# Patient Record
Sex: FEMALE
[De-identification: names, ages, dates, MRNs, and addresses within clinical notes are randomized per-mention and may not be internally consistent; named-entity substitution may affect disease eponyms.]

---

## 2017-12-09 NOTE — ED PDOC
Arrival/HPI





- General


Time Seen by Provider: 12/09/17 09:41


Historian: Patient





- History of Present Illness


Narrative History of Present Illness (Text): 





12/09/17 09:56


57 year old female, whose past medical history includes angina, multiple 

sclerosis, fibromyalgia, diabetes, hyperlipidemia, who presents complaining of 

intermittent midsternal chest pain associated with nausea, vomiting that began 

a week ago. She reports the pain radiates to her back and is similar to her 

past angina symptoms. Patient states she is visiting from [place] and traveled 

on 11/18/2017. She reports she has relief when taking her nitroglycerin, but 

did not have any with her at the time. She also states she her Hgb was 7 and 

missed an Iron treatment. Intermittent cough. Patient reports a fever at 100, 

but denies any chills, shortness of breath, diarrhea, urinary symptoms, neck 

pain, headache, dizziness, or any other complaints.  





PMD: Dr. Edwards in [place]





Time/Duration: 1 week


Symptom Onset: Gradual


Symptom Course: Intermittent


Activities at Onset: Light


Context: Home





Past Medical History





- Provider Review


Nursing Documentation Reviewed: Yes





- Travel History


Have you recently traveled outside [place] w/in the past 3 mons?: Yes


If Yes, travel location?: From [facility] to [place] (11/18/2017)





Family/Social History





- Physician Review


Nursing Documentation Reviewed: Yes


Family/Social History: Other (Cardiac history: Brother recent open heart 

surgery )





Allergies/Home Meds


Allergies/Adverse Reactions: 


Allergies





levofloxacin [From Levaquin] Allergy (Verified 12/09/17 09:59)


 RASH


iv dye Allergy (Uncoded 12/09/17 10:00)


 RASH








Home Medications: 


 Home Meds











 Medication  Instructions  Recorded  Confirmed


 


Aspirin [Ecotrin] 81 mg PO DAILY 12/09/17 12/09/17


 


Atropine/Diphenoxylate [Lonox 1 tab PO DAILY PRN 12/09/17 12/09/17





0.025 MG-2.5 MG]   


 


Clonazepam [Klonopin] 1 mg PO Q8 PRN 12/09/17 12/09/17


 


Insulin Human (NPH)/Regular 0 units SC AC PRN 12/09/17 12/09/17





[Novolin 70/30 (70/30 units/ml) 10   





ml]   


 


Losartan/Hydrochlorothiazide 1 tab PO BID 12/09/17 12/09/17





[Losartan-Hctz 100-12.5 mg Tab]   


 


Meloxicam [Mobic] 15 mg PO DAILY 12/09/17 12/09/17


 


Oxycodone HCl/Acetaminophen 1 tab PO Q6 PRN 12/09/17 12/09/17





[Percocet  mg Tablet]   


 


Pantoprazole [Protonix] 40 mg PO DAILY 12/09/17 12/09/17


 


amLODIPine [Norvasc] 10 mg PO DAILY 12/09/17 12/09/17














Review of Systems





- Physician Review


All systems were reviewed & negative as marked: Yes





- Review of Systems


Constitutional: Fevers.  absent: Other (Chills)


Respiratory: Cough.  absent: SOB


Cardiovascular: Chest Pain


Gastrointestinal: Nausea, Vomiting.  absent: Diarrhea


Genitourinary Female: absent: Dysuria, Frequency, Hematuria


Musculoskeletal: Back Pain.  absent: Neck Pain, Other (lower extremities edema)





Physical Exam


Vital Signs Reviewed: Yes


Vital Signs











  Temp Pulse Resp BP Pulse Ox


 


 12/09/17 11:36   88  18  141/98 H  98


 


 12/09/17 09:36  98.2 F  63  18  146/76  98











Temperature: Afebrile


Blood Pressure: Normal


Pulse: Regular


Respiratory Rate: Normal


Appearance: Positive for: Well-Appearing, Non-Toxic, Comfortable


Pain Distress: None


Mental Status: Positive for: Alert and Oriented X 3





- Systems Exam


Head: Present: Atraumatic, Normocephalic


Pupils: Present: PERRL


Extroacular Muscles: Present: EOMI


Conjunctiva: Present: Normal


Mouth: Present: Moist Mucous Membranes


Neck: Present: Normal Range of Motion


Respiratory/Chest: Present: Clear to Auscultation, Good Air Exchange, Other (

Right Chest wall port).  No: Respiratory Distress, Accessory Muscle Use


Cardiovascular: Present: Regular Rate and Rhythm, Normal S1, S2.  No: Murmurs


Abdomen: Present: Normal Bowel Sounds.  No: Tenderness, Distention, Peritoneal 

Signs


Back: Present: Normal Inspection


Upper Extremity: Present: Normal Inspection.  No: Cyanosis, Edema


Lower Extremity: Present: Normal Inspection.  No: Edema


Neurological: Present: GCS=15, CN II-XII Intact, Speech Normal


Skin: Present: Warm, Dry, Normal Color.  No: Rashes


Psychiatric: Present: Alert, Oriented x 3, Normal Insight, Normal Concentration





Medical Decision Making


ED Course and Treatment: 





12/09/17 09:56


Impression:


57 year old female presents complaining of midsternal chest pain that radiates 

to the back associated with nausea, vomiting, and cough for the past week. 





Differential Diagnosis included but are not limited to:  


R/O ACS. Lower probability for PE





Plan:


-- EKG 


-- Labs


-- Chest X-ray 


-- Urinalysis 


-- Reassess and disposition





Progress Notes:


EKG shows NSR at 61 BPM with low voltage. Otherwise with normal. Interpreted by 

me. 





12/09/17 10:21


CXR Impression: As read by me, NAD. 





Chest pain has improved. Nitrostat and Aspirin was given on the field. 


 


PROCEDURE: Chest X-ray 


Dictator : Gilles Douglas MD


Report Date : 12/09/2017 10:55:46


IMPRESSION:


No acute consolidation. There may be some minimal linear scarring left lateral 

lower lung field.








12/09/17 14:16


CXR nl. EKG NSR. Chest pain resolved. Case discussed with Dr. Foster who 

will place the patient under her service. 





- Lab Interpretations


Lab Results: 








 12/09/17 10:10 





 12/09/17 10:10 





 Lab Results





12/09/17 11:25: Urine Color Yellow, Urine Appearance Slight-cloudy, Urine pH 6.0

, Ur Specific Gravity 1.020, Urine Protein Trace H, Urine Glucose (UA) Negative

, Urine Ketones Negative, Urine Blood Negative, Urine Nitrate Positive H, Urine 

Bilirubin Negative, Urine Urobilinogen 1.0 H, Ur Leukocyte Esterase Small H, 

Urine RBC 0 - 2, Urine WBC 5 - 10, Ur Epithelial Cells 3 - 4, Urine Bacteria 

Many


12/09/17 10:10: Sodium 141, Potassium 3.5 L, Chloride 107, Carbon Dioxide 26, 

Anion Gap 12, BUN 14, Creatinine 0.8, Est GFR (African Amer) > 60, Est GFR (Non-

Af Amer) > 60, Random Glucose 81, Calcium 8.8, Magnesium 1.5 L, Total Bilirubin 

0.3, AST 24, ALT 30, Alkaline Phosphatase 84, Lactate Dehydrogenase 381, Total 

Creatine Kinase 34 L, Troponin I < 0.01, NT-Pro-B Natriuret Pep 526 H, Total 

Protein 6.5, Albumin 3.3, Globulin 3.3, Albumin/Globulin Ratio 1.0 L


12/09/17 10:10: PT 12.6 H, INR 1.14 H, APTT 33.7, D-Dimer, Quantitative 856 H


12/09/17 10:10: WBC 3.3 L, RBC 3.04 L, Hgb 9.5 L, Hct 30.4 L, .0, MCH 

31.3, MCHC 31.3, RDW 14.1, Plt Count 185, MPV 11.0, Gran % 67.0, Lymph % (Auto) 

24.0, Mono % (Auto) 7.8 H, Eos % (Auto) 0.9 L, Baso % (Auto) 0.3, Gran # 2.24, 

Lymph # 0.8 L, Mono # 0.3, Eos # 0.0, Baso # 0.01








I have reviewed the lab results: Yes





- RAD Interpretation


Radiology Orders: 








12/09/17 10:01


CHEST PORTABLE [RAD] Stat 





12/09/17 11:09


ANGIO CHEST PE PROTOCOL [CT] Stat 














- EKG Interpretation


Interpreted by ED Physician: Yes


Type: 12 lead EKG





- Medication Orders


Current Medication Orders: 











Discontinued Medications





Acetaminophen (Tylenol 325mg Tab)  975 mg PO STAT STA


   Stop: 12/09/17 10:20


   Last Admin: 12/09/17 10:36  Dose: 975 mg





MAR Pain/Vitals


 Document     12/09/17 10:36  EWO  (Rec: 12/09/17 10:36  [facility]  [id number])


     Pain Reassessment


      Is This A Pain ReAssessment?               No


     Sleep


      Is patient sleeping during reassessment?   No


     Presence of Pain


      Presence of Pain                           Yes


     Pain Scale Used


      Pain Scale Used                            Numeric


     Location


      Pain Location Body Site                    Head


      Description                                Constant


                                                 Pressure


      Intensity                                  4


      Scale Used                                 Numeric





Aspirin (Aspirin)  325 mg PO STAT STA


   Stop: 12/09/17 10:01


   Last Admin: 12/09/17 10:33  Dose:  





Diphenhydramine HCl (Benadryl)  50 mg IVP STAT STA


   Stop: 12/09/17 11:17


   Last Admin: 12/09/17 12:10  Dose: 50 mg





IVP Administration


 Document     12/09/17 12:10  EWO  (Rec: 12/09/17 12:11  [facility]  [id number])


     Charges for Administration


      # of IVP Administrations                   1





Ketorolac Tromethamine (Toradol)  30 mg IVP STAT STA


   Stop: 12/09/17 11:10


   Last Admin: 12/09/17 12:10  Dose: 30 mg





MAR Pain Assessment


 Document     12/09/17 12:10  EWO  (Rec: 12/09/17 12:10  [facility]  [id number])


     Pain Reassessment


      Is this a pain reassessment?               Yes


     Sleep


      Is patient sleeping during reassessment?   No


     Presence of Pain


      Presence of Pain                           Yes


     Pain Scale Used


      Pain Scale Used                            Numeric


     Location


      Pain Location Body Site                    Head


IVP Administration


 Document     12/09/17 12:10  EWO  (Rec: 12/09/17 12:10  [facility]  [id number])


     Charges for Administration


      # of IVP Administrations                   1





Magnesium Oxide (Mag-Ox)  400 mg PO STAT STA


   Stop: 12/09/17 10:56


   Last Admin: 12/09/17 12:09  Dose: 400 mg





Nitroglycerin (Nitro-Bid 2% Oint)  1 ea TOP STAT STA


   Stop: 12/09/17 10:20


   Last Admin: 12/09/17 10:36  Dose: 1 ea





Ondansetron HCl (Zofran Inj)  4 mg IVP STAT STA


   Stop: 12/09/17 10:49


   Last Admin: 12/09/17 12:10  Dose: 4 mg





IVP Administration


 Document     12/09/17 12:10  EWO  (Rec: 12/09/17 12:10  [facility]  [id number])


     Charges for Administration


      # of IVP Administrations                   1





Potassium Chloride (K-Dur 20 Meq Er Tab)  40 meq PO STAT STA


   Stop: 12/09/17 10:56


   Last Admin: 12/09/17 12:09  Dose: 40 meq











- Scribe Statement


The provider has reviewed the documentation as recorded by the Ivelisse Levy





Provider Scribe Attestation:


All medical record entries made by the Ivelisse were at my direction and 

personally dictated by me. I have reviewed the chart and agree that the record 

accurately reflects my personal performance of the history, physical exam, 

medical decision making, and the department course for this patient. I have 

also personally directed, reviewed, and agree with the discharge instructions 

and disposition.








Disposition/Present on Arrival





- Present on Arrival


Any Indicators Present on Arrival: No





- Disposition


Have Diagnosis and Disposition been Completed?: Yes


Diagnosis: 


 Chest pain





Disposition Time: 14:17


Patient Plan: Observation


Condition: FAIR


Discharge Instructions (ExitCare):  Chest Pain (ED)


Referrals: 


Micaela Lema, [Primary Care Provider] - Follow up with primary

## 2017-12-09 NOTE — CT
PROCEDURE:  CT Chest with contrast (Pulmonary Angiogram)



HISTORY:

The chest pain. .  Elevated D dimer ; r/o PE



COMPARISON:

None available. 



TECHNIQUE:

Axial computed tomography images were obtained of the chest in the 

pulmonary arterial phase of enhancement. Coronal and sagittal 

reformatted images were created and reviewed.



Intravenous contrast dose: 



Radiation dose:



Total exam DLP = 667.43 mGy-cm.



This CT exam was performed using one or more of the following dose 

reduction techniques: Automated exposure control, adjustment of the 

mA and/or kV according to patient size, and/or use of iterative 

reconstruction technique.



FINDINGS:



PULMONARY ARTERIES:

The visualized pulmonary trunk, right and left main, lobar, segmental 

and proximal subsegmental branches of the pulmonary arteries are well 

opacified with no definitive filling defects seen to suggest acute 

central pulmonary embolus. Pulmonary trunk measures approximately 3.1 

cm. 



In situ right IJ MediPort with tip in the SVC. 



AORTA:

No acute findings. No thoracic aortic aneurysm. Ascending thoracic 

aorta slightly dilated measuring approximately 3.73 cm. Descending 

thoracic aorta measures approximately 2.53 cm. 



LUNGS:

Minor atelectasis/scarring changes seen in both lung bases.  No focal 

consolidation. No parenchymal masses or obvious nodules 



PLEURAL SPACES:

Unremarkable. No effusion or pneumothorax. 



HEART:

Heart is borderline/mildly enlarged. No significant pericardial 

effusion. . In situ bipolar pacemaker 



LYMPH NODES:

No significant mediastinal or hilar adenopathy.  Note is made of 

fluid throughout the entire esophagus.  Clinical correlation 

recommended to exclude dysmotility or reflux. .  There is a small 

hiatal hernia.



BONES, CHEST WALL:

Mild multilevel degenerative spondylosis of the thoracic spine.  

There are no acute compression fractures nor retropulsed fragments.



OTHER FINDINGS:

The spleen appears enlarged measuring approximately 15.6 cm in AP 

dimension. 



Postoperative changes of the stomach.  Clinical correlation with 

surgical history recommended 



IMPRESSION:

No evidence of central pulmonary embolus.



Mild splenomegaly.



Postoperative changes of the stomach.  Clinical correlation with 

surgical history.

## 2017-12-09 NOTE — CP.PCM.HP
<Naga Ralph - Last Filed: 12/09/17 19:05>





History of Present Illness





- History of Present Illness


History of Present Illness: 





57 year old female with a past medical history of multiple sclerosis, angina, 

Fibromyalgia, bradycardia (s/p Pacemaker), Diabetes mellitus, hyperlipidemia, 

colitis, obesity(s/p gastric bypass) who comes in complaining of mid-sternal 

chest pain associated with nausea and radiates to the back.  The patient rates 

the pain a 7/10 in severity.  The patient who is visiting from [place] also 

reports a headache in conjunction with the pain.  The patient was recently 

hospitalized in [place] for the same complaint about one month ago.  The 

patient denies any fevers, chills, vomiting, changes in vision, lightheadedness

, dizziness, syncopal episodes, or any other complaints.





Past medical history: See HPI


Medications: Reviewed, see MAR


Past surgical history: Gastric bypass, Unspecified small intestine surgery


Past hospitalization: One month ago for chest pain in [place]


Social history: Denies smoking or alcohol history. Denies illicit drug use. 

Lives with daughter. . Performs ADL's and IDL's with help of home 

services and walker. Expected to return to [place] January 1st.





PMD: Dr. Edwards


Hematologist: Dr. Love





Present on Admission





- Present on Admission


Any Indicators Present on Admission: No





Review of Systems





- Constitutional


Constitutional: As Per HPI





- EENT


Eyes: As Per HPI


Ears: As Per HPI


Nose/Mouth/Throat: As Per HPI





- Cardiovascular


Cardiovascular: As Per HPI





- Respiratory


Respiratory: As Per HPI





- Gastrointestinal


Gastrointestinal: As Per HPI





- Genitourinary


Genitourinary: As Per HPI





- Musculoskeletal


Musculoskeletal: As Per HPI





- Integumentary


Integumentary: As Per HPI





- Neurological


Neurological: As Per HPI





- Psychiatric


Psychiatric: As Per HPI





- Endocrine


Endocrine: As Per HPI





- Hematologic/Lymphatic


Hematologic: As Per HPI





Past Patient History





- Infectious Disease


Hx of Infectious Diseases: None





- Past Social History


Smoking Status: Unknown If Ever Smoked





- CARDIAC


Hx Hypertension: Yes


Hx Pacemaker: Yes





- ENDOCRINE/METABOLIC


Hx Diabetes Mellitus Type 2: Yes





- PSYCHIATRIC


Hx Substance Use: No





- ANESTHESIA


Hx Anesthesia: No





Meds


Allergies/Adverse Reactions: 


 Allergies











Allergy/AdvReac Type Severity Reaction Status Date / Time


 


levofloxacin [From Levaquin] Allergy  RASH Verified 12/09/17 14:45


 


iv dye Allergy  RASH Uncoded 12/09/17 14:45














Physical Exam





- Head Exam


Head Exam: ATRAUMATIC, NORMAL INSPECTION, NORMOCEPHALIC





- Eye Exam


Eye Exam: EOMI, Normal appearance, PERRL


Pupil Exam: NORMAL ACCOMODATION, PERRL.  absent: Irregular, Unequal





- ENT Exam


ENT Exam: Mucous Membranes Moist, Normal Exam, Normal Oropharynx





- Neck Exam


Neck exam: Positive for: Normal Inspection.  Negative for: Lymphadenopathy, 

Thyromegaly





- Respiratory Exam


Respiratory Exam: Clear to Auscultation Bilateral, NORMAL BREATHING PATTERN.  

absent: Chest Wall Tenderness, Prolonged Expiratory Phase, Respiratory Distress





- Cardiovascular Exam


Cardiovascular Exam: REGULAR RHYTHM, RRR, +S1, +S2.  absent: Gallop, Rubs





- GI/Abdominal Exam


GI & Abdominal Exam: Normal Bowel Sounds, Soft.  absent: Organomegaly, 

Tenderness





- Extremities Exam


Extremities exam: Positive for: full ROM, normal inspection.  Negative for: 

joint swelling, pedal edema, tenderness





- Back Exam


Back exam: NORMAL INSPECTION.  absent: CVA tenderness (L), CVA tenderness (R), 

paraspinal tenderness





- Neurological Exam


Neurological exam: Alert, CN II-XII Intact, Oriented x3





- Psychiatric Exam


Psychiatric exam: Normal Affect, Normal Mood





- Skin


Skin Exam: Dry, Intact, Normal Color





Results





- Vital Signs


Recent Vital Signs: 





 Last Vital Signs











Temp  98.9 F   12/09/17 13:00


 


Pulse  78   12/09/17 15:00


 


Resp  18   12/09/17 15:00


 


BP  118/80   12/09/17 15:00


 


Pulse Ox  98   12/09/17 15:00














- Labs


Result Diagrams: 


 12/09/17 10:10





 12/09/17 10:10


Labs: 





 Laboratory Results - last 24 hr











  12/09/17 12/09/17 12/09/17





  10:10 10:10 10:10


 


WBC  3.3 L  


 


RBC  3.04 L  


 


Hgb  9.5 L  


 


Hct  30.4 L  


 


MCV  100.0  


 


MCH  31.3  


 


MCHC  31.3  


 


RDW  14.1  


 


Plt Count  185  


 


MPV  11.0  


 


Gran %  67.0  


 


Lymph % (Auto)  24.0  


 


Mono % (Auto)  7.8 H  


 


Eos % (Auto)  0.9 L  


 


Baso % (Auto)  0.3  


 


Gran #  2.24  


 


Lymph #  0.8 L  


 


Mono #  0.3  


 


Eos #  0.0  


 


Baso #  0.01  


 


PT   12.6 H 


 


INR   1.14 H 


 


APTT   33.7 


 


D-Dimer, Quantitative   856 H 


 


Sodium    141


 


Potassium    3.5 L


 


Chloride    107


 


Carbon Dioxide    26


 


Anion Gap    12


 


BUN    14


 


Creatinine    0.8


 


Est GFR ( Amer)    > 60


 


Est GFR (Non-Af Amer)    > 60


 


Random Glucose    81


 


Calcium    8.8


 


Magnesium    1.5 L


 


Total Bilirubin    0.3


 


AST    24


 


ALT    30


 


Alkaline Phosphatase    84


 


Lactate Dehydrogenase    381


 


Total Creatine Kinase    34 L


 


Troponin I    < 0.01


 


NT-Pro-B Natriuret Pep    526 H


 


Total Protein    6.5


 


Albumin    3.3


 


Globulin    3.3


 


Albumin/Globulin Ratio    1.0 L


 


Urine Color   


 


Urine Appearance   


 


Urine pH   


 


Ur Specific Gravity   


 


Urine Protein   


 


Urine Glucose (UA)   


 


Urine Ketones   


 


Urine Blood   


 


Urine Nitrate   


 


Urine Bilirubin   


 


Urine Urobilinogen   


 


Ur Leukocyte Esterase   


 


Urine RBC   


 


Urine WBC   


 


Ur Epithelial Cells   


 


Urine Bacteria   














  12/09/17





  11:25


 


WBC 


 


RBC 


 


Hgb 


 


Hct 


 


MCV 


 


MCH 


 


MCHC 


 


RDW 


 


Plt Count 


 


MPV 


 


Gran % 


 


Lymph % (Auto) 


 


Mono % (Auto) 


 


Eos % (Auto) 


 


Baso % (Auto) 


 


Gran # 


 


Lymph # 


 


Mono # 


 


Eos # 


 


Baso # 


 


PT 


 


INR 


 


APTT 


 


D-Dimer, Quantitative 


 


Sodium 


 


Potassium 


 


Chloride 


 


Carbon Dioxide 


 


Anion Gap 


 


BUN 


 


Creatinine 


 


Est GFR ( Amer) 


 


Est GFR (Non-Af Amer) 


 


Random Glucose 


 


Calcium 


 


Magnesium 


 


Total Bilirubin 


 


AST 


 


ALT 


 


Alkaline Phosphatase 


 


Lactate Dehydrogenase 


 


Total Creatine Kinase 


 


Troponin I 


 


NT-Pro-B Natriuret Pep 


 


Total Protein 


 


Albumin 


 


Globulin 


 


Albumin/Globulin Ratio 


 


Urine Color  Yellow


 


Urine Appearance  Slight-cloudy


 


Urine pH  6.0


 


Ur Specific Gravity  1.020


 


Urine Protein  Trace H


 


Urine Glucose (UA)  Negative


 


Urine Ketones  Negative


 


Urine Blood  Negative


 


Urine Nitrate  Positive H


 


Urine Bilirubin  Negative


 


Urine Urobilinogen  1.0 H


 


Ur Leukocyte Esterase  Small H


 


Urine RBC  0 - 2


 


Urine WBC  5 - 10


 


Ur Epithelial Cells  3 - 4


 


Urine Bacteria  Many














Assessment & Plan





- Assessment and Plan (Free Text)


Assessment: 





57 year old female with a past medical history of dm, hld, colitis, m.s., angina

, and fibromyalgia who is being admitted for chest pain r/o acs.





Plan: 





1. Chest pain r/o ACS.


-1 Week of Chest pain reported by patient.


-Trending troponins x3. Troponin (-)x1.


-Cardiology consulted. Will f/u with rec's.


-Lipid panel ordered. Will f/u with results.


-Aspirin 325 give in the E.D. Start 81 mg Daily.





2.history of Anemia


-Hemoglobin 9.5 upon admission. Unable to trend since patient is from [place].


-Patient has Port-o-cath for IV iron treatments.


-One 200 Venofer dose to be given today.


-Will continue to follow up serial CBC's.





3. UTI


-u/A had positive Nitrates, and many urine bacteria.


-Urine cx ordered .Will f/u with results.


-Rocephin 1 gm q24.





4.D.M.


-hold home medications.


-ISS. Accuchecks.


-Diabetic diet.





5. Hyperlipidemia


-lipid panel ordered. Will f/u with results.


-continue home medications.





6.Fibromyalgia


-continue home meds.





7. Multiple sclerosis


- continue home meds.





GI ppx


-Protonix


DVT ppx


-Heparin

















<Ariana Foster - Last Filed: 12/10/17 12:20>





Results





- Vital Signs


Recent Vital Signs: 





 Last Vital Signs











Temp  98.0 F   12/10/17 07:30


 


Pulse  60   12/10/17 07:30


 


Resp  18   12/10/17 07:30


 


BP  125/82   12/10/17 07:30


 


Pulse Ox  98   12/10/17 07:30














- Labs


Result Diagrams: 


 12/10/17 07:30





 12/10/17 07:30


Labs: 





 Laboratory Results - last 24 hr











  12/09/17 12/09/17 12/09/17





  16:07 17:00 21:47


 


WBC   


 


RBC   


 


Hgb   


 


Hct   


 


MCV   


 


MCH   


 


MCHC   


 


RDW   


 


Plt Count   


 


MPV   


 


Gran %   


 


Lymph % (Auto)   


 


Mono % (Auto)   


 


Eos % (Auto)   


 


Baso % (Auto)   


 


Gran #   


 


Lymph #   


 


Mono #   


 


Eos #   


 


Baso #   


 


Sodium   


 


Potassium   


 


Chloride   


 


Carbon Dioxide   


 


Anion Gap   


 


BUN   


 


Creatinine   


 


Est GFR ( Amer)   


 


Est GFR (Non-Af Amer)   


 


POC Glucose (mg/dL)  95   116 H


 


Random Glucose   


 


Calcium   


 


Total Bilirubin   


 


AST   


 


ALT   


 


Alkaline Phosphatase   


 


Lactate Dehydrogenase   


 


Total Creatine Kinase   


 


Troponin I   < 0.01 


 


Total Protein   


 


Albumin   


 


Globulin   


 


Albumin/Globulin Ratio   














  12/09/17 12/10/17 12/10/17





  23:00 07:30 07:30


 


WBC   2.6 L* D 


 


RBC   3.12 L 


 


Hgb   9.6 L 


 


Hct   30.9 L 


 


MCV   99.0 


 


MCH   30.8 


 


MCHC   31.1 


 


RDW   14.2 


 


Plt Count   163 


 


MPV   11.6 H 


 


Gran %   69.2 H 


 


Lymph % (Auto)   21.4 L 


 


Mono % (Auto)   7.8 H 


 


Eos % (Auto)   1.2 L 


 


Baso % (Auto)   0.4 


 


Gran #   1.78 


 


Lymph #   0.6 L 


 


Mono #   0.2 


 


Eos #   0.0 


 


Baso #   0.01 


 


Sodium    142


 


Potassium    4.6


 


Chloride    105


 


Carbon Dioxide    28


 


Anion Gap    13


 


BUN    10


 


Creatinine    0.8


 


Est GFR ( Amer)    > 60


 


Est GFR (Non-Af Amer)    > 60


 


POC Glucose (mg/dL)   


 


Random Glucose    85


 


Calcium    9.2


 


Total Bilirubin    0.3


 


AST    26


 


ALT    27


 


Alkaline Phosphatase    87


 


Lactate Dehydrogenase   


 


Total Creatine Kinase   


 


Troponin I  < 0.01  


 


Total Protein    6.5


 


Albumin    3.4


 


Globulin    3.1


 


Albumin/Globulin Ratio    1.1














  12/10/17





  08:30


 


WBC 


 


RBC 


 


Hgb 


 


Hct 


 


MCV 


 


MCH 


 


MCHC 


 


RDW 


 


Plt Count 


 


MPV 


 


Gran % 


 


Lymph % (Auto) 


 


Mono % (Auto) 


 


Eos % (Auto) 


 


Baso % (Auto) 


 


Gran # 


 


Lymph # 


 


Mono # 


 


Eos # 


 


Baso # 


 


Sodium 


 


Potassium 


 


Chloride 


 


Carbon Dioxide 


 


Anion Gap 


 


BUN 


 


Creatinine 


 


Est GFR ( Amer) 


 


Est GFR (Non-Af Amer) 


 


POC Glucose (mg/dL) 


 


Random Glucose 


 


Calcium 


 


Total Bilirubin 


 


AST 


 


ALT 


 


Alkaline Phosphatase 


 


Lactate Dehydrogenase  429


 


Total Creatine Kinase  31 L


 


Troponin I  < 0.01


 


Total Protein 


 


Albumin 


 


Globulin 


 


Albumin/Globulin Ratio 














Attending/Attestation





- Attestation


I have personally seen and examined this patient.: Yes


I have fully participated in the care of the patient.: Yes


I have reviewed all pertinent clinical information: Yes


Notes (Text): 





12/10/17 12:17





Attending note;





Patient seen and examined with resident in ER.





Patient is a 57 year old female with a past medical history of multiple 

sclerosis, Fibromyalgia, bradycardia (s/p Pacemaker), Diabetes mellitus, 

hyperlipidemia, colitis, obesity(s/p gastric bypass), opiate dependency is 

admitted for chest pain.


EKG showed no significant changes/pacer present. Cardiac enzymes 1 negative.


CT angios negative for pulmonary embolus.





Patient was  admitted in [facility] for chest pain in June. Workup 

negative as per patient.


Does not remember stress test.


Patient had remote history of cardiac cath about 10 years ago which was 

negative.


Cardiology evaluation requested.





Obesity/status post gastric bypass surgery; anemia. Started on IV iron. 

Hemoglobin is stable.





Questionable history of MS;walks with a walker at home. also getting physical 

therapy at home.





Chronic opiate dependency; Percocet when necessary. Patient was given Toradol.





Currently denies any headache, abdominal pain.





Admit to telemetry. Monitor closely.





Possible discharge home tomorrow.

## 2017-12-09 NOTE — RAD
HISTORY:

chest pain  



COMPARISON:

No prior study available for comparison 



FINDINGS:

Right IJ MediPort tip in the SVC. 



LUNGS:

Lung fields are free of focal consolidation though there may be some 

minimal linear scarring in the left lateral lower lung field.



PLEURA:

No significant pleural effusion identified, no pneumothorax apparent.



CARDIOVASCULAR:

Heart is enlarged. In situ bipolar pacemaker



OSSEOUS STRUCTURES:

No significant abnormalities.



VISUALIZED UPPER ABDOMEN:

Normal.



OTHER FINDINGS:

None.



IMPRESSION:

No acute consolidation. There may be some minimal linear scarring 

left lateral lower lung field.

## 2017-12-10 NOTE — CP.PCM.PN
<Daniel Blount - Last Filed: 12/10/17 12:30>





Subjective





- Date & Time of Evaluation


Date of Evaluation: 12/10/17


Time of Evaluation: 09:10





- Subjective


Subjective: 


Daniel Blount DO, PGY-1: Hospitalist Service


Patient seen and examined at bedside. Patient reports chest pain with exertion 

and a headache for 1 week that has gradually worsening. Patient also reports 

one week of malaise and generalized weakness and has not taken her Copaxone (MS 

medication) for over 2.5 weeks given that she left it at home in [place]. 








Objective





- Vital Signs/Intake and Output


Vital Signs (last 24 hours): 


 











Temp Pulse Resp BP Pulse Ox


 


 98.0 F   60   18   125/82   98 


 


 12/10/17 07:30  12/10/17 07:30  12/10/17 07:30  12/10/17 07:30  12/10/17 07:30








Intake and Output: 


 











 12/10/17 12/10/17





 06:59 18:59


 


Intake Total 300 


 


Output Total 0 


 


Balance 300 














- Medications


Medications: 


 Current Medications





Acetaminophen (Tylenol 325mg Tab)  650 mg PO Q6H PRN


   PRN Reason: Pain, moderate (4-7)


   Last Admin: 12/10/17 05:48 Dose:  650 mg


Aspirin (Ecotrin)  81 mg PO DAILY JEFF


   Last Admin: 12/10/17 10:00 Dose:  81 mg


Clonazepam (Klonopin)  1 mg PO Q8 PRN; Protocol


   PRN Reason: Anxiety


   Last Admin: 12/10/17 05:44 Dose:  1 mg


Diphenhydramine HCl (Benadryl)  25 mg PO HS PRN


   PRN Reason: Insomnia


   Last Admin: 12/10/17 10:01 Dose:  25 mg


Diphenoxylate HCl/Atropine (Lomotil 0.025-2.5 Mg Tablet)  1 tab PO DAILY PRN


   PRN Reason: Diarrhea


Hydrochlorothiazide (Microzide)  12.5 mg PO DAILY JEFF


   Last Admin: 12/10/17 10:00 Dose:  12.5 mg


Ceftriaxone Sodium (Rocephin 1 Gram Ivpb (D5w))  1 gm in 100 mls @ 100 mls/hr 

IVPB DAILY JEFF


   PRN Reason: Protocol


   Last Admin: 12/10/17 10:00 Dose:  100 mls/hr


Insulin Human Regular (Humulin R Low)  0 units SC ACHS [facility]


   PRN Reason: Protocol


   Last Admin: 12/10/17 07:34 Dose:  Not Given


Losartan Potassium (Cozaar)  100 mg PO DAILY [facility]


   Last Admin: 12/10/17 10:00 Dose:  100 mg


Home Med ( Venlafaxine [Effexor 50 Mg Tab] 100 Mg)  100 mg PO BID [facility]


Ondansetron HCl (Zofran Inj)  4 mg IVP Q8H PRN


   PRN Reason: Nausea/Vomiting


   Last Admin: 12/10/17 11:07 Dose:  4 mg


Oxycodone/Acetaminophen (Percocet 5/325 Mg Tab)  1 tab PO Q6H PRN


   PRN Reason: Pain, moderate (4-7)


   Stop: 12/13/17 08:18


   Last Admin: 12/10/17 08:24 Dose:  1 tab


Pantoprazole Sodium (Protonix Ec Tab)  40 mg PO 0600 [facility]


   Last Admin: 12/10/17 05:44 Dose:  40 mg











- Labs


Labs: 


 





 12/10/17 07:30 





 12/10/17 07:30 





 











PT  12.6 SECONDS (9.4-12.5)  H  12/09/17  10:10    


 


INR  1.14  (0.93-1.08)  H  12/09/17  10:10    


 


APTT  33.7 Seconds (25.1-36.5)   12/09/17  10:10    














- Constitutional


Appears: Non-toxic, No Acute Distress, Older Than Stated Age





- Head Exam


Head Exam: ATRAUMATIC





- Eye Exam


Additional comments: 


left eye ptosis








- ENT Exam


ENT Exam: Mucous Membranes Moist, Normal Oropharynx





- Neck Exam


Neck Exam: Normal Inspection





- Respiratory Exam


Respiratory Exam: Clear to Ausculation Bilateral, NORMAL BREATHING PATTERN





- Cardiovascular Exam


Cardiovascular Exam: RRR, +S1, +S2





- GI/Abdominal Exam


GI & Abdominal Exam: Soft, Normal Bowel Sounds





- Extremities Exam


Extremities Exam: Normal Inspection.  absent: Pedal Edema





- Back Exam


Back Exam: NORMAL INSPECTION.  absent: CVA tenderness (L), CVA tenderness (R)





- Neurological Exam


Neurological Exam: Alert, CN II-XII Intact, Oriented x3


Neuro motor strength exam: Left Upper Extremity: 4, Right Upper Extremity: 5, 

Left Lower Extremity: 4, Right Lower Extremity: 5





- Psychiatric Exam


Psychiatric exam: Normal Affect, Normal Mood





- Skin


Skin Exam: Dry, Intact, Normal Color, Warm





Assessment and Plan





- Assessment and Plan (Free Text)


Assessment: 


57 year old female with a past medical history of Diabetes Mellitus, 

Hyperlipidemia, Multiple Sclerosis, Angina, and Fibromyalgia who is being 

admitted for chest pain. Prior to discharge, patient began to complain of 

gradually worsening headache and bilateral lower extremity pain.   


Plan: 


1) Chest pain


- ACS ruled out; troponin x3 negative, EKG showed paced rhythm with no acute ST-

T wave changes.


- CT angiogram of the chest was negative for pulmonary embolism.


- Cardiology consulted, appreciate recommendations.


-Lipid panel ordered, TG 91, cholesterol 116, LDL 51, HDL of 45 


-Aspirin 325 give in the E.D. Start 81 mg Daily.





2) Subacute headache with concomitant lower extremity pain: This could 

represent a MS flair as patient has been 


    without MS medication for 2.5 weeks.


- CT head without contrast to rule out acute stroke or hemmorhage.


- Neurology consulted





3) History of Anemia


-Hemoglobin 9.5 upon admission. Unable to trend since patient is from [place].


-Patient has Port-o-cath for IV iron treatments.


-One 200 Venofer dose to be given today.


-Will continue to follow up serial CBC's.





4) UTI


-u/A had positive Nitrates, and many urine bacteria.


-Urine cx ordered .Will f/u with results.


-Rocephin 1 gm q24.





4.D.M.


-hold home medications.


-ISS. Accuchecks.


-Diabetic diet.





5. Hyperlipidemia


-lipid panel ordered. Will f/u with results.


-continue home medications.





6.Fibromyalgia


- Effexor 75 mg PO, as 50 mg is not carried in pharmacy 





7) Multiple sclerosis


- Neurology consulted, for possible MS flair





GI ppx


-Protonix


DVT ppx


-Heparin








<Rangasamy,Ajantha - Last Filed: 12/10/17 15:35>





Objective





- Vital Signs/Intake and Output


Vital Signs (last 24 hours): 


 











Temp Pulse Resp BP Pulse Ox


 


 98.4 F   65   20   145/82   98 


 


 12/10/17 12:00  12/10/17 12:00  12/10/17 12:00  12/10/17 12:00  12/10/17 07:30








Intake and Output: 


 











 12/10/17 12/10/17





 06:59 18:59


 


Intake Total 300 


 


Output Total 0 


 


Balance 300 














- Medications


Medications: 


 Current Medications





Acetaminophen (Tylenol 325mg Tab)  650 mg PO Q6H PRN


   PRN Reason: Pain, moderate (4-7)


   Last Admin: 12/10/17 05:48 Dose:  650 mg


Aspirin (Ecotrin)  81 mg PO DAILY [facility]


   Last Admin: 12/10/17 10:00 Dose:  81 mg


Clonazepam (Klonopin)  1 mg PO Q8 PRN; Protocol


   PRN Reason: Anxiety


   Last Admin: 12/10/17 05:44 Dose:  1 mg


Diphenhydramine HCl (Benadryl)  25 mg PO HS PRN


   PRN Reason: Insomnia


   Last Admin: 12/10/17 10:01 Dose:  25 mg


Diphenoxylate HCl/Atropine (Lomotil 0.025-2.5 Mg Tablet)  1 tab PO DAILY PRN


   PRN Reason: Diarrhea


Hydrochlorothiazide (Microzide)  12.5 mg PO DAILY [facility]


   Last Admin: 12/10/17 10:00 Dose:  12.5 mg


Ceftriaxone Sodium (Rocephin 1 Gram Ivpb (D5w))  1 gm in 100 mls @ 100 mls/hr 

IVPB DAILY [facility]


   PRN Reason: Protocol


   Last Admin: 12/10/17 10:00 Dose:  100 mls/hr


Insulin Human Regular (Humulin R Low)  0 units SC ACHS [facility]


   PRN Reason: Protocol


   Last Admin: 12/10/17 11:55 Dose:  Not Given


Losartan Potassium (Cozaar)  100 mg PO DAILY [facility]


   Last Admin: 12/10/17 10:00 Dose:  100 mg


Multivitamins/Minerals (Therapeutic-M Tab)  1 tab PO 0800 [facility]


Ondansetron HCl (Zofran Inj)  4 mg IVP Q8H PRN


   PRN Reason: Nausea/Vomiting


   Last Admin: 12/10/17 11:07 Dose:  4 mg


Oxycodone/Acetaminophen (Percocet 5/325 Mg Tab)  1 tab PO Q6H PRN


   PRN Reason: Pain, moderate (4-7)


   Stop: 12/13/17 08:18


   Last Admin: 12/10/17 08:24 Dose:  1 tab


Pantoprazole Sodium (Protonix Ec Tab)  40 mg PO 0600 [facility]


   Last Admin: 12/10/17 05:44 Dose:  40 mg


Venlafaxine HCl (Effexor Xr)  75 mg PO DAILY [facility]


   Last Admin: 12/10/17 13:27 Dose:  75 mg











- Labs


Labs: 


 





 12/10/17 07:30 





 12/10/17 07:30 





 











PT  12.6 SECONDS (9.4-12.5)  H  12/09/17  10:10    


 


INR  1.14  (0.93-1.08)  H  12/09/17  10:10    


 


APTT  33.7 Seconds (25.1-36.5)   12/09/17  10:10    














Attending/Attestation





- Attestation


I have personally seen and examined this patient.: Yes


I have fully participated in the care of the patient.: Yes


I have reviewed all pertinent clinical information, including history, physical 

exam and plan: Yes


Notes (Text): 





12/10/17 15:27





Attending note;





Patient seen and examined with resident.





Patient is a 57 year old female with a past medical history of multiple 

sclerosis, Fibromyalgia, bradycardia (s/p Pacemaker), Diabetes mellitus, 

hyperlipidemia, colitis, obesity(s/p gastric bypass), opiate dependency is 

admitted for chest pain.


EKG showed no significant changes/pacer present. Cardiac enzymes 3 negative.


CT angios negative for pulmonary embolus.


cardiology evaluation with  appreciated.cleared from cardiology point of 

view.





Questionable history of MS;walks with a walker at home. also getting physical 

therapy at home.


currently comaining of head ache; requesting Percocet and Toradol. Patient did 

not bring any pain medications from [place].


Opiate seeking behavior suspected.


CT head ordered.





multiple symptoms; rule out somatization disorder. we will Follow-up patient 

closely.





Chronic opiate dependency; Percocet and toradol prn ordered.





upon discharge the patient will follow-up with PMD in [place].

## 2017-12-10 NOTE — CARD
--------------- APPROVED REPORT --------------





EKG Measurement

Heart [id number]

[id number]

[id number]

[id number]

[id number]



<Conclusion>

Normal sinus rhythm

Low voltage QRS

Nonspecific ST and T wave abnormality

Base line artefact please repeat

Abnormal ECG

## 2017-12-10 NOTE — CP.PCM.DIS
Provider





- Provider


Date of Admission: 


12/09/17 14:11





Attending physician: 


Natanael Saha MD





Primary care physician: 


Micaela Profile Required





Consults: 


Dr. Anderson Cardiology








Time Spent in preparation of Discharge (in minutes): 35





Hospital Course





- Lab Results


Lab Results: 


 Most Recent Lab Values











WBC  2.6 10^3/ul (4.5-11.0)  L* D 12/10/17  07:30    


 


RBC  3.12 10^6/uL (3.5-6.1)  L  12/10/17  07:30    


 


Hgb  9.6 g/dL (12.0-16.0)  L  12/10/17  07:30    


 


Hct  30.9 % (36.0-48.0)  L  12/10/17  07:30    


 


MCV  99.0 fl (80.0-105.0)   12/10/17  07:30    


 


MCH  30.8 pg (25.0-35.0)   12/10/17  07:30    


 


MCHC  31.1 g/dl (31.0-37.0)   12/10/17  07:30    


 


RDW  14.2 % (11.5-14.5)   12/10/17  07:30    


 


Plt Count  163 10^3/uL (120.0-450.0)   12/10/17  07:30    


 


MPV  11.6 fl (7.0-11.0)  H  12/10/17  07:30    


 


Gran %  69.2 % (50.0-68.0)  H  12/10/17  07:30    


 


Lymph % (Auto)  21.4 % (22.0-35.0)  L  12/10/17  07:30    


 


Mono % (Auto)  7.8 % (1.0-6.0)  H  12/10/17  07:30    


 


Eos % (Auto)  1.2 % (1.5-5.0)  L  12/10/17  07:30    


 


Baso % (Auto)  0.4 % (0.0-3.0)   12/10/17  07:30    


 


Gran #  1.78  (1.4-6.5)   12/10/17  07:30    


 


Lymph #  0.6  (1.2-3.4)  L  12/10/17  07:30    


 


Mono #  0.2  (0.1-0.6)   12/10/17  07:30    


 


Eos #  0.0  (0.0-0.7)   12/10/17  07:30    


 


Baso #  0.01 K/mm3 (0.0-2.0)   12/10/17  07:30    


 


PT  12.6 SECONDS (9.4-12.5)  H  12/09/17  10:10    


 


INR  1.14  (0.93-1.08)  H  12/09/17  10:10    


 


APTT  33.7 Seconds (25.1-36.5)   12/09/17  10:10    


 


D-Dimer, Quantitative  856 ng/mL (0-243)  H  12/09/17  10:10    


 


Sodium  142 mmol/L (132-148)   12/10/17  07:30    


 


Potassium  4.6 mmol/L (3.6-5.0)   12/10/17  07:30    


 


Chloride  105 mmol/L ()   12/10/17  07:30    


 


Carbon Dioxide  28 mmol/L (21-33)   12/10/17  07:30    


 


Anion Gap  13  (10-20)   12/10/17  07:30    


 


BUN  10 mg/dL (7-21)   12/10/17  07:30    


 


Creatinine  0.8 mg/dl (0.7-1.2)   12/10/17  07:30    


 


Est GFR ( Amer)  > 60   12/10/17  07:30    


 


Est GFR (Non-Af Amer)  > 60   12/10/17  07:30    


 


POC Glucose (mg/dL)  116 mg/dL ()  H  12/09/17  21:47    


 


Random Glucose  85 mg/dL ()   12/10/17  07:30    


 


Calcium  9.2 mg/dL (8.4-10.5)   12/10/17  07:30    


 


Magnesium  1.5 mg/dL (1.7-2.2)  L  12/09/17  10:10    


 


Total Bilirubin  0.3 mg/dL (0.2-1.3)   12/10/17  07:30    


 


AST  26 U/L (14-36)   12/10/17  07:30    


 


ALT  27 U/L (7-56)   12/10/17  07:30    


 


Alkaline Phosphatase  87 U/L ()   12/10/17  07:30    


 


Lactate Dehydrogenase  429 U/L (333-699)   12/10/17  08:30    


 


Total Creatine Kinase  31 U/L ()  L  12/10/17  08:30    


 


Troponin I  < 0.01 ng/mL  12/10/17  08:30    


 


NT-Pro-B Natriuret Pep  526 pg/mL (0-450)  H  12/09/17  10:10    


 


Total Protein  6.5 g/dL (5.8-8.3)   12/10/17  07:30    


 


Albumin  3.4 g/dL (3.0-4.8)   12/10/17  07:30    


 


Globulin  3.1 gm/dL  12/10/17  07:30    


 


Albumin/Globulin Ratio  1.1  (1.1-1.8)   12/10/17  07:30    


 


Triglycerides  91 mg/dL ()   12/09/17  10:10    


 


Cholesterol  116 mg/dL (130-200)  L  12/09/17  10:10    


 


LDL Cholesterol Direct  51 mg/dL (0-129)   12/09/17  10:10    


 


HDL Cholesterol  45 mg/dL (29-60)   12/09/17  10:10    


 


TSH 3rd Generation  0.90 mIU/mL (0.46-4.68)   12/09/17  10:10    


 


Urine Color  Yellow  (YELLOW)   12/09/17  11:25    


 


Urine Appearance  Slight-cloudy  (CLEAR)   12/09/17  11:25    


 


Urine pH  6.0  (4.7-8.0)   12/09/17  11:25    


 


Ur Specific Gravity  1.020  (1.005-1.035)   12/09/17  11:25    


 


Urine Protein  Trace mg/dL (<30 mg/dL)  H  12/09/17  11:25    


 


Urine Glucose (UA)  Negative mg/dL (NEGATIVE)   12/09/17  11:25    


 


Urine Ketones  Negative mg/dL (NEGATIVE)   12/09/17  11:25    


 


Urine Blood  Negative  (NEGATIVE)   12/09/17  11:25    


 


Urine Nitrate  Positive  (NEGATIVE)  H  12/09/17  11:25    


 


Urine Bilirubin  Negative  (NEGATIVE)   12/09/17  11:25    


 


Urine Urobilinogen  1.0 E.U./dL (<1 E.U./dL)  H  12/09/17  11:25    


 


Ur Leukocyte Esterase  Small Susan/uL (NEGATIVE)  H  12/09/17  11:25    


 


Urine RBC  0 - 2 /hpf (0-2)   12/09/17  11:25    


 


Urine WBC  5 - 10 /hpf (0-6)   12/09/17  11:25    


 


Ur Epithelial Cells  3 - 4 /hpf (0-5)   12/09/17  11:25    


 


Urine Bacteria  Many  (NEG)   12/09/17  11:25    














- Hospital Course


Hospital Course: 


57 year female with a past medical history of MS, fibrolyalgia, bradycardia s/p 

pacemaker, angina, hyperlipidemia, DM II, history of gastric bypass who 

presents to [facility] [place] with midsternal chest pain with radiation to the back with 

associated nausea. EKG, troponin x3, and chest x-ray were negative for an acute 

MI or acute chest syndrome. The patient was monitored on telemetry and 

cardiology evaluated the patient. The patient's chest pain resolved and was 

dsicharged with the below written instructions and prescriptions.








Discharge Exam





- Head Exam


Head Exam: ATRAUMATIC, NORMAL INSPECTION, NORMOCEPHALIC





- Eye Exam


Eye Exam: EOMI, Normal appearance





- ENT Exam


ENT Exam: Mucous Membranes Moist, Normal Oropharynx





- Neck Exam


Neck exam: Normal Inspection





- Respiratory Exam


Respiratory Exam: Clear to PA & Lateral, NORMAL BREATHING PATTERN





- Cardiovascular Exam


Cardiovascular Exam: RRR, +S1, +S2





- GI/Abdominal Exam


GI & Abdominal Exam: Normal Bowel Sounds.  absent: Guarding, Rebound





- Extremities Exam


Extremities exam: normal capillary refill, normal inspection





- Back Exam


Back exam: NORMAL INSPECTION.  absent: CVA tenderness (L), CVA tenderness (R)





- Neurological Exam


Neurological exam: Alert, CN II-XII Intact, Oriented x3





- Psychiatric Exam


Psychiatric exam: Normal Affect, Normal Mood





- Skin


Skin Exam: Dry, Intact, Normal Color, Warm





Discharge Plan





- Discharge Medications


Prescriptions: 


Ibuprofen [Motrin] 600 mg PO BID #12 tab





- Follow Up Plan


Condition: FAIR


Disposition: HOME/ ROUTINE


Instructions:  Chest Pain (GEN), Chest Pain (DC)


Additional Instructions: 


1) Patient to take any medications as prescribed.


2) Patient to return to closest ED for any worsening of symptoms.


3) Patient to follow up with Primary Medical Doctor within the next 7 days.


Referrals: 


Micaela Lema, [Primary Care Provider] -

## 2017-12-10 NOTE — CON
DATE:  12/10/2017



REASON FOR CONSULTATION AND FOLLOWUP:  Cardiac evaluation and chest pain.

 

HISTORY OF PRESENT ILLNESS:  A 57-year-old female with a past medical

history significant for multiple sclerosis, angina, fibromyalgia,

bradycardia, syncope, a year and half ago; heart rate at upper 30s, status

post permanent pacemaker; resident of [place], all workup was done in

[place]; history of obesity, history of status post gastric bypass,

hypertension, hyperlipidemia, colitis, diabetes, came in with complaint of

sharp mediastinal pain that radiate into the back.  Denies any chest pain,

nausea or vomiting.  Denies any dyspnea on exertion.  The patient walks

with a cane, but no chest pain on walking.  The patient has a history of

multiple sclerosis, weakness of the left lower extremity, so walks with a

cane.  The patient had cardiac catheterization 10 years ago.  Currently,

the patient denies any chest pain.  Denies any shortness of breath.  Denies

any palpitation, lying flat in the bed.



PAST MEDICAL HISTORY:  Significant for multiple sclerosis leading to

weakness of the left side and walks with a cane, history of fibromyalgia,

history of bradycardia, history of syncope, status post permanent pacemaker

a year and half in [place], history of diabetes, hypertension,

hyperlipidemia, history of obesity, history of gastric bypass.



PAST SURGICAL HISTORY:  Gastric bypass, a couple of years ago; history of

pacemaker, a year and half ago in [place], recently admitted in [place]

with a cardiac noninvasive workup was done, which was negative according to

the patient.



REVIEW OF SYSTEMS:  As per HPI.



CURRENT MEDICATIONS:  The patient is at home taking Effexor 50 mg daily,

oxycodone, Percocet 10/325 every now and then, Protonix, Klonopin, insulin,

aspirin, amlodipine, Losartan.



ALLERGIES:  ALLERGIC TO LEVAQUIN AND IV DYE.



PHYSICAL EXAMINATION:

VITAL SIGNS:  As follows, temperature afebrile, heart rate 60, and blood

pressure 125/82.

HEENT:  PERRLA.  Extraocular muscles intact.

NECK:  Supple.  No carotid bruits or thyromegaly.

CHEST:  Clear to auscultation.

HEART:  S1 and S2, regular.

ABDOMEN:  Soft.

EXTREMITIES:  Clubbing and cyanosis negative.



LABORATORY DATA:  EKG shows normal sinus rhythm at a rate of 61, low

voltage nonspecific ST-T changes noted.  Blood workup as follows:  WBC 2.6,

hemoglobin 9.6, hematocrit 30.9, platelet count 163.  Chemistry shows

sodium 140, potassium 4.6, chloride 105, carbon dioxide 23, anion gap of

13, BUN 10, and creatinine 0.1.  Two troponin, negative.



IMPRESSION:  Atypical chest pain, mild tenderness on deep palpation,

history of coronary artery disease _____ to the angina; history of cardiac

catheterization 10 years ago at [place], which was negative; history of

recent admission to [place] and according to the patient, noninvasive

workup was negative; history of permanent pacemaker, a year and half ago in

[place]; a resident of [place], diabetes, hypertension, obesity, history of

Port-A-Cath on right side of the chest because of the poor venous access,

history of multiple sclerosis, weakness of the left lower extremity, walks

with a cane, history of fibromyalgia.



RECOMMENDATION:  Chest pain, atypical.  No evidence of acute MI.  No

evidence of angina or abnormal EKG.  I suggest 600 mg of ibuprofen now

stat, and if the patient to be in stable, okay to be discharged and follow

up in [place].  Discussed with the patient in length.



Thank you  _____ for providing us the opportunity in taking care of

GruberKassandraa.  The patient will go back on 01/01/2018 to [place].  So

far no evidence of acute MI.





__________________________________________

Marcus Anderson MD





DD:  12/10/2017 10:29:58

DT:  12/10/2017 18:31:56

Job # [id number]

## 2017-12-11 NOTE — CON
NEUROLOGY CONSULTATION



DATE:



REASON FOR CONSULTATION:  Headaches.



HISTORY OF PRESENT ILLNESS:  The patient is a 57-year-old female who has

been asked for evaluation of headaches.  The patient was admitted with

chest pain; however, started experiencing headaches since she is in the

hospital.  Headaches are described as pressure like.  It is not associated

with any photophobia or phonophobia.  She does have some nausea.  The

patient usually does not get headaches.  She was given Toradol with minimal

relief in her headache symptoms.  She has history of multiple sclerosis

with weakness in the left side.  She never lost vision in the eyes.  She

said she has had multiple sclerosis for 10 years and is being followed up

with physician in [place].



PAST MEDICAL HISTORY:  Include multiple sclerosis, hypertension, and

diabetes mellitus.



PAST SURGICAL HISTORY:  Includes permanent pacemaker.



ALLERGIES:  LEVOFLOXACIN AND IV DYE.



FAMILY HISTORY:  Reviewed and noncontributory to the case.



REVIEW OF SYSTEMS:  Positive for headache, positive for chest pain.  Denies

any shortness of breath, abdominal pain, constipation, diarrhea, dysuria,

pyuria.  Positive for nausea.  Denies any cough, sputum production,

hallucinations, skin rash.



MEDICATIONS:  At home included Effexor, Percocet p.r.n., pantoprazole,

Lomotil, Klonopin, insulin, amlodipine, losartan, aspirin, and

acetaminophen.



PHYSICAL EXAMINATION:

GENERAL:  The patient is a middle age female, lying on the bed, in no acute

distress.

VITAL SIGNS:  Blood pressure is 145/82, heart rate is 61 per minute,

breathing at the rate of 16 per minute, temperature is 98.8 degrees

Fahrenheit.

HEENT:  Normocephalic and atraumatic.

NECK:  Supple.  There are no carotid bruits.

LUNGS:  Clear.

CVS:  S1 and S2 audible.  No murmurs.

ABDOMEN:  Soft and nontender.  Bowel sounds are present.

NEUROLOGY:  Mental status:  The patient is awake and alert.  Oriented to

time, place, and person.  Speech is fluent.  Naming and repetition normal. 

Memory and cognition are intact.  Cranial nerve examination; pupils are 3

mm bilaterally reactive to light.  Visual fields are full.  Extraocular

movements are intact.  There is no facial asymmetry.  Palate is upgoing

bilaterally and tongue is midline.  Motor examination; tone is normal. 

Power is 5/5 bilaterally in all extremities.  Reflexes are +2 and

symmetrical.  Plantars are downgoing bilaterally.  Cerebellar examination;

finger to nose shows no dysmetria.  Gait is deferred.



LABORATORY DATA:  Labs reviewed.  CT scan of the head, significant diffuse

_____ chronic white matter ischemic changes extending to the white matter

tracts of both basal nuclei.  WBC is 3.3, hemoglobin 9.8, hematocrit 31.2

and platelets of 191.  Sodium is 135, potassium 4.2, chloride 101, carbon

dioxide 28, BUN of 11, creatinine 0.9 and glucose of 86.



IMPRESSION:  Headaches.  Possible stress related.  I doubt it secondary to

multiple sclerosis exacerbation.



RECOMMENDATIONS:

1.  The patient was started on Fioricet, which is to be stopped.

2.  The patient was on Percocet at home in the past.  If Tylenol does not

help her pain symptoms switch to Percocet.

3.  The patient also to be continued on morphine p.r.n.

4.  Consider obtaining MRI of the brain with and without contrast if her

pacemaker is MRI compatible.

5.  Continue supportive care and treatment.



Thank you for the opportunity to participate in the care of this patient.





__________________________________________

Steffany Baugh MD



DD:  12/11/2017 9:47:23

DT:  12/11/2017 10:58:13

Job # [id number]

## 2017-12-11 NOTE — CP.PCM.DIS
<Deandre Linn - Last Filed: 12/13/17 17:31>





Provider





- Provider


Date of Admission: 


12/09/17 14:11





Attending physician: 


Ariana Foster MD





Consults: 


Neurology: Dr. Baugh


Cardiology: Dr. Anderson and Dr. Lawton


Time Spent in preparation of Discharge (in minutes): 30





Hospital Course





- Lab Results


Lab Results: 


 Most Recent Lab Values











WBC  3.3 10^3/ul (4.5-11.0)  L D 12/11/17  08:00    


 


RBC  3.15 10^6/uL (3.5-6.1)  L  12/11/17  08:00    


 


Hgb  9.8 g/dL (12.0-16.0)  L  12/11/17  08:00    


 


Hct  31.2 % (36.0-48.0)  L  12/11/17  08:00    


 


MCV  99.0 fl (80.0-105.0)   12/11/17  08:00    


 


MCH  31.1 pg (25.0-35.0)   12/11/17  08:00    


 


MCHC  31.4 g/dl (31.0-37.0)   12/11/17  08:00    


 


RDW  14.0 % (11.5-14.5)   12/11/17  08:00    


 


Plt Count  191 10^3/uL (120.0-450.0)   12/11/17  08:00    


 


MPV  10.8 fl (7.0-11.0)   12/11/17  08:00    


 


Gran %  68.5 % (50.0-68.0)  H  12/11/17  08:00    


 


Lymph % (Auto)  24.0 % (22.0-35.0)   12/11/17  08:00    


 


Mono % (Auto)  6.0 % (1.0-6.0)   12/11/17  08:00    


 


Eos % (Auto)  1.2 % (1.5-5.0)  L  12/11/17  08:00    


 


Baso % (Auto)  0.3 % (0.0-3.0)   12/11/17  08:00    


 


Gran #  2.29  (1.4-6.5)   12/11/17  08:00    


 


Lymph #  0.8  (1.2-3.4)  L  12/11/17  08:00    


 


Mono #  0.2  (0.1-0.6)   12/11/17  08:00    


 


Eos #  0.0  (0.0-0.7)   12/11/17  08:00    


 


Baso #  0.01 K/mm3 (0.0-2.0)   12/11/17  08:00    


 


PT  12.6 SECONDS (9.4-12.5)  H  12/09/17  10:10    


 


INR  1.14  (0.93-1.08)  H  12/09/17  10:10    


 


APTT  33.7 Seconds (25.1-36.5)   12/09/17  10:10    


 


D-Dimer, Quantitative  856 ng/mL (0-243)  H  12/09/17  10:10    


 


Sodium  135 mmol/L (132-148)   12/11/17  08:00    


 


Potassium  4.2 mmol/L (3.6-5.0)   12/11/17  08:00    


 


Chloride  101 mmol/L ()   12/11/17  08:00    


 


Carbon Dioxide  28 mmol/L (21-33)   12/11/17  08:00    


 


Anion Gap  11  (10-20)   12/11/17  08:00    


 


BUN  11 mg/dL (7-21)   12/11/17  08:00    


 


Creatinine  0.9 mg/dl (0.7-1.2)   12/11/17  08:00    


 


Est GFR ( Amer)  > 60   12/11/17  08:00    


 


Est GFR (Non-Af Amer)  > 60   12/11/17  08:00    


 


POC Glucose (mg/dL)  90 mg/dL ()   12/11/17  07:17    


 


Random Glucose  86 mg/dL ()   12/11/17  08:00    


 


Hemoglobin A1c  4.8 % (4.2-6.5)   12/10/17  08:30    


 


Calcium  8.7 mg/dL (8.4-10.5)   12/11/17  08:00    


 


Magnesium  1.7 mg/dL (1.7-2.2)   12/11/17  08:00    


 


Total Bilirubin  0.3 mg/dL (0.2-1.3)   12/11/17  08:00    


 


AST  26 U/L (14-36)   12/11/17  08:00    


 


ALT  27 U/L (7-56)   12/11/17  08:00    


 


Alkaline Phosphatase  88 U/L ()   12/11/17  08:00    


 


Lactate Dehydrogenase  429 U/L (333-699)   12/10/17  08:30    


 


Total Creatine Kinase  31 U/L ()  L  12/10/17  08:30    


 


Troponin I  < 0.01 ng/mL  12/10/17  08:30    


 


NT-Pro-B Natriuret Pep  526 pg/mL (0-450)  H  12/09/17  10:10    


 


Total Protein  6.4 g/dL (5.8-8.3)   12/11/17  08:00    


 


Albumin  3.3 g/dL (3.0-4.8)   12/11/17  08:00    


 


Globulin  3.1 gm/dL  12/11/17  08:00    


 


Albumin/Globulin Ratio  1.1  (1.1-1.8)   12/11/17  08:00    


 


Triglycerides  91 mg/dL ()   12/09/17  10:10    


 


Cholesterol  116 mg/dL (130-200)  L  12/09/17  10:10    


 


LDL Cholesterol Direct  51 mg/dL (0-129)   12/09/17  10:10    


 


HDL Cholesterol  45 mg/dL (29-60)   12/09/17  10:10    


 


TSH 3rd Generation  0.90 mIU/mL (0.46-4.68)   12/09/17  10:10    


 


Urine Color  Yellow  (YELLOW)   12/09/17  11:25    


 


Urine Appearance  Slight-cloudy  (CLEAR)   12/09/17  11:25    


 


Urine pH  6.0  (4.7-8.0)   12/09/17  11:25    


 


Ur Specific Gravity  1.020  (1.005-1.035)   12/09/17  11:25    


 


Urine Protein  Trace mg/dL (<30 mg/dL)  H  12/09/17  11:25    


 


Urine Glucose (UA)  Negative mg/dL (NEGATIVE)   12/09/17  11:25    


 


Urine Ketones  Negative mg/dL (NEGATIVE)   12/09/17  11:25    


 


Urine Blood  Negative  (NEGATIVE)   12/09/17  11:25    


 


Urine Nitrate  Positive  (NEGATIVE)  H  12/09/17  11:25    


 


Urine Bilirubin  Negative  (NEGATIVE)   12/09/17  11:25    


 


Urine Urobilinogen  1.0 E.U./dL (<1 E.U./dL)  H  12/09/17  11:25    


 


Ur Leukocyte Esterase  Small Susan/uL (NEGATIVE)  H  12/09/17  11:25    


 


Urine RBC  0 - 2 /hpf (0-2)   12/09/17  11:25    


 


Urine WBC  5 - 10 /hpf (0-6)   12/09/17  11:25    


 


Ur Epithelial Cells  3 - 4 /hpf (0-5)   12/09/17  11:25    


 


Urine Bacteria  Many  (NEG)   12/09/17  11:25    














- Hospital Course


Hospital Course: 


Patient was admitted on 12/09/17 for mid-sternal chest pain associated with 

nausea that radiated to the back.  Patient stated that she was visiting from 

[place], where she had been hospitalized a month previous for the same complaint

, and that she left her Copaxone pain medications there.  She also complained 

of a headache.  The following imaging was performed while she was in the 

hospital:





Imaging:


- XR Chest 12/09: No acute consolidation; may have linear scarring in left lung 

field


- CT Chest 12/09: No evidence of central pulmonary embolus, mild splenomegaly, 

postoperative changes of stomach


- CT Head 12/10: Diffuse/confluent chronic white matter changes extending into 

white matter tracts of both basal nuclei and scattered bilateral basal nuclei 

lacunar infarcts





Throughout the course of her stay, patient's chest pain was ruled out for acute 

coronary syndrome.  She then started complaining of worsening headache and 

asked for more pain medication.  Neurology was contacted at this time, given 

the patient's past medical history of multiple sclerosis.  Dr. Baugh from 

neurology made the recommendations to start the patient on fioricet, and give 

her percocet for her pain as well as morphine.  He also suggested getting an 

MRI of the brain, but patient has a pacemaker.  





Cardiology cleared her chest pain and ruled out acute coronary syndrome.  The 

following lab values: Lipid panel ordered, TG 91, cholesterol 116, LDL 51, HDL 

of 45, warranted starting the patient on Aspirin 81 mg daily





Patient's pain improved throughout her stay.  She was deemed stable for 

discharge.  Patient was given 15 tablets of fioricet and was advised to follow 

up with her primary care doctor in [place].





Discharge Exam





- Head Exam


Head Exam: ATRAUMATIC, NORMAL INSPECTION, NORMOCEPHALIC





- Eye Exam


Eye Exam: EOMI, Normal appearance, PERRL


Pupil Exam: NORMAL ACCOMODATION, PERRL


Additional comments: 


Mild Left Eye ptosis





- Respiratory Exam


Respiratory Exam: Clear to PA & Lateral, NORMAL BREATHING PATTERN, 

UNREMARKABLE.  absent: Decreased Breath Sounds





- Cardiovascular Exam


Cardiovascular Exam: REGULAR RHYTHM, +S1, +S2.  absent: Tachycardia





- GI/Abdominal Exam


GI & Abdominal Exam: Normal Bowel Sounds, Unremarkable





- Extremities Exam


Extremities exam: full ROM, normal capillary refill, normal inspection





- Back Exam


Back exam: FULL ROM, NORMAL INSPECTION.  absent: CVA tenderness (L), CVA 

tenderness (R)





- Neurological Exam


Neurological exam: Alert, CN II-XII Intact, Normal Gait, Oriented x3, Reflexes 

Normal





- Psychiatric Exam


Psychiatric exam: Normal Affect, Normal Mood





- Skin


Skin Exam: Dry, Intact, Normal Color, Warm





Discharge Plan





- Discharge Medications


Prescriptions: 


Acetaminophen/Butalbital/Caf [Fioricet] 1 tab PO Q8 PRN #15 tab


 PRN Reason: Headache


Ibuprofen [Motrin] 600 mg PO BID #12 tab





- Follow Up Plan


Condition: FAIR


Disposition: HOME/ ROUTINE


Patient education suggested?: Yes


Instructions:  Chest Pain (GEN), Multiple Sclerosis (DC), Fibromyalgia (DC), 

Diabetes Mellitus Type 1 in Adults (GEN), Cholesterol and Your Health (GEN), 

Hypertension (GEN)


Additional Instructions: 


1) Patient to take any medications as prescribed.


2) Patient to return to closest ED for any worsening of symptoms.


3) Patient to follow up with Primary Medical Doctor within the next 7 days.


Referrals: 


[facility], [Non-Staff] - 





<Ariana Foster - Last Filed: 12/13/17 18:00>





Provider





- Provider


Date of Admission: 


12/09/17 14:11





Attending physician: 


Ariana Foster MD








Hospital Course





- Lab Results


Lab Results: 


 Most Recent Lab Values











WBC  3.3 10^3/ul (4.5-11.0)  L D 12/11/17  08:00    


 


RBC  3.15 10^6/uL (3.5-6.1)  L  12/11/17  08:00    


 


Hgb  9.8 g/dL (12.0-16.0)  L  12/11/17  08:00    


 


Hct  31.2 % (36.0-48.0)  L  12/11/17  08:00    


 


MCV  99.0 fl (80.0-105.0)   12/11/17  08:00    


 


MCH  31.1 pg (25.0-35.0)   12/11/17  08:00    


 


MCHC  31.4 g/dl (31.0-37.0)   12/11/17  08:00    


 


RDW  14.0 % (11.5-14.5)   12/11/17  08:00    


 


Plt Count  191 10^3/uL (120.0-450.0)   12/11/17  08:00    


 


MPV  10.8 fl (7.0-11.0)   12/11/17  08:00    


 


Gran %  68.5 % (50.0-68.0)  H  12/11/17  08:00    


 


Lymph % (Auto)  24.0 % (22.0-35.0)   12/11/17  08:00    


 


Mono % (Auto)  6.0 % (1.0-6.0)   12/11/17  08:00    


 


Eos % (Auto)  1.2 % (1.5-5.0)  L  12/11/17  08:00    


 


Baso % (Auto)  0.3 % (0.0-3.0)   12/11/17  08:00    


 


Gran #  2.29  (1.4-6.5)   12/11/17  08:00    


 


Lymph #  0.8  (1.2-3.4)  L  12/11/17  08:00    


 


Mono #  0.2  (0.1-0.6)   12/11/17  08:00    


 


Eos #  0.0  (0.0-0.7)   12/11/17  08:00    


 


Baso #  0.01 K/mm3 (0.0-2.0)   12/11/17  08:00    


 


PT  12.6 SECONDS (9.4-12.5)  H  12/09/17  10:10    


 


INR  1.14  (0.93-1.08)  H  12/09/17  10:10    


 


APTT  33.7 Seconds (25.1-36.5)   12/09/17  10:10    


 


D-Dimer, Quantitative  856 ng/mL (0-243)  H  12/09/17  10:10    


 


Sodium  135 mmol/L (132-148)   12/11/17  08:00    


 


Potassium  4.2 mmol/L (3.6-5.0)   12/11/17  08:00    


 


Chloride  101 mmol/L ()   12/11/17  08:00    


 


Carbon Dioxide  28 mmol/L (21-33)   12/11/17  08:00    


 


Anion Gap  11  (10-20)   12/11/17  08:00    


 


BUN  11 mg/dL (7-21)   12/11/17  08:00    


 


Creatinine  0.9 mg/dl (0.7-1.2)   12/11/17  08:00    


 


Est GFR ( Amer)  > 60   12/11/17  08:00    


 


Est GFR (Non-Af Amer)  > 60   12/11/17  08:00    


 


POC Glucose (mg/dL)  120 mg/dL ()  H  12/11/17  11:22    


 


Random Glucose  86 mg/dL ()   12/11/17  08:00    


 


Hemoglobin A1c  4.8 % (4.2-6.5)   12/10/17  08:30    


 


Calcium  8.7 mg/dL (8.4-10.5)   12/11/17  08:00    


 


Magnesium  1.7 mg/dL (1.7-2.2)   12/11/17  08:00    


 


Total Bilirubin  0.3 mg/dL (0.2-1.3)   12/11/17  08:00    


 


AST  26 U/L (14-36)   12/11/17  08:00    


 


ALT  27 U/L (7-56)   12/11/17  08:00    


 


Alkaline Phosphatase  88 U/L ()   12/11/17  08:00    


 


Lactate Dehydrogenase  429 U/L (333-699)   12/10/17  08:30    


 


Total Creatine Kinase  31 U/L ()  L  12/10/17  08:30    


 


Troponin I  < 0.01 ng/mL  12/10/17  08:30    


 


NT-Pro-B Natriuret Pep  526 pg/mL (0-450)  H  12/09/17  10:10    


 


Total Protein  6.4 g/dL (5.8-8.3)   12/11/17  08:00    


 


Albumin  3.3 g/dL (3.0-4.8)   12/11/17  08:00    


 


Globulin  3.1 gm/dL  12/11/17  08:00    


 


Albumin/Globulin Ratio  1.1  (1.1-1.8)   12/11/17  08:00    


 


Triglycerides  91 mg/dL ()   12/09/17  10:10    


 


Cholesterol  116 mg/dL (130-200)  L  12/09/17  10:10    


 


LDL Cholesterol Direct  51 mg/dL (0-129)   12/09/17  10:10    


 


HDL Cholesterol  45 mg/dL (29-60)   12/09/17  10:10    


 


TSH 3rd Generation  0.90 mIU/mL (0.46-4.68)   12/09/17  10:10    


 


Urine Color  Yellow  (YELLOW)   12/09/17  11:25    


 


Urine Appearance  Slight-cloudy  (CLEAR)   12/09/17  11:25    


 


Urine pH  6.0  (4.7-8.0)   12/09/17  11:25    


 


Ur Specific Gravity  1.020  (1.005-1.035)   12/09/17  11:25    


 


Urine Protein  Trace mg/dL (<30 mg/dL)  H  12/09/17  11:25    


 


Urine Glucose (UA)  Negative mg/dL (NEGATIVE)   12/09/17  11:25    


 


Urine Ketones  Negative mg/dL (NEGATIVE)   12/09/17  11:25    


 


Urine Blood  Negative  (NEGATIVE)   12/09/17  11:25    


 


Urine Nitrate  Positive  (NEGATIVE)  H  12/09/17  11:25    


 


Urine Bilirubin  Negative  (NEGATIVE)   12/09/17  11:25    


 


Urine Urobilinogen  1.0 E.U./dL (<1 E.U./dL)  H  12/09/17  11:25    


 


Ur Leukocyte Esterase  Small Susan/uL (NEGATIVE)  H  12/09/17  11:25    


 


Urine RBC  0 - 2 /hpf (0-2)   12/09/17  11:25    


 


Urine WBC  5 - 10 /hpf (0-6)   12/09/17  11:25    


 


Ur Epithelial Cells  3 - 4 /hpf (0-5)   12/09/17  11:25    


 


Urine Bacteria  Many  (NEG)   12/09/17  11:25    














Attending/Attestation





- Attestation


I have personally seen and examined this patient.: Yes


I have fully participated in the care of the patient.: Yes


I have reviewed all pertinent clinical information, including history, physical 

exam and plan: Yes


Notes (Text): 





12/13/17 17:59








Attending note;





Patient seen and examined with resident.





Patient is a 57 year old female with a past medical history of multiple 

sclerosis, Fibromyalgia, bradycardia (s/p Pacemaker), Diabetes mellitus, 

hyperlipidemia, colitis, obesity(s/p gastric bypass), opiate dependency is 

admitted for chest pain.


EKG showed no significant changes/pacer present. Cardiac enzymes 3 negative.


CT angios negative for pulmonary embolus.


cardiology evaluation with  appreciated.cleared from cardiology point of 

view.





Questionable history of MS;walks with a walker at home. also getting physical 

therapy at home.


currently comaining of head ache; requesting Percocet and Toradol. Patient did 

not bring any pain medications from [place].


Opiate seeking behavior suspected.


CT head is negative for acute infarcts. Shows chronic microvascular changes. 

Neurology evaluation appreciated.





multiple symptoms; rule out somatization disorder.





Chronic opiate dependency; Percocet and toradol prn ordered.  patient stated 

that she will get her prescription for Percocet faxed from [place]/from her PMD.





upon discharge the patient will follow-up with PMD in [place].





diagnosis;


Fibromyalgia


Chronic opiate dependency


Pacemaker


Diabetes


Obesity


History of gastric bypass surgery.

## 2017-12-11 NOTE — PN
DATE:  12/11/2017



LOCATION:  The patient is in room 569, bed 2.



REASON FOR CONSULTATION AND FOLLOWUP:  Chest pain.



SUBJECTIVE:  The patient states that her chest pain is relieved and she had

no shortness of breath or palpitation.  The patient is status post

permanent pacemaker insertion.



PHYSICAL EXAMINATION:

VITAL SIGNS:  Blood pressure is 145/82, yesterday blood pressure was

125/82, respirations 20, pulse 61, and temperature 98.8.

HEENT:  Head is normocephalic.  Eyes; pupils are normal.  Conjunctivae

slightly pale.

NECK:  JVP is low.  Carotids are equal.

THORAX:  AP diameter normal.

LUNGS:  Clear.

CARDIOVASCULAR:  S1 and S2.

ABDOMEN:  Soft.  No tenderness.  No organomegaly.  Bowel sounds normal.

EXTREMITIES:  No clubbing.  No cyanosis.



LABORATORY DATA:  WBC 3.3, hemoglobin 9.8, hematocrit 31.2, and platelet

count 191.  Sodium 135, potassium 4.2, BUN 11, and creatinine 0.9. 

Calcium, magnesium, AST, ALT normal.  Total protein and albumin normal. 

Troponin negative.



MEDICATIONS:  The patient is on aspirin 81 mg daily, losartan 100 mg p.o.

daily, Microzide 12.5 mg p.o. daily, Protonix 40 daily, and cefpodoxime 200

mg p.o. q.12 hours.



DIAGNOSES:  The patient's chest pain is atypically, it is musculoskeletal

with local tenderness, which has resolved now, status post permanent

pacemaker insertion, status post gastric bypass surgery, hypertension,

hyperlipidemia, colitis, diabetes mellitus, and anemia.



PLAN:  We will continue present therapy.  The patient also has multiple

sclerosis and she came to visit from [place] and she will follow with her

physician in [place].  We will continue present therapy.





__________________________________________

Marcus Lawton MD





DD:  12/11/2017 12:24:58

DT:  12/11/2017 12:48:51

Job # [id number]

## 2017-12-26 NOTE — RAD
HISTORY:

12/09/2017



COMPARISON:

No prior. 



FINDINGS:



LUNGS:

No active pulmonary disease.



PLEURA:

No significant pleural effusion identified, no pneumothorax apparent.



CARDIOVASCULAR:

 No radiographic findings to suggest acute or significant 

cardiovascular disease. Position/ configuration of pacemaker

device: Satisfactory. Venous access catheter in stable, satisfactory 

position.



OSSEOUS STRUCTURES:

No significant abnormalities.



VISUALIZED UPPER ABDOMEN:

Normal.



OTHER FINDINGS:

None.



IMPRESSION:

No active disease. No significant interval change compared to the 

prior examination(s).

## 2017-12-27 NOTE — CARD
--------------- APPROVED REPORT --------------





EKG Measurement

Heart [id number]

MO [id number]

[id number]

[id number]

[id number]



<Conclusion>

Electronic atrial pacemaker

Nonspecific ST and T wave abnormality

Abnormal ECG

## 2017-12-27 NOTE — RAD
PROCEDURE:  Right Knee Radiographs.



HISTORY:

knee pain



COMPARISON:

None.



FINDINGS:



BONES:

Normal. No fracture. 



JOINTS:

Normal. No osteoarthritis. 



JOINT EFFUSION:

None. 



OTHER FINDINGS:

None.



IMPRESSION:

Normal radiographs of the right knee.

## 2018-05-10 ENCOUNTER — HOSPITAL ENCOUNTER (OUTPATIENT)
Dept: HOSPITAL 42 - ED | Age: 58
Setting detail: OBSERVATION
LOS: 2 days | Discharge: HOME | End: 2018-05-12
Attending: INTERNAL MEDICINE | Admitting: INTERNAL MEDICINE
Payer: COMMERCIAL

## 2018-05-10 VITALS — BODY MASS INDEX: 16 KG/M2

## 2018-05-10 DIAGNOSIS — M79.7: ICD-10-CM

## 2018-05-10 DIAGNOSIS — R07.9: Primary | ICD-10-CM

## 2018-05-10 DIAGNOSIS — I10: ICD-10-CM

## 2018-05-10 DIAGNOSIS — G35: ICD-10-CM

## 2018-05-10 DIAGNOSIS — E78.5: ICD-10-CM

## 2018-05-10 DIAGNOSIS — D64.9: ICD-10-CM

## 2018-05-10 DIAGNOSIS — E66.9: ICD-10-CM

## 2018-05-10 DIAGNOSIS — J44.9: ICD-10-CM

## 2018-05-10 DIAGNOSIS — Z95.0: ICD-10-CM

## 2018-05-10 DIAGNOSIS — E11.9: ICD-10-CM

## 2018-05-10 DIAGNOSIS — Z98.84: ICD-10-CM

## 2018-05-10 LAB
ALBUMIN SERPL-MCNC: 2.5 G/DL (ref 3–4.8)
ALBUMIN/GLOB SERPL: 0.8 {RATIO} (ref 1.1–1.8)
ALT SERPL-CCNC: 38 U/L (ref 7–56)
APTT BLD: 27.1 SECONDS (ref 25.1–36.5)
AST SERPL-CCNC: 43 U/L (ref 14–36)
BUN SERPL-MCNC: 17 MG/DL (ref 7–21)
CALCIUM SERPL-MCNC: 8 MG/DL (ref 8.4–10.5)
ERYTHROCYTE [DISTWIDTH] IN BLOOD BY AUTOMATED COUNT: 14 % (ref 11.5–14.5)
GFR NON-AFRICAN AMERICAN: > 60
HGB BLD-MCNC: 10.1 G/DL (ref 12–16)
INR PPP: 1.12 (ref 0.93–1.08)
MCH RBC QN AUTO: 30.5 PG (ref 25–35)
MCHC RBC AUTO-ENTMCNC: 31.8 G/DL (ref 31–37)
MCV RBC AUTO: 96.1 FL (ref 80–105)
PLATELET # BLD: 160 10^3/UL (ref 120–450)
PMV BLD AUTO: 10.6 FL (ref 7–11)
PROTHROMBIN TIME: 12.8 SECONDS (ref 9.4–12.5)
RBC # BLD AUTO: 3.31 10^6/UL (ref 3.5–6.1)
TROPONIN I SERPL-MCNC: < 0.01 NG/ML
WBC # BLD AUTO: 7.4 10^3/UL (ref 4.5–11)

## 2018-05-10 PROCEDURE — 82948 REAGENT STRIP/BLOOD GLUCOSE: CPT

## 2018-05-10 PROCEDURE — 93005 ELECTROCARDIOGRAM TRACING: CPT

## 2018-05-10 PROCEDURE — 85610 PROTHROMBIN TIME: CPT

## 2018-05-10 PROCEDURE — 84484 ASSAY OF TROPONIN QUANT: CPT

## 2018-05-10 PROCEDURE — 82550 ASSAY OF CK (CPK): CPT

## 2018-05-10 PROCEDURE — 84439 ASSAY OF FREE THYROXINE: CPT

## 2018-05-10 PROCEDURE — 71045 X-RAY EXAM CHEST 1 VIEW: CPT

## 2018-05-10 PROCEDURE — 96372 THER/PROPH/DIAG INJ SC/IM: CPT

## 2018-05-10 PROCEDURE — 85027 COMPLETE CBC AUTOMATED: CPT

## 2018-05-10 PROCEDURE — 93306 TTE W/DOPPLER COMPLETE: CPT

## 2018-05-10 PROCEDURE — 85730 THROMBOPLASTIN TIME PARTIAL: CPT

## 2018-05-10 PROCEDURE — 83735 ASSAY OF MAGNESIUM: CPT

## 2018-05-10 PROCEDURE — 99285 EMERGENCY DEPT VISIT HI MDM: CPT

## 2018-05-10 PROCEDURE — 85025 COMPLETE CBC W/AUTO DIFF WBC: CPT

## 2018-05-10 PROCEDURE — 96376 TX/PRO/DX INJ SAME DRUG ADON: CPT

## 2018-05-10 PROCEDURE — 83615 LACTATE (LD) (LDH) ENZYME: CPT

## 2018-05-10 PROCEDURE — 84443 ASSAY THYROID STIM HORMONE: CPT

## 2018-05-10 PROCEDURE — 76700 US EXAM ABDOM COMPLETE: CPT

## 2018-05-10 PROCEDURE — 96365 THER/PROPH/DIAG IV INF INIT: CPT

## 2018-05-10 PROCEDURE — 97161 PT EVAL LOW COMPLEX 20 MIN: CPT

## 2018-05-10 PROCEDURE — 96375 TX/PRO/DX INJ NEW DRUG ADDON: CPT

## 2018-05-10 PROCEDURE — 36415 COLL VENOUS BLD VENIPUNCTURE: CPT

## 2018-05-10 PROCEDURE — 97116 GAIT TRAINING THERAPY: CPT

## 2018-05-10 PROCEDURE — 80053 COMPREHEN METABOLIC PANEL: CPT

## 2018-05-11 VITALS — OXYGEN SATURATION: 97 %

## 2018-05-11 LAB
APTT BLD: 27 SECONDS (ref 25.1–36.5)
BASOPHILS # BLD AUTO: 0.01 K/MM3 (ref 0–2)
BASOPHILS NFR BLD: 0.1 % (ref 0–3)
EOSINOPHIL # BLD: 0 10*3/UL (ref 0–0.7)
EOSINOPHIL NFR BLD: 0.1 % (ref 1.5–5)
ERYTHROCYTE [DISTWIDTH] IN BLOOD BY AUTOMATED COUNT: 14.2 % (ref 11.5–14.5)
GRANULOCYTES # BLD: 6.6 10*3/UL (ref 1.4–6.5)
GRANULOCYTES NFR BLD: 73.2 % (ref 50–68)
HGB BLD-MCNC: 9 G/DL (ref 12–16)
INR PPP: 1.24 (ref 0.93–1.08)
LYMPHOCYTES # BLD: 1.9 10*3/UL (ref 1.2–3.4)
LYMPHOCYTES NFR BLD AUTO: 20.9 % (ref 22–35)
MCH RBC QN AUTO: 30.4 PG (ref 25–35)
MCHC RBC AUTO-ENTMCNC: 31.5 G/DL (ref 31–37)
MCV RBC AUTO: 96.6 FL (ref 80–105)
MONOCYTES # BLD AUTO: 0.5 10*3/UL (ref 0.1–0.6)
MONOCYTES NFR BLD: 5.7 % (ref 1–6)
PLATELET # BLD: 139 10^3/UL (ref 120–450)
PMV BLD AUTO: 11 FL (ref 7–11)
PROTHROMBIN TIME: 14.2 SECONDS (ref 9.4–12.5)
RBC # BLD AUTO: 2.96 10^6/UL (ref 3.5–6.1)
T4 FREE SERPL-MCNC: 0.83 NG/DL (ref 0.78–2.19)
TROPONIN I SERPL-MCNC: < 0.01 NG/ML
WBC # BLD AUTO: 9 10^3/UL (ref 4.5–11)

## 2018-05-11 RX ADMIN — OXYCODONE AND ACETAMINOPHEN PRN TAB: 10; 325 TABLET ORAL at 14:09

## 2018-05-11 RX ADMIN — INSULIN LISPRO SCH: 100 INJECTION, SOLUTION INTRAVENOUS; SUBCUTANEOUS at 12:21

## 2018-05-11 RX ADMIN — INSULIN LISPRO SCH: 100 INJECTION, SOLUTION INTRAVENOUS; SUBCUTANEOUS at 16:30

## 2018-05-11 RX ADMIN — INSULIN LISPRO SCH: 100 INJECTION, SOLUTION INTRAVENOUS; SUBCUTANEOUS at 22:27

## 2018-05-11 RX ADMIN — INSULIN LISPRO SCH: 100 INJECTION, SOLUTION INTRAVENOUS; SUBCUTANEOUS at 08:14

## 2018-05-11 RX ADMIN — OXYCODONE AND ACETAMINOPHEN PRN TAB: 10; 325 TABLET ORAL at 08:14

## 2018-05-11 RX ADMIN — OXYCODONE AND ACETAMINOPHEN PRN TAB: 10; 325 TABLET ORAL at 01:14

## 2018-05-11 RX ADMIN — OXYCODONE AND ACETAMINOPHEN PRN TAB: 10; 325 TABLET ORAL at 20:17

## 2018-05-11 NOTE — RAD
HISTORY:

chest pain  



COMPARISON:

12/26/2017 



FINDINGS:



LUNGS:

No active pulmonary disease.



PLEURA:

No significant pleural effusion identified, no pneumothorax apparent.



CARDIOVASCULAR:

Normal.



OSSEOUS STRUCTURES:

No significant abnormalities.



VISUALIZED UPPER ABDOMEN:

Normal.



OTHER FINDINGS:

Port-A-Cath.  Pacemaker



IMPRESSION:

No active disease.

## 2018-05-11 NOTE — CARD
--------------- APPROVED REPORT --------------





EKG Measurement

Heart [id number]

MO [id number]

[id number]

[id number]

[id number]



<Conclusion>

Sinus rhythm with APCs

Low voltage QRS

Nonspecific T wave abnormality

C/W ECG 12/26/17: A. Pacing not seen on this ECG

## 2018-05-11 NOTE — CP.PCM.HP
<Nain Candelaria - Last Filed: 05/11/18 03:39>





History of Present Illness





- History of Present Illness


History of Present Illness: 





CC: Chest pain





HPI:


58 year old female with past medical history MS, Angina, fibromyalgia, 

bradycardia s/p pacemaker, DM2, HLD, colitis, obestity s/p gastric bypass who 

presents with 24 hour history of chest pain. Patient is noted to be poor 

historian in terms of presenting symptoms. Patient when asked to describe pain 

states the pain is middle of her chest. Patient reports yes to questions when 

prompted symptoms such as nausea, vomiting, diaphoresis, radiation of pain to 

neck and arm. Patient has recently traveled from [place] where she spends some 

of her time. She was admitted to prior to coming to [place] for acute exacerbation 

of CHF. Patient states she has not been able to take any of her prescribed 

medication due to inability to fill scripts from pharmacy because she has had 

no time to fill them. Patient indicates headache, lower extremity edema that 

has since improved from admission to hospital in [place]. Patient reports 

chronic back pain. She denies shortness of breath, abdominal pain, diarrhea, 

fever, chills. Patient indicates left sided weakness due to her chronic 

Multiple sclerosis. She denies numbness or focal deficits. 








PMH: MS, Angina, fibromyalgia, bradycardia s/p pacemaker, DM2, HLD, colitis, 

obestity s/p gastric bypass


PSH: Gastric bypass, pacemaker in 2015


SOCHx: Denies tobacco, ETOH, ID


- Lives with daughter, performs ADL and IDL with help of home services, 

ambulates with walker


Hosp: [facility] 12/2017


ALL: Levofloxacin and IV dye


MEDS: Mar reviewed





PMD: Dr. Edwards


Heme: Dr. Love 





Present on Admission





- Present on Admission


Any Indicators Present on Admission: No





Review of Systems





- Review of Systems


All systems: reviewed and no additional remarkable complaints except (as 

mentioned in HPI)





Past Patient History





- Infectious Disease


Hx of Infectious Diseases: None





- Past Social History


Smoking Status: Never Smoked





- CARDIAC


Hx Hypercholesterolemia: Yes


Hx Pacemaker: Yes





- PULMONARY


Hx Respiratory Disorders: No





- NEUROLOGICAL


Hx Neurological Disorder: Yes (MULTIPLE SCLEROSIS,FIBROMYALGIA)





- HEENT


Hx HEENT Problems: No





- RENAL


Hx Chronic Kidney Disease: No





- ENDOCRINE/METABOLIC


Hx Diabetes Mellitus Type 2: Yes





- HEMATOLOGICAL/ONCOLOGICAL


Hx Blood Disorders: No





- INTEGUMENTARY


Hx Dermatological Problems: No





- MUSCULOSKELETAL/RHEUMATOLOGICAL


Hx Falls: Yes





- GASTROINTESTINAL


Other/Comment: colitis





- GENITOURINARY/GYNECOLOGICAL


Hx Genitourinary Disorders: No





- PSYCHIATRIC


Hx Substance Use: No





- SURGICAL HISTORY


Hx Surgeries: Yes


Hx Gastric Bypass Surgery: Yes





- ANESTHESIA


Hx Anesthesia: No





Meds


Allergies/Adverse Reactions: 


 Allergies











Allergy/AdvReac Type Severity Reaction Status Date / Time


 


levofloxacin [From Levaquin] Allergy  RASH Verified 12/09/17 14:45


 


iv dye Allergy  RASH Uncoded 12/09/17 14:45














Physical Exam





- Constitutional


Appears: Non-toxic





- Head Exam


Head Exam: ATRAUMATIC, NORMAL INSPECTION, NORMOCEPHALIC





- Eye Exam


Eye Exam: EOMI, PERRL





- ENT Exam


ENT Exam: Mucous Membranes Moist





- Respiratory Exam


Respiratory Exam: Clear to Auscultation Bilateral, NORMAL BREATHING PATTERN.  

absent: Rhonchi, Wheezes





- Cardiovascular Exam


Cardiovascular Exam: REGULAR RHYTHM, +S1, +S2


Additional comments: 





midsternal pain





- GI/Abdominal Exam


GI & Abdominal Exam: Normal Bowel Sounds, Soft





- Neurological Exam


Neurological exam: Alert, Oriented x3


Additional comments: 





motor and sensory grossly intact, left sided lower extremity and upper 

extremity exhibit slight weakness with limited ability for movement against 

resistance, patient able to move all four extremities past midline





- Psychiatric Exam


Psychiatric exam: Anxious





- Skin


Skin Exam: Dry, Warm





Results





- Vital Signs


Recent Vital Signs: 





 Last Vital Signs











Temp  99.2 F   05/11/18 02:16


 


Pulse  81   05/11/18 02:16


 


Resp  19   05/11/18 02:16


 


BP  97/55 L  05/11/18 02:16


 


Pulse Ox  99   05/10/18 21:14














- Labs


Result Diagrams: 


 05/10/18 21:51





 05/10/18 21:51





Assessment & Plan





- Assessment and Plan (Free Text)


Assessment: 





58 year old female with past medical history of MS, Angina, fibromyalgia, 

bradycardia s/p pacemaker, DM2, HLD, colitis, obestity s/p gastric bypass who 

presents with chest pain. EKG showing NSR with sinus arrhythmia, non specific T 

wave changes , initial troponin negative. Patient to be admitted for chest pain 

r/o ACS


Plan: 





Chest Pain r/o ACS


- Initial troponin negative


- Trend trop and serial ekg


- Cardiology consult


- previous lipid panel in chart


- ASA, CBB, statin, nitro prn 





Anemia


- H/H on admission 10/31.8 


- Patient has Port for IV iron treatments 


- Venofer dose to be confirmed by day team


- Monitor





DM2


- ISS low


- ACHS


- CCD





HLD


- Lipitor 40mg





Hx of Fibromyalgia


- Continue home medications





Multiple Sclerosis


- Continue home medications





GI PPX: Protonix


DVT ppx: Heparin





Case and plan discussed with attending 








- Date & Time


Date: 05/11/18


Time: 03:48





<Mynor Wilks - Last Filed: 05/15/18 03:43>





Results





- Vital Signs


Recent Vital Signs: 





 Last Vital Signs











Temp  98.6 F   05/12/18 12:00


 


Pulse  61   05/12/18 12:00


 


Resp  18   05/12/18 12:00


 


BP  94/56 L  05/12/18 12:00


 


Pulse Ox  97   05/12/18 00:01














- Labs


Result Diagrams: 


 05/11/18 04:00





 05/10/18 21:51





Attending/Attestation





- Attestation


I have personally seen and examined this patient.: Yes


I have fully participated in the care of the patient.: Yes


I have reviewed all pertinent clinical information: Yes


Notes (Text): 





05/15/18 03:42


Agree with physical examination, assessment and plan.


Patient was seen when she was in the ER.

## 2018-05-11 NOTE — US
HISTORY:

abd pain



COMPARISON:

None.



TECHNIQUE:

Sonographic evaluation of the abdomen.



FINDINGS:



LIVER:

Measures 10.7 cm.  Increased echogenicity of the liver parenchyma. No 

mass. No intrahepatic bile duct dilatation.



GALLBLADDER:

Not seen



COMMON BILE DUCT:

Measures 12 mm. No stones. No dilatation.



PANCREAS:

Unremarkable as visualized. No mass. No ductal dilatation.



RIGHT KIDNEY:

Measures 11.1 x 3.5 x 5.2cm. Normal echogenicity. No calculus, mass, 

or hydronephrosis.



LEFT KIDNEY:

Measures 12.3 x 6.1 x 5.3cm. Normal echogenicity. No calculus, mass, 

or hydronephrosis.



SPLEEN:

Normal in size and contour. No mass. 12.7 x 5.0 x 5.7 cm 



AORTA:

No aneurysmal dilatation. 2.2 cm 



IVC:

Unremarkable. 



OTHER FINDINGS:

None. 



IMPRESSION:

Fatty infiltration of the liver.  Gallbladder removed with dilatation 

of the common duct

## 2018-05-12 VITALS
DIASTOLIC BLOOD PRESSURE: 56 MMHG | HEART RATE: 61 BPM | SYSTOLIC BLOOD PRESSURE: 94 MMHG | TEMPERATURE: 98.6 F | RESPIRATION RATE: 18 BRPM

## 2018-05-12 RX ADMIN — OXYCODONE AND ACETAMINOPHEN PRN TAB: 10; 325 TABLET ORAL at 14:12

## 2018-05-12 RX ADMIN — INSULIN LISPRO SCH: 100 INJECTION, SOLUTION INTRAVENOUS; SUBCUTANEOUS at 11:51

## 2018-05-12 RX ADMIN — INSULIN LISPRO SCH: 100 INJECTION, SOLUTION INTRAVENOUS; SUBCUTANEOUS at 07:58

## 2018-05-12 RX ADMIN — OXYCODONE AND ACETAMINOPHEN PRN TAB: 10; 325 TABLET ORAL at 07:57

## 2018-05-12 RX ADMIN — OXYCODONE AND ACETAMINOPHEN PRN TAB: 10; 325 TABLET ORAL at 02:10

## 2018-05-12 NOTE — CP.PCM.DIS
<Deysi Fisher - Last Filed: 05/12/18 19:34>





Provider





- Provider


Date of Admission: 


05/10/18 23:18





Attending physician: 


Natanael Saha MD





Primary care physician: 


NO PRIMARY CARE PROVIDER





Consults: 





Jamie Lawton


Time Spent in preparation of Discharge (in minutes): 60





Diagnosis





- Discharge Diagnosis


(1) Chest pain


Status: Acute   





Hospital Course





- Lab Results


Lab Results: 


 Most Recent Lab Values











WBC  9.0 10^3/ul (4.5-11.0)  D 05/11/18  04:00    


 


RBC  2.96 10^6/uL (3.5-6.1)  L  05/11/18  04:00    


 


Hgb  9.0 g/dL (12.0-16.0)  L  05/11/18  04:00    


 


Hct  28.6 % (36.0-48.0)  L  05/11/18  04:00    


 


MCV  96.6 fl (80.0-105.0)   05/11/18  04:00    


 


MCH  30.4 pg (25.0-35.0)   05/11/18  04:00    


 


MCHC  31.5 g/dl (31.0-37.0)   05/11/18  04:00    


 


RDW  14.2 % (11.5-14.5)   05/11/18  04:00    


 


Plt Count  139 10^3/uL (120.0-450.0)   05/11/18  04:00    


 


MPV  11.0 fl (7.0-11.0)   05/11/18  04:00    


 


Gran %  73.2 % (50.0-68.0)  H  05/11/18  04:00    


 


Lymph % (Auto)  20.9 % (22.0-35.0)  L  05/11/18  04:00    


 


Mono % (Auto)  5.7 % (1.0-6.0)   05/11/18  04:00    


 


Eos % (Auto)  0.1 % (1.5-5.0)  L  05/11/18  04:00    


 


Baso % (Auto)  0.1 % (0.0-3.0)   05/11/18  04:00    


 


Gran #  6.60  (1.4-6.5)  H  05/11/18  04:00    


 


Lymph # (Auto)  1.9  (1.2-3.4)   05/11/18  04:00    


 


Mono # (Auto)  0.5  (0.1-0.6)   05/11/18  04:00    


 


Eos # (Auto)  0.0  (0.0-0.7)   05/11/18  04:00    


 


Baso # (Auto)  0.01 K/mm3 (0.0-2.0)   05/11/18  04:00    


 


PT  14.2 SECONDS (9.4-12.5)  H  05/11/18  04:00    


 


INR  1.24  (0.93-1.08)  H  05/11/18  04:00    


 


APTT  27.0 Seconds (25.1-36.5)   05/11/18  04:00    


 


Sodium  146 mmol/L (132-148)   05/10/18  21:51    


 


Potassium  3.9 mmol/L (3.6-5.0)   05/10/18  21:51    


 


Chloride  110 mmol/L ()  H  05/10/18  21:51    


 


Carbon Dioxide  27 mmol/L (21-33)   05/10/18  21:51    


 


Anion Gap  13  (10-20)   05/10/18  21:51    


 


BUN  17 mg/dL (7-21)   05/10/18  21:51    


 


Creatinine  0.6 mg/dl (0.7-1.2)  L  05/10/18  21:51    


 


Est GFR ( Amer)  > 60   05/10/18  21:51    


 


Est GFR (Non-Af Amer)  > 60   05/10/18  21:51    


 


POC Glucose (mg/dL)  93 mg/dL ()   05/12/18  11:29    


 


Random Glucose  97 mg/dL ()   05/10/18  21:51    


 


Calcium  8.0 mg/dL (8.4-10.5)  L  05/10/18  21:51    


 


Magnesium  1.5 mg/dL (1.7-2.2)  L  05/11/18  04:00    


 


Total Bilirubin  0.2 mg/dL (0.2-1.3)   05/10/18  21:51    


 


AST  43 U/L (14-36)  H D 05/10/18  21:51    


 


ALT  38 U/L (7-56)   05/10/18  21:51    


 


Alkaline Phosphatase  120 U/L ()   05/10/18  21:51    


 


Lactate Dehydrogenase  524 U/L (333-699)   05/10/18  21:51    


 


Total Creatine Kinase  34 U/L ()  L  05/10/18  21:51    


 


Troponin I  0.03 ng/mL D 05/11/18  10:06    


 


Total Protein  5.6 g/dL (5.8-8.3)  L  05/10/18  21:51    


 


Albumin  2.5 g/dL (3.0-4.8)  L  05/10/18  21:51    


 


Globulin  3.1 gm/dL  05/10/18  21:51    


 


Albumin/Globulin Ratio  0.8  (1.1-1.8)  L  05/10/18  21:51    


 


Free T4  0.83 ng/dL (0.78-2.19)   05/11/18  04:00    


 


TSH 3rd Generation  1.26 mIU/mL (0.46-4.68)   05/11/18  04:00    














- Hospital Course


Hospital Course: 





58 year old female with past medical history MS, Angina, fibromyalgia, 

bradycardia s/p pacemaker, DM2, HLD, colitis, obestity s/p gastric bypass who 

presents with 24 hour history of chest pain. Pt's EKG neg for ST/T wave 

abnormalities, trops negative x3. Echocardiogram showed normal EF, no wall 

motion abnormalities. Pt also found to be anemic, given a dose of IV iron. Pt 

to follow up with Dr Lawton for an outpatient stress test. Pt to follow up with 

PMD, pt understands and states to be adherent to the plan.





Case seen and discussed with Dr Saha.


Deysi Fisher, PGY1





Discharge Exam





- Head Exam


Head Exam: ATRAUMATIC, NORMAL INSPECTION, NORMOCEPHALIC





- Eye Exam


Eye Exam: EOMI, PERRL.  absent: Conjunctival injection, Nystagmus, Scleral 

icterus


Pupil Exam: NORMAL ACCOMODATION, PERRL.  absent: Irregular, Miosis, Unequal





- ENT Exam


ENT Exam: Mucous Membranes Moist





- Neck Exam


Neck exam: Full Rom





- Respiratory Exam


Respiratory Exam: Clear to PA & Lateral, NORMAL BREATHING PATTERN.  absent: 

Accessory Muscle Use, Chest Wall Tenderness, Prolonged Expiratory Phase, 

Respiratory Distress, Stridor





- Cardiovascular Exam


Cardiovascular Exam: RRR, +S1, +S2.  absent: Systolic Murmur





- GI/Abdominal Exam


GI & Abdominal Exam: Normal Bowel Sounds, Soft.  absent: Distended, Firm, 

Guarding, Mass, Rebound, Rigid, Tenderness





- Extremities Exam


Extremities exam: normal inspection





- Back Exam


Back exam: NORMAL INSPECTION





- Neurological Exam


Neurological exam: Alert, Oriented x3





- Psychiatric Exam


Psychiatric exam: Normal Affect, Normal Mood





- Skin


Skin Exam: Dry, Normal Color, Warm





Discharge Plan





- Discharge Medications


Prescriptions: 


Acetaminophen/Oxycodone Hydr [Percocet 10/325 mg Tab] 1 tab PO Q6H #12 tab





- Follow Up Plan


Condition: STABLE


Disposition: HOME/ ROUTINE


Instructions:  Postgastric Bypass Diet, Chronic Pain (DC), Rheumatoid Arthritis 

(DC), Diet to Prevent Dumping Syndrome, Chest Pain (DC), Chest Pain (GEN)


Additional Instructions: 


1. Patient is to follow up with her primary care physician within one week of 

being discharge.


2. Patient is to follow up with Cardiology, Dr. Lawton, upon discharge.


3. Patient is to have out patient stress test before following up with Dr. Lawton.


4. Patient is to follow up with GI (stomach doctor), Dr. Pierce, for GI 

evaluation and possible EGD.


4. Patient has not been given any new prescriptions, please take your 

medications as previously prescribed. Pt given 1 script for percocet, 12 

tablets.


5. If you have any new or worsening symptoms, please go to the nearest 

emergency room. 


6. Please take care of yourself and be well. 


Referrals: 


PCP,NO [Primary Care Provider] - 


Bruce Pierce MD [Staff Provider] - 


Marcus Lawton MD [Staff Provider] - 





<Natanael Saha - Last Filed: 05/13/18 07:39>





Provider





- Provider


Date of Admission: 


05/10/18 23:18





Attending physician: 


Natanael Saha MD





Primary care physician: 


MALINA PRIMARY CARE PROVIDER








Hospital Course





- Lab Results


Lab Results: 


 Most Recent Lab Values











WBC  9.0 10^3/ul (4.5-11.0)  D 05/11/18  04:00    


 


RBC  2.96 10^6/uL (3.5-6.1)  L  05/11/18  04:00    


 


Hgb  9.0 g/dL (12.0-16.0)  L  05/11/18  04:00    


 


Hct  28.6 % (36.0-48.0)  L  05/11/18  04:00    


 


MCV  96.6 fl (80.0-105.0)   05/11/18  04:00    


 


MCH  30.4 pg (25.0-35.0)   05/11/18  04:00    


 


MCHC  31.5 g/dl (31.0-37.0)   05/11/18  04:00    


 


RDW  14.2 % (11.5-14.5)   05/11/18  04:00    


 


Plt Count  139 10^3/uL (120.0-450.0)   05/11/18  04:00    


 


MPV  11.0 fl (7.0-11.0)   05/11/18  04:00    


 


Gran %  73.2 % (50.0-68.0)  H  05/11/18  04:00    


 


Lymph % (Auto)  20.9 % (22.0-35.0)  L  05/11/18  04:00    


 


Mono % (Auto)  5.7 % (1.0-6.0)   05/11/18  04:00    


 


Eos % (Auto)  0.1 % (1.5-5.0)  L  05/11/18  04:00    


 


Baso % (Auto)  0.1 % (0.0-3.0)   05/11/18  04:00    


 


Gran #  6.60  (1.4-6.5)  H  05/11/18  04:00    


 


Lymph # (Auto)  1.9  (1.2-3.4)   05/11/18  04:00    


 


Mono # (Auto)  0.5  (0.1-0.6)   05/11/18  04:00    


 


Eos # (Auto)  0.0  (0.0-0.7)   05/11/18  04:00    


 


Baso # (Auto)  0.01 K/mm3 (0.0-2.0)   05/11/18  04:00    


 


PT  14.2 SECONDS (9.4-12.5)  H  05/11/18  04:00    


 


INR  1.24  (0.93-1.08)  H  05/11/18  04:00    


 


APTT  27.0 Seconds (25.1-36.5)   05/11/18  04:00    


 


Sodium  146 mmol/L (132-148)   05/10/18  21:51    


 


Potassium  3.9 mmol/L (3.6-5.0)   05/10/18  21:51    


 


Chloride  110 mmol/L ()  H  05/10/18  21:51    


 


Carbon Dioxide  27 mmol/L (21-33)   05/10/18  21:51    


 


Anion Gap  13  (10-20)   05/10/18  21:51    


 


BUN  17 mg/dL (7-21)   05/10/18  21:51    


 


Creatinine  0.6 mg/dl (0.7-1.2)  L  05/10/18  21:51    


 


Est GFR ( Amer)  > 60   05/10/18  21:51    


 


Est GFR (Non-Af Amer)  > 60   05/10/18  21:51    


 


POC Glucose (mg/dL)  93 mg/dL ()   05/12/18  11:29    


 


Random Glucose  97 mg/dL ()   05/10/18  21:51    


 


Calcium  8.0 mg/dL (8.4-10.5)  L  05/10/18  21:51    


 


Magnesium  1.5 mg/dL (1.7-2.2)  L  05/11/18  04:00    


 


Total Bilirubin  0.2 mg/dL (0.2-1.3)   05/10/18  21:51    


 


AST  43 U/L (14-36)  H D 05/10/18  21:51    


 


ALT  38 U/L (7-56)   05/10/18  21:51    


 


Alkaline Phosphatase  120 U/L ()   05/10/18  21:51    


 


Lactate Dehydrogenase  524 U/L (333-699)   05/10/18  21:51    


 


Total Creatine Kinase  34 U/L ()  L  05/10/18  21:51    


 


Troponin I  0.03 ng/mL D 05/11/18  10:06    


 


Total Protein  5.6 g/dL (5.8-8.3)  L  05/10/18  21:51    


 


Albumin  2.5 g/dL (3.0-4.8)  L  05/10/18  21:51    


 


Globulin  3.1 gm/dL  05/10/18  21:51    


 


Albumin/Globulin Ratio  0.8  (1.1-1.8)  L  05/10/18  21:51    


 


Free T4  0.83 ng/dL (0.78-2.19)   05/11/18  04:00    


 


TSH 3rd Generation  1.26 mIU/mL (0.46-4.68)   05/11/18  04:00    














Attending/Attestation





- Attestation


I have personally seen and examined this patient.: Yes


I have fully participated in the care of the patient.: Yes


I have reviewed all pertinent clinical information, including history, physical 

exam and plan: Yes


Notes (Text): 





05/12/18


58 year old female with past medical history of MS, fibromyalgia, bradycardia s/

p pacemaker, diabetes, dyslipidemia, and obesity s/p gastric bypass who 

presented with complaint of chest pain and abdominal pain.  Serial cardiac 

enzymes were negative and ACS was ruled out.  Echocardiogram was reviewed as 

above.  Abdominal US was also obtained which was negative for acute findings.  

She was seen by cardiology who recommended outpatient stress test.  She is on 

iron for chronic anemia.





Overall her symptoms have improved.


Patient is discharged home to follow up with her pmd.


Follow up with cardiology for outpatient stress test.


Recommend to follow up with GI as outpatient.


Counselled on limiting NSAID use.





Natanael Saha MD


Hospitalist.

## 2018-05-12 NOTE — CON
DATE:  05/11/2018



LOCATION:  Patient in room 274, bed #2.





REASON FOR CONSULTATION:  Chest pain.



HISTORY OF PRESENT ILLNESS:  This is a 58-year-old female who is known to

have multiple sclerosis, fibromyalgia, hyperlipidemia, colitis, obesity. 

She is status post bariatric surgery for obesity, diabetes mellitus since

last 2 years. high

blood pressure since last 5 years, history of asthma and COPD, admitted

with history that is in 2 days she is having chest pain with local

tenderness on the chest area.  The patient also states that yesterday she

started having cough and she had cold-like feeling and chills and now she

was bring up yellowish expectoration.  The patient states that she was in

the bus and she fell and hit the chest and since then she broke or black

and blue marks on the chest and also has local tenderness.  She is also

complaining since then abdominal pain.  The patient prior to this denies

any exertional chest pain.  She walks with a walker.  She also has a

pacemaker insertion 2 years ago.  She was following with a cardiologist in

[place].



PAST MEDICAL HISTORY:  Positive for multiple sclerosis, fibromyalgia,

hyperlipidemia, colitis, obesity, status post bariatric surgery for

obesity, status post pacemaker insertion, diabetes mellitus, hypertension,

asthma, COPD.  She also has a history of fall, so that is why she is

walking with a walker.



PERSONAL HISTORY:  Denies smoking, denies drinking.



ALLERGIES:  DENIES ALLERGIES.



MEDICATIONS:  The patient's home medication included Ecotrin 81 mg daily,

amlodipine 10 mg daily, Effexor 50 mg tablets 100 mg b.i.d., Protonix 40

daily, losartan/hydrochlorothiazide 100/12.5 one tablet p.o. b.i.d.,

insulin 70/30, Motrin 600 mg b.i.d., Klonopin 1 mg p.o. every 8 hours

p.r.n., Lomotil 1 tablet p.o. daily p.r.n.



REVIEW OF SYSTEMS:  All the systems reviewed, positive mentioned history,

otherwise negative.



PHYSICAL EXAMINATION:

VITAL SIGNS:  Blood pressure 107/65, respirations 20, pulse 72, temperature

98.5.

HEENT:  Head is normocephalic.  Eyes, pupils normal.  Conjunctivae slightly

pale.

NECK:  JVP low.  Carotid equal.

THORAX:  AP diameter normal.

LUNGS:  Clear.

CARDIOVASCULAR:  S1 and S2.  The patient's chest wall shows ecchymosis due

to fall in [place] and she has local tenderness on the whole chest.

ABDOMEN:  Soft, nontender.  No organomegaly.  Bowel sound normal.

EXTREMITIES:  No clubbing, no cyanosis.



LABORATORY DATA:   Shows WBC 9, hemoglobin 9, hematocrit 28.6, platelet

139.  Glucose 70.  Troponin x2 negative.  Sodium 146, potassium 3.9, BUN

17, creatinine 0.6, glucose 97, also glucose 131.  Troponin x3 negative. 

Total protein 5.6, albumin 2.5.  TSH 1.26.  Chest x-ray, no active disease,

pacemaker in position.  EKG shows sinus rhythm with APCs, low voltage,

nonspecific ST-T changes.



DIAGNOSES:  Chest pain, musculoskeletal, status post pacemaker insertion,

history of multiple sclerosis, fibromyalgia, hypertension, diabetes

mellitus, hyperlipidemia, colitis, obesity, status post bariatric surgery

for obesity, history of asthma, COPD, respiratory tract infection.



PLAN:  The patient getting heparin 5000 units subcu every 8 hours, insulin

as ordered, Lipitor 40 daily, Lyrica 75 b.i.d., amlodipine 10 daily,

Protonix 40 daily, clonidine 1 mg p.o. every 8 hours p.r.n., vitamin D

p.r.n.  The patient also has been ordered Effexor 100 mg p.o. b.i.d.  Echo

has been ordered and we will continue symptomatic treatment of chest pain,

chest pain is musculoskeletal and the patient can have IV Lexiscan stress

test as outpatient.  We will follow with you.





__________________________________________

Marcus Lawton MD





DD:  05/11/2018 16:49:20

DT:  05/11/2018 16:56:17

Job # [id number]

## 2018-05-12 NOTE — CARD
--------------- APPROVED REPORT --------------





EXAM: Two-dimensional and M-mode echocardiogram with Doppler and 

color Doppler.



Other Information 

Quality : AverageRhythm : 



INDICATION

Chest Pain 



2D DIMENSIONS 

Left Atrium (2D)4.2   (1.6-4.0cm)IVSd1.1   (0.7-1.1cm)

LVDd3.6   (3.9-5.9cm)PWd1.2   (0.7-1.1cm)

LVDs2.5   (2.5-4.0cm)FS (%) 29.2   %

LVEF (%)57.0   (>50%)



M-Mode DIMENSIONS 

Aortic Root3.00   (2.2-3.7cm)Aortic Cusp Exc.1.90   (1.5-2.0cm)



Aortic Valve

AoV Peak Dukgvivo474.0cm/s



Mitral Valve

MV E Gpnzgffb65.3cm/sMV A Fgfhflgu22.7cm/sE/A ratio0.9



TDI

E/Lateral E'0.0E/Medial E'0.0



Tricuspid Valve

TR Peak Grzgbtde277fw/sRAP [id number] Peak Gr.28mmHg

[id number]



 LEFT VENTRICLE 

The left ventricle is normal size. There is normal left ventricular 

wall thickness. The left ventricular function is normal. The left 

ventricular ejection fraction is within the normal range. There is 

normal LV segmental wall motion.



 RIGHT VENTRICLE 

The right ventricle is normal size.



 ATRIA 

The left atrium is mildly dilated. The right atrium size is normal. 

The interatrial septum is intact with no evidence for an atrial 

septal defect.



 AORTIC VALVE 

The aortic valve is mildly sclerotic.



 MITRAL VALVE 

The mitral valve is normal in structure.



 TRICUSPID VALVE 

The tricuspid valve is normal in structure.



 PULMONIC VALVE 

The pulmonic valve is not well visualized.



 GREAT VESSELS 

The aortic root is normal in size.



 PERICARDIAL EFFUSION 

There is no pericardial effusion.



<Conclusion>

The left ventricle is normal size.

There is normal left ventricular wall thickness.

The left ventricular function is normal.

## 2018-06-16 ENCOUNTER — HOSPITAL ENCOUNTER (INPATIENT)
Dept: HOSPITAL 42 - ED | Age: 58
LOS: 4 days | Discharge: HOME | DRG: 690 | End: 2018-06-20
Attending: INTERNAL MEDICINE | Admitting: INTERNAL MEDICINE
Payer: COMMERCIAL

## 2018-06-16 VITALS — BODY MASS INDEX: 36.5 KG/M2

## 2018-06-16 DIAGNOSIS — Z91.041: ICD-10-CM

## 2018-06-16 DIAGNOSIS — E11.9: ICD-10-CM

## 2018-06-16 DIAGNOSIS — F31.9: ICD-10-CM

## 2018-06-16 DIAGNOSIS — G35: ICD-10-CM

## 2018-06-16 DIAGNOSIS — Z91.14: ICD-10-CM

## 2018-06-16 DIAGNOSIS — Z87.81: ICD-10-CM

## 2018-06-16 DIAGNOSIS — K52.9: ICD-10-CM

## 2018-06-16 DIAGNOSIS — Z79.82: ICD-10-CM

## 2018-06-16 DIAGNOSIS — J44.9: ICD-10-CM

## 2018-06-16 DIAGNOSIS — F41.9: ICD-10-CM

## 2018-06-16 DIAGNOSIS — Z95.0: ICD-10-CM

## 2018-06-16 DIAGNOSIS — M79.7: ICD-10-CM

## 2018-06-16 DIAGNOSIS — N39.0: Primary | ICD-10-CM

## 2018-06-16 DIAGNOSIS — Z82.49: ICD-10-CM

## 2018-06-16 DIAGNOSIS — Z88.1: ICD-10-CM

## 2018-06-16 DIAGNOSIS — Z87.440: ICD-10-CM

## 2018-06-16 DIAGNOSIS — E78.00: ICD-10-CM

## 2018-06-16 DIAGNOSIS — Z79.4: ICD-10-CM

## 2018-06-16 DIAGNOSIS — E78.5: ICD-10-CM

## 2018-06-16 DIAGNOSIS — Z95.810: ICD-10-CM

## 2018-06-16 DIAGNOSIS — E66.9: ICD-10-CM

## 2018-06-16 DIAGNOSIS — I10: ICD-10-CM

## 2018-06-16 DIAGNOSIS — D61.818: ICD-10-CM

## 2018-06-16 DIAGNOSIS — K21.9: ICD-10-CM

## 2018-06-16 DIAGNOSIS — Z87.442: ICD-10-CM

## 2018-06-16 DIAGNOSIS — Z98.84: ICD-10-CM

## 2018-06-16 LAB
ALBUMIN SERPL-MCNC: 3.4 G/DL (ref 3–4.8)
ALBUMIN/GLOB SERPL: 1 {RATIO} (ref 1.1–1.8)
ALT SERPL-CCNC: 36 U/L (ref 7–56)
APAP SERPL-MCNC: < 10 UG/ML (ref 10–20)
APPEARANCE UR: (no result)
APTT BLD: 31.3 SECONDS (ref 25.1–36.5)
AST SERPL-CCNC: 55 U/L (ref 14–36)
BACTERIA #/AREA URNS HPF: (no result) /[HPF]
BASOPHILS # BLD AUTO: 0 K/MM3 (ref 0–2)
BASOPHILS NFR BLD: 0 % (ref 0–3)
BILIRUB UR-MCNC: NEGATIVE MG/DL
BNP SERPL-MCNC: 93.9 PG/ML (ref 0–450)
BUN SERPL-MCNC: 12 MG/DL (ref 7–21)
CALCIUM SERPL-MCNC: 8.3 MG/DL (ref 8.4–10.5)
COLOR UR: YELLOW
EOSINOPHIL # BLD: 0 10*3/UL (ref 0–0.7)
EOSINOPHIL NFR BLD: 0.5 % (ref 1.5–5)
ERYTHROCYTE [DISTWIDTH] IN BLOOD BY AUTOMATED COUNT: 13.8 % (ref 11.5–14.5)
GFR NON-AFRICAN AMERICAN: > 60
GLUCOSE UR STRIP-MCNC: NEGATIVE MG/DL
GRANULOCYTES # BLD: 0.46 10*3/UL (ref 1.4–6.5)
GRANULOCYTES NFR BLD: 24.7 % (ref 50–68)
HGB BLD-MCNC: 9.9 G/DL (ref 12–16)
INR PPP: 0.99 (ref 0.93–1.08)
LEUKOCYTE ESTERASE UR-ACNC: (no result) LEU/UL
LYMPHOCYTES # BLD: 1.3 10*3/UL (ref 1.2–3.4)
LYMPHOCYTES NFR BLD AUTO: 68.3 % (ref 22–35)
MCH RBC QN AUTO: 29.8 PG (ref 25–35)
MCHC RBC AUTO-ENTMCNC: 31.6 G/DL (ref 31–37)
MCV RBC AUTO: 94.3 FL (ref 80–105)
MONOCYTES # BLD AUTO: 0.1 10*3/UL (ref 0.1–0.6)
MONOCYTES NFR BLD: 6.5 % (ref 1–6)
PH UR STRIP: 6 [PH] (ref 4.7–8)
PLATELET # BLD: 159 10^3/UL (ref 120–450)
PMV BLD AUTO: 11.1 FL (ref 7–11)
PROT UR STRIP-MCNC: NEGATIVE MG/DL
PROTHROMBIN TIME: 11.3 SECONDS (ref 9.4–12.5)
RBC # BLD AUTO: 3.32 10^6/UL (ref 3.5–6.1)
RBC # UR STRIP: NEGATIVE /UL
SALICYLATES SERPL-MCNC: 2 MG/DL (ref 2–20)
SP GR UR STRIP: 1.02 (ref 1–1.03)
TROPONIN I SERPL-MCNC: < 0.01 NG/ML
UROBILINOGEN UR STRIP-ACNC: 0.2 E.U./DL
WBC # BLD AUTO: 1.9 10^3/UL (ref 4.5–11)
WBC #/AREA URNS HPF: (no result) /HPF (ref 0–6)

## 2018-06-16 RX ADMIN — OXYCODONE AND ACETAMINOPHEN PRN TAB: 10; 325 TABLET ORAL at 23:46

## 2018-06-16 RX ADMIN — INSULIN HUMAN SCH: 100 INJECTION, SOLUTION PARENTERAL at 23:05

## 2018-06-16 NOTE — CP.PCM.HP
<Adonay Perdomo - Last Filed: 06/16/18 22:12>





History of Present Illness





- History of Present Illness


History of Present Illness: 





Adonay Perdomo D.O. PGY 2, Internal Medicine Resident, History and Physical





58-year-old female with a past medical history multiple sclerosis, fibromyalgia

, bradycardia s/p AICD placement, hypertension, obesity, diabetes, chronic 

anemia who presented to the [facility] emergency room with complaints of 2 days worth 

of substernal chest pain.  Patient describes the pain as 10 out of 10, 

substernal, associated with left arm numbness, diaphoresis, shortness of breath

, weakness, does state that she has associated left hand paresthesias/numbness 

although she does have a hard time discerning the symptoms given the fact that 

she has multiple sclerosis.  Patient is originally from [place] and was 

considering moving appears of as how she has been here over the past month.  

However the patient has not been taking her medications for multiple sclerosis, 

Copaxone, for about a month.  Patient states that she comes to [place] every 

now and again for visiting family.  Otherwise no recent travel, sick contacts, 

or any other extraneous factors.  Of note patient states that she chronically 

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 

and a short gut.





Past medical history: As above


Past surgical history: AICD placement, right chest wall port placement, gastric 

bypass, small bowel resection


Social history: Denies smoking, denies alcohol, denies illicits


Family history: Noncontributory


Medications: Reviewed


Allergies: Levofloxacin





Present on Admission





- Present on Admission


Any Indicators Present on Admission: No





Review of Systems





- Review of Systems


All systems: reviewed and no additional remarkable complaints except





- Cardiovascular


Cardiovascular: Chest Pain





- Gastrointestinal


Gastrointestinal: Diarrhea





- Musculoskeletal


Musculoskeletal: Numbness





Past Patient History





- Infectious Disease


Hx of Infectious Diseases: None





- Past Social History


Smoking Status: Never Smoked





- CARDIAC


Hx Hypertension: Yes





- PULMONARY


Hx Respiratory Disorders: No





- NEUROLOGICAL


Hx Neurological Disorder: Yes (MULTIPLE SCLEROSIS,FIBROMYALGIA)





- HEENT


Hx HEENT Problems: No





- RENAL


Hx Chronic Kidney Disease: No





- ENDOCRINE/METABOLIC


Hx Diabetes Mellitus Type 2: Yes





- HEMATOLOGICAL/ONCOLOGICAL


Hx Blood Disorders: No





- INTEGUMENTARY


Hx Dermatological Problems: No





- MUSCULOSKELETAL/RHEUMATOLOGICAL


Hx Back Pain: Yes


Hx Falls: Yes





- GASTROINTESTINAL


Other/Comment: colitis





- GENITOURINARY/GYNECOLOGICAL


Hx Genitourinary Disorders: No





- PSYCHIATRIC


Hx Substance Use: No





- SURGICAL HISTORY


Hx Gastric Bypass Surgery: Yes


Other/Comment: pacemaker





- ANESTHESIA


Hx Anesthesia: No





Meds


Allergies/Adverse Reactions: 


 Allergies











Allergy/AdvReac Type Severity Reaction Status Date / Time


 


levofloxacin [From Levaquin] Allergy  RASH Verified 12/09/17 14:45


 


iv dye Allergy  RASH Uncoded 12/09/17 14:45














Physical Exam





- Constitutional


Appears: Non-toxic, No Acute Distress, Chronically Ill





- Head Exam


Head Exam: ATRAUMATIC, NORMOCEPHALIC





- Eye Exam


Eye Exam: EOMI, PERRL.  absent: Scleral icterus





- ENT Exam


ENT Exam: Mucous Membranes Moist





- Neck Exam


Neck exam: Positive for: Normal Inspection.  Negative for: Lymphadenopathy





- Respiratory Exam


Respiratory Exam: Clear to Auscultation Bilateral.  absent: Rhonchi, Wheezes





- Cardiovascular Exam


Cardiovascular Exam: RRR, +S1, +S2.  absent: Gallop, Rubs





- GI/Abdominal Exam


GI & Abdominal Exam: Normal Bowel Sounds, Soft.  absent: Distended, Firm





- Neurological Exam


Neurological exam: Alert, CN II-XII Intact, Oriented x3


Additional comments: 





4/5 L BI/TRI/DELT, otherwise all other areas 5/5, sensory deficit over right 

outer thigh and some numbness of left hand





- Psychiatric Exam


Psychiatric exam: Normal Affect, Normal Mood





- Skin


Skin Exam: Dry, Intact, Warm





Results





- Vital Signs


Recent Vital Signs: 





 Last Vital Signs











Temp  98.5 F   06/16/18 17:57


 


Pulse  68   06/16/18 21:17


 


Resp  18   06/16/18 21:17


 


BP  118/82   06/16/18 21:17


 


Pulse Ox  97   06/16/18 21:17














- Labs


Result Diagrams: 


 06/16/18 18:39





 06/16/18 18:39


Labs: 





 Laboratory Results - last 24 hr











  06/16/18





  21:55


 


POC Glucose (mg/dL)  77














Assessment & Plan





- Assessment and Plan (Free Text)


Assessment: 





58-year-old female with a past medical history multiple sclerosis, fibromyalgia

, bradycardia s/p AICD placement, hypertension, obesity, diabetes, chronic 

anemia who presented to the [facility] emergency room with complaints of 2 days worth 

of substernal chest pain.  


Plan: 





1.  Chest pain rule out ACS


Admit to telemetry


First troponin is negative, will trend another 2


Does have a history of bradycardia and has an AICD


Cardiology consulted


We will order a TSH


Vitals every 4


Had about 30


Repeat EKG in the a.m.





2.  Neutropenia


Etiology unclear


Could be due to the Copaxone that she was previously taking, patient has admits 

that she follows with a hematologist for anemia and may have had low white 

blood cells as well


Urine shows possible urinary tract infection, will repeat to get a clean-catch 

but at this time given her neutropenia we will start her on ceftriaxone 2 g 

daily


We will also test CRP, hepatitis panel, HIV, TSH, MARYANN screen with reflex and 

obtain a peripheral smear to workup possible causes


Neutropenic precautions


Low microbial food


Chest x-ray reviewed, possible infiltrate versus consolidation of the left 

lower lobe region, as mentioned will continue with ceftriaxone and follow-up 

radiologist official read





3.  Anemia, chronic


Given her symptomatology, we will order a B12 as well as a folate


Repeat CBC in the morning


No active bleeding noted


Hemodynamically stable


Hematology consulted





4.  History of multiple sclerosis and fibromyalgia


Noncompliant with medications


Patient previously on Copaxone


Difficult to ascertain whether some of her symptoms may be due to multiple 

sclerosis flare versus coronary disease


We will start on Solu-Medrol 60 every 12 and evaluate for symptom changes


Neurology consulted


Given that she has a history of depression as well, will consult psychiatry


Continue home medications Flexeril, Klonopin, venlafaxine, oxycodone


PT ordered





5.  Chronic diarrhea


Continue home Imodium





DVT/GI prophylaxis: SCDs, Protonix





Patient was seen and examined and case was discussed at length with attending 

physician.








- Date & Time


Date: 06/16/18


Time: 21:45





<Charo MENESES,Kd - Last Filed: 06/17/18 02:35>





Results





- Vital Signs


Recent Vital Signs: 





 Last Vital Signs











Temp  98.0 F   06/17/18 00:01


 


Pulse  67   06/17/18 02:00


 


Resp  19   06/17/18 00:01


 


BP  143/78   06/17/18 00:01


 


Pulse Ox  99   06/17/18 00:01














- Labs


Result Diagrams: 


 06/16/18 18:39





 06/16/18 18:39


Labs: 





 Laboratory Results - last 24 hr











  06/16/18 06/16/18 06/16/18





  21:55 23:02 23:50


 


POC Glucose (mg/dL)  77  103 


 


Troponin I    < 0.01














Attending/Attestation





- Attestation


I have personally seen and examined this patient.: Yes


I have fully participated in the care of the patient.: Yes


I have reviewed all pertinent clinical information: Yes


Notes (Text): 











-I agree with the above H&P completed by the resident physician with the 

following additions and/or changes:





-The patient is a 58 year old woman with a history of multiple sclerosis, 

fibromyalgia, depression, bradycardia (s/p AICD), HTN, obesity, IDDM and 

chronic anemia, who presents with chest pain (with some typical features) and 

neutropenia of unclear etiology. The differential diagnosis for her neutropenia 

is broad and includes: Infection vs Medications vs Nutritional Deficiencies (i.e

- B12 or folate) vs Primary Hematological Disease vs Malignancy. We will check 

serial trops and EKGs, HgA1c, lipids and a TSH.  Also, daily ASA and a 

cardiology consult ordered. In addition, for work-up of her neutropenia, the 

following labs have been ordered: peripheral blood smear, MARYANN, B12, folate, HIV

, Hepatitis panel, ESR and CRP. Empiric IV Ceftriaxone for treatment of her 

UTI. Neutropenic precautions and diet as well as a hematology consult have all 

been requested.

## 2018-06-16 NOTE — ED PDOC
Arrival/HPI





- General


Chief Complaint: Anxiety


Time Seen by Provider: 06/16/18 17:39


Historian: Patient





- History of Present Illness


Narrative History of Present Illness (Text): 





06/16/18 18:00


pt p/w + 2 days onset of substernal chest pain, non-radiating, + left arm 

numbness, + weakness, + diaphoretic, + sob; NON-pleuritic chest pain; at most 

pain is 10/10; pt states pain wax and wanes and felt that the pain is similar 

to her prior hx of chest pain; pt also has chest pain when she has MS flare, 

including gait imbalance; pt states 1 week ago, she fell 3 times and was 

diagnosed with left rib fx but was subsequently discharged; pt states she fell 

again yesterday but did not strike anything; pt states no fever/chills, no 

palpitations, no abd pain, + nausea, no vomiting, no appetite; pt denied urinary

/bowel changes, no incontinence; pt states she also had an argument with her 

daughter today, just prior to Emergency department arrival and states her chest 

pain became severe and decided to come to Emergency department for further eval

; pt states she has been very depressed lately, no SI/HI, no hallucinations - 

visual/tactile/auditory; pt states she misses her monthly psych evaluation that 

she typically receives while down in [place]; pt splits her time between [place] and [place]; 

pt states she just came up to [place] ~ 1 month ago; pt has a neurologists in [place] but 

not in [place]; pt states she stopped taking her MS medication ~ 1 month ago as well 

(b/c insurance is not covering it). pt also did not obtain a stress test for 

herself over the last 2 months as recommended by Dr Lawton, when she was 

admitted few weeks ago, pt expressed fear of stress tests. Pt arrived to 

Emergency department for further eval, pt's without other complaints.





PCP: Dr GOSIA Han? (MEGHAN)


cards: Dr Lawton





Time/Duration: < week (2 days)


Symptom Onset: Sudden


Symptom Course: Worsening


Quality: Tightness, Cramping


Severity Level: 7, Severe


Activities at Onset: Rest


Context: Home





Past Medical History





- Provider Review


Nursing Documentation Reviewed: Yes





- Travel History


Have you recently traveled outside [place] w/in the past 3 mons?: No





- Past History


Past History: Non-Contributing





- Infectious Disease


Hx of Infectious Diseases: None





- Reproductive


Menopause: Yes


Currently Pregnant: No





- Cardiac


Hx Hypertension: Yes





- Pulmonary


Hx Respiratory Disorders: No





- Neurological


Hx Neurological Disorder: Yes (MULTIPLE SCLEROSIS,FIBROMYALGIA)





- HEENT


Hx HEENT Disorder: No





- Renal


Hx Renal Disorder: No





- Endocrine/Metabolic


Hx Diabetes Mellitus Type 2: Yes





- Hematological/Oncological


Hx Blood Disorders: No





- Integumentary


Hx Dermatological Disorder: No





- Musculoskeletal/Rheumatological


Hx Back Pain: Yes


Hx Falls: Yes





- Gastrointestinal


Other/Comment: colitis





- Genitourinary/Gynecological


Hx Genitourinary Disorders: No





- Psychiatric


Hx Substance Use: No





- Surgical History


Hx Gastric Bypass Surgery: Yes


Other/Comment: pacemaker





- Anesthesia


Hx Anesthesia: No





Family/Social History





- Physician Review


Nursing Documentation Reviewed: Yes


Family/Social History: No Known Family HX


Smoking Status: Never Smoked


Hx Alcohol Use: No


Hx Substance Use: No


Hx Substance Use Treatment: No





Allergies/Home Meds


Allergies/Adverse Reactions: 


Allergies





levofloxacin [From Levaquin] Allergy (Verified 12/09/17 14:45)


 RASH


iv dye Allergy (Uncoded 12/09/17 14:45)


 RASH








Home Medications: 


 Home Meds











 Medication  Instructions  Recorded  Confirmed


 


Aspirin [Ecotrin] 81 mg PO DAILY 12/09/17 06/16/18


 


Atropine/Diphenoxylate [Lomotil 1 tab PO DAILY PRN 12/09/17 06/16/18





0.025-2.5 mg tablet]   


 


Clonazepam [Klonopin] 1 mg PO Q8 PRN 12/09/17 06/16/18


 


Insulin Human (NPH)/Regular 0 units SC AC PRN 12/09/17 06/16/18





[Novolin 70/30 (70/30 units/ml) 10   





ml]   


 


Oxycodone HCl/Acetaminophen 1 tab PO Q6 PRN 12/09/17 06/16/18





[Percocet  mg Tablet]   


 


Pantoprazole [Protonix EC Tab] 40 mg PO DAILY 12/09/17 06/16/18


 


Venlafaxine [Effexor  50 MG TAB] 100 mg PO BID 12/09/17 06/16/18


 


amLODIPine [Norvasc] 10 mg PO DAILY 12/09/17 06/16/18


 


Acetaminophen/Butalbital/Caf 1 tab PO Q4H PRN 06/16/18 06/16/18





[Fioricet]   


 


Cyclobenzaprine [Flexeril] 1 tab PO TID PRN 06/16/18 06/16/18


 


Lidocaine 5% [Lidocaine 5%] 1 g TOP DAILY 06/16/18 06/16/18


 


Loperamide [Imodium] 1 tab PO PRN PRN 06/16/18 06/16/18


 


Ondansetron [Zofran Tab] 1 tab PO BID 06/16/18 06/16/18


 


hydroCHLOROthiazide [Hydrodiuril] 1 tab PO DAILY 06/16/18 06/16/18














Review of Systems





- Review of Systems


Constitutional: Fatigue


Eyes: Normal


ENT: Normal


Respiratory: SOB.  absent: Cough


Cardiovascular: Chest Pain.  absent: Palpitations


Gastrointestinal: Nausea.  absent: Abdominal Pain, Vomiting


Genitourinary Female: Normal


Musculoskeletal: Normal


Skin: Normal


Neurological: Headache, Dizziness.  absent: Focal Weakness


Endocrine: Diaphoresis


Hemo/Lymphatic: Normal


Psychiatric: Anxiety, Depression.  absent: Suicidal Ideation





Physical Exam





- Physical Exam


Narrative Physical Exam (Text): 





06/16/18 18:05


General: alert/awake, GCS = 15, oriented x 3, resting in bed, uncomfortable, 

cooperative, interactive; NAD; tearful at times


Head: NC/AT


EYE: PERRLA, EOMI, sclera anicteric, no nystagmus, no photophobia; visual field 

intact b/l


Facial: WNL


Oral: uvula/tongue are midline, no exudate/lesions, no drooling/stridor, no 

dysphonia; intact dentitions; moist oral mucosa


NECK: intact ROM, no midline tenderness, no nuchal rigidity, no meningeal signs

; no step off


Chest: CTA b/l, no w/r/r; no tachypenia, no accessory muscle use noted


CHEST wall: + right chest wall port-a-cath; no skin wounds noted


Cardiac: +S1, +S2, no m/r/r, no tachycardia


Abdominal: +BS, soft/nd/nt, well nourished patient; no masses/rebound/guarding/

rigidity; no gimenez's sign, no mcburney's point tenderness


Extremities: intact ROM, strength 5/5 grossly intact in all limbs, neurovasc 

intact b/l; + ambulatory; reflex +2/2


BACK: no step off, no midline tenderness, NO crepitus, no gross deformities 

noted; Intact ROM


SKIN: cap refill < 1 sec, no ulcerations, no petechiae, no rashes


NEURO: CNII-XII WNL, no facial asymmetries, no slurr speech, oriented x 3


NIH stroke scale ~ 0


Psych: normal insight, depressed/flat affect; + crying, follows command with 

ease





Vital Signs Reviewed: Yes


Vital Signs











  Temp Pulse Resp BP Pulse Ox


 


 06/16/18 21:17   68  18  118/82  97


 


 06/16/18 17:57  98.5 F  90  18  117/69  100











Temperature: Afebrile


Blood Pressure: Normal


Pulse: Regular


Respiratory Rate: Normal


Appearance: Positive for: Well-Appearing, Uncomfortable.  No: Non-Toxic, Ill-

Appearing


Pain Distress: Mild


Mental Status: Positive for: Alert and Oriented X 3





- Systems Exam


Head: Present: Atraumatic, Normocephalic





Medical Decision Making


ED Course and Treatment: 





06/16/18 18:10





Impression: 1) depression; 2) chest pain, r/o acs; 3) possible MS flare; 4) 

medication non-compliance


i have consider all the differential diagnosis regarding pt's chief medical 

complaints/clinical findings, including but are not limited to: 1) depression; 2

) chest pain, r/o acs; 3) possible MS flare; 4) medication non-compliance





A/P: 1) depression; 2) chest pain, r/o acs; 3) possible MS flare; 4) medication 

non-compliance


- labs


- iv


- acs eval


- xray


- supportive care


- observe/reevaluation





1900


given pt's medical complaints and complexities of her medical history, will 

recommend patient for admission


pt agrees





06/16/18 19:24


pt is doing well currently


pt is much more comfortable


pt is made aware of her medical results and agrees with admission





I spoke to medicine team on call, made aware, will see patient





paged on call hospitalists, Dr Wilks, made aware, agrees with admission





Re-evaluation Time: 19:19


Reassessment Condition: Improving,but remains with symptoms





- Lab Interpretations


Microbiology Results: 


Microbiology Results





06/16/18 19:00   Urine,Clean Catch   Urine Culture - Final


                            ,000 CFU/ML.


                            MULTIPLE SPECIES. SUGGEST REPEAT SPECIMEN.








Lab Results: 








 06/16/18 18:39 





 06/16/18 18:39 





 Lab Results





06/16/18 18:39: ESR 55 H


06/16/18 18:39: Alcohol, Quantitative < 10


06/16/18 18:39: Salicylates 2, Acetaminophen < 10.0 L


06/16/18 18:39: Sodium 143, Potassium 4.0, Chloride 108 H, Carbon Dioxide 26, 

Anion Gap 13, BUN 12, Creatinine 0.8, Est GFR (African Amer) > 60, Est GFR (Non-

Af Amer) > 60, Random Glucose 80, Calcium 8.3 L, Magnesium 2.1, Total Bilirubin 

0.2, AST 55 H D, ALT 36, Alkaline Phosphatase 111, Lactate Dehydrogenase 528, 

Total Creatine Kinase 45, Troponin I < 0.01  D, NT-Pro-B Natriuret Pep 93.9, 

Total Protein 6.9, Albumin 3.4, Globulin 3.5, Albumin/Globulin Ratio 1.0 L


06/16/18 18:39: PT 11.3, INR 0.99, APTT 31.3


06/16/18 18:39: WBC 1.9 L* D, RBC 3.32 L, Hgb 9.9 L, Hct 31.3 L, MCV 94.3, MCH 

29.8, MCHC 31.6, RDW 13.8, Plt Count 159, MPV 11.1 H, Gran % 24.7 L, Lymph % (

Auto) 68.3 H, Mono % (Auto) 6.5 H, Eos % (Auto) 0.5 L, Baso % (Auto) 0.0, Gran 

# 0.46 L, Lymph # (Auto) 1.3, Mono # (Auto) 0.1, Eos # (Auto) 0.0, Baso # (Auto

) 0.00


06/16/18 18:28: Urine Opiates Screen Negative, Urine Methadone Screen Negative, 

Ur Barbiturates Screen Positive H, Ur Phencyclidine Scrn Negative, Ur 

Amphetamines Screen Negative, U Benzodiazepines Scrn Negative, U Oth Cocaine 

Metabols Negative, U Cannabinoids Screen Negative


06/16/18 18:28: Urine Color Yellow, Urine Appearance Sl cloudy, Urine pH 6.0, 

Ur Specific Gravity 1.020, Urine Protein Negative, Urine Glucose (UA) Negative, 

Urine Ketones Negative, Urine Blood Negative, Urine Nitrate Positive H, Urine 

Bilirubin Negative, Urine Urobilinogen 0.2, Ur Leukocyte Esterase Moderate H, 

Urine RBC 2 - 5, Urine WBC 20 - 25, Ur Epithelial Cells 10 - 12, Urine Bacteria 

Large








I have reviewed the lab results: Yes


Interpretation: Abnormal lab values (+ UTI; low WBCs)





- RAD Interpretation


Narrative RAD Interpretations (Text): 





06/20/18 08:14


HISTORY:


Chest pain





COMPARISON:


05/10/2018





TECHNIQUE:


Chest PA and lateral





FINDINGS:





LUNGS:


No active pulmonary disease.





PLEURA:


No significant pleural effusion identified. No pneumothorax apparent.





CARDIOVASCULAR:


 No radiographic findings to suggest acute or significant cardiovascular 

disease. Position/ configuration of pacemaker\AICD device: Satisfactory. Venous 

access catheter in stable, satisfactory position.





OSSEOUS STRUCTURES:


No significant abnormalities.





VISUALIZED UPPER ABDOMEN:


Normal.





OTHER FINDINGS:


None.





IMPRESSION:


No active disease. No significant interval change compared to the prior 

examination(s).


Radiology Orders: 








06/16/18 18:04


CHEST TWO VIEWS (PA/LAT) [RAD] Stat 











: Radiologist





- EKG Interpretation


EKG Interpretation (Text): 





06/16/18 18:17


Sinus rhythm at 75 bpm, with ectopy, normal axis, diffuse low voltage, non-

specific T changes, ABNL EKG; unchanged compare with old ekg 5/2018





Interpreted by ED Physician: Yes


Type: 12 lead EKG


Comparison: Similar to previous EKG





- Medication Orders


Current Medication Orders: 








Acetaminophen (Tylenol 325mg Tab)  650 mg PO Q6H PRN


   PRN Reason: Pain, moderate (4-7)


   Last Admin: 06/18/18 13:12  Dose: 650 mg





MAR Pain/Vitals


 Document     06/18/18 13:12    (Rec: 06/18/18 13:13  [id number]


     Pain Reassessment


      Is This A Pain ReAssessment?               Yes


     Presence of Pain


      Presence of Pain                           Yes


     Location


      Pain Location Body Site                    Head


      Alleviating Factors                        Medication


Re-Assess: MAR Pain/Vitals


 Document     06/18/18 14:12    (Rec: 06/18/18 18:05  [id number]


     Pain Reassessment


      Is This A Pain ReAssessment?               Yes


     Sleep


      Is patient sleeping during reassessment?   Yes





Acetaminophen/Butalbital/Caffeine (Fioricet)  1 tab PO Q4H PRN


   PRN Reason: Pain, moderate (4-7)


   Last Admin: 06/18/18 20:44  Dose: 1 tab





MAR Pain Assessment


 Document     06/18/18 20:44  OLIVD  (Rec: 06/18/18 20:45  OLIVD  [id number])


     Pain Reassessment


      Is this a pain reassessment?               No


     Presence of Pain


      Presence of Pain                           Yes


     Pain Scale Used


      Pain Scale Used                            Numeric


     Location


      Pain Location Body Site                    Head


     Description


      Description                                Constant


      Intensity of Pain at present               9


      Pain Behavior                              Restlessness


Re-Assess: MAR Pain Assessment


 Document     06/18/18 21:44  OLIVD  (Rec: 06/18/18 22:17  OLIVD  [id number])


     Pain Reassessment


      Is this a pain reassessment?               Yes


     Presence of Pain


      Presence of Pain                           Yes


     Pain Scale Used


      Pain Scale Used                            Numeric


     Location


      Pain Location Body Site                    Head


     Description


      Description                                Constant


      Intensity of Pain at present               8


      Pain Behavior                              Moaning


                                                 Restlessness


      Pain not relieved and LIP/MD was           Yes


       notified                                  





Acetylcysteine (Acetylcysteine 20%)  4 ml IH [id number] [facility]


   Last Admin: 06/20/18 07:21  Dose: 4 ml





Albuterol/Ipratropium (Duoneb 3 Mg/0.5 Mg (3 Ml) Ud)  3 ml IH [id number] [facility]


   Last Admin: 06/20/18 07:21  Dose: 3 ml





Amlodipine Besylate (Norvasc)  10 mg PO DAILY [facility]


   Last Admin: 06/19/18 09:34  Dose: 10 mg





MAR Blood Pressure


 Document     06/19/18 09:34  ML  (Rec: 06/19/18 09:35  ML  [email or web address])


     Blood Pressure


      Blood Pressure (100//90)             156/78





Aspirin (Ecotrin)  81 mg PO DAILY [facility]


   Last Admin: 06/19/18 09:34  Dose: 81 mg





Clonazepam (Klonopin)  1 mg PO Q8 PRN; Protocol


   PRN Reason: Anxiety


   Last Admin: 06/19/18 09:43  Dose: 1 mg





Behavioural


 Document     06/19/18 09:43  ML  (Rec: 06/19/18 09:43  ML  [email or web address])


     Maintenance


      Maintenance Dose                           Yes


Re-Assess: Reassess Psych Meds


 Document     06/19/18 10:43  ML  (Rec: 06/19/18 10:44  ML  [email or web address])


      Reassess Psych Med                         Effective





Cyclobenzaprine HCl (Flexeril)  5 mg PO TID PRN


   PRN Reason: Pain, moderate (4-7)


   Last Admin: 06/19/18 06:07  Dose: 5 mg





Diphenoxylate HCl/Atropine (Lomotil 0.025-2.5 Mg Tablet)  1 tab PO DAILY PRN


   PRN Reason: Diarrhea


Folic Acid (Folic Acid)  1 mg PO DAILY [facility]


   Last Admin: 06/19/18 09:34  Dose: 1 mg





Guaifenesin/Dextromethorphan (Robitussin Dm)  10 ml PO Q4H PRN


   PRN Reason: Cough


Hydrochlorothiazide (Hydrodiuril)  25 mg PO DAILY [facility]


   Last Admin: 06/19/18 09:34  Dose: 25 mg





Ceftriaxone Sodium (Rocephin 2 Gm Ivpb)  2 gm in 100 mls @ 100 mls/hr IVPB 

DAILY [facility]


   PRN Reason: Protocol


   Last Admin: 06/19/18 09:35  Dose: 100 mls/hr





eMAR Start Stop


 Document     06/19/18 09:35  ML  (Rec: 06/19/18 09:35  ML  [email or web address])


     Intravenous Solution


      Start Date                                 06/19/18


      Start Time                                 09:35


      End Date                                   06/19/18


      End time                                   10:35


      Total Infusion Time                        60





Methylprednisolone 1 gm/ (Sodium Chloride)  250 mls @ 500 mls/hr IV DAILY [facility]


   Stop: 06/20/18 14:00


   Last Admin: 06/19/18 09:35  Dose: 500 mls/hr





eMAR Start Stop


 Document     06/19/18 09:35  ML  (Rec: 06/19/18 09:36  ML  [email or web address])


     Intravenous Solution


      Start Date                                 06/19/18


      Start Time                                 09:35


      End Date                                   06/19/18


      End time                                   10:45


      Total Infusion Time                        70





Ibuprofen (Motrin Tab)  600 mg PO BID [facility]


   Last Admin: 06/19/18 17:02  Dose: 600 mg





Re-Assess: MAR Pain/Vitals


 Document     06/19/18 18:02  ML  (Rec: 06/19/18 18:08  ML  [email or web address])


     Pain Reassessment


      Is This A Pain ReAssessment?               Yes


     Presence of Pain


      Presence of Pain                           No





Insulin Human Regular (Humulin R Med)  0 units SC ACHS [facility]


   PRN Reason: Protocol


   Last Admin: 06/19/18 23:12 Dose:  Not Given


   Non-Admin Reason: Blood Sugar Parameter





MAR Blood Glucose


 Document     06/19/18 23:12  OLIVD  (Rec: 06/19/18 23:13  OLIVD  [id number])


     Blood Glucose


      Finger Stick Blood Glucose ()        151





Lidocaine (Lidocaine 5%)  0 gm TOP DAILY [facility]


   Last Admin: 06/19/18 09:36 Dose:  Not Given


   Non-Admin Reason: Patient Refused





Loperamide HCl (Imodium)  2 mg PO DAILY [facility]


   Last Admin: 06/19/18 09:59  Dose:  





Non-Formulary Medication (Venlafaxine [Effexor  50 Mg Tab])  100 mg PO BID [facility]


   Last Admin: 06/19/18 17:53  Dose:  





Ondansetron HCl (Zofran Inj)  4 mg IVP Q6H PRN


   PRN Reason: Migraine headache


   Last Admin: 06/20/18 01:18  Dose: 4 mg





IVP Administration


 Document     06/20/18 01:18  MV  (Rec: 06/20/18 01:19  MV  [id number])


     Charges for Administration


      # of IVP Administrations                   1





Oxycodone/Acetaminophen (Percocet 10/325 Mg Tab)  1 tab PO Q6H PRN


   PRN Reason: Pain, moderate (4-7)


   Last Admin: 06/19/18 18:02  Dose: 1 tab





MAR Pain Assessment


 Document     06/19/18 18:02  ML  (Rec: 06/19/18 18:02  ML  [email or web address])


     Pain Reassessment


      Is this a pain reassessment?               No


     Presence of Pain


      Presence of Pain                           Yes





Pantoprazole Sodium (Protonix Ec Tab)  40 mg PO 0600 [facility]


   Last Admin: 06/20/18 05:48  Dose: 40 mg





Discontinued Medications





Albuterol/Ipratropium (Duoneb 3 Mg/0.5 Mg (3 Ml) Ud)  3 ml IH [id number] [facility]


   Last Admin: 06/19/18 01:22 Dose:  Not Given


   Non-Admin Reason: Patient Refused





Diphenhydramine HCl (Benadryl)  25 mg PO ONCE ONE


   Stop: 06/18/18 11:25


   Last Admin: 06/18/18 11:40  Dose: 25 mg





Famotidine (Pepcid)  20 mg PO STAT STA


   Stop: 06/18/18 11:27


   Last Admin: 06/18/18 11:40  Dose: 20 mg





Loperamide HCl (Imodium)  2 mg PO ONCE STA


   Stop: 06/18/18 11:28


   Last Admin: 06/18/18 11:40  Dose: 2 mg





Lorazepam (Ativan)  2 mg IVP ONCE ONE


   PRN Reason: Protocol


   Stop: 06/16/18 18:06


   Last Admin: 06/16/18 18:48  Dose: 2 mg





IVP Administration


 Document     06/16/18 18:48  GMD  (Rec: 06/16/18 18:48  GMD  [id number])


     Charges for Administration


      # of IVP Administrations                   1





Nitrofurantoin Macrocrystals (Macrobid)  100 mg PO ONCE ONE


   PRN Reason: Protocol


   Stop: 06/16/18 18:51


   Last Admin: 06/16/18 19:48  Dose: 100 mg





Nitroglycerin (Nitro-Bid 2% Oint)  1 ea TOP STAT STA


   Stop: 06/16/18 18:05


   Last Admin: 06/16/18 18:48  Dose: 1 ea





Ondansetron HCl (Zofran Inj)  4 mg IVP ONCE ONE


   Stop: 06/19/18 12:43


   Last Admin: 06/19/18 12:59  Dose: 4 mg





IVP Administration


 Document     06/19/18 12:59  ML  (Rec: 06/19/18 12:59  ML  [email or web address])


     Charges for Administration


      # of IVP Administrations                   1














Disposition/Present on Arrival





- Present on Arrival


Any Indicators Present on Arrival: No


History of DVT/PE: No


History of Uncontrolled Diabetes: Yes


Urinary Catheter: No


History of Decub. Ulcer: No


History Surgical Site Infection Following: None





- Disposition


Have Diagnosis and Disposition been Completed?: Yes


Diagnosis: 


 Chest pain with minimal risk for cardiac etiology, Multiple sclerosis 

exacerbation, Depression, UTI (urinary tract infection), Weakness, Leukopenia





Disposition: HOSPITALIZED


Disposition Time: 19:15


Patient Plan: Admission, Telemetry


Patient Problems: 


 Current Active Problems











Problem Status Onset


 


Chest pain with minimal risk for cardiac etiology Acute  


 


Multiple sclerosis exacerbation Acute  


 


Depression Acute  


 


UTI (urinary tract infection) Acute  


 


Weakness Acute  


 


Pancytopenia Acute  











Condition: STABLE

## 2018-06-17 LAB
% IRON SATURATION: 31 % (ref 20–55)
ALBUMIN SERPL-MCNC: 2.9 G/DL (ref 3–4.8)
ALBUMIN/GLOB SERPL: 0.9 {RATIO} (ref 1.1–1.8)
ALT SERPL-CCNC: 32 U/L (ref 7–56)
APPEARANCE UR: (no result)
AST SERPL-CCNC: 39 U/L (ref 14–36)
BACTERIA #/AREA URNS HPF: (no result) /[HPF]
BASOPHILS # BLD AUTO: 0 K/MM3 (ref 0–2)
BASOPHILS NFR BLD: 0 % (ref 0–3)
BILIRUB UR-MCNC: NEGATIVE MG/DL
BUN SERPL-MCNC: 11 MG/DL (ref 7–21)
CALCIUM SERPL-MCNC: 8.1 MG/DL (ref 8.4–10.5)
COLOR UR: (no result)
EOSINOPHIL # BLD: 0 10*3/UL (ref 0–0.7)
EOSINOPHIL NFR BLD: 1.2 % (ref 1.5–5)
EPI CELLS #/AREA URNS HPF: (no result) /HPF (ref 0–5)
ERYTHROCYTE [DISTWIDTH] IN BLOOD BY AUTOMATED COUNT: 13.8 % (ref 11.5–14.5)
FOLATE SERPL-MCNC: > 20 NG/ML
GFR NON-AFRICAN AMERICAN: > 60
GLUCOSE UR STRIP-MCNC: NEGATIVE MG/DL
GRANULOCYTES # BLD: 0.51 10*3/UL (ref 1.4–6.5)
GRANULOCYTES NFR BLD: 30.4 % (ref 50–68)
HEPATITIS A IGM: NEGATIVE
HEPATITIS B CORE AB: NEGATIVE
HEPATITIS C ANTIBODY: NEGATIVE
HGB BLD-MCNC: 9.2 G/DL (ref 12–16)
IRON SERPL-MCNC: 64 UG/DL (ref 45–180)
LEUKOCYTE ESTERASE UR-ACNC: (no result) LEU/UL
LYMPHOCYTES # BLD: 1 10*3/UL (ref 1.2–3.4)
LYMPHOCYTES NFR BLD AUTO: 61.9 % (ref 22–35)
MCH RBC QN AUTO: 29.6 PG (ref 25–35)
MCHC RBC AUTO-ENTMCNC: 31.4 G/DL (ref 31–37)
MCV RBC AUTO: 94.2 FL (ref 80–105)
MONOCYTES # BLD AUTO: 0.1 10*3/UL (ref 0.1–0.6)
MONOCYTES NFR BLD: 6.5 % (ref 1–6)
PH UR STRIP: 8 [PH] (ref 4.7–8)
PLATELET # BLD: 121 10^3/UL (ref 120–450)
PMV BLD AUTO: 11 FL (ref 7–11)
PROT UR STRIP-MCNC: NEGATIVE MG/DL
RBC # BLD AUTO: 3.11 10^6/UL (ref 3.5–6.1)
RBC # UR STRIP: NEGATIVE /UL
RBC #/AREA URNS HPF: NEGATIVE /HPF (ref 0–2)
SP GR UR STRIP: 1.01 (ref 1–1.03)
TIBC SERPL-MCNC: 203 UG/DL (ref 265–497)
TROPONIN I SERPL-MCNC: < 0.01 NG/ML
UROBILINOGEN UR STRIP-ACNC: 0.2 E.U./DL
VIT B12 SERPL-MCNC: 204 PG/ML (ref 239–931)
WBC # BLD AUTO: 1.7 10^3/UL (ref 4.5–11)

## 2018-06-17 RX ADMIN — IPRATROPIUM BROMIDE AND ALBUTEROL SULFATE SCH ML: .5; 3 SOLUTION RESPIRATORY (INHALATION) at 19:24

## 2018-06-17 RX ADMIN — INSULIN HUMAN SCH: 100 INJECTION, SOLUTION PARENTERAL at 12:20

## 2018-06-17 RX ADMIN — PANTOPRAZOLE SODIUM SCH MG: 40 TABLET, DELAYED RELEASE ORAL at 06:16

## 2018-06-17 RX ADMIN — IPRATROPIUM BROMIDE AND ALBUTEROL SULFATE SCH ML: .5; 3 SOLUTION RESPIRATORY (INHALATION) at 13:13

## 2018-06-17 RX ADMIN — OXYCODONE AND ACETAMINOPHEN PRN TAB: 10; 325 TABLET ORAL at 14:25

## 2018-06-17 RX ADMIN — LIDOCAINE SCH APPLIC: 50 OINTMENT TOPICAL at 10:19

## 2018-06-17 RX ADMIN — OXYCODONE AND ACETAMINOPHEN PRN TAB: 10; 325 TABLET ORAL at 08:02

## 2018-06-17 RX ADMIN — OXYCODONE AND ACETAMINOPHEN PRN TAB: 10; 325 TABLET ORAL at 21:28

## 2018-06-17 RX ADMIN — INSULIN HUMAN SCH: 100 INJECTION, SOLUTION PARENTERAL at 22:21

## 2018-06-17 RX ADMIN — INSULIN HUMAN SCH: 100 INJECTION, SOLUTION PARENTERAL at 16:50

## 2018-06-17 RX ADMIN — INSULIN HUMAN SCH: 100 INJECTION, SOLUTION PARENTERAL at 07:45

## 2018-06-17 RX ADMIN — CEFTRIAXONE SCH MLS/HR: 2 INJECTION, POWDER, FOR SOLUTION INTRAMUSCULAR; INTRAVENOUS at 10:16

## 2018-06-17 NOTE — CARD
--------------- APPROVED REPORT --------------





EKG Measurement

Heart [id number]

CT 132P41

[id number]

[id number]

[id number]



<Conclusion>

Sinus rhythm with premature atrial complexes

Low voltage QRS

Nonspecific T wave abnormality

Prolonged QT

Abnormal ECG

## 2018-06-17 NOTE — CP.PCM.PN
Subjective





- Date & Time of Evaluation


Date of Evaluation: 06/17/18


Time of Evaluation: 00:46





- Subjective


Subjective: 





Seen by medical resident and .





Objective





- Vital Signs/Intake and Output


Vital Signs (last 24 hours): 


 











Temp Pulse Resp BP Pulse Ox


 


 98.5 F   68   18   118/82   97 


 


 06/16/18 17:57  06/16/18 21:17  06/16/18 21:17  06/16/18 21:17  06/16/18 21:17











- Medications


Medications: 


 Current Medications





Acetaminophen (Tylenol 325mg Tab)  650 mg PO Q6H PRN


   PRN Reason: Pain, moderate (4-7)


Acetaminophen/Butalbital/Caffeine (Fioricet)  1 tab PO Q4H PRN


   PRN Reason: Pain, moderate (4-7)


Amlodipine Besylate (Norvasc)  10 mg PO DAILY [facility]


Aspirin (Ecotrin)  81 mg PO DAILY [facility]


Clonazepam (Klonopin)  1 mg PO Q8 PRN; Protocol


   PRN Reason: Anxiety


Cyclobenzaprine HCl (Flexeril)  5 mg PO TID PRN


   PRN Reason: Pain, moderate (4-7)


Diphenoxylate HCl/Atropine (Lomotil 0.025-2.5 Mg Tablet)  1 tab PO DAILY PRN


   PRN Reason: Diarrhea


Hydrochlorothiazide (Hydrodiuril)  25 mg PO DAILY [facility]


Ceftriaxone Sodium (Rocephin 2 Gm Ivpb)  2 gm in 100 mls @ 100 mls/hr IVPB 

DAILY [facility]


   PRN Reason: Protocol


Ibuprofen (Motrin Tab)  600 mg PO BID [facility]


Insulin Human Regular (Humulin R Med)  0 units SC ACHS [facility]


   PRN Reason: Protocol


   Last Admin: 06/16/18 23:05 Dose:  Not Given


Lidocaine (Lidocaine 5%)  0 gm TOP DAILY [facility]


Loperamide HCl (Imodium)  2 mg PO QID PRN


   PRN Reason: Diarrhea


Non-Formulary Medication (Venlafaxine [Effexor  50 Mg Tab])  100 mg PO BID [facility]


Oxycodone/Acetaminophen (Percocet 10/325 Mg Tab)  1 tab PO Q6H PRN


   PRN Reason: Pain, moderate (4-7)


   Last Admin: 06/16/18 23:46 Dose:  1 tab


Pantoprazole Sodium (Protonix Ec Tab)  40 mg PO 0600 [facility]











- Labs


Labs: 


 











PT  11.3 SECONDS (9.4-12.5)   06/16/18  18:39    


 


INR  0.99  (0.93-1.08)   06/16/18  18:39    


 


APTT  31.3 Seconds (25.1-36.5)   06/16/18  18:39

## 2018-06-17 NOTE — CP.PCM.CON
History of Present Illness





- History of Present Illness


History of Present Illness: 


Neurology Consultation Note:





Mrs. Gruber is a 58-year-old left-handed woman with a past medical history 

multiple sclerosis (stopped copaxone one month ago), fibromyalgia, bradycardia s

/p AICD placement, hypertension, obesity, diabetes, chronic anemia who 

presented to the [facility] complaining of severe chest pain and left arm numbness/

weakness and paresthesias.  MRI of the brain is difficult to obtain due to her 

AICD.  The patient has had several MS flares in the past and currently has the 

numbness/weakness of the left hand and right leg numbness. 





Review of Systems





- Review of Systems


All systems: reviewed and no additional remarkable complaints except





Past Patient History





- Infectious Disease


Hx of Infectious Diseases: None





- Past Social History


Smoking Status: Never Smoked





- CARDIAC


Hx Cardiac Disorders: Yes


Hx Cardia Arrhythmia: Yes (BRADYCARDIA)


Hx Hypercholesterolemia: Yes


Hx Hypertension: Yes


Hx Pacemaker: Yes





- PULMONARY


Hx Respiratory Disorders: No





- NEUROLOGICAL


Hx Neurological Disorder: Yes (MULTIPLE SCLEROSIS)


Hx Migraine: Yes





- HEENT


Hx HEENT Problems: No





- RENAL


Hx Chronic Kidney Disease: No


Hx Kidney Stones: Yes





- ENDOCRINE/METABOLIC


Hx Endocrine Disorders: Yes


Hx Diabetes Mellitus Type 2: Yes





- HEMATOLOGICAL/ONCOLOGICAL


Hx Blood Disorders: Yes


Hx Anemia: Yes





- INTEGUMENTARY


Hx Dermatological Problems: No





- MUSCULOSKELETAL/RHEUMATOLOGICAL


Hx Musculoskeletal Disorders: Yes


Hx Arthritis: Yes


Hx Back Pain: Yes


Hx Falls: Yes


Hx Fractures: Yes (L RIB)


Hx Unsteady Gait: Yes





- GASTROINTESTINAL


Hx Gastrointestinal Disorders: Yes (COLITIS)


Hx Gastroesophageal Reflux: Yes





- GENITOURINARY/GYNECOLOGICAL


Hx Genitourinary Disorders: No


Hx Urinary Tract Infection: Yes





- PSYCHIATRIC


Hx Psychophysiologic Disorder: Yes


Hx Anxiety: Yes


Hx Bipolar Disorder: Yes


Hx Depression: Yes


Hx Substance Use: No





- SURGICAL HISTORY


Hx Surgeries: Yes (TONSILLECTOMY)


Hx Gastric Bypass Surgery: Yes





- ANESTHESIA


Hx Anesthesia: No





Meds


Allergies/Adverse Reactions: 


 Allergies











Allergy/AdvReac Type Severity Reaction Status Date / Time


 


levofloxacin [From Levaquin] Allergy  RASH Verified 12/09/17 14:45


 


iv dye Allergy  RASH Uncoded 12/09/17 14:45














- Medications


Medications: 


 Current Medications





Acetaminophen (Tylenol 325mg Tab)  650 mg PO Q6H PRN


   PRN Reason: Pain, moderate (4-7)


Acetaminophen/Butalbital/Caffeine (Fioricet)  1 tab PO Q4H PRN


   PRN Reason: Pain, moderate (4-7)


Albuterol/Ipratropium (Duoneb 3 Mg/0.5 Mg (3 Ml) Ud)  3 ml IH [id number] On license of [facility]


Amlodipine Besylate (Norvasc)  10 mg PO DAILY On license of [facility]


Aspirin (Ecotrin)  81 mg PO DAILY On license of [facility]


Clonazepam (Klonopin)  1 mg PO Q8 PRN; Protocol


   PRN Reason: Anxiety


Cyclobenzaprine HCl (Flexeril)  5 mg PO TID PRN


   PRN Reason: Pain, moderate (4-7)


Diphenoxylate HCl/Atropine (Lomotil 0.025-2.5 Mg Tablet)  1 tab PO DAILY PRN


   PRN Reason: Diarrhea


Hydrochlorothiazide (Hydrodiuril)  25 mg PO DAILY On license of [facility]


Ceftriaxone Sodium (Rocephin 2 Gm Ivpb)  2 gm in 100 mls @ 100 mls/hr IVPB 

DAILY JEFF


   PRN Reason: Protocol


Ibuprofen (Motrin Tab)  600 mg PO BID On license of [facility]


Insulin Human Regular (Humulin R Med)  0 units SC ACHS JEFF


   PRN Reason: Protocol


   Last Admin: 06/17/18 07:45 Dose:  Not Given


Lidocaine (Lidocaine 5%)  0 gm TOP DAILY On license of [facility]


Loperamide HCl (Imodium)  2 mg PO QID PRN


   PRN Reason: Diarrhea


Non-Formulary Medication (Venlafaxine [Effexor  50 Mg Tab])  100 mg PO BID On license of [facility]


Oxycodone/Acetaminophen (Percocet 10/325 Mg Tab)  1 tab PO Q6H PRN


   PRN Reason: Pain, moderate (4-7)


   Last Admin: 06/17/18 08:02 Dose:  1 tab


Pantoprazole Sodium (Protonix Ec Tab)  40 mg PO 0600 On license of [facility]


   Last Admin: 06/17/18 06:16 Dose:  40 mg











Physical Exam





- Neurological Exam


Neurological exam: Abnormal Gait, Alert, CN II-XII Intact, Oriented x3, 

Reflexes Normal


Additional comments: 





Brisk reflexes, decreased fine motor movements on the left.  Decreased 

sensation of the left arm as compared with the right. 





Results





- Vital Signs


Recent Vital Signs: 


 Last Vital Signs











Temp  98.3 F   06/17/18 06:00


 


Pulse  66   06/17/18 06:00


 


Resp  20   06/17/18 06:00


 


BP  111/67   06/17/18 06:00


 


Pulse Ox  96   06/17/18 06:00














- Labs


Result Diagrams: 


 06/17/18 06:15





 06/17/18 06:15


Labs: 


 Laboratory Results - last 24 hr











  06/16/18 06/16/18 06/16/18





  21:55 23:02 23:50


 


WBC   


 


RBC   


 


Hgb   


 


Hct   


 


MCV   


 


MCH   


 


MCHC   


 


RDW   


 


Plt Count   


 


MPV   


 


Gran %   


 


Lymph % (Auto)   


 


Mono % (Auto)   


 


Eos % (Auto)   


 


Baso % (Auto)   


 


Gran #   


 


Lymph # (Auto)   


 


Mono # (Auto)   


 


Eos # (Auto)   


 


Baso # (Auto)   


 


Sodium   


 


Potassium   


 


Chloride   


 


Carbon Dioxide   


 


Anion Gap   


 


BUN   


 


Creatinine   


 


Est GFR ( Amer)   


 


Est GFR (Non-Af Amer)   


 


POC Glucose (mg/dL)  77  103 


 


Random Glucose   


 


Calcium   


 


Iron   


 


TIBC   


 


% Saturation   


 


Total Bilirubin   


 


AST   


 


ALT   


 


Alkaline Phosphatase   


 


Troponin I    < 0.01


 


Total Protein   


 


Albumin   


 


Globulin   


 


Albumin/Globulin Ratio   


 


TSH 3rd Generation   














  06/17/18 06/17/18 06/17/18





  06:15 06:15 06:15


 


WBC    1.7 L*


 


RBC    3.11 L


 


Hgb    9.2 L


 


Hct    29.3 L


 


MCV    94.2


 


MCH    29.6


 


MCHC    31.4


 


RDW    13.8


 


Plt Count    121


 


MPV    11.0


 


Gran %    30.4 L


 


Lymph % (Auto)    61.9 H


 


Mono % (Auto)    6.5 H


 


Eos % (Auto)    1.2 L


 


Baso % (Auto)    0.0


 


Gran #    0.51 L


 


Lymph # (Auto)    1.0 L


 


Mono # (Auto)    0.1


 


Eos # (Auto)    0.0


 


Baso # (Auto)    0.00


 


Sodium  140  


 


Potassium  4.2  


 


Chloride  105  


 


Carbon Dioxide  28  


 


Anion Gap  11  


 


BUN  11  


 


Creatinine  0.8  


 


Est GFR ( Amer)  > 60  


 


Est GFR (Non-Af Amer)  > 60  


 


POC Glucose (mg/dL)   


 


Random Glucose  86  


 


Calcium  8.1 L  


 


Iron   


 


TIBC   


 


% Saturation   


 


Total Bilirubin  0.1 L  


 


AST  39 H D  


 


ALT  32  


 


Alkaline Phosphatase  101  


 


Troponin I  < 0.01  


 


Total Protein  6.0  


 


Albumin  2.9 L  


 


Globulin  3.1  


 


Albumin/Globulin Ratio  0.9 L  


 


TSH 3rd Generation   2.58 














  06/17/18 06/17/18 06/17/18





  07:35 08:45 09:00


 


WBC   


 


RBC   


 


Hgb   


 


Hct   


 


MCV   


 


MCH   


 


MCHC   


 


RDW   


 


Plt Count   


 


MPV   


 


Gran %   


 


Lymph % (Auto)   


 


Mono % (Auto)   


 


Eos % (Auto)   


 


Baso % (Auto)   


 


Gran #   


 


Lymph # (Auto)   


 


Mono # (Auto)   


 


Eos # (Auto)   


 


Baso # (Auto)   


 


Sodium   


 


Potassium   


 


Chloride   


 


Carbon Dioxide   


 


Anion Gap   


 


BUN   


 


Creatinine   


 


Est GFR ( Amer)   


 


Est GFR (Non-Af Amer)   


 


POC Glucose (mg/dL)  70  76 


 


Random Glucose   


 


Calcium   


 


Iron    64


 


TIBC    203 L


 


% Saturation    31


 


Total Bilirubin   


 


AST   


 


ALT   


 


Alkaline Phosphatase   


 


Troponin I   


 


Total Protein   


 


Albumin   


 


Globulin   


 


Albumin/Globulin Ratio   


 


TSH 3rd Generation   














Assessment & Plan


(1) Multiple sclerosis exacerbation


Assessment and Plan: 


I recommend starting the patient on IV solumedrol 1000 mg once a day for 3 days 

(3 total treatments). This should be discussed with ID and hematology since the 

patient is high risk for infection and she is neutropenic.  She will need to be 

closely monitored.





Thank you. 


Status: Acute   Priority: High

## 2018-06-17 NOTE — CARD
--------------- APPROVED REPORT --------------





EKG Measurement

Heart [id number]

NE [id number]

[id number]

[id number]

[id number]



<Conclusion>

Sinus rhythm with marked sinus arrhythmia

Low voltage QRS

Nonspecific T wave abnormality

Prolonged QT

Abnormal ECG

## 2018-06-17 NOTE — RAD
HISTORY:

Chest pain



COMPARISON:

05/10/2018



TECHNIQUE:

Chest PA and lateral



FINDINGS:



LUNGS:

No active pulmonary disease.



PLEURA:

No significant pleural effusion identified. No pneumothorax apparent.



CARDIOVASCULAR:

 No radiographic findings to suggest acute or significant 

cardiovascular disease. Position/ configuration of pacemaker

device: Satisfactory. Venous access catheter in stable, satisfactory 

position.



OSSEOUS STRUCTURES:

No significant abnormalities.



VISUALIZED UPPER ABDOMEN:

Normal.



OTHER FINDINGS:

None.



IMPRESSION:

No active disease. No significant interval change compared to the 

prior examination(s).

## 2018-06-17 NOTE — CP.PCM.CON
History of Present Illness





- History of Present Illness


History of Present Illness: 





59 yo woman  who is currently visiting from [place], admitted with chest pain 

and numbness of arm, currently being worked up fro neuro and cardio standpoint.


She was found to have pancytopenia, asymptomatic without fever, SOb.


She has an extensive PMHx of multiple sclerosis, D.M, HTN, gastric bypass 

surgery, AICD placement.





Past Patient History





- Infectious Disease


Hx of Infectious Diseases: None





- Past Social History


Smoking Status: Never Smoked





- CARDIAC


Hx Cardiac Disorders: Yes


Hx Cardia Arrhythmia: Yes (BRADYCARDIA)


Hx Hypercholesterolemia: Yes


Hx Hypertension: Yes


Hx Pacemaker: Yes





- PULMONARY


Hx Respiratory Disorders: No





- NEUROLOGICAL


Hx Neurological Disorder: Yes (MULTIPLE SCLEROSIS)


Hx Migraine: Yes





- HEENT


Hx HEENT Problems: No





- RENAL


Hx Chronic Kidney Disease: No


Hx Kidney Stones: Yes





- ENDOCRINE/METABOLIC


Hx Endocrine Disorders: Yes


Hx Diabetes Mellitus Type 2: Yes





- HEMATOLOGICAL/ONCOLOGICAL


Hx Blood Disorders: Yes


Hx Anemia: Yes





- INTEGUMENTARY


Hx Dermatological Problems: No





- MUSCULOSKELETAL/RHEUMATOLOGICAL


Hx Musculoskeletal Disorders: Yes


Hx Arthritis: Yes


Hx Back Pain: Yes


Hx Falls: Yes


Hx Fractures: Yes (L RIB)


Hx Unsteady Gait: Yes





- GASTROINTESTINAL


Hx Gastrointestinal Disorders: Yes (COLITIS)


Hx Gastroesophageal Reflux: Yes





- GENITOURINARY/GYNECOLOGICAL


Hx Genitourinary Disorders: No


Hx Urinary Tract Infection: Yes





- PSYCHIATRIC


Hx Psychophysiologic Disorder: Yes


Hx Anxiety: Yes


Hx Bipolar Disorder: Yes


Hx Depression: Yes


Hx Substance Use: No





- SURGICAL HISTORY


Hx Surgeries: Yes (TONSILLECTOMY)


Hx Gastric Bypass Surgery: Yes





- ANESTHESIA


Hx Anesthesia: No





Meds


Allergies/Adverse Reactions: 


 Allergies











Allergy/AdvReac Type Severity Reaction Status Date / Time


 


levofloxacin [From Levaquin] Allergy  RASH Verified 12/09/17 14:45


 


iv dye Allergy  RASH Uncoded 12/09/17 14:45














- Medications


Medications: 


 Current Medications





Acetaminophen (Tylenol 325mg Tab)  650 mg PO Q6H PRN


   PRN Reason: Pain, moderate (4-7)


Acetaminophen/Butalbital/Caffeine (Fioricet)  1 tab PO Q4H PRN


   PRN Reason: Pain, moderate (4-7)


Albuterol/Ipratropium (Duoneb 3 Mg/0.5 Mg (3 Ml) Ud)  3 ml IH [id number] Pending sale to [facility]


Amlodipine Besylate (Norvasc)  10 mg PO DAILY Pending sale to [facility]


   Last Admin: 06/17/18 10:16 Dose:  10 mg


Aspirin (Ecotrin)  81 mg PO DAILY Pending sale to [facility]


   Last Admin: 06/17/18 10:16 Dose:  81 mg


Clonazepam (Klonopin)  1 mg PO Q8 PRN; Protocol


   PRN Reason: Anxiety


   Last Admin: 06/17/18 12:18 Dose:  1 mg


Cyclobenzaprine HCl (Flexeril)  5 mg PO TID PRN


   PRN Reason: Pain, moderate (4-7)


Diphenoxylate HCl/Atropine (Lomotil 0.025-2.5 Mg Tablet)  1 tab PO DAILY PRN


   PRN Reason: Diarrhea


Folic Acid (Folic Acid)  1 mg PO DAILY Pending sale to [facility]


Hydrochlorothiazide (Hydrodiuril)  25 mg PO DAILY Pending sale to [facility]


   Last Admin: 06/17/18 10:16 Dose:  25 mg


Ceftriaxone Sodium (Rocephin 2 Gm Ivpb)  2 gm in 100 mls @ 100 mls/hr IVPB 

DAILY Pending sale to [facility]


   PRN Reason: Protocol


   Last Admin: 06/17/18 10:16 Dose:  100 mls/hr


Ibuprofen (Motrin Tab)  600 mg PO BID Pending sale to [facility]


   Last Admin: 06/17/18 10:16 Dose:  600 mg


Insulin Human Regular (Humulin R Med)  0 units SC ACHS Pending sale to [facility]


   PRN Reason: Protocol


   Last Admin: 06/17/18 12:20 Dose:  Not Given


Lidocaine (Lidocaine 5%)  0 gm TOP DAILY Pending sale to [facility]


   Last Admin: 06/17/18 10:19 Dose:  1 applic


Loperamide HCl (Imodium)  2 mg PO QID PRN


   PRN Reason: Diarrhea


Non-Formulary Medication (Venlafaxine [Effexor  50 Mg Tab])  100 mg PO BID Pending sale to [facility]


Oxycodone/Acetaminophen (Percocet 10/325 Mg Tab)  1 tab PO Q6H PRN


   PRN Reason: Pain, moderate (4-7)


   Last Admin: 06/17/18 08:02 Dose:  1 tab


Pantoprazole Sodium (Protonix Ec Tab)  40 mg PO 0600 Pending sale to [facility]


   Last Admin: 06/17/18 06:16 Dose:  40 mg











Results





- Vital Signs


Recent Vital Signs: 


 Last Vital Signs











Temp  98.7 F   06/17/18 12:00


 


Pulse  80   06/17/18 12:00


 


Resp  19   06/17/18 12:00


 


BP  132/92 H  06/17/18 12:00


 


Pulse Ox  96   06/17/18 06:00














- Labs


Result Diagrams: 


 06/17/18 06:15





 06/17/18 06:15


Labs: 


 Laboratory Results - last 24 hr











  06/16/18 06/16/18 06/16/18





  21:55 23:02 23:50


 


WBC   


 


RBC   


 


Hgb   


 


Hct   


 


MCV   


 


MCH   


 


MCHC   


 


RDW   


 


Plt Count   


 


MPV   


 


Gran %   


 


Lymph % (Auto)   


 


Mono % (Auto)   


 


Eos % (Auto)   


 


Baso % (Auto)   


 


Gran #   


 


Lymph # (Auto)   


 


Mono # (Auto)   


 


Eos # (Auto)   


 


Baso # (Auto)   


 


Sodium   


 


Potassium   


 


Chloride   


 


Carbon Dioxide   


 


Anion Gap   


 


BUN   


 


Creatinine   


 


Est GFR ( Amer)   


 


Est GFR (Non-Af Amer)   


 


POC Glucose (mg/dL)  77  103 


 


Random Glucose   


 


Calcium   


 


Iron   


 


TIBC   


 


% Saturation   


 


Total Bilirubin   


 


AST   


 


ALT   


 


Alkaline Phosphatase   


 


Troponin I    < 0.01


 


C-React Prot High Sens   


 


Total Protein   


 


Albumin   


 


Globulin   


 


Albumin/Globulin Ratio   


 


TSH 3rd Generation   


 


Urine Color   


 


Urine Appearance   


 


Urine pH   


 


Ur Specific Gravity   


 


Urine Protein   


 


Urine Glucose (UA)   


 


Urine Ketones   


 


Urine Blood   


 


Urine Nitrate   


 


Urine Bilirubin   


 


Urine Urobilinogen   


 


Ur Leukocyte Esterase   














  06/17/18 06/17/18 06/17/18





  06:15 06:15 06:15


 


WBC    1.7 L*


 


RBC    3.11 L


 


Hgb    9.2 L


 


Hct    29.3 L


 


MCV    94.2


 


MCH    29.6


 


MCHC    31.4


 


RDW    13.8


 


Plt Count    121


 


MPV    11.0


 


Gran %    30.4 L


 


Lymph % (Auto)    61.9 H


 


Mono % (Auto)    6.5 H


 


Eos % (Auto)    1.2 L


 


Baso % (Auto)    0.0


 


Gran #    0.51 L


 


Lymph # (Auto)    1.0 L


 


Mono # (Auto)    0.1


 


Eos # (Auto)    0.0


 


Baso # (Auto)    0.00


 


Sodium  140  


 


Potassium  4.2  


 


Chloride  105  


 


Carbon Dioxide  28  


 


Anion Gap  11  


 


BUN  11  


 


Creatinine  0.8  


 


Est GFR ( Amer)  > 60  


 


Est GFR (Non-Af Amer)  > 60  


 


POC Glucose (mg/dL)   


 


Random Glucose  86  


 


Calcium  8.1 L  


 


Iron   


 


TIBC   


 


% Saturation   


 


Total Bilirubin  0.1 L  


 


AST  39 H D  


 


ALT  32  


 


Alkaline Phosphatase  101  


 


Troponin I  < 0.01  


 


C-React Prot High Sens   5.10 H 


 


Total Protein  6.0  


 


Albumin  2.9 L  


 


Globulin  3.1  


 


Albumin/Globulin Ratio  0.9 L  


 


TSH 3rd Generation   2.58 


 


Urine Color   


 


Urine Appearance   


 


Urine pH   


 


Ur Specific Gravity   


 


Urine Protein   


 


Urine Glucose (UA)   


 


Urine Ketones   


 


Urine Blood   


 


Urine Nitrate   


 


Urine Bilirubin   


 


Urine Urobilinogen   


 


Ur Leukocyte Esterase   














  06/17/18 06/17/18 06/17/18





  07:35 08:45 09:00


 


WBC   


 


RBC   


 


Hgb   


 


Hct   


 


MCV   


 


MCH   


 


MCHC   


 


RDW   


 


Plt Count   


 


MPV   


 


Gran %   


 


Lymph % (Auto)   


 


Mono % (Auto)   


 


Eos % (Auto)   


 


Baso % (Auto)   


 


Gran #   


 


Lymph # (Auto)   


 


Mono # (Auto)   


 


Eos # (Auto)   


 


Baso # (Auto)   


 


Sodium   


 


Potassium   


 


Chloride   


 


Carbon Dioxide   


 


Anion Gap   


 


BUN   


 


Creatinine   


 


Est GFR ( Amer)   


 


Est GFR (Non-Af Amer)   


 


POC Glucose (mg/dL)  70  76 


 


Random Glucose   


 


Calcium   


 


Iron    64


 


TIBC    203 L


 


% Saturation    31


 


Total Bilirubin   


 


AST   


 


ALT   


 


Alkaline Phosphatase   


 


Troponin I   


 


C-React Prot High Sens   


 


Total Protein   


 


Albumin   


 


Globulin   


 


Albumin/Globulin Ratio   


 


TSH 3rd Generation   


 


Urine Color   


 


Urine Appearance   


 


Urine pH   


 


Ur Specific Gravity   


 


Urine Protein   


 


Urine Glucose (UA)   


 


Urine Ketones   


 


Urine Blood   


 


Urine Nitrate   


 


Urine Bilirubin   


 


Urine Urobilinogen   


 


Ur Leukocyte Esterase   














  06/17/18 06/17/18





  11:36 11:45


 


WBC  


 


RBC  


 


Hgb  


 


Hct  


 


MCV  


 


MCH  


 


MCHC  


 


RDW  


 


Plt Count  


 


MPV  


 


Gran %  


 


Lymph % (Auto)  


 


Mono % (Auto)  


 


Eos % (Auto)  


 


Baso % (Auto)  


 


Gran #  


 


Lymph # (Auto)  


 


Mono # (Auto)  


 


Eos # (Auto)  


 


Baso # (Auto)  


 


Sodium  


 


Potassium  


 


Chloride  


 


Carbon Dioxide  


 


Anion Gap  


 


BUN  


 


Creatinine  


 


Est GFR ( Amer)  


 


Est GFR (Non-Af Amer)  


 


POC Glucose (mg/dL)  69 


 


Random Glucose  


 


Calcium  


 


Iron  


 


TIBC  


 


% Saturation  


 


Total Bilirubin  


 


AST  


 


ALT  


 


Alkaline Phosphatase  


 


Troponin I  


 


C-React Prot High Sens  


 


Total Protein  


 


Albumin  


 


Globulin  


 


Albumin/Globulin Ratio  


 


TSH 3rd Generation  


 


Urine Color   Light yellow


 


Urine Appearance   Sl cloudy


 


Urine pH   8.0


 


Ur Specific Gravity   1.015


 


Urine Protein   Negative


 


Urine Glucose (UA)   Negative


 


Urine Ketones   Negative


 


Urine Blood   Negative


 


Urine Nitrate   Positive H


 


Urine Bilirubin   Negative


 


Urine Urobilinogen   0.2


 


Ur Leukocyte Esterase   Trace H














Assessment & Plan


(1) Pancytopenia


Assessment and Plan: 


59 yo woman with multiple sclerosis, neutropenia and anemia, relatively 

asymptomatic, without any fever, labs not consistent with iron deficiency, 

noirmal LDH, no evidence of any abnormal WBCs in periphery, as per patient she 

has had low WBC counts before and was seen by an oncologist in [place], did not 

have a bone marrow biopsy and did not require any treatment.


Will give one dose of Granix, just in case of infectious diarrhea, add po folic 

acid.


get ultrasound of the abdomen to evaluate spleen size and check estelita and 

hepatitis profiles


Status: Acute

## 2018-06-17 NOTE — CT
PROCEDURE:  CT HEAD WITHOUT CONTRAST.



HISTORY:

left arm numbness/weakness



COMPARISON:

12/10/2017 



TECHNIQUE:

Axial computed tomography images were obtained through the head/brain 

without intravenous contrast.  



Coronal and sagittal reconstructed images.



Radiation dose:



Total exam DLP = 768.95 mGy-cm.



This CT exam was performed using one or more of the following dose 

reduction techniques: Automated exposure control, adjustment of the 

mA and/or kV according to patient size, and/or use of iterative 

reconstruction technique.



FINDINGS:



HEMORRHAGE:

No intracranial hemorrhage. 



BRAIN:

No mass effect or edema.  Cortical  atrophy, periventricular small 

vessel disease. Multiple lacune or infarcts again identified 

bilaterally.



VENTRICLES:

Unremarkable. No hydrocephalus. 



CALVARIUM:

Unremarkable.



PARANASAL SINUSES:

Unremarkable as visualized. No significant inflammatory changes.



MASTOID AIR CELLS:

Unremarkable as visualized. No inflammatory changes.



OTHER FINDINGS:

None.



IMPRESSION:

No acute intracranial abnormalities. No significant findings to 

account for the clinical presentation. No significant interval change 

compared to the prior examination(s).

## 2018-06-17 NOTE — CON
HISTORY OF PRESENT ILLNESS:  The patient is a  58-year-old female

with a psychiatric history of depression and anxiety as well as extensive

medical history including multiple sclerosis (please refer to medical note

for further details and history and _____), who is being worked up on the

medical floor after presenting with chest pain, upper extremity numbness

and weakness.  Psychiatrist consulted this patient _____ during her

presentation in the ER.  I reviewed recent notes and met with patient at

bedside.  She is cooperative, alert, and well oriented to month, year,

location, and circumstances.  Patient indicates that she has been depressed

and she has a lifelong history of depression with numerous medication

trials; however, has felt recently improved in the last couple of years

after she started Effexor.  Recently, she has been stressed and overwhelmed

because of her medical issues and recent move from [place] to [place] a

month ago to be with her 39-year-old daughter, Arabella.  Patient indicates

that she is not happy with the move and then she felt that it would be a

little bit more different, a little bit more harmonious; however, it has

been stressful because of her daughter is overwhelmed with her 5 children

and her daughter _____.  Patient also indicates feeling overwhelmed with

her medical issues as well.  However, she is hopeful, she has never been

hopeless.  She is not suicidal and she indicated that she is not feeling

worse.  She usually is as this is the stress that she can deal with.  She

reported that she is taking Effexor twice daily and feels that this

medication has been very beneficial for her depression after trying many

other medications that failed.  Indicated that her cousin is the one that

has prescribed to her.  The patient's anxiety is up and down, but generally

under control right now and I discussed different treatment options and

patient defers on any changes to her doses of Effexor, which she indicated

as 100 mg twice daily.  She is coherent, consistent with her responses, and

denies any perceptual disturbance or history of perceptual disturbance. 

Generally has been in control on the unit and is compliant with staff

request.  Her affect is congruent with her reported mood and shows some

reactivity and range during my questioning.



PSYCHIATRIC HISTORY:  Patient indicates two prior psychiatric

hospitalizations only in [place].  Last time was a year ago, in which she

was very depressed.  Patient reports that she has been prescribed Effexor

100 mg twice a day by her cousin who also benefits from this medication

apparently.  Patient denies any suicide attempts as noted.  She is not on

any current _____ psychiatric outpatient treatment.  Patient also reports

long history of _____ medication trial and would like to continue with

Effexor.



SOCIAL HISTORY:  Patient is born and raised in the [place].  She has been

 for the last 3 years after a 35-year marriage.  Her   of

complications from diabetes.  She has 3 adult daughters and she is residing

with her 39-year-old daughter, Arabella and her 5 kids.  Patient had been

living in [place] up until a month ago and decided to move to [place]

with Arabella.  Patient at this time wants to return back to [place] where

she feels much more comfortable.  She denies any drug or alcohol issues in

the past.  She graduated high school.  She is not employed and she on

disability.



Vital signs and labs were reviewed by this provider.



RELEVANT PSYCHIATRIC MEDICATIONS:  Klonopin 1 mg p.o. every 8 hours p.r.n.,

in which the patient received 1 dose today at noon; however, it does not

appear that Effexor has been restarted per patient.



IMPRESSION:  Major depressive disorder, mild to moderate at this time,

without any psychiatric symptoms; constipation of adjustment disorder with

anxiety and depression as patient has recently moved to [place] from

[place] about a month ago, contributing to her mood symptoms.  Patient also

has an anxiety disorder.  She is not psychotic.



RECOMMENDATIONS:  I discussed the options about increasing her Effexor dose

right now; however, patient defers, feels that this current dose is

effective and does not believe that she needs any alterations to her

regimen at this time.  Patient is agreeable to follow up with Psychiatry to

ensure that she remains stable on the medical floor; however, she adamantly

denies any need for psychiatric admission and again, she defers on any

medications at this time.  Psychiatry will follow up with patient tomorrow,

Monday, 2018, to check on patient's status.  Patient is being

prescribed Effexor as non-formulary 100 mg p.o. b.i.d. while being treated

on the medical floor.





__________________________________________

Grace Chapman MD



DD:  2018 12:26:15

DT:  2018 13:49:36

[facility] # [id number]

## 2018-06-17 NOTE — CP.PCM.PN
<Ashu Quiroz - Last Filed: 06/17/18 17:46>





Subjective





- Date & Time of Evaluation


Date of Evaluation: 06/17/18


Time of Evaluation: 08:15





- Subjective


Subjective: 





Patient seen and examined at bedside stating that she had chest pain that 

lasted for two days. Patient admits to chest pain, episodes of diarrhea, and 

cough. Patient denies shortness of breath, nausea, vomiting, headache.  





Objective





- Vital Signs/Intake and Output


Vital Signs (last 24 hours): 


 











Temp Pulse Resp BP Pulse Ox


 


 98.5 F   84   18   100/55 L  98 


 


 06/17/18 17:20  06/17/18 17:20  06/17/18 17:20  06/17/18 17:20  06/17/18 17:20








Intake and Output: 


 











 06/17/18 06/17/18





 06:59 18:59


 


Intake Total 240 900


 


Output Total  1650


 


Balance 240 -750














- Medications


Medications: 


 Current Medications





Acetaminophen (Tylenol 325mg Tab)  650 mg PO Q6H PRN


   PRN Reason: Pain, moderate (4-7)


Acetaminophen/Butalbital/Caffeine (Fioricet)  1 tab PO Q4H PRN


   PRN Reason: Pain, moderate (4-7)


Albuterol/Ipratropium (Duoneb 3 Mg/0.5 Mg (3 Ml) Ud)  3 ml [id number]


   Last Admin: 06/17/18 13:13 Dose:  3 ml


Amlodipine Besylate (Norvasc)  10 mg PO DAILY [facility]


   Last Admin: 06/17/18 10:16 Dose:  10 mg


Aspirin (Ecotrin)  81 mg PO DAILY [facility]


   Last Admin: 06/17/18 10:16 Dose:  81 mg


Clonazepam (Klonopin)  1 mg PO Q8 PRN; Protocol


   PRN Reason: Anxiety


   Last Admin: 06/17/18 12:18 Dose:  1 mg


Cyclobenzaprine HCl (Flexeril)  5 mg PO TID PRN


   PRN Reason: Pain, moderate (4-7)


Diphenoxylate HCl/Atropine (Lomotil 0.025-2.5 Mg Tablet)  1 tab PO DAILY PRN


   PRN Reason: Diarrhea


Folic Acid (Folic Acid)  1 mg PO DAILY [facility]


   Last Admin: 06/17/18 13:01 Dose:  1 mg


Hydrochlorothiazide (Hydrodiuril)  25 mg PO DAILY [facility]


   Last Admin: 06/17/18 10:16 Dose:  25 mg


Ceftriaxone Sodium (Rocephin 2 Gm Ivpb)  2 gm in 100 mls @ 100 mls/hr IVPB 

DAILY [facility]


   PRN Reason: Protocol


   Last Admin: 06/17/18 10:16 Dose:  100 mls/hr


Ibuprofen (Motrin Tab)  600 mg PO BID [facility]


   Last Admin: 06/17/18 10:16 Dose:  600 mg


Insulin Human Regular (Humulin R Med)  0 units SC ACHS [facility]


   PRN Reason: Protocol


   Last Admin: 06/17/18 16:50 Dose:  Not Given


Lidocaine (Lidocaine 5%)  0 gm TOP DAILY [facility]


   Last Admin: 06/17/18 10:19 Dose:  1 applic


Loperamide HCl (Imodium)  2 mg PO QID PRN


   PRN Reason: Diarrhea


Non-Formulary Medication (Venlafaxine [Effexor  50 Mg Tab])  100 mg PO BID [facility]


Oxycodone/Acetaminophen (Percocet 10/325 Mg Tab)  1 tab PO Q6H PRN


   PRN Reason: Pain, moderate (4-7)


   Last Admin: 06/17/18 14:25 Dose:  1 tab


Pantoprazole Sodium (Protonix Ec Tab)  40 mg PO 0600 [facility]


   Last Admin: 06/17/18 06:16 Dose:  40 mg











- Labs


Labs: 


 





 06/17/18 06:15 





 06/17/18 06:15 





 











PT  11.3 SECONDS (9.4-12.5)   06/16/18  18:39    


 


INR  0.99  (0.93-1.08)   06/16/18  18:39    


 


APTT  31.3 Seconds (25.1-36.5)   06/16/18  18:39    














- Head Exam


Head Exam: ATRAUMATIC, NORMAL INSPECTION, NORMOCEPHALIC





- Eye Exam


Eye Exam: EOMI, Normal appearance





- ENT Exam


ENT Exam: Mucous Membranes Moist





- Neck Exam


Neck Exam: Full ROM





- Respiratory Exam


Respiratory Exam: Clear to Ausculation Bilateral, NORMAL BREATHING PATTERN.  

absent: Rhonchi, Wheezes





- Cardiovascular Exam


Cardiovascular Exam: REGULAR RHYTHM, +S1, +S2


Additional comments: 





chest wall tenderness





- GI/Abdominal Exam


GI & Abdominal Exam: Soft, Normal Bowel Sounds





- Extremities Exam


Extremities Exam: Full ROM





- Back Exam


Back Exam: NORMAL INSPECTION





- Neurological Exam


Neurological Exam: Alert, Awake, Oriented x3





- Psychiatric Exam


Psychiatric exam: Normal Affect, Normal Mood





- Skin


Skin Exam: Intact, Normal Color, Warm





Assessment and Plan





- Assessment and Plan (Free Text)


Assessment: 








58-year-old female with a past medical history multiple sclerosis, fibromyalgia

, bradycardia s/p AICD placement, hypertension, obesity, diabetes, chronic 

anemia who presented to the [facility] emergency room with complaints of 2 days worth 

of substernal chest pain.  


Plan: 





1.  Chest pain rule out ACS


Admit to telemetry


Troponin is negative x 3


Does have a history of bradycardia and has an AICD


Cardiology consulted


TSH within normal limits


Vitals every 4


Head above bed 30





2.  Neutropenia


Hematology on consult


Etiology unclear


Could be due to the Copaxone that she was previously taking, patient has admits 

that she follows with a hematologist for anemia and may have had low white 

blood cells as well


Urine shows possible urinary tract infection, will continue with Rocephin


CRP >5, hepatitis panel negative, HIV and MARYANN screen with reflex still pending


Neutropenic precautions


Low microbial food


Chest x-ray official read states no active disease





3.  Anemia, chronic


Due to her symptoms, B12 was ordered which was low, Folate was normal


No active bleeding noted


Hemodynamically stable


Hematology consulted





4.  History of multiple sclerosis and fibromyalgia


Noncompliant with medications


Patient previously on Copaxone


Difficult to ascertain whether some of her symptoms may be due to multiple 

sclerosis flare versus coronary disease


Neurology consulted


Given that she has a history of depression as well, will consult psychiatry; 

recs appreciated


Continue home medications Flexeril, Klonopin, venlafaxine, oxycodone


PT ordered





5.  Chronic diarrhea


Continue home Imodium if stool cultures return negative





DVT/GI prophylaxis: SCDs, Protonix





Patient was seen and examined and case was discussed at length with attending 

physician.





<Marcus Pro - Last Filed: 06/17/18 18:38>





Objective





- Vital Signs/Intake and Output


Vital Signs (last 24 hours): 


 











Temp Pulse Resp BP Pulse Ox


 


 98.5 F   84   18   100/55 L  98 


 


 06/17/18 17:20  06/17/18 17:20  06/17/18 17:20  06/17/18 17:20  06/17/18 17:20








Intake and Output: 


 











 06/17/18 06/17/18





 06:59 18:59


 


Intake Total 240 900


 


Output Total  1650


 


Balance 240 -750














- Medications


Medications: 


 Current Medications





Acetaminophen (Tylenol 325mg Tab)  650 mg PO Q6H PRN


   PRN Reason: Pain, moderate (4-7)


Acetaminophen/Butalbital/Caffeine (Fioricet)  1 tab PO Q4H PRN


   PRN Reason: Pain, moderate (4-7)


Albuterol/Ipratropium (Duoneb 3 Mg/0.5 Mg (3 Ml) Ud)  3 ml IH [id number] [facility]


   Last Admin: 06/17/18 13:13 Dose:  3 ml


Amlodipine Besylate (Norvasc)  10 mg PO DAILY [facility]


   Last Admin: 06/17/18 10:16 Dose:  10 mg


Aspirin (Ecotrin)  81 mg PO DAILY [facility]


   Last Admin: 06/17/18 10:16 Dose:  81 mg


Clonazepam (Klonopin)  1 mg PO Q8 PRN; Protocol


   PRN Reason: Anxiety


   Last Admin: 06/17/18 12:18 Dose:  1 mg


Cyclobenzaprine HCl (Flexeril)  5 mg PO TID PRN


   PRN Reason: Pain, moderate (4-7)


Diphenoxylate HCl/Atropine (Lomotil 0.025-2.5 Mg Tablet)  1 tab PO DAILY PRN


   PRN Reason: Diarrhea


Folic Acid (Folic Acid)  1 mg PO DAILY [facility]


   Last Admin: 06/17/18 13:01 Dose:  1 mg


Hydrochlorothiazide (Hydrodiuril)  25 mg PO DAILY [facility]


   Last Admin: 06/17/18 10:16 Dose:  25 mg


Ceftriaxone Sodium (Rocephin 2 Gm Ivpb)  2 gm in 100 mls @ 100 mls/hr IVPB 

DAILY [facility]


   PRN Reason: Protocol


   Last Admin: 06/17/18 10:16 Dose:  100 mls/hr


Ibuprofen (Motrin Tab)  600 mg PO BID [facility]


   Last Admin: 06/17/18 17:56 Dose:  600 mg


Insulin Human Regular (Humulin R Med)  0 units SC ACHS [facility]


   PRN Reason: Protocol


   Last Admin: 06/17/18 16:50 Dose:  Not Given


Lidocaine (Lidocaine 5%)  0 gm TOP DAILY [facility]


   Last Admin: 06/17/18 10:19 Dose:  1 applic


Non-Formulary Medication (Venlafaxine [Effexor  50 Mg Tab])  100 mg PO BID [facility]


Oxycodone/Acetaminophen (Percocet 10/325 Mg Tab)  1 tab PO Q6H PRN


   PRN Reason: Pain, moderate (4-7)


   Last Admin: 06/17/18 14:25 Dose:  1 tab


Pantoprazole Sodium (Protonix Ec Tab)  40 mg PO 0600 [facility]


   Last Admin: 06/17/18 06:16 Dose:  40 mg











- Labs


Labs: 


 





 06/17/18 06:15 





 06/17/18 06:15 





 











PT  11.3 SECONDS (9.4-12.5)   06/16/18  18:39    


 


INR  0.99  (0.93-1.08)   06/16/18  18:39    


 


APTT  31.3 Seconds (25.1-36.5)   06/16/18  18:39    














Attending/Attestation





- Attestation


I have personally seen and examined this patient.: Yes


I have fully participated in the care of the patient.: Yes


I have reviewed all pertinent clinical information, including history, physical 

exam and plan: Yes


Notes (Text): 





06/17/18 18:34


Medical record note made by the resident after discussion with my direction and 

input after the patient was personally seen and examined by me. I have reviewed 

the chart and agree that the record accurately reflects by personal performance 

of the history, physical exam, data review, and medical decision-making, in the 

course for the patient. I have also personally directed the plan of care.





58-year-old female with PMH of  multiple sclerosis, fibromyalgia,Gastric Bypass 

surgery,  bradycardia s/p AICD placement, hypertension, obesity, diabetes, 

chronic anemia was admitted with atypical chest pain and H/O numbness in upper 

limb.





Chest pain is atypical, patient has chest wall tenderness, serial troponins are 

normal.





Possible Multiple Sclerosis exacerbation, started on steroid by Neurology.





Neutropenia is chronic, Patient is afebrile, we will monitor








Management plan was discussed in detail with patient and family.


Education was provided.


06/17/18 18:35

## 2018-06-18 LAB
ALBUMIN SERPL-MCNC: 3 G/DL (ref 3–4.8)
ALBUMIN/GLOB SERPL: 0.9 {RATIO} (ref 1.1–1.8)
ALT SERPL-CCNC: 35 U/L (ref 7–56)
AST SERPL-CCNC: 40 U/L (ref 14–36)
BASOPHILS # BLD AUTO: 0.01 K/MM3 (ref 0–2)
BASOPHILS NFR BLD: 0.1 % (ref 0–3)
BUN SERPL-MCNC: 15 MG/DL (ref 7–21)
CALCIUM SERPL-MCNC: 8.4 MG/DL (ref 8.4–10.5)
EOSINOPHIL # BLD: 0 10*3/UL (ref 0–0.7)
EOSINOPHIL NFR BLD: 0.4 % (ref 1.5–5)
ERYTHROCYTE [DISTWIDTH] IN BLOOD BY AUTOMATED COUNT: 13.7 % (ref 11.5–14.5)
GFR NON-AFRICAN AMERICAN: > 60
GRANULOCYTES # BLD: 9.53 10*3/UL (ref 1.4–6.5)
GRANULOCYTES NFR BLD: 84.7 % (ref 50–68)
HGB BLD-MCNC: 9.9 G/DL (ref 12–16)
LYMPHOCYTES # BLD: 1.3 10*3/UL (ref 1.2–3.4)
LYMPHOCYTES NFR BLD AUTO: 11.9 % (ref 22–35)
MCH RBC QN AUTO: 29.7 PG (ref 25–35)
MCHC RBC AUTO-ENTMCNC: 31.6 G/DL (ref 31–37)
MCV RBC AUTO: 94 FL (ref 80–105)
MONOCYTES # BLD AUTO: 0.3 10*3/UL (ref 0.1–0.6)
MONOCYTES NFR BLD: 2.9 % (ref 1–6)
PLATELET # BLD: 142 10^3/UL (ref 120–450)
PMV BLD AUTO: 11.1 FL (ref 7–11)
RBC # BLD AUTO: 3.33 10^6/UL (ref 3.5–6.1)
WBC # BLD AUTO: 11.3 10^3/UL (ref 4.5–11)

## 2018-06-18 RX ADMIN — CEFTRIAXONE SCH MLS/HR: 2 INJECTION, POWDER, FOR SOLUTION INTRAMUSCULAR; INTRAVENOUS at 09:43

## 2018-06-18 RX ADMIN — IPRATROPIUM BROMIDE AND ALBUTEROL SULFATE SCH ML: .5; 3 SOLUTION RESPIRATORY (INHALATION) at 13:38

## 2018-06-18 RX ADMIN — INSULIN HUMAN SCH: 100 INJECTION, SOLUTION PARENTERAL at 08:34

## 2018-06-18 RX ADMIN — IPRATROPIUM BROMIDE AND ALBUTEROL SULFATE SCH ML: .5; 3 SOLUTION RESPIRATORY (INHALATION) at 20:24

## 2018-06-18 RX ADMIN — INSULIN HUMAN SCH: 100 INJECTION, SOLUTION PARENTERAL at 11:33

## 2018-06-18 RX ADMIN — LIDOCAINE SCH: 50 OINTMENT TOPICAL at 09:53

## 2018-06-18 RX ADMIN — OXYCODONE AND ACETAMINOPHEN PRN TAB: 10; 325 TABLET ORAL at 09:55

## 2018-06-18 RX ADMIN — IPRATROPIUM BROMIDE AND ALBUTEROL SULFATE SCH ML: .5; 3 SOLUTION RESPIRATORY (INHALATION) at 09:09

## 2018-06-18 RX ADMIN — INSULIN HUMAN SCH: 100 INJECTION, SOLUTION PARENTERAL at 22:16

## 2018-06-18 RX ADMIN — IPRATROPIUM BROMIDE AND ALBUTEROL SULFATE SCH ML: .5; 3 SOLUTION RESPIRATORY (INHALATION) at 01:20

## 2018-06-18 RX ADMIN — PANTOPRAZOLE SODIUM SCH MG: 40 TABLET, DELAYED RELEASE ORAL at 06:23

## 2018-06-18 RX ADMIN — INSULIN HUMAN SCH UNIT: 100 INJECTION, SOLUTION PARENTERAL at 18:04

## 2018-06-18 RX ADMIN — OXYCODONE AND ACETAMINOPHEN PRN TAB: 10; 325 TABLET ORAL at 15:41

## 2018-06-18 RX ADMIN — METHYLPREDNISOLONE SODIUM SUCCINATE SCH MLS/HR: 1 INJECTION, POWDER, FOR SOLUTION INTRAMUSCULAR; INTRAVENOUS at 13:13

## 2018-06-18 NOTE — CP.PCM.PN
Subjective





- Date & Time of Evaluation


Date of Evaluation: 06/18/18


Time of Evaluation: 06:45





- Subjective


Subjective: 





Patient seen and examined at bedside stating overnight she used a facial cream 

which causes her to have itchiness in her face. Patient also states she 

continues to have a non productive cough. Numbness in her extremities is still 

presents on a waxing and waning basis. Also endorses diarrhea. Denies nausea, 

vomiting, abdominal pain. 





Objective





- Vital Signs/Intake and Output


Vital Signs (last 24 hours): 


 











Temp Pulse Resp BP Pulse Ox


 


 98.3 F   69   20   126/64   98 


 


 06/18/18 06:00  06/18/18 06:00  06/18/18 06:00  06/18/18 09:53  06/18/18 06:00








Intake and Output: 


 











 06/18/18 06/18/18





 06:59 18:59


 


Intake Total 300 


 


Balance 300 














- Medications


Medications: 


 Current Medications





Acetaminophen (Tylenol 325mg Tab)  650 mg PO Q6H PRN


   PRN Reason: Pain, moderate (4-7)


   Last Admin: 06/18/18 01:49 Dose:  650 mg


Acetaminophen/Butalbital/Caffeine (Fioricet)  1 tab PO Q4H PRN


   PRN Reason: Pain, moderate (4-7)


Albuterol/Ipratropium (Duoneb 3 Mg/0.5 Mg (3 Ml) Ud)  3 ml IH [id number] [facility]


   Last Admin: 06/18/18 09:09 Dose:  3 ml


Amlodipine Besylate (Norvasc)  10 mg PO DAILY [facility]


   Last Admin: 06/18/18 09:53 Dose:  10 mg


Aspirin (Ecotrin)  81 mg PO DAILY [facility]


   Last Admin: 06/18/18 09:34 Dose:  81 mg


Clonazepam (Klonopin)  1 mg PO Q8 PRN; Protocol


   PRN Reason: Anxiety


   Last Admin: 06/17/18 12:18 Dose:  1 mg


Cyclobenzaprine HCl (Flexeril)  5 mg PO TID PRN


   PRN Reason: Pain, moderate (4-7)


Diphenhydramine HCl (Benadryl)  25 mg PO ONCE ONE


   Stop: 06/18/18 11:25


Diphenoxylate HCl/Atropine (Lomotil 0.025-2.5 Mg Tablet)  1 tab PO DAILY PRN


   PRN Reason: Diarrhea


Famotidine (Pepcid)  20 mg PO STAT STA


   Stop: 06/18/18 11:27


Folic Acid (Folic Acid)  1 mg PO DAILY [facility]


   Last Admin: 06/18/18 09:34 Dose:  1 mg


Hydrochlorothiazide (Hydrodiuril)  25 mg PO DAILY [facility]


   Last Admin: 06/18/18 09:35 Dose:  25 mg


Ceftriaxone Sodium (Rocephin 2 Gm Ivpb)  2 gm in 100 mls @ 100 mls/hr IVPB 

DAILY [facility]


   PRN Reason: Protocol


   Last Admin: 06/18/18 09:43 Dose:  100 mls/hr


Ibuprofen (Motrin Tab)  600 mg PO BID [facility]


   Last Admin: 06/18/18 09:35 Dose:  Not Given


Insulin Human Regular (Humulin R Med)  0 units SC ACHS [facility]


   PRN Reason: Protocol


   Last Admin: 06/18/18 08:34 Dose:  Not Given


Lidocaine (Lidocaine 5%)  0 gm TOP DAILY [facility]


   Last Admin: 06/18/18 09:53 Dose:  Not Given


Non-Formulary Medication (Venlafaxine [Effexor  50 Mg Tab])  100 mg PO BID [facility]


Oxycodone/Acetaminophen (Percocet 10/325 Mg Tab)  1 tab PO Q6H PRN


   PRN Reason: Pain, moderate (4-7)


   Last Admin: 06/18/18 09:55 Dose:  1 tab


Pantoprazole Sodium (Protonix Ec Tab)  40 mg PO 0600 [facility]


   Last Admin: 06/18/18 06:23 Dose:  40 mg











- Labs


Labs: 


 





 06/18/18 06:30 





 06/18/18 06:30 





 











PT  11.3 SECONDS (9.4-12.5)   06/16/18  18:39    


 


INR  0.99  (0.93-1.08)   06/16/18  18:39    


 


APTT  31.3 Seconds (25.1-36.5)   06/16/18  18:39    














- Head Exam


Head Exam: ATRAUMATIC, NORMAL INSPECTION, NORMOCEPHALIC





- Eye Exam


Eye Exam: EOMI





- ENT Exam


ENT Exam: Mucous Membranes Moist, Normal Exam





- Respiratory Exam


Respiratory Exam: Clear to Ausculation Bilateral, NORMAL BREATHING PATTERN.  

absent: Rhonchi, Wheezes





- Cardiovascular Exam


Cardiovascular Exam: REGULAR RHYTHM, +S1, +S2





- GI/Abdominal Exam


GI & Abdominal Exam: Soft, Normal Bowel Sounds





- Extremities Exam


Extremities Exam: Normal Inspection





- Neurological Exam


Neurological Exam: Alert, Awake, Oriented x3





- Psychiatric Exam


Psychiatric exam: Normal Affect, Normal Mood





- Skin


Skin Exam: Erythema (facial), Warm





Assessment and Plan





- Assessment and Plan (Free Text)


Assessment: 


58-year-old female with a past medical history multiple sclerosis, fibromyalgia

, bradycardia s/p AICD placement, hypertension, obesity, diabetes, chronic 

anemia who [place] [facility] [place] with complaints of 2 days worth 

of substernal chest pain.  


Plan: 





1.  Chest pain rule out ACS


D/C telemetry


Troponin is negative x 3


Does have a history of bradycardia and has an AICD


Cardiology consulted


TSH within normal limits


Head above bed 30





2.  Neutropenia


Resolved


Hematology on consult


Etiology unclear; granix started on patient by hematologist. WBC 11.3 today


Could be due to the Copaxone that she was previously taking, patient has admits 

that she follows with a hematologist for anemia and may have had low white 

blood cells as well


Urine shows possible urinary tract infection, will continue with Rocephin


CRP >5, hepatitis panel negative, HIV and MARYANN screen with reflex still pending


Neutropenic precautions discontinued


Low microbial food


Chest x-ray official read states no active disease





3.  Anemia, chronic


Due to her symptoms, B12 was ordered which was low, Folate was normal


No active bleeding noted


Hemodynamically stable


Hematology consulted





4.  History of multiple sclerosis and fibromyalgia


Noncompliant with medications


Patient previously on Copaxone


Difficult to ascertain whether some of her symptoms may be due to multiple 

sclerosis flare versus coronary disease


Neurology consulted; will begin steroid therapy 


Given that she has a history of depression as well, psychiatry was consulted: 

patient stable from a psych standpoint and will sign off. 


Continue home medications Flexeril, Klonopin, venlafaxine, oxycodone


PT ordered





5.  Chronic diarrhea


Continue home Imodium since C. diff negative





DVT/GI prophylaxis: SCDs, Protonix





Patient was seen and examined and case was discussed at length with attending 

physician.

## 2018-06-18 NOTE — CP.PCM.PN
Subjective





- Date & Time of Evaluation


Date of Evaluation: 06/18/18


Time of Evaluation: 12:07





- Subjective


Subjective: 





Ms. Gruber was seen and examined at the bedside. She is alert, oriented x3. She 

complains of moderate to severe itchiness in her bilateral cheeks, warm to 

touch. She further claims of her cheeks are swollen. She also mention that it 

started after applying face cream. She is able to follow simple commands. She 

was on neutopenic precautions yesterday, today neutropenic precautions is 

discontinued. Latest WBC is 11.7. There was no untoward events overnight.





Objective





- Vital Signs/Intake and Output


Vital Signs (last 24 hours): 


 











Temp Pulse Resp BP Pulse Ox


 


 98.1 F   79   18   100/57 L  98 


 


 06/18/18 12:00  06/18/18 12:00  06/18/18 12:00  06/18/18 12:00  06/18/18 06:00








Intake and Output: 


 











 06/18/18 06/18/18





 06:59 18:59


 


Intake Total 300 


 


Balance 300 














- Medications


Medications: 


 Current Medications





Acetaminophen (Tylenol 325mg Tab)  650 mg PO Q6H PRN


   PRN Reason: Pain, moderate (4-7)


   Last Admin: 06/18/18 01:49 Dose:  650 mg


Acetaminophen/Butalbital/Caffeine (Fioricet)  1 tab PO Q4H PRN


   PRN Reason: Pain, moderate (4-7)


Albuterol/Ipratropium (Duoneb 3 Mg/0.5 Mg (3 Ml) Ud)  3 ml IH [id number] [facility]


   Last Admin: 06/18/18 09:09 Dose:  3 ml


Amlodipine Besylate (Norvasc)  10 mg PO DAILY [facility]


   Last Admin: 06/18/18 09:53 Dose:  10 mg


Aspirin (Ecotrin)  81 mg PO DAILY [facility]


   Last Admin: 06/18/18 09:34 Dose:  81 mg


Clonazepam (Klonopin)  1 mg PO Q8 PRN; Protocol


   PRN Reason: Anxiety


   Last Admin: 06/17/18 12:18 Dose:  1 mg


Cyclobenzaprine HCl (Flexeril)  5 mg PO TID PRN


   PRN Reason: Pain, moderate (4-7)


Diphenoxylate HCl/Atropine (Lomotil 0.025-2.5 Mg Tablet)  1 tab PO DAILY PRN


   PRN Reason: Diarrhea


Folic Acid (Folic Acid)  1 mg PO DAILY [facility]


   Last Admin: 06/18/18 09:34 Dose:  1 mg


Hydrochlorothiazide (Hydrodiuril)  25 mg PO DAILY [facility]


   Last Admin: 06/18/18 09:35 Dose:  25 mg


Ceftriaxone Sodium (Rocephin 2 Gm Ivpb)  2 gm in 100 mls @ 100 mls/hr IVPB 

DAILY [facility]


   PRN Reason: Protocol


   Last Admin: 06/18/18 09:43 Dose:  100 mls/hr


Methylprednisolone 1 gm/ (Sodium Chloride)  250 mls @ 500 mls/hr IV DAILY [facility]


   Stop: 06/20/18 14:00


Ibuprofen (Motrin Tab)  600 mg PO BID [facility]


   Last Admin: 06/18/18 09:35 Dose:  Not Given


Insulin Human Regular (Humulin R Med)  0 units SC ACHS [facility]


   PRN Reason: Protocol


   Last Admin: 06/18/18 11:33 Dose:  Not Given


Lidocaine (Lidocaine 5%)  0 gm TOP DAILY [facility]


   Last Admin: 06/18/18 09:53 Dose:  Not Given


Non-Formulary Medication (Venlafaxine [Effexor  50 Mg Tab])  100 mg PO BID [facility]


Oxycodone/Acetaminophen (Percocet 10/325 Mg Tab)  1 tab PO Q6H PRN


   PRN Reason: Pain, moderate (4-7)


   Last Admin: 06/18/18 09:55 Dose:  1 tab


Pantoprazole Sodium (Protonix Ec Tab)  40 mg PO 0600 [facility]


   Last Admin: 06/18/18 06:23 Dose:  40 mg











- Labs


Labs: 


 





 06/18/18 06:30 





 06/18/18 06:30 





 











PT  11.3 SECONDS (9.4-12.5)   06/16/18  18:39    


 


INR  0.99  (0.93-1.08)   06/16/18  18:39    


 


APTT  31.3 Seconds (25.1-36.5)   06/16/18  18:39    














- Constitutional


Appears: No Acute Distress





- Head Exam


Head Exam: NORMAL INSPECTION





- Eye Exam


Pupil Exam: PERRL





- Neurological Exam


Neurological Exam: Alert, Awake, Oriented x3


Neuro motor strength exam: Left Upper Extremity: 5, Right Upper Extremity: 5, 

Left Lower Extremity: 5, Right Lower Extremity: 5


Additional comments: 





alert, oriented follows commands, sensation is intact.





Assessment and Plan


(1) Multiple sclerosis exacerbation


Assessment & Plan: 


Case discussed with Dr. Lynne, continue all current medical regimen. Recommend 

Solumendrol 1000 mg IVPB daily for 3 days only. Recommend to follow up with her 

own neurologist upon discharge.


Status: Acute

## 2018-06-18 NOTE — PN
DATE:  06/18/2018



FOLLOWUP NOTE



SUBJECTIVE:  The patient was followed up.  The patient was admitted on the

medical site for chest pain and numbness of her upper extremities. 

Psychiatry consult was called for evaluation of mood symptoms as well as

anxiety and the patient is on psychotropic medication.  The patient

initially was seen by Dr. Chapman over the weekend.  This writer is taking

over.  The patient was followed up.  As per nursing report, the patient is

doing relatively well and compliant with the medications.  At the same

time, the patient complained of the back pain and constantly asking for

pain medication.  Besides that, there is no acute agitation, no aggression.

The patient is compliant with her treatment and medications.  The patient

was followed up today.  The patient presented to be alert.  The patient is

on breathing treatment.  The patient said that she feels fine, but

complained of the pain in her back.  The patient reported that she moved

from the [place] and she is happy with her move.  The patient is staying

with her daughter.  The patient described her relationship with her

daughter as very good.  The patient denied feeling of hopelessness or

helplessness.  The patient reported that she tolerates medications well,

denied any side effects.  The patient wants to continue the same regimen. 

The patient denied suicidal or homicidal ideation.  The patient does not

present to be psychotic.



PHYSICAL EXAMINATION

VITAL SIGNS:  This writer reviewed vital signs, it seems to be stable. 

Temperature 98.3, pulse of 69, blood pressure 101/49, respiration 20 and

oxygen saturation is 98%.



MEDICATIONS:  Medications reviewed.  The patient is on Tylenol, Fioricet,

DuoNeb, Norvasc, aspirin, Rocephin, Klonopin 1 mg p.o. every 8 hours

p.r.n., most recently was given yesterday at 12:18 p.m.  The patient is on

Flexeril, Lomotil, folic acid, hydrochlorothiazide, Motrin, Humulin,

lidocaine.  The patient is on Effexor 100 mg twice a day, Percocet,

Protonix.



LABORATORY DATA:  Labs reviewed.  WBC 11.3, hemoglobin and hematocrit 9.9

and 31.3 respectively.  Chemistry reviewed.  AST is 40.  Urinalysis showed

leukocyte esterase moderate.  Toxicology was positive for barbiturate, but

the patient is on Fioricet.  Serology negative for any hepatitis.  Reports

reviewed.  The patient had CT scan of the head, which showed no acute

intracranial abnormalities.  No significant findings to account of the

clinical presentation, no significant interval changes compared to the

prior examination.



MENTAL STATUS EXAMINATION:  The patient appears to be alert, mildly

anxious, having breathing treatment during the interview.  Good eye

contact.  Speech was underproductive, but normal rate, tone, quality and

quantity.  Mood described as "I do not feel good from my medical issues." 

Affect was constricted.  Thought process coherent and goal directed. 

Thought content:  The patient denied visual, auditory, tactile

hallucinations.  Denied paranoid ideation.  The patient does not present to

be psychotic.  Insight and judgment seems to be fair.  Impulses are well

controlled.



IMPRESSION:  Anxiety and depression.  As per history, rule out anxiety and

depression due to general medical condition.



PLAN:  Continue current management.  Continue current medication.  The

patient was advised to call back if she has any worsening of anxiety or

depression.  The patient contracted for safety.  The patient denied any

suicidal or homicidal ideation.  The patient is not psychotic.  At the

present moment, the patient is no imminent danger to self or others.  This

writer will sign off.  Should you have any questions, give me a call.







__________________________________________

Tamia Guerra MD



DD:  06/18/2018 9:47:33

DT:  06/18/2018 9:48:53

Job # [id number]

## 2018-06-19 VITALS — RESPIRATION RATE: 19 BRPM

## 2018-06-19 LAB
ALBUMIN SERPL-MCNC: 3.3 G/DL (ref 3–4.8)
ALBUMIN/GLOB SERPL: 1 {RATIO} (ref 1.1–1.8)
ALT SERPL-CCNC: 32 U/L (ref 7–56)
AST SERPL-CCNC: 28 U/L (ref 14–36)
BASOPHILS # BLD AUTO: 0 K/MM3 (ref 0–2)
BASOPHILS NFR BLD: 0 % (ref 0–3)
BUN SERPL-MCNC: 16 MG/DL (ref 7–21)
CALCIUM SERPL-MCNC: 8.9 MG/DL (ref 8.4–10.5)
EOSINOPHIL # BLD: 0 10*3/UL (ref 0–0.7)
EOSINOPHIL NFR BLD: 0 % (ref 1.5–5)
ERYTHROCYTE [DISTWIDTH] IN BLOOD BY AUTOMATED COUNT: 13.5 % (ref 11.5–14.5)
GFR NON-AFRICAN AMERICAN: > 60
GRANULOCYTES # BLD: 13.13 10*3/UL (ref 1.4–6.5)
GRANULOCYTES NFR BLD: 88.8 % (ref 50–68)
HGB BLD-MCNC: 10.2 G/DL (ref 12–16)
LYMPHOCYTES # BLD: 1.3 10*3/UL (ref 1.2–3.4)
LYMPHOCYTES NFR BLD AUTO: 8.9 % (ref 22–35)
MCH RBC QN AUTO: 29.8 PG (ref 25–35)
MCHC RBC AUTO-ENTMCNC: 32.3 G/DL (ref 31–37)
MCV RBC AUTO: 92.4 FL (ref 80–105)
MONOCYTES # BLD AUTO: 0.3 10*3/UL (ref 0.1–0.6)
MONOCYTES NFR BLD: 2.3 % (ref 1–6)
PLATELET # BLD: 162 10^3/UL (ref 120–450)
PMV BLD AUTO: 11.5 FL (ref 7–11)
RBC # BLD AUTO: 3.42 10^6/UL (ref 3.5–6.1)
WBC # BLD AUTO: 14.8 10^3/UL (ref 4.5–11)

## 2018-06-19 RX ADMIN — INSULIN HUMAN SCH: 100 INJECTION, SOLUTION PARENTERAL at 08:26

## 2018-06-19 RX ADMIN — ACETYLCYSTEINE SCH ML: 200 INHALANT RESPIRATORY (INHALATION) at 19:42

## 2018-06-19 RX ADMIN — IPRATROPIUM BROMIDE AND ALBUTEROL SULFATE SCH: .5; 3 SOLUTION RESPIRATORY (INHALATION) at 13:26

## 2018-06-19 RX ADMIN — ACETYLCYSTEINE SCH: 200 INHALANT RESPIRATORY (INHALATION) at 13:26

## 2018-06-19 RX ADMIN — OXYCODONE AND ACETAMINOPHEN PRN TAB: 10; 325 TABLET ORAL at 18:02

## 2018-06-19 RX ADMIN — INSULIN HUMAN SCH: 100 INJECTION, SOLUTION PARENTERAL at 16:52

## 2018-06-19 RX ADMIN — INSULIN HUMAN SCH: 100 INJECTION, SOLUTION PARENTERAL at 23:12

## 2018-06-19 RX ADMIN — INSULIN HUMAN SCH: 100 INJECTION, SOLUTION PARENTERAL at 11:39

## 2018-06-19 RX ADMIN — METHYLPREDNISOLONE SODIUM SUCCINATE SCH MLS/HR: 1 INJECTION, POWDER, FOR SOLUTION INTRAMUSCULAR; INTRAVENOUS at 09:35

## 2018-06-19 RX ADMIN — CEFTRIAXONE SCH MLS/HR: 2 INJECTION, POWDER, FOR SOLUTION INTRAMUSCULAR; INTRAVENOUS at 09:35

## 2018-06-19 RX ADMIN — OXYCODONE AND ACETAMINOPHEN PRN TAB: 10; 325 TABLET ORAL at 12:06

## 2018-06-19 RX ADMIN — IPRATROPIUM BROMIDE AND ALBUTEROL SULFATE SCH: .5; 3 SOLUTION RESPIRATORY (INHALATION) at 01:22

## 2018-06-19 RX ADMIN — OXYCODONE AND ACETAMINOPHEN PRN TAB: 10; 325 TABLET ORAL at 01:41

## 2018-06-19 RX ADMIN — PANTOPRAZOLE SODIUM SCH MG: 40 TABLET, DELAYED RELEASE ORAL at 06:07

## 2018-06-19 RX ADMIN — IPRATROPIUM BROMIDE AND ALBUTEROL SULFATE SCH ML: .5; 3 SOLUTION RESPIRATORY (INHALATION) at 19:42

## 2018-06-19 RX ADMIN — LIDOCAINE SCH: 50 OINTMENT TOPICAL at 09:36

## 2018-06-19 RX ADMIN — ACETYLCYSTEINE SCH ML: 200 INHALANT RESPIRATORY (INHALATION) at 07:38

## 2018-06-19 RX ADMIN — IPRATROPIUM BROMIDE AND ALBUTEROL SULFATE SCH ML: .5; 3 SOLUTION RESPIRATORY (INHALATION) at 07:38

## 2018-06-19 NOTE — CP.PCM.PN
Subjective





- Date & Time of Evaluation


Date of Evaluation: 06/19/18


Time of Evaluation: 06:15





- Subjective


Subjective: 





Lying in bed, complaining of headache, no distress,denies chest pain





Reason for consultation and follow up: Cardiac evaluation for chest pain more 

muskuloskeletal than cardiac, history of multiple sclerosis, fibromyalgia, 

bradycardia s/p AICD placement, hypertension, obesity, diabetes, chronic anemia 





Seen and examined by me and Dr. Anderson











Objective





- Vital Signs/Intake and Output


Vital Signs (last 24 hours): 


 











Temp Pulse Resp BP Pulse Ox


 


 98.9 F   105 H  20   143/98 H  98 


 


 06/18/18 18:25  06/18/18 18:25  06/18/18 18:25  06/18/18 18:25  06/18/18 18:25








Intake and Output: 


 











 06/19/18 06/19/18





 06:59 18:59


 


Intake Total 840 


 


Balance 840 














- Medications


Medications: 


 Current Medications





Acetaminophen (Tylenol 325mg Tab)  650 mg PO Q6H PRN


   PRN Reason: Pain, moderate (4-7)


   Last Admin: 06/18/18 13:12 Dose:  650 mg


Acetaminophen/Butalbital/Caffeine (Fioricet)  1 tab PO Q4H PRN


   PRN Reason: Pain, moderate (4-7)


   Last Admin: 06/18/18 20:44 Dose:  1 tab


Albuterol/Ipratropium (Duoneb 3 Mg/0.5 Mg (3 Ml) Ud)  3 ml IH [id number] [facility]


   Last Admin: 06/19/18 01:22 Dose:  Not Given


Amlodipine Besylate (Norvasc)  10 mg PO DAILY [facility]


   Last Admin: 06/18/18 09:53 Dose:  10 mg


Aspirin (Ecotrin)  81 mg PO DAILY [facility]


   Last Admin: 06/18/18 09:34 Dose:  81 mg


Clonazepam (Klonopin)  1 mg PO Q8 PRN; Protocol


   PRN Reason: Anxiety


   Last Admin: 06/17/18 12:18 Dose:  1 mg


Cyclobenzaprine HCl (Flexeril)  5 mg PO TID PRN


   PRN Reason: Pain, moderate (4-7)


   Last Admin: 06/19/18 06:07 Dose:  5 mg


Diphenoxylate HCl/Atropine (Lomotil 0.025-2.5 Mg Tablet)  1 tab PO DAILY [facility]


   PRN Reason: Diarrhea


Folic Acid (Folic Acid)  1 mg PO DAILY [facility]


   Last Admin: 06/18/18 09:34 Dose:  1 mg


Guaifenesin/Dextromethorphan (Robitussin Dm)  10 ml PO Q4H PRN


   PRN Reason: Cough


Hydrochlorothiazide (Hydrodiuril)  25 mg PO DAILY [facility]


   Last Admin: 06/18/18 09:35 Dose:  25 mg


Ceftriaxone Sodium (Rocephin 2 Gm Ivpb)  2 gm in 100 mls @ 100 mls/hr IVPB 

DAILY [facility]


   PRN Reason: Protocol


   Last Admin: 06/18/18 09:43 Dose:  100 mls/hr


Methylprednisolone 1 gm/ (Sodium Chloride)  250 mls @ 500 mls/hr IV DAILY [facility]


   Stop: 06/20/18 14:00


   Last Admin: 06/18/18 13:13 Dose:  500 mls/hr


Ibuprofen (Motrin Tab)  600 mg PO BID [facility]


   Last Admin: 06/18/18 18:04 Dose:  600 mg


Insulin Human Regular (Humulin R Med)  0 units SC ACHS [facility]


   PRN Reason: Protocol


   Last Admin: 06/18/18 22:16 Dose:  Not Given


Lidocaine (Lidocaine 5%)  0 gm TOP DAILY [facility]


   Last Admin: 06/18/18 09:53 Dose:  Not Given


Non-Formulary Medication (Venlafaxine [Effexor  50 Mg Tab])  100 mg PO BID [facility]


   Last Admin: 06/18/18 18:05 Dose:  Not Given


Oxycodone/Acetaminophen (Percocet 10/325 Mg Tab)  1 tab PO Q6H PRN


   PRN Reason: Pain, moderate (4-7)


   Last Admin: 06/19/18 01:41 Dose:  1 tab


Pantoprazole Sodium (Protonix Ec Tab)  40 mg PO 0600 [facility]


   Last Admin: 06/19/18 06:07 Dose:  40 mg











- Labs


Labs: 


 





 06/18/18 06:30 





 06/18/18 06:30 





 











PT  11.3 SECONDS (9.4-12.5)   06/16/18  18:39    


 


INR  0.99  (0.93-1.08)   06/16/18  18:39    


 


APTT  31.3 Seconds (25.1-36.5)   06/16/18  18:39    














- Constitutional


Appears: No Acute Distress





- Head Exam


Head Exam: NORMOCEPHALIC


Additional comments: 





headache





- Eye Exam


Eye Exam: Normal appearance





- ENT Exam


ENT Exam: Mucous Membranes Moist





- Respiratory Exam


Respiratory Exam: Clear to Ausculation Bilateral, NORMAL BREATHING PATTERN





- Cardiovascular Exam


Cardiovascular Exam: +S1, +S2


Additional comments: 





AICD/PPM





- GI/Abdominal Exam


GI & Abdominal Exam: Soft, Normal Bowel Sounds





- Extremities Exam


Extremities Exam: Normal Capillary Refill





- Neurological Exam


Neurological Exam: Alert, Awake, Oriented x3





- Psychiatric Exam


Psychiatric exam: Anxious





- Skin


Skin Exam: Intact, Normal Color, Warm





Assessment and Plan





- Assessment and Plan (Free Text)


Assessment: 





A 58-year-old female who came in to the ER due to left chest pain associated 

with left arm numbness for 2 days. upon evaluation by Jered Miles, chest pain 

more musculoskeletal than cardiac. positive tenderness as site. History of 

multiple sclerosis, fibromyalgia, bradycardia s/p AICD/PPM placement, 

hypertension, obesity, diabetes, chronic anemia, gastric bypass,small bowel 

resection, depression,anxiety.











Plan: 





Complaining of severe headache, RN gave Flexeril


Some tenderness on left side of chest otherwise denies chest pain


Continue Motrin 600 mg BID


Cardiac status stable


Heart rate and blood pressure controlled


On Norvasc 10 mg daily,Hydrodiuril 25 mg daily


Continue other medications


Continue current treatment


Work up in progress for low WBC.





Will follow up





Plan and treatment discussed with Dr. Anderson

## 2018-06-19 NOTE — CP.PCM.PN
Subjective





- Date & Time of Evaluation


Date of Evaluation: 06/19/18


Time of Evaluation: 05:30





- Subjective


Subjective: 





Patient seen and examined at bedside, stating she couldn't sleep all night due 

to her headache which started yesterday. Rates the pain 8/10 describing it as a 

tight band wrapping around her forehead radiating down to her neck. Patient 

also complains of leg pain which is chronic. States she has associated nausea, 

vision changes, dizziness and requests dilaudid. Also admits to coughing and 

not being able to expectorate. 





Objective





- Vital Signs/Intake and Output


Vital Signs (last 24 hours): 


 











Temp Pulse Resp BP Pulse Ox


 


 98.9 F   105 H  20   143/98 H  98 


 


 06/18/18 18:25  06/18/18 18:25  06/18/18 18:25  06/18/18 18:25  06/18/18 18:25








Intake and Output: 


 











 06/19/18 06/19/18





 06:59 18:59


 


Intake Total 840 


 


Balance 840 














- Medications


Medications: 


 Current Medications





Acetaminophen (Tylenol 325mg Tab)  650 mg PO Q6H PRN


   PRN Reason: Pain, moderate (4-7)


   Last Admin: 06/18/18 13:12 Dose:  650 mg


Acetaminophen/Butalbital/Caffeine (Fioricet)  1 tab PO Q4H PRN


   PRN Reason: Pain, moderate (4-7)


   Last Admin: 06/18/18 20:44 Dose:  1 tab


Acetylcysteine (Acetylcysteine 20%)  4 ml IH [id number] [facility]


   Last Admin: 06/19/18 07:38 Dose:  4 ml


Albuterol/Ipratropium (Duoneb 3 Mg/0.5 Mg (3 Ml) Ud)  3 ml IH [id number] [facility]


   Last Admin: 06/19/18 07:38 Dose:  3 ml


Amlodipine Besylate (Norvasc)  10 mg PO DAILY [facility]


   Last Admin: 06/18/18 09:53 Dose:  10 mg


Aspirin (Ecotrin)  81 mg PO DAILY [facility]


   Last Admin: 06/18/18 09:34 Dose:  81 mg


Clonazepam (Klonopin)  1 mg PO Q8 PRN; Protocol


   PRN Reason: Anxiety


   Last Admin: 06/17/18 12:18 Dose:  1 mg


Cyclobenzaprine HCl (Flexeril)  5 mg PO TID PRN


   PRN Reason: Pain, moderate (4-7)


   Last Admin: 06/19/18 06:07 Dose:  5 mg


Diphenoxylate HCl/Atropine (Lomotil 0.025-2.5 Mg Tablet)  1 tab PO DAILY PRN


   PRN Reason: Diarrhea


Folic Acid (Folic Acid)  1 mg PO DAILY [facility]


   Last Admin: 06/18/18 09:34 Dose:  1 mg


Guaifenesin/Dextromethorphan (Robitussin Dm)  10 ml PO Q4H PRN


   PRN Reason: Cough


Hydrochlorothiazide (Hydrodiuril)  25 mg PO DAILY [facility]


   Last Admin: 06/18/18 09:35 Dose:  25 mg


Ceftriaxone Sodium (Rocephin 2 Gm Ivpb)  2 gm in 100 mls @ 100 mls/hr IVPB 

DAILY [facility]


   PRN Reason: Protocol


   Last Admin: 06/18/18 09:43 Dose:  100 mls/hr


Methylprednisolone 1 gm/ (Sodium Chloride)  250 mls @ 500 mls/hr IV DAILY [facility]


   Stop: 06/20/18 14:00


   Last Admin: 06/18/18 13:13 Dose:  500 mls/hr


Ibuprofen (Motrin Tab)  600 mg PO BID [facility]


   Last Admin: 06/18/18 18:04 Dose:  600 mg


Insulin Human Regular (Humulin R Med)  0 units SC ACHS [facility]


   PRN Reason: Protocol


   Last Admin: 06/18/18 22:16 Dose:  Not Given


Lidocaine (Lidocaine 5%)  0 gm TOP DAILY [facility]


   Last Admin: 06/18/18 09:53 Dose:  Not Given


Non-Formulary Medication (Venlafaxine [Effexor  50 Mg Tab])  100 mg PO BID [facility]


   Last Admin: 06/18/18 18:05 Dose:  Not Given


Oxycodone/Acetaminophen (Percocet 10/325 Mg Tab)  1 tab PO Q6H PRN


   PRN Reason: Pain, moderate (4-7)


   Last Admin: 06/19/18 01:41 Dose:  1 tab


Pantoprazole Sodium (Protonix Ec Tab)  40 mg PO 0600 [facility]


   Last Admin: 06/19/18 06:07 Dose:  40 mg











- Labs


Labs: 


 





 06/18/18 06:30 





 06/18/18 06:30 





 











PT  11.3 SECONDS (9.4-12.5)   06/16/18  18:39    


 


INR  0.99  (0.93-1.08)   06/16/18  18:39    


 


APTT  31.3 Seconds (25.1-36.5)   06/16/18  18:39    














- Head Exam


Head Exam: ATRAUMATIC, NORMAL INSPECTION, NORMOCEPHALIC





- Eye Exam


Eye Exam: EOMI, Normal appearance





- ENT Exam


ENT Exam: Mucous Membranes Moist, Normal Exam





- Respiratory Exam


Respiratory Exam: Rhonchi (lower lobes), NORMAL BREATHING PATTERN





- Cardiovascular Exam


Cardiovascular Exam: REGULAR RHYTHM, +S1, +S2





- GI/Abdominal Exam


GI & Abdominal Exam: Soft, Normal Bowel Sounds





- Extremities Exam


Extremities Exam: Normal Inspection, Tenderness





- Back Exam


Back Exam: NORMAL INSPECTION





- Neurological Exam


Neurological Exam: Alert, Awake, Oriented x3





- Psychiatric Exam


Psychiatric exam: Normal Affect, Normal Mood





- Skin


Skin Exam: Normal Color, Warm





Assessment and Plan





- Assessment and Plan (Free Text)


Assessment: 





58-year-old female with a past medical history multiple sclerosis, fibromyalgia

, bradycardia s/p AICD placement, hypertension, obesity, diabetes, chronic 

anemia who presented to the [facility] emergency room with complaints of 2 days worth 

of substernal chest pain.  


Plan: 





1.  Chest pain rule out ACS


D/C telemetry


Troponin was negative x 3


Does have a history of bradycardia and has an AICD


Cardiology consulted; patient's chest pain is musculoskeletal in nature


TSH within normal limits


Head above bed 30





2.  Neutropenia


Resolved


Hematology on consult


Etiology was unclear; granix given by hematologist and count corrected. WBC 

14.8 today


Could be due to the Copaxone that she was previously taking, patient has admits 

that she follows with a hematologist for anemia and may have had low white 

blood cells as well


Urine shows possible urinary tract infection, will continue with Rocephin


CRP >5, hepatitis panel negative, HIV and MARYANN screen with reflex still pending


Neutropenic precautions discontinued


Low microbial food


Chest x-ray official read states no active disease





3.  Anemia, chronic


Due to her symptoms, B12 was ordered which was low, Folate was normal


No active bleeding noted


Hemodynamically stable


Hematology consulted





4. Multiple Sclerosis Flare


Noncompliant with medications


Patient previously on Copaxone


Difficult to ascertain whether some of her symptoms may be due to multiple 

sclerosis flare versus coronary disease


Neurology consulted; steroid therapy day #2, final day of steroids tomorrow for 

MS flare


Given that she has a history of depression as well, psychiatry was consulted: 

patient stable from a psych standpoint and will sign off. 


Continue home medications Flexeril, Klonopin, venlafaxine, oxycodone


PT evaluated and recommends home with services 





5.  Chronic diarrhea


C. diff negative


Continue home Imodium only if patient is having 3-4 loose BMs





6. Headache


-Patient states not relieved with percocet, flexeril, or fioricet. Requested 

dilaudid initially then morphine. Patient was noted to be up and talking on 

phone and eating not showing any signs of pain when assessed. 


-Will give zofran and re-assess





DVT/GI prophylaxis: SCDs, Protonix





Patient was seen and examined and case was discussed at length with attending 

physician.

## 2018-06-19 NOTE — CON
DATE:  06/18/2018



LOCATION:  Patient in room 275, bed 1.



REASON FOR CONSULTATION:  Chest pain.



HISTORY OF PRESENT ILLNESS:  The patient is a 58-years-old female who was

admitted to [facility] on 05/10/2018 also for chest pain, now is

again admitted with chest pain, when she said that she is getting episodes

of sharp chest pain at one localized point in the chest.  Mostly it comes

lying down, and when she sits up, it goes away.  Also sometimes in change

of positions, she feels pain.  Other times, she does not feel pain.  Pain

is sharp in nature, lasts 10 minutes to an hour.  Also she had some pain in

the left upper arm, both places she had tenderness, the arm and in the

chest, and the same point where the pain is, and this pain has no relation

with exertion.  The patient is known to have multiple sclerosis,

fibromyalgia, hyperlipidemia, colitis, obesity, diabetes, hypertension. 

She states that she lives in [place] and she comes here for visits, and the

same thing was when she was in 05/2018 when she was admitted, at that time

also she was visiting here, and she was told to have stress test as an

outpatient with her cardiologist at [place], that is what she wanted to do,

but apparently she did not do that yet.



PAST MEDICAL HISTORY:  Positive for multiple sclerosis, fibromyalgia,

hyperlipidemia, colitis, obesity, status post bariatric surgery for

obesity, status post pacemaker insertion, diabetes mellitus, hypertension,

asthma, COPD.  She also has history of a fall and she uses walker.



SOCIAL HISTORY:  She denies smoking, denies drinking.



ALLERGIES:  THE PATIENT STATES THAT SHE IS ALLERGIC TO LEVAQUIN.



FAMILY HISTORY:  Mother has coronary artery disease, one brother also had

coronary artery disease and he had bypass surgery.



PAST SURGICAL HISTORY:  The patient's past surgical history is positive for

gastric bypass surgery 10 years ago and also she had a small intestinal

perforation for which she had surgery.



REVIEW OF SYSTEMS:  All the systems reviewed and positive mentioned in the

history, others were negative.



HOME MEDICATIONS:  The patient's home medications included  Klonopin,

Imodium, Zofran, Flexeril, HydroDIURIL, Effexor, amlodipine, aspirin,

insulin, oxycodone, Protonix, Tylenol.



PHYSICAL EXAMINATION:

VITAL SIGNS:  Blood pressure 100/57, respirations 18, pulse 79, temperature

98.1.

HEENT:  Head:  Normocephalic.  Eyes:  Pupil normal.  Conjunctivae slightly

pale.

NECK:  JVP low.  Carotids equal.

THORAX:  AP diameter normal.

LUNGS:  Clear.

CARDIOVASCULAR:  S1 and S2.  The patient has marked tenderness at the area

where she complains of chest pain and she has also some tenderness on the

left upper arm that is where she says the pain was, the both places where

she has had pain, both places has tenderness, it is musculoskeletal type of

pain.

ABDOMEN:  Soft.  No tenderness.  No organomegaly.  Bowel sound normal.

EXTREMITIES:  No clubbing.  No cyanosis.



LABORATORY DATA:  Initially on 06/16/2018, WBC was 1.9, and 06/17/2018, it

was 1.7, but today it is 11.3.  Hemoglobin 9.9, hematocrit 31.3, platelets

142.  Sodium 139, potassium 4.7, BUN 15, creatinine 0.7, sugar 73. 

Calcium, phosphorus, magnesium normal.  AST 40, ALT 35, total protein 6.3,

albumin 3, globulin 3.3.  Chest x-ray, no acute pulmonary disease.  EKG

shows sinus rhythm with premature atrial complexes, low voltage QRS,

nonspecific T-wave abnormality.  The patient had echo on 05/11/2018 which

showed left ventricle size is normal, normal left ventricle wall thickness.

Left ventricular systolic function is normal.  RVSP 38 mmHg.  LV ejection

fraction 57%.  No significant regurgitation noted.



DIAGNOSES:  Chest pain, musculoskeletal; status post pacemaker insertion,

history of multiple sclerosis, fibromyalgia, hypertension, diabetes

mellitus, hyperlipidemia, colitis, obesity, status post bariatric surgery

for loss of weight, history of asthma, chronic obstructive pulmonary

disease, respiratory tract infection.



PLAN:  To continue DuoNeb hand nebulizer therapy, folic acid 1 mg daily,

insulin as ordered, hydrochlorothiazide 25 mg daily, Klonopin 1 mg p.o.

every 8 hours p.r.n., Motrin 600 mg b.i.d., amlodipine 10 mg daily,

Protonix 40 daily, ceftriaxone 2 g IV daily, methylprednisolone 1 g IV

daily.  The patient states that she will do stress test as outpatient when

she gets better.  Also right now we will treat chest pain symptomatically,

patient's chest pain is musculoskeletal and we will follow with you.





__________________________________________

Marcus Lawton MD



DD:  06/18/2018 17:00:35

DT:  06/18/2018 19:21:03

Job # [id number]

## 2018-06-20 VITALS
HEART RATE: 61 BPM | TEMPERATURE: 97.1 F | SYSTOLIC BLOOD PRESSURE: 110 MMHG | DIASTOLIC BLOOD PRESSURE: 67 MMHG | OXYGEN SATURATION: 96 %

## 2018-06-20 LAB
ALBUMIN SERPL-MCNC: 3.4 G/DL (ref 3–4.8)
ALBUMIN/GLOB SERPL: 1 {RATIO} (ref 1.1–1.8)
ALT SERPL-CCNC: 28 U/L (ref 7–56)
AST SERPL-CCNC: 28 U/L (ref 14–36)
BASOPHILS # BLD AUTO: 0 K/MM3 (ref 0–2)
BASOPHILS NFR BLD: 0 % (ref 0–3)
BUN SERPL-MCNC: 18 MG/DL (ref 7–21)
CALCIUM SERPL-MCNC: 9 MG/DL (ref 8.4–10.5)
EOSINOPHIL # BLD: 0 10*3/UL (ref 0–0.7)
EOSINOPHIL NFR BLD: 0 % (ref 1.5–5)
ERYTHROCYTE [DISTWIDTH] IN BLOOD BY AUTOMATED COUNT: 13.6 % (ref 11.5–14.5)
GFR NON-AFRICAN AMERICAN: > 60
GRANULOCYTES # BLD: 7.67 10*3/UL (ref 1.4–6.5)
GRANULOCYTES NFR BLD: 85.3 % (ref 50–68)
HGB BLD-MCNC: 10.2 G/DL (ref 12–16)
LYMPHOCYTES # BLD: 1 10*3/UL (ref 1.2–3.4)
LYMPHOCYTES NFR BLD AUTO: 11.3 % (ref 22–35)
MCH RBC QN AUTO: 30.3 PG (ref 25–35)
MCHC RBC AUTO-ENTMCNC: 32.7 G/DL (ref 31–37)
MCV RBC AUTO: 92.6 FL (ref 80–105)
MONOCYTES # BLD AUTO: 0.3 10*3/UL (ref 0.1–0.6)
MONOCYTES NFR BLD: 3.4 % (ref 1–6)
PLATELET # BLD: 159 10^3/UL (ref 120–450)
PMV BLD AUTO: 11.8 FL (ref 7–11)
RBC # BLD AUTO: 3.37 10^6/UL (ref 3.5–6.1)
WBC # BLD AUTO: 9 10^3/UL (ref 4.5–11)

## 2018-06-20 RX ADMIN — IPRATROPIUM BROMIDE AND ALBUTEROL SULFATE SCH ML: .5; 3 SOLUTION RESPIRATORY (INHALATION) at 13:41

## 2018-06-20 RX ADMIN — PANTOPRAZOLE SODIUM SCH MG: 40 TABLET, DELAYED RELEASE ORAL at 05:48

## 2018-06-20 RX ADMIN — METHYLPREDNISOLONE SODIUM SUCCINATE SCH MLS/HR: 1 INJECTION, POWDER, FOR SOLUTION INTRAMUSCULAR; INTRAVENOUS at 10:16

## 2018-06-20 RX ADMIN — ACETYLCYSTEINE SCH: 200 INHALANT RESPIRATORY (INHALATION) at 01:35

## 2018-06-20 RX ADMIN — CEFTRIAXONE SCH MLS/HR: 2 INJECTION, POWDER, FOR SOLUTION INTRAMUSCULAR; INTRAVENOUS at 09:13

## 2018-06-20 RX ADMIN — IPRATROPIUM BROMIDE AND ALBUTEROL SULFATE SCH: .5; 3 SOLUTION RESPIRATORY (INHALATION) at 01:35

## 2018-06-20 RX ADMIN — IPRATROPIUM BROMIDE AND ALBUTEROL SULFATE SCH ML: .5; 3 SOLUTION RESPIRATORY (INHALATION) at 07:21

## 2018-06-20 RX ADMIN — INSULIN HUMAN SCH: 100 INJECTION, SOLUTION PARENTERAL at 08:10

## 2018-06-20 RX ADMIN — ACETYLCYSTEINE SCH ML: 200 INHALANT RESPIRATORY (INHALATION) at 07:21

## 2018-06-20 RX ADMIN — LIDOCAINE SCH: 50 OINTMENT TOPICAL at 09:16

## 2018-06-20 RX ADMIN — ACETYLCYSTEINE SCH ML: 200 INHALANT RESPIRATORY (INHALATION) at 13:40

## 2018-06-20 RX ADMIN — INSULIN HUMAN SCH: 100 INJECTION, SOLUTION PARENTERAL at 11:36

## 2018-06-20 NOTE — PN
DATE:  06/19/2018



REASON FOR DICTATION:  Addendum to the initial progress note dictated by

our nurse practitioner, Aurora Ellis.



REASON FOR ADDENDUM:  The patient's chest pain was atypical.  No evidence

of acute MI.  Significant improvement with Motrin and nonsteroidal

antiinflammatory.  The patient had pacemaker AICD 2 years, being followed

by  _____ in [place].  The patient saw before she coming in and advised

to go back and follow up upon reaching [place].  The patient is going back

to [place] on 07/03/2018, and suggest for the risk stratification with a

stress test there.  The patient's stress test 2 years ago was normal.  So

for now, the chest pain looks very atypical and musculoskeletal but because

of risk stratification at least the patient to have a followup with her

cardiologist and a stress test in [place].





__________________________________________

Marcus Anderson MD



DD:  06/19/2018 11:58:01

DT:  06/19/2018 12:07:08

Job # [id number]

## 2018-06-20 NOTE — CP.PCM.PN
Subjective





- Date & Time of Evaluation


Date of Evaluation: 06/20/18


Time of Evaluation: 11:49





- Subjective


Subjective: 





Ms. Gruber was seen and examined at the bedside. She remains alert, oriented in 

all spheres. She denies any headache, lightheadedness,nausea, or vomiting. She 

is able to follow simple commands. She is able to tolerate IV solumedrol and 

last dose today.There was no untoward events overnight.





Objective





- Vital Signs/Intake and Output


Vital Signs (last 24 hours): 


 











Temp Pulse Resp BP Pulse Ox


 


 97.1 F L  61   19   110/67   96 


 


 06/20/18 08:20  06/20/18 08:20  06/20/18 08:20  06/20/18 09:11  06/20/18 08:20








Intake and Output: 


 











 06/20/18 06/20/18





 06:59 18:59


 


Intake Total 480 240


 


Balance 480 240














- Medications


Medications: 


 Current Medications





Acetaminophen (Tylenol 325mg Tab)  650 mg PO Q6H PRN


   PRN Reason: Pain, moderate (4-7)


   Last Admin: 06/18/18 13:12 Dose:  650 mg


Acetaminophen/Butalbital/Caffeine (Fioricet)  1 tab PO Q4H PRN


   PRN Reason: Pain, moderate (4-7)


   Last Admin: 06/18/18 20:44 Dose:  1 tab


Acetylcysteine (Acetylcysteine 20%)  4 ml IH [id number] [facility]


   Last Admin: 06/20/18 07:21 Dose:  4 ml


Albuterol/Ipratropium (Duoneb 3 Mg/0.5 Mg (3 Ml) Ud)  3 ml IH [id number] [facility]


   Last Admin: 06/20/18 07:21 Dose:  3 ml


Amlodipine Besylate (Norvasc)  10 mg PO DAILY [facility]


   Last Admin: 06/20/18 09:11 Dose:  10 mg


Aspirin (Ecotrin)  81 mg PO DAILY [facility]


   Last Admin: 06/20/18 09:12 Dose:  81 mg


Cefpodoxime Proxetil (Vantin)  200 mg PO Q12 [facility]


   Stop: 06/22/18 22:01


Clonazepam (Klonopin)  1 mg PO Q8 PRN; Protocol


   PRN Reason: Anxiety


   Last Admin: 06/20/18 09:11 Dose:  1 mg


Cyclobenzaprine HCl (Flexeril)  5 mg PO TID PRN


   PRN Reason: Pain, moderate (4-7)


   Last Admin: 06/19/18 06:07 Dose:  5 mg


Diphenoxylate HCl/Atropine (Lomotil 0.025-2.5 Mg Tablet)  1 tab PO DAILY PRN


   PRN Reason: Diarrhea


Folic Acid (Folic Acid)  1 mg PO DAILY [facility]


   Last Admin: 06/20/18 09:12 Dose:  1 mg


Guaifenesin/Dextromethorphan (Robitussin Dm)  10 ml PO Q4H PRN


   PRN Reason: Cough


Hydrochlorothiazide (Hydrodiuril)  25 mg PO DAILY [facility]


   Last Admin: 06/20/18 09:12 Dose:  25 mg


Methylprednisolone 1 gm/ (Sodium Chloride)  250 mls @ 500 mls/hr IV DAILY [facility]


   Stop: 06/20/18 14:00


   Last Admin: 06/20/18 10:16 Dose:  500 mls/hr


Ibuprofen (Motrin Tab)  600 mg PO BID [facility]


   Last Admin: 06/20/18 09:12 Dose:  600 mg


Insulin Human Regular (Humulin R Med)  0 units SC ACHS [facility]


   PRN Reason: Protocol


   Last Admin: 06/20/18 11:36 Dose:  Not Given


Lidocaine (Lidocaine 5%)  0 gm TOP DAILY [facility]


   Last Admin: 06/20/18 09:16 Dose:  Not Given


Loperamide HCl (Imodium)  2 mg PO DAILY [facility]


   Last Admin: 06/20/18 09:12 Dose:  2 mg


Non-Formulary Medication (Venlafaxine [Effexor  50 Mg Tab])  100 mg PO BID [facility]


   Last Admin: 06/20/18 09:16 Dose:  Not Given


Ondansetron HCl (Zofran Inj)  4 mg IVP Q6H PRN


   PRN Reason: Migraine headache


   Last Admin: 06/20/18 09:13 Dose:  4 mg


Oxycodone/Acetaminophen (Percocet 10/325 Mg Tab)  1 tab PO Q6H PRN


   PRN Reason: Pain, moderate (4-7)


   Last Admin: 06/19/18 18:02 Dose:  1 tab


Pantoprazole Sodium (Protonix Ec Tab)  40 mg PO 0600 [facility]


   Last Admin: 06/20/18 05:48 Dose:  40 mg











- Labs


Labs: 


 





 06/20/18 05:30 





 06/20/18 05:30 





 











PT  11.3 SECONDS (9.4-12.5)   06/16/18  18:39    


 


INR  0.99  (0.93-1.08)   06/16/18  18:39    


 


APTT  31.3 Seconds (25.1-36.5)   06/16/18  18:39    














- Constitutional


Appears: No Acute Distress





- Head Exam


Head Exam: NORMAL INSPECTION





- Eye Exam


Eye Exam: EOMI


Pupil Exam: PERRL





- Neck Exam


Neck Exam: Full ROM





- Neurological Exam


Neurological Exam: Alert, Awake, Oriented x3


Neuro motor strength exam: Left Upper Extremity: 5, Right Upper Extremity: 5, 

Left Lower Extremity: 4, Right Lower Extremity: 4





Assessment and Plan


(1) Multiple sclerosis exacerbation


Assessment & Plan: 


Case discussed with Dr. Lynne, continue all current medical regimen. May 

discharge patient pending clearance from primary team.Recommend to follow up 

with her own private neurologist in [place] and if the patient will stay in [place] 

[place] to follow up with Dr. THA Daniel, encourage hydration.


Status: Acute

## 2018-06-20 NOTE — CP.PCM.PN
Subjective





- Date & Time of Evaluation


Date of Evaluation: 06/20/18


Time of Evaluation: 06:20





- Subjective


Subjective: 





Sleeping but easily awaken,Lying in bed, no distress,denies chest pain





Reason for consultation and follow up: Cardiac evaluation for chest pain more 

muskuloskeletal than cardiac, history of multiple sclerosis, fibromyalgia, 

bradycardia s/p AICD placement, hypertension, obesity, diabetes, chronic anemia 





Seen and examined by me and Dr. Anderson











Objective





- Vital Signs/Intake and Output


Vital Signs (last 24 hours): 


 











Temp Pulse Resp BP Pulse Ox


 


 98.2 F   81   19   135/88   99 


 


 06/19/18 18:00  06/19/18 18:00  06/19/18 18:00  06/19/18 18:00  06/19/18 18:00








Intake and Output: 


 











 06/19/18 06/20/18





 18:59 06:59


 


Intake Total 725 480


 


Balance 725 480














- Medications


Medications: 


 Current Medications





Acetaminophen (Tylenol 325mg Tab)  650 mg PO Q6H PRN


   PRN Reason: Pain, moderate (4-7)


   Last Admin: 06/18/18 13:12 Dose:  650 mg


Acetaminophen/Butalbital/Caffeine (Fioricet)  1 tab PO Q4H PRN


   PRN Reason: Pain, moderate (4-7)


   Last Admin: 06/18/18 20:44 Dose:  1 tab


Acetylcysteine (Acetylcysteine 20%)  4 ml IH [id number] [facility]


   Last Admin: 06/20/18 01:35 Dose:  Not Given


Albuterol/Ipratropium (Duoneb 3 Mg/0.5 Mg (3 Ml) Ud)  3 ml IH [id number] [facility]


   Last Admin: 06/20/18 01:35 Dose:  Not Given


Amlodipine Besylate (Norvasc)  10 mg PO DAILY [facility]


   Last Admin: 06/19/18 09:34 Dose:  10 mg


Aspirin (Ecotrin)  81 mg PO DAILY [facility]


   Last Admin: 06/19/18 09:34 Dose:  81 mg


Clonazepam (Klonopin)  1 mg PO Q8 PRN; Protocol


   PRN Reason: Anxiety


   Last Admin: 06/19/18 09:43 Dose:  1 mg


Cyclobenzaprine HCl (Flexeril)  5 mg PO TID PRN


   PRN Reason: Pain, moderate (4-7)


   Last Admin: 06/19/18 06:07 Dose:  5 mg


Diphenoxylate HCl/Atropine (Lomotil 0.025-2.5 Mg Tablet)  1 tab PO DAILY PRN


   PRN Reason: Diarrhea


Folic Acid (Folic Acid)  1 mg PO DAILY [facility]


   Last Admin: 06/19/18 09:34 Dose:  1 mg


Guaifenesin/Dextromethorphan (Robitussin Dm)  10 ml PO Q4H PRN


   PRN Reason: Cough


Hydrochlorothiazide (Hydrodiuril)  25 mg PO DAILY [facility]


   Last Admin: 06/19/18 09:34 Dose:  25 mg


Ceftriaxone Sodium (Rocephin 2 Gm Ivpb)  2 gm in 100 mls @ 100 mls/hr IVPB 

DAILY [facility]


   PRN Reason: Protocol


   Last Admin: 06/19/18 09:35 Dose:  100 mls/hr


Methylprednisolone 1 gm/ (Sodium Chloride)  250 mls @ 500 mls/hr IV DAILY [facility]


   Stop: 06/20/18 14:00


   Last Admin: 06/19/18 09:35 Dose:  500 mls/hr


Ibuprofen (Motrin Tab)  600 mg PO BID [facility]


   Last Admin: 06/19/18 17:02 Dose:  600 mg


Insulin Human Regular (Humulin R Med)  0 units SC ACHS [facility]


   PRN Reason: Protocol


   Last Admin: 06/19/18 23:12 Dose:  Not Given


Lidocaine (Lidocaine 5%)  0 gm TOP DAILY [facility]


   Last Admin: 06/19/18 09:36 Dose:  Not Given


Loperamide HCl (Imodium)  2 mg PO DAILY [facility]


   Last Admin: 06/19/18 09:59 Dose:  Not Given


Non-Formulary Medication (Venlafaxine [Effexor  50 Mg Tab])  100 mg PO BID [facility]


   Last Admin: 06/19/18 17:53 Dose:  Not Given


Ondansetron HCl (Zofran Inj)  4 mg IVP Q6H PRN


   PRN Reason: Migraine headache


   Last Admin: 06/20/18 01:18 Dose:  4 mg


Oxycodone/Acetaminophen (Percocet 10/325 Mg Tab)  1 tab PO Q6H PRN


   PRN Reason: Pain, moderate (4-7)


   Last Admin: 06/19/18 18:02 Dose:  1 tab


Pantoprazole Sodium (Protonix Ec Tab)  40 mg PO 0600 [facility]


   Last Admin: 06/20/18 05:48 Dose:  40 mg











- Labs


Labs: 


 





 06/19/18 07:40 





 06/19/18 07:40 





 











PT  11.3 SECONDS (9.4-12.5)   06/16/18  18:39    


 


INR  0.99  (0.93-1.08)   06/16/18  18:39    


 


APTT  31.3 Seconds (25.1-36.5)   06/16/18  18:39    














- Constitutional


Appears: No Acute Distress





- Head Exam


Head Exam: NORMOCEPHALIC





- Eye Exam


Eye Exam: Normal appearance





- ENT Exam


ENT Exam: Mucous Membranes Moist





- Respiratory Exam


Respiratory Exam: Clear to Ausculation Bilateral, NORMAL BREATHING PATTERN





- Cardiovascular Exam


Cardiovascular Exam: +S1, +S2


Additional comments: 





AICD/PPM


right subclavian port.





- GI/Abdominal Exam


GI & Abdominal Exam: Soft, Normal Bowel Sounds





- Extremities Exam


Extremities Exam: Normal Capillary Refill





- Neurological Exam


Neurological Exam: Alert, Awake, Oriented x3





- Psychiatric Exam


Psychiatric exam: Normal Affect, Normal Mood





- Skin


Skin Exam: Intact, Normal Color, Warm





Assessment and Plan





- Assessment and Plan (Free Text)


Assessment: 





A 58-year-old female who came in to the ER due to left chest pain associated 

with left arm numbness for 2 days. upon evaluation by Jered Miles, chest pain 

more musculoskeletal than cardiac. positive tenderness as site. History of 

multiple sclerosis, fibromyalgia, bradycardia s/p AICD/PPM placement, 

hypertension, obesity, diabetes, chronic anemia, gastric bypass,small bowel 

resection, depression,anxiety.














Plan: 


Recommended stress test however refused 


     and she will have it done in [place] with her Cardiologist


Comfortable this morning


Denies chest pain,chest tenderness


Continue Motrin 600 mg BID


Cardiac status stable


Heart rate and blood pressure controlled


On Norvasc 10 mg daily,Hydrodiuril 25 mg daily


Continue other medications


Continue current treatment








Will follow up





Plan and treatment discussed with Dr. Anderson

## 2018-06-20 NOTE — CP.PCM.DIS
Provider





- Provider


Date of Admission: 


06/16/18 19:11





Attending physician: 


Marcus Pro MD





Primary care physician: 


[facility]





- Lab Results


Lab Results: 


 Micro Results





06/20/18 09:44   Stool   C. difficile Antigen & Toxin A,B (M - Final


06/18/18 22:13   Urine   Urine Culture - Final


                            10-50,000 CFU/ML.


                            MULTIPLE SPECIES. PROBABLE CONTAMINATION.


06/16/18 22:45   Blood-Venous   Blood Culture - Preliminary


                            NO GROWTH AFTER 3 DAYS


06/16/18 22:10   Blood-Venous   Blood Culture - Preliminary


                            NO GROWTH AFTER 3 DAYS


06/17/18 11:45   Stool   C. difficile Antigen & Toxin A,B (M - Final





 Most Recent Lab Values











WBC  9.0 10^3/ul (4.5-11.0)  D 06/20/18  05:30    


 


RBC  3.37 10^6/uL (3.5-6.1)  L  06/20/18  05:30    


 


Hgb  10.2 g/dL (12.0-16.0)  L  06/20/18  05:30    


 


Hct  31.2 % (36.0-48.0)  L  06/20/18  05:30    


 


MCV  92.6 fl (80.0-105.0)   06/20/18  05:30    


 


MCH  30.3 pg (25.0-35.0)   06/20/18  05:30    


 


MCHC  32.7 g/dl (31.0-37.0)   06/20/18  05:30    


 


RDW  13.6 % (11.5-14.5)   06/20/18  05:30    


 


Plt Count  159 10^3/uL (120.0-450.0)   06/20/18  05:30    


 


MPV  11.8 fl (7.0-11.0)  H  06/20/18  05:30    


 


Gran %  85.3 % (50.0-68.0)  H  06/20/18  05:30    


 


Lymph % (Auto)  11.3 % (22.0-35.0)  L  06/20/18  05:30    


 


Mono % (Auto)  3.4 % (1.0-6.0)   06/20/18  05:30    


 


Eos % (Auto)  0.0 % (1.5-5.0)  L  06/20/18  05:30    


 


Baso % (Auto)  0.0 % (0.0-3.0)   06/20/18  05:30    


 


Gran #  7.67  (1.4-6.5)  H  06/20/18  05:30    


 


Lymph # (Auto)  1.0  (1.2-3.4)  L  06/20/18  05:30    


 


Mono # (Auto)  0.3  (0.1-0.6)   06/20/18  05:30    


 


Eos # (Auto)  0.0  (0.0-0.7)   06/20/18  05:30    


 


Baso # (Auto)  0.00 K/mm3 (0.0-2.0)   06/20/18  05:30    


 


Differential Comment  See pathology report   06/16/18  22:00    


 


ESR  55 mm/hr (0.0-20.0)  H  06/16/18  18:39    


 


PT  11.3 SECONDS (9.4-12.5)   06/16/18  18:39    


 


INR  0.99  (0.93-1.08)   06/16/18  18:39    


 


APTT  31.3 Seconds (25.1-36.5)   06/16/18  18:39    


 


Sodium  141 mmol/L (132-148)   06/20/18  05:30    


 


Potassium  4.3 mmol/L (3.6-5.0)   06/20/18  05:30    


 


Chloride  105 mmol/L ()   06/20/18  05:30    


 


Carbon Dioxide  26 mmol/L (21-33)   06/20/18  05:30    


 


Anion Gap  14  (10-20)   06/20/18  05:30    


 


BUN  18 mg/dL (7-21)   06/20/18  05:30    


 


Creatinine  0.8 mg/dl (0.7-1.2)   06/20/18  05:30    


 


Est GFR ( Amer)  > 60   06/20/18  05:30    


 


Est GFR (Non-Af Amer)  > 60   06/20/18  05:30    


 


POC Glucose (mg/dL)  107 mg/dL ()   06/20/18  11:18    


 


Random Glucose  116 mg/dL ()  H  06/20/18  05:30    


 


Calcium  9.0 mg/dL (8.4-10.5)   06/20/18  05:30    


 


Phosphorus  4.5 mg/dL (2.5-4.5)   06/18/18  06:30    


 


Magnesium  1.8 mg/dL (1.7-2.2)   06/18/18  06:30    


 


Iron  64 ug/dL ()   06/17/18  09:00    


 


TIBC  203 ug/dL (265-497)  L  06/17/18  09:00    


 


% Saturation  31 % (20-55)   06/17/18  09:00    


 


Ferritin  289.0 ng/mL  06/17/18  09:00    


 


Total Bilirubin  < 0.1 mg/dL (0.2-1.3)  L  06/20/18  05:30    


 


AST  28 U/L (14-36)   06/20/18  05:30    


 


ALT  28 U/L (7-56)   06/20/18  05:30    


 


Alkaline Phosphatase  132 U/L ()  H  06/20/18  05:30    


 


Lactate Dehydrogenase  528 U/L (333-699)   06/16/18  18:39    


 


Total Creatine Kinase  45 U/L ()   06/16/18  18:39    


 


Troponin I  < 0.01 ng/mL  06/17/18  06:15    


 


C-React Prot High Sens  5.10 mg/L (1.00-3.00)  H  06/17/18  06:15    


 


NT-Pro-B Natriuret Pep  93.9 pg/mL (0-450)   06/16/18  18:39    


 


Total Protein  6.8 g/dL (5.8-8.3)   06/20/18  05:30    


 


Albumin  3.4 g/dL (3.0-4.8)   06/20/18  05:30    


 


Globulin  3.4 gm/dL  06/20/18  05:30    


 


Albumin/Globulin Ratio  1.0  (1.1-1.8)  L  06/20/18  05:30    


 


Vitamin B12  204 pg/mL (239-931)  L  06/17/18  06:15    


 


Folate  > 20.0 ng/mL  06/17/18  06:15    


 


Procalcitonin  < 0.05 NG/ML (0.19-0.49)  L  06/17/18  06:15    


 


TSH 3rd Generation  2.58 mIU/mL (0.46-4.68)   06/17/18  06:15    


 


Urine Color  Light yellow  (YELLOW)   06/17/18  11:45    


 


Urine Appearance  Sl cloudy  (CLEAR)   06/17/18  11:45    


 


Urine pH  8.0  (4.7-8.0)   06/17/18  11:45    


 


Ur Specific Gravity  1.015  (1.005-1.035)   06/17/18  11:45    


 


Urine Protein  Negative mg/dL (<30 mg/dL)   06/17/18  11:45    


 


Urine Glucose (UA)  Negative mg/dL (NEGATIVE)   06/17/18  11:45    


 


Urine Ketones  Negative mg/dL (NEGATIVE)   06/17/18  11:45    


 


Urine Blood  Negative  (NEGATIVE)   06/17/18  11:45    


 


Urine Nitrate  Positive  (NEGATIVE)  H  06/17/18  11:45    


 


Urine Bilirubin  Negative  (NEGATIVE)   06/17/18  11:45    


 


Urine Urobilinogen  0.2 E.U./dL (<1 E.U./dL)   06/17/18  11:45    


 


Ur Leukocyte Esterase  Trace Susan/uL (NEGATIVE)  H  06/17/18  11:45    


 


Urine RBC  Negative /hpf (0-2)   06/17/18  11:45    


 


Urine WBC  2 - 5 /hpf (0-6)   06/17/18  11:45    


 


Ur Epithelial Cells  0 - 2 /hpf (0-5)   06/17/18  11:45    


 


Urine Bacteria  Many  (NEG)   06/17/18  11:45    


 


Salicylates  2 mg/dL (2.0-20.0)   06/16/18  18:39    


 


Urine Opiates Screen  Negative  (NEGATIVE)   06/16/18  18:28    


 


Urine Methadone Screen  Negative  (NEGATIVE)   06/16/18  18:28    


 


Acetaminophen  < 10.0 ug/ml (10.0-20.0)  L  06/16/18  18:39    


 


Ur Barbiturates Screen  Positive  (NEGATIVE)  H  06/16/18  18:28    


 


Ur Phencyclidine Scrn  Negative  (NEGATIVE)   06/16/18  18:28    


 


Ur Amphetamines Screen  Negative  (NEGATIVE)   06/16/18  18:28    


 


U Benzodiazepines Scrn  Negative  (NEGATIVE)   06/16/18  18:28    


 


U Oth Cocaine Metabols  Negative  (NEGATIVE)   06/16/18  18:28    


 


U Cannabinoids Screen  Negative  (NEGATIVE)   06/16/18  18:28    


 


Alcohol, Quantitative  < 10 mg/dL (0-10)   06/16/18  18:39    


 


Hepatitis A IgM Ab  Negative  (NEGATIVE)   06/17/18  06:15    


 


Hep Bs Antigen  Negative  (NEGATIVE)   06/17/18  06:15    


 


Hep B Core IgM Ab  Negative  (NEGATIVE)   06/17/18  06:15    


 


Hepatitis C Antibody  Negative  (NEGATIVE)   06/17/18  06:15    


 


HIV 1&2 Ag/Ab, 4th Gen  Nonreactive  (Nonreactive)   06/17/18  06:00    














Discharge Exam





- Head Exam


Head Exam: NORMAL INSPECTION





Discharge Plan





- Discharge Medications


Prescriptions: 


Ibuprofen [Motrin Tab] 600 mg PO Q6 #20 tab


Ibuprofen [Motrin Tab] 600 mg PO BID #12 tab


Ondansetron [Zofran Tab] 1 tab PO BID #14 tab


Pantoprazole Sodium [Protonix] 40 mg PO DAILY #7 ect


Pregabalin [Lyrica] 100 mg PO TID #21 capsule


Venlafaxine [Effexor  50 MG TAB] 100 mg PO BID #14 tab





- Follow Up Plan


Condition: STABLE


Disposition: HOME/ ROUTINE


Instructions:  Chest Pain, Urinary Tract Infection, Adult (DC), Multiple 

Sclerosis, Adult (DC)


Additional Instructions: 


Follow up with Neurologist and Primary care physician.  Take mediations as 

prescribed. If you experience any worsening of symptoms contact your primary 

care Physician and go to the nearest Emergency Room     


Referrals: 


FAMILY PROVIDER,NO [Primary Care Provider] -

## 2018-07-18 ENCOUNTER — HOSPITAL ENCOUNTER (INPATIENT)
Dept: HOSPITAL 42 - ED | Age: 58
LOS: 2 days | Discharge: PSYCHIATRIC OR SUBSTANCE USE FACILITY | DRG: 690 | End: 2018-07-20
Attending: INTERNAL MEDICINE | Admitting: INTERNAL MEDICINE
Payer: MEDICARE

## 2018-07-18 VITALS — BODY MASS INDEX: 36.7 KG/M2

## 2018-07-18 DIAGNOSIS — F41.9: ICD-10-CM

## 2018-07-18 DIAGNOSIS — Z95.0: ICD-10-CM

## 2018-07-18 DIAGNOSIS — Z80.1: ICD-10-CM

## 2018-07-18 DIAGNOSIS — Z79.82: ICD-10-CM

## 2018-07-18 DIAGNOSIS — R44.1: ICD-10-CM

## 2018-07-18 DIAGNOSIS — Z86.73: ICD-10-CM

## 2018-07-18 DIAGNOSIS — G35: ICD-10-CM

## 2018-07-18 DIAGNOSIS — I10: ICD-10-CM

## 2018-07-18 DIAGNOSIS — N39.0: Primary | ICD-10-CM

## 2018-07-18 DIAGNOSIS — D72.819: ICD-10-CM

## 2018-07-18 DIAGNOSIS — F31.9: ICD-10-CM

## 2018-07-18 DIAGNOSIS — E11.9: ICD-10-CM

## 2018-07-18 DIAGNOSIS — D64.9: ICD-10-CM

## 2018-07-18 DIAGNOSIS — Z95.810: ICD-10-CM

## 2018-07-18 DIAGNOSIS — Z91.041: ICD-10-CM
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
## 2018-07-18 DIAGNOSIS — Z87.891: ICD-10-CM

## 2018-07-18 DIAGNOSIS — R41.0: ICD-10-CM

## 2018-07-18 DIAGNOSIS — Z91.14: ICD-10-CM

## 2018-07-18 DIAGNOSIS — Z98.84: ICD-10-CM

## 2018-07-18 DIAGNOSIS — Z88.1: ICD-10-CM

## 2018-07-18 DIAGNOSIS — M79.7: ICD-10-CM

## 2018-07-18 DIAGNOSIS — Z82.62: ICD-10-CM

## 2018-07-18 LAB
% IRON SATURATION: 16 % (ref 20–55)
ALBUMIN SERPL-MCNC: 3.7 G/DL (ref 3–4.8)
ALBUMIN/GLOB SERPL: 1.1 {RATIO} (ref 1.1–1.8)
ALT SERPL-CCNC: 24 U/L (ref 7–56)
APAP SERPL-MCNC: < 10 UG/ML (ref 10–20)
APPEARANCE UR: (no result)
APTT BLD: 29.5 SECONDS (ref 25.1–36.5)
AST SERPL-CCNC: 28 U/L (ref 14–36)
BACTERIA #/AREA URNS HPF: (no result) /[HPF]
BASE EXCESS BLDV CALC-SCNC: 0.5 MMOL/L (ref 0–2)
BASOPHILS # BLD AUTO: 0.01 K/MM3 (ref 0–2)
BASOPHILS NFR BLD: 0.3 % (ref 0–3)
BILIRUB UR-MCNC: NEGATIVE MG/DL
BUN SERPL-MCNC: 19 MG/DL (ref 7–21)
CALCIUM SERPL-MCNC: 8.9 MG/DL (ref 8.4–10.5)
COLOR UR: YELLOW
EOSINOPHIL # BLD: 0 10*3/UL (ref 0–0.7)
EOSINOPHIL NFR BLD: 0.6 % (ref 1.5–5)
ERYTHROCYTE [DISTWIDTH] IN BLOOD BY AUTOMATED COUNT: 13.2 % (ref 11.5–14.5)
GFR NON-AFRICAN AMERICAN: > 60
GLUCOSE UR STRIP-MCNC: NEGATIVE MG/DL
GRANULOCYTES # BLD: 1.43 10*3/UL (ref 1.4–6.5)
GRANULOCYTES NFR BLD: 44.9 % (ref 50–68)
HGB BLD-MCNC: 10.3 G/DL (ref 12–16)
INR PPP: 1.1 (ref 0.93–1.08)
IRON SERPL-MCNC: 35 UG/DL (ref 45–180)
LEUKOCYTE ESTERASE UR-ACNC: (no result) LEU/UL
LYMPHOCYTES # BLD: 1.4 10*3/UL (ref 1.2–3.4)
LYMPHOCYTES NFR BLD AUTO: 45.1 % (ref 22–35)
MCH RBC QN AUTO: 30.7 PG (ref 25–35)
MCHC RBC AUTO-ENTMCNC: 33.3 G/DL (ref 31–37)
MCV RBC AUTO: 92.2 FL (ref 80–105)
MONOCYTES # BLD AUTO: 0.3 10*3/UL (ref 0.1–0.6)
MONOCYTES NFR BLD: 9.1 % (ref 1–6)
PH BLDV: 7.38 [PH] (ref 7.32–7.43)
PH UR STRIP: 6 [PH] (ref 4.7–8)
PLATELET # BLD: 219 10^3/UL (ref 120–450)
PMV BLD AUTO: 10 FL (ref 7–11)
PROT UR STRIP-MCNC: NEGATIVE MG/DL
PROTHROMBIN TIME: 12.6 SECONDS (ref 9.4–12.5)
RBC # BLD AUTO: 3.35 10^6/UL (ref 3.5–6.1)
RBC # UR STRIP: NEGATIVE /UL
RBC #/AREA URNS HPF: NEGATIVE /HPF (ref 0–2)
SALICYLATES SERPL-MCNC: < 1 MG/DL (ref 2–20)
SP GR UR STRIP: >= 1.03 (ref 1–1.03)
TIBC SERPL-MCNC: 214 UG/DL (ref 265–497)
UROBILINOGEN UR STRIP-ACNC: 1 E.U./DL
VENOUS BLOOD FIO2: 21 %
VENOUS BLOOD GAS PCO2: 44 (ref 40–60)
VENOUS BLOOD GAS PO2: 71 MM/HG (ref 30–55)
WBC # BLD AUTO: 3.2 10^3/UL (ref 4.5–11)

## 2018-07-18 RX ADMIN — FOLIC ACID SCH MLS/HR: 5 INJECTION, SOLUTION INTRAMUSCULAR; INTRAVENOUS; SUBCUTANEOUS at 21:57

## 2018-07-18 NOTE — ED PDOC
Arrival/HPI





- General


Historian: Patient





- History of Present Illness


Time/Duration: Prior to Arrival


Symptom Onset: Sudden


Activities at Onset: Light


Context: Home





<Gustavo Ardon - Last Filed: 07/18/18 21:38>





<Hoang Marin DO - Last Filed: 07/18/18 22:17>





- General


Time Seen by Provider: 07/18/18 13:46





- History of Present Illness


Narrative History of Present Illness (Text): 





07/18/18 14:10


This is a 58 year old female with PMH of multiple sclerosis, fibromyalgia, HT, 

diabetes, chronic anemia and bradycardia s/p AICD presenting to the ER after 

verbal altercation with her daughter. History is difficult to obtain from 

patient at this time due to patient responses. She states that she was arguing 

with adult daughter and her daughter called the ambulance. Patient denies 

physical harm to herself or daughter. Patient denies desire for self harm or 

harm to others. She denies CP, SOB, abdominal pain, fevers, chills, urinary 

complaints and chills. 


 (Gustavo Ardon)





Past Medical History





- Provider Review


Nursing Documentation Reviewed: Yes





- Past History


Past History: Non-Contributing





- Infectious Disease


Hx of Infectious Diseases: None





- Cardiac


Hx Hypertension: Yes





- Pulmonary


Hx Respiratory Disorders: No





- Neurological


Hx Neurological Disorder: Yes (MULTIPLE SCLEROSIS,FIBROMYALGIA)





- HEENT


Hx HEENT Disorder: No





- Renal


Hx Renal Disorder: No





- Endocrine/Metabolic


Hx Diabetes Mellitus Type 2: Yes





- Hematological/Oncological


Hx Blood Disorders: No





- Integumentary


Hx Dermatological Disorder: No





- Musculoskeletal/Rheumatological


Hx Back Pain: Yes


Hx Falls: Yes





- Gastrointestinal


Other/Comment: colitis





- Genitourinary/Gynecological


Hx Genitourinary Disorders: No





- Psychiatric


Hx Substance Use: No





- Surgical History


Hx Gastric Bypass Surgery: Yes


Other/Comment: pacemaker





- Anesthesia


Hx Anesthesia: No





<Gustavo Ardon - Last Filed: 07/18/18 21:38>





Family/Social History





- Physician Review


Nursing Documentation Reviewed: Yes


Family/Social History: Unknown Family HX


Smoking Status: Never Smoked


Hx Alcohol Use: No


Hx Substance Use: No


Hx Substance Use Treatment: No





<Gustavo Ardon - Last Filed: 07/18/18 21:38>





Allergies/Home Meds





<Gustavo Arodn - Last Filed: 07/18/18 21:38>





<Hoang Marin DO - Last Filed: 07/18/18 22:17>


Allergies/Adverse Reactions: 


Allergies





levofloxacin [From Levaquin] Allergy (Verified 12/09/17 14:45)


 RASH


iv dye Allergy (Uncoded 12/09/17 14:45)


 RASH








Home Medications: 


 Home Meds











 Medication  Instructions  Recorded  Confirmed


 


Aspirin [Ecotrin] 81 mg PO DAILY 12/09/17 07/18/18


 


amLODIPine [Norvasc] 10 mg PO DAILY 12/09/17 07/18/18


 


Acetaminophen/Butalbital/Caf 1 tab PO Q4H PRN 06/16/18 07/18/18





[Fioricet]   


 


Lidocaine 5% 1 g TOP DAILY 06/16/18 07/18/18














Review of Systems





- Physician Review


All systems were reviewed & negative as marked: Yes





- Review of Systems


Constitutional: Normal.  absent: Fevers


Eyes: Normal


ENT: Normal


Respiratory: Normal.  absent: SOB


Cardiovascular: Normal.  absent: Chest Pain


Gastrointestinal: Normal.  absent: Abdominal Pain


Genitourinary Female: Normal.  absent: Dysuria


Musculoskeletal: Normal


Skin: Normal


Neurological: Normal


Psychiatric: Anxiety





<Gustavo Ardon - Last Filed: 07/18/18 21:38>





Physical Exam


Vital Signs Reviewed: Yes


Temperature: Afebrile


Blood Pressure: Normal


Pulse: Regular


Respiratory Rate: Normal


Appearance: Positive for: Well-Appearing, Non-Toxic, Comfortable


Pain Distress: None


Mental Status: Positive for: Alert and Oriented X 3





- Systems Exam


Head: Present: Atraumatic, Normocephalic


Pupils: Present: PERRL


Extroacular Muscles: Present: EOMI


Conjunctiva: Present: Normal


Mouth: Present: Moist Mucous Membranes


Neck: Present: Normal Range of Motion


Respiratory/Chest: Present: Clear to Auscultation, Good Air Exchange.  No: 

Respiratory Distress, Accessory Muscle Use


Cardiovascular: Present: Regular Rate and Rhythm, Normal S1, S2.  No: Murmurs


Abdomen: No: Tenderness, Distention, Peritoneal Signs


Back: Present: Normal Inspection


Upper Extremity: Present: Normal Inspection.  No: Cyanosis, Edema


Lower Extremity: Present: Normal Inspection.  No: Edema


Neurological: Present: GCS=15, CN II-XII Intact, Speech Normal


Skin: Present: Warm, Dry, Normal Color.  No: Rashes


Psychiatric: Present: Alert, Anxious, Agitated.  No: Normal Insight, Suicidal 

Ideation, Homicidal Ideation





<Gustavo Ardon - Last Filed: 07/18/18 21:38>


Vital Signs











  Temp Pulse Resp BP Pulse Ox


 


 07/18/18 18:44  98.7 F  89  18  126/76  99


 


 07/18/18 14:39  98.9 F  96 H  16  117/44 L  100














Medical Decision Making





<Gustavo Ardon - Last Filed: 07/18/18 21:38>





- Lab Interpretations


I have reviewed the lab results: Yes





- RAD Interpretation


: Radiologist





- EKG Interpretation


Interpreted by ED Physician: Yes


Type: 12 lead EKG





<Sarathheididavid DOHoang - Last Filed: 07/18/18 22:17>


ED Course and Treatment: 





07/18/18 14:17


Impression: This is a 58 year old female with PMH of multiple sclerosis, 

fibromyalgia, HT, diabetes, chronic anemia and bradycardia s/p AICD presenting 

to the ER after verbal altercation with her daughter.





Differential not limited to: Anxiety vs depression vs psychosis





Plan:


Blood work pending. Will check U/A and drug screen. 





Progress: 





07/18/18 14:20


Patient is agitated and confrontational, given 2mg ativan. 





07/18/18 16:37


Head CT: no acute intracranial findings. 





07/18/18 21:07


Spoke with daughter who states patient was recently told by physician to stop 

taking benzodiazepine. Daughter is unsure of stop date, physician name or name 

of benzodiazepine. 


 (Gustavo Ardon)


07/18/18 


Patient Seen With Resident:


In agreement with resident note which contains more details about the patient. 

Patient was seen and evaluated with resident. Came up with plan and treatment 

together. 





07/18/18 14:37


Chest X-ray:


Creator : Jennifer Lorenzo MD


IMPRESSION:


No active pulmonary disease.





07/18/18 15:43


Head CT without contrast:


Creator : Rome Le MD


IMPRESSION:


No acute intracranial findings





07/18/18 17:17


EKG shows NSR at 93 BPM with normal axis and intervals. Interpreted by me. 





07/18/18 17:18


Patient has a UTI and can be treated with PO antibiotics. Patient cleared for 

psychiatric evaluation





07/18/18 19:52


Discussed case with . (Hoang Marin DO)





- Lab Interpretations


Lab Results: 








 07/18/18 14:25 





 07/18/18 14:25 





 Lab Results





07/18/18 18:26: pO2 71 H, VBG pH 7.38, VBG pCO2 44.0, VBG HCO3 26.0, VBG Total 

CO2 27.4, VBG O2 Sat (Calc) 92.2 H, VBG Base Excess 0.5, VBG Potassium 3.8, 

Glucose 81, Lactate 0.6 L, FiO2 21.0, Sodium 136.0, Chloride 107.0, Venous 

Blood Potassium 3.8


07/18/18 16:13: Urine Opiates Screen Negative, Urine Methadone Screen Negative, 

Ur Barbiturates Screen Positive H, Ur Phencyclidine Scrn Negative, Ur 

Amphetamines Screen Negative, U Benzodiazepines Scrn Negative, U Oth Cocaine 

Metabols Negative, U Cannabinoids Screen Negative


07/18/18 16:13: Urine Color Yellow, Urine Appearance Sl cloudy, Urine pH 6.0, 

Ur Specific Gravity >= 1.030, Urine Protein Negative, Urine Glucose (UA) 

Negative, Urine Ketones Negative, Urine Blood Negative, Urine Nitrate Positive H

, Urine Bilirubin Negative, Urine Urobilinogen 1.0 H, Ur Leukocyte Esterase 

Small H, Urine RBC Negative, Urine WBC 10 - 15, Ur Epithelial Cells 6 - 8, 

Urine Bacteria Many


07/18/18 15:09: Blood Type A POSITIVE, Antibody Screen Negative, BBK History 

Checked No verified bt


07/18/18 14:30: Iron 35 L, TIBC Pending, % Saturation Pending


07/18/18 14:25: PT 12.6 H, INR 1.10 H, APTT 29.5


07/18/18 14:25: Alcohol, Quantitative < 10


07/18/18 14:25: Salicylates < 1 L, Acetaminophen < 10.0 L


07/18/18 14:25: Sodium 137, Potassium 3.7, Chloride 103, Carbon Dioxide 23, 

Anion Gap 15, BUN 19, Creatinine 0.8, Est GFR (African Amer) > 60, Est GFR (Non-

Af Amer) > 60, Random Glucose 105, Calcium 8.9, Total Bilirubin 0.6, AST 28, 

ALT 24, Alkaline Phosphatase 129 H, Total Protein 7.1, Albumin 3.7, Globulin 3.4

, Albumin/Globulin Ratio 1.1


07/18/18 14:25: WBC 3.2 L D, RBC 3.35 L, Hgb 10.3 L, Hct 30.9 L, MCV 92.2, MCH 

30.7, MCHC 33.3, RDW 13.2, Plt Count 219, MPV 10.0, Gran % 44.9 L, Lymph % (Auto

) 45.1 H, Mono % (Auto) 9.1 H, Eos % (Auto) 0.6 L, Baso % (Auto) 0.3, Gran # 

1.43, Lymph # (Auto) 1.4, Mono # (Auto) 0.3, Eos # (Auto) 0.0, Baso # (Auto) 

0.01











- RAD Interpretation


Radiology Orders: 








07/18/18 14:07


CHEST PORTABLE [RAD] Stat 





07/18/18 14:30


HEAD W/O CONTRAST [CT] Stat 














- Medication Orders


Current Medication Orders: 








Sodium Chloride (Sodium Chloride 0.9%)  1,000 mls @ 250 mls/hr IV .Q4H ONE


   Stop: 07/18/18 23:43


   Last Admin: 07/18/18 21:02  Dose: 250 mls/hr





eMAR Start Stop


 Document     07/18/18 21:02  JOL  (Rec: 07/18/18 21:03  JOL  [id number])


     Intravenous Solution


      Start Date                                 07/18/18


      Start Time                                 21:02





Folic Acid 1 mg/ Thiamine HCl 100 mg/ Multivitamins/Vitamin C 10 ml/ Dextrose  1

,011.2 mls @ 150 mls/hr IV .Q6H45M [facility]


   Last Admin: 07/18/18 21:57  Dose: 150 mls/hr





eMAR Start Stop


 Document     07/18/18 21:57  JOL  (Rec: 07/18/18 21:57  JOL  [id number])


     Intravenous Solution


      Start Date                                 07/18/18


      Start Time                                 21:57





Ceftriaxone Sodium (Rocephin 1 Gram Ivpb)  1 gm in 100 mls @ 100 mls/hr IVPB 

DAILY JEFF


   PRN Reason: Protocol


Lorazepam (Ativan)  2 mg IVP Q4H PRN; Protocol


   PRN Reason: Agitation





Discontinued Medications





Clonazepam (Klonopin)  1 mg PO STAT STA


   PRN Reason: Protocol


   Stop: 07/18/18 20:07


   Last Admin: 07/18/18 21:02  Dose: 1 mg





Ceftriaxone Sodium (Rocephin 1 Gram Ivpb)  1 gm in 100 mls @ 100 mls/hr IVPB 

STAT STA


   PRN Reason: Protocol


   Stop: 07/18/18 19:21


   Last Admin: 07/18/18 19:00  Dose: 100 mls/hr





eMAR Start Stop


 Document     07/18/18 19:00  MS  (Rec: 07/18/18 19:00  MS  [id number])


     Intravenous Solution


      Start Date                                 07/18/18


      Start Time                                 19:00


      End Date                                   07/18/18


      End time                                   20:00


      Total Infusion Time                        60





Lorazepam (Ativan)  2 mg IM STAT STA


   PRN Reason: Protocol


   Stop: 07/18/18 14:26


   Last Admin: 07/18/18 14:54  Dose: 2 mg





IM Administration Charges


 Document     07/18/18 14:54  MS  (Rec: 07/18/18 17:55  MS  [id number])


     Injection Site


      MAR Injection Site                         Left Deltoid


     Charges for Administration


      # of IM Administrations                    1





Lorazepam (Ativan)  2 mg IVP STAT STA


   PRN Reason: Protocol


   Stop: 07/18/18 14:52


   Last Admin: 07/18/18 15:30  Dose: 2 mg





IVP Administration


 Document     07/18/18 15:30  MS  (Rec: 07/18/18 18:51  MS  [id number])


     Charges for Administration


      # of IVP Administrations                   1





Thiamine HCl (Vitamin B1 Tab)  100 mg PO STAT STA


   Stop: 07/18/18 21:32











- PA / NP / Resident Statement


JEANNETTE has reviewed & agrees with the documentation as recorded.


JEANNETTE has examined the patient and agrees with the treatment plan.





<Gustavo Ardon - Last Filed: 07/18/18 21:38>





Disposition/Present on Arrival





- Present on Arrival


Any Indicators Present on Arrival: Yes


History of DVT/PE: No


History of Uncontrolled Diabetes: Yes


Urinary Catheter: No


History Surgical Site Infection Following: None





- Disposition


Have Diagnosis and Disposition been Completed?: Yes


Disposition Time: 22:00


Patient Plan: Discharge





<Gustavo Ardon - Last Filed: 07/18/18 21:38>





- Present on Arrival


Any Indicators Present on Arrival: No





- Disposition


Disposition Time: 18:30





<Hoang Marin DO - Last Filed: 07/18/18 22:17>





- Disposition


Diagnosis: 


 UTI (urinary tract infection), Leukopenia





Disposition: HOSPITALIZED


Patient Problems: 


 Current Active Problems











Problem Status Onset


 


UTI (urinary tract infection) Acute  


 


Leukopenia Chronic  











Condition: STABLE

## 2018-07-18 NOTE — RAD
Date of service: 



07/18/2018



HISTORY:

psych eval  



COMPARISON:

06/16/2018. 



FINDINGS:

The right MediPort terminates at the cavoatrial junction. 



LUNGS:

The lungs are well inflated and clear.



PLEURA:

No significant pleural effusion identified, no pneumothorax apparent.



CARDIOVASCULAR:

The heart is normal in size.  There is stable position of left-sided 

pacemaker.



OSSEOUS STRUCTURES:

No significant abnormalities.



VISUALIZED UPPER ABDOMEN:

Normal.



OTHER FINDINGS:

None.



IMPRESSION:

No active pulmonary disease.

## 2018-07-18 NOTE — CP.PCM.HP
<Mane Capellan - Last Filed: 07/18/18 23:36>





History of Present Illness





- History of Present Illness


History of Present Illness: 


58F w/ a PMH significant for fibromyalgia, MS, depression, HTN, diabetes, 

chronic anemia and bradycardia s/p AICD [place] [facility] [place] via EMS 

for CC of AMS.





Pt was seen in ED however is a poor historian and requires frequent 

reorientation. Remainder of HPI was supplemented by daughter via telephone. 





Patient's daughter reported her mother had been altered since saturday during 

which she had an unwitnessed fall. She reported that today the patient was 

having hallucinations of someone entering the house, and subsequently EMS was 

called. Daughter reports that patient lives at home with her, and has recently 

moved from [place] to [place] 2/2 increasing episodes of falls. The daughter reported her 

mother had a previous similar episode ~6 years ago in which that patient went 

into a coma after an uti. Subsequently, the patient was diagnosed w/ MS 6-7 

years ago and has been seen previously here for flare up. Her previous flare up 

involved R UE/LE numbness. It has been previously noted that the patient does 

show lesions on her brain. Patient has a port on the R side of her chest for a 

hematologic condition, however it is unknown to the daughter which condition 

she is receiving treatment for. Daughter has reported that patient's PMD may 

have DC'd some of the patient's benzo/ hazel recently; unable to verify at this 

time. 





Unable to obtain ROS at this time. 





In ED pt given Ativan 2mg x2; Clonazepam, Rocephin, and NS@250. 


CT head: w/o acute intracranial findings


CXR: WNl


CBC/CMP: Leukopenia; Normocytic Anemia


UA: Nitrite+, WBC: 10-15; Bacteria many 


UTox: Barbituate 








Present on Admission





- Present on Admission


Any Indicators Present on Admission: No





Review of Systems





- Review of Systems


Systems not reviewed;Unavailable: Altered Mental Status





Past Patient History





- Infectious Disease


Hx of Infectious Diseases: None





- Past Social History


Smoking Status: Never Smoked





- CARDIAC


Hx Hypertension: Yes





- PULMONARY


Hx Respiratory Disorders: No





- NEUROLOGICAL


Hx Neurological Disorder: Yes (MULTIPLE SCLEROSIS,FIBROMYALGIA)





- HEENT


Hx HEENT Problems: No





- RENAL


Hx Chronic Kidney Disease: No





- ENDOCRINE/METABOLIC


Hx Diabetes Mellitus Type 2: Yes





- HEMATOLOGICAL/ONCOLOGICAL


Hx Blood Disorders: No





- INTEGUMENTARY


Hx Dermatological Problems: No





- MUSCULOSKELETAL/RHEUMATOLOGICAL


Hx Back Pain: Yes


Hx Falls: Yes





- GASTROINTESTINAL


Other/Comment: colitis





- GENITOURINARY/GYNECOLOGICAL


Hx Genitourinary Disorders: No





- PSYCHIATRIC


Hx Substance Use: No





- SURGICAL HISTORY


Hx Gastric Bypass Surgery: Yes


Other/Comment: pacemaker





- ANESTHESIA


Hx Anesthesia: No





Meds


Allergies/Adverse Reactions: 


 Allergies











Allergy/AdvReac Type Severity Reaction Status Date / Time


 


levofloxacin [From Levaquin] Allergy  RASH Verified 12/09/17 14:45


 


iv dye Allergy  RASH Uncoded 12/09/17 14:45














Physical Exam





- Constitutional


Appears: No Acute Distress, Agitated





- Head Exam


Head Exam: ATRAUMATIC, NORMOCEPHALIC





- Eye Exam


Eye Exam: EOMI, PERRL.  absent: Scleral icterus





- ENT Exam


ENT Exam: Mucous Membranes Moist





- Respiratory Exam


Respiratory Exam: Clear to Auscultation Bilateral, NORMAL BREATHING PATTERN.  

absent: Rales, Rhonchi, Wheezes





- Cardiovascular Exam


Cardiovascular Exam: +S1, +S2.  absent: REGULAR RHYTHM, RRR





- GI/Abdominal Exam


GI & Abdominal Exam: Normal Bowel Sounds, Soft, Tenderness (RLQ/ LLQ ).  absent

: Guarding





- Extremities Exam


Extremities exam: Positive for: pedal pulses present.  Negative for: tenderness





- Neurological Exam


Neurological exam: Alert


Additional comments: 


Lethargic








- Psychiatric Exam


Additional comments: 





Confused, Agitated 





- Skin


Skin Exam: Dry, Intact, Warm





Results





- Vital Signs


Recent Vital Signs: 





 Last Vital Signs











Temp  98.7 F   07/18/18 18:44


 


Pulse  89   07/18/18 18:44


 


Resp  18   07/18/18 18:44


 


BP  126/76   07/18/18 18:44


 


Pulse Ox  99   07/18/18 18:44














- Labs


Result Diagrams: 


 07/18/18 14:25





 07/18/18 14:25





Assessment & Plan





- Assessment and Plan (Free Text)


Assessment: 


58F w/ a PMH significant for fibromyalgia, MS, depression, HTN, diabetes, 

chronic anemia and bradycardia s/p AICD [place] [facility] [place] via EMS 

for CC of AMS.





AMS


Barbituate OD vs Benzo Withdrawal vs MS Exacerbation vs UTI 


Seizure precaution


Aspiration precaution


Fall precaution


Neurocheck Q4


Thiamine 100 x1 given 


Banana Bag 150mls/hr


Aspiration precaution 


Blood Cx


Urine Cx


Cont Rocpehin 1gm Q24


Neuro consulted 





Hx MS


Continue Neurochecks; Neurologic reassessment once patient mentation returns to 

baseline





Leukopenia


Appears to be chronic give previous admt WBC


HIV pending 





Hx Chronic Anemia


Continue to monitor h/h; pt. currently hemodynamically stable





Hx Diabetes


Prev A1c 4.8 12/2017


New A1C pending





Hx Chronic Anemia:


Normocytic in nature; 


Iron studies pending





Hx HTN :


Appears to be normotensive at this time; 


Restart home Norvasc when necessary 





Hx Bradycardia s/p pacemaker 


EKG w/ nonspecific ST/T wave abnormalities; no ST Eelevations 





DVT/GI PPX: 





Dispo: Admit to med surg for monitoring of AMS and evaluation of underlying 

etiology 





Patient seen, examined, and discussed w/ attending physician Dr. Baugh in ED. 


Mane Capellan DO PGY1 IM Intern 











- Date & Time


Date: 07/18/18


Time: 23:33





<Tima Baugh - Last Filed: 07/19/18 00:05>





Results





- Vital Signs


Recent Vital Signs: 





 Last Vital Signs











Temp  98.4 F   07/18/18 22:30


 


Pulse  72   07/18/18 22:30


 


Resp  20   07/18/18 22:30


 


BP  108/66   07/18/18 22:30


 


Pulse Ox  94 L  07/18/18 22:30














- Labs


Result Diagrams: 


 07/18/18 14:25





 07/18/18 14:25

## 2018-07-18 NOTE — CT
Date of service: 



07/18/2018



PROCEDURE:  CT HEAD WITHOUT CONTRAST.



HISTORY:

? fall



COMPARISON:

06/17/2018 



TECHNIQUE:

Axial computed tomography images were obtained through the head/brain 

without intravenous contrast.  



Radiation dose:



Total exam DLP = 857 mGy-cm.



This CT exam was performed using one or more of the following dose 

reduction techniques: Automated exposure control, adjustment of the 

mA and/or kV according to patient size, and/or use of iterative 

reconstruction technique.



FINDINGS:



HEMORRHAGE:

No intracranial hemorrhage. 



BRAIN:

No mass effect or edema.  Chronic microvascular changes are seen in 

the periventricular white matter. There is an old lacunar infarct in 

the right thalamus.



VENTRICLES:

Unremarkable. No hydrocephalus. 



CALVARIUM:

Unremarkable.



PARANASAL SINUSES:

There is partial opacification of the right maxillary sinus



MASTOID AIR CELLS:

Unremarkable as visualized. No inflammatory changes.



OTHER FINDINGS:

None.



IMPRESSION:

No acute intracranial findings

## 2018-07-18 NOTE — CARD
--------------- APPROVED REPORT --------------





Date of service: 07/18/2018



EKG Measurement

Heart [id number]

TN [id number]

[id number]

[id number]

[id number]



<Conclusion>

Sinus rhythm with marked sinus arrhythmia

Nonspecific ST and T wave abnormality

Abnormal ECG

## 2018-07-19 LAB
HDLC SERPL-MCNC: 48 MG/DL (ref 29–60)
LDLC SERPL-MCNC: 48 MG/DL (ref 0–129)

## 2018-07-19 RX ADMIN — CEFTRIAXONE SCH MLS/HR: 1 INJECTION, SOLUTION INTRAVENOUS at 10:03

## 2018-07-19 RX ADMIN — THERA TABS SCH TAB: TAB at 10:03

## 2018-07-19 RX ADMIN — FOLIC ACID SCH MLS/HR: 5 INJECTION, SOLUTION INTRAMUSCULAR; INTRAVENOUS; SUBCUTANEOUS at 06:05

## 2018-07-19 RX ADMIN — INSULIN HUMAN SCH: 100 INJECTION, SOLUTION PARENTERAL at 08:41

## 2018-07-19 RX ADMIN — INSULIN HUMAN SCH: 100 INJECTION, SOLUTION PARENTERAL at 12:00

## 2018-07-19 RX ADMIN — INSULIN HUMAN SCH: 100 INJECTION, SOLUTION PARENTERAL at 22:29

## 2018-07-19 RX ADMIN — INSULIN HUMAN SCH: 100 INJECTION, SOLUTION PARENTERAL at 16:30

## 2018-07-19 NOTE — CP.PCM.PN
<Martin Vela - Last Filed: 07/19/18 17:48>





Subjective





- Date & Time of Evaluation


Date of Evaluation: 07/19/18


Time of Evaluation: 07:30





- Subjective


Subjective: 


Martin Vela, PGY-1 Progress Note for Hospitalist Service





Patient was seen and examined at bedside today and seemed agitated.Patient 

admitted to neck pain, headaches, and dizziness.Patient had difficulty staying 

focused and concentrate during interview. Patient admitted to have visual 

hallucinations while staying with her daughter. Patient complained of pain and 

numbness in her LE bilaterally but admitted this to be a chronic condition. 








Objective





- Vital Signs/Intake and Output


Vital Signs (last 24 hours): 


 











Temp Pulse Resp BP Pulse Ox


 


 99.1 F   75   16   133/85   96 


 


 07/19/18 14:00  07/19/18 14:00  07/19/18 14:00  07/19/18 14:00  07/19/18 14:00








Intake and Output: 


 











 07/19/18 07/19/18





 06:59 18:59


 


Intake Total 120 


 


Balance 120 














- Medications


Medications: 


 Current Medications





Aspirin (Ecotrin)  81 mg PO DAILY [facility]


Folic Acid (Folic Acid)  1 mg PO DAILY [facility]


   Last Admin: 07/19/18 10:03 Dose:  1 mg


Ceftriaxone Sodium (Rocephin 1 Gram Ivpb)  1 gm in 100 mls @ 100 mls/hr IVPB 

DAILY [facility]


   PRN Reason: Protocol


   Last Admin: 07/19/18 10:03 Dose:  100 mls/hr


Insulin Human Regular (Humulin R Low)  0 units SC ACHS [facility]


   PRN Reason: Protocol


   Last Admin: 07/19/18 12:00 Dose:  Not Given


Lorazepam (Ativan)  2 mg IVP Q4H PRN; Protocol


   PRN Reason: Agitation


   Last Admin: 07/19/18 12:30 Dose:  2 mg


Multivitamins (Thera Tab)  1 tab PO DAILY [facility]


   Last Admin: 07/19/18 10:03 Dose:  1 tab


Pregabalin (Lyrica)  100 mg PO TID [facility]


Thiamine HCl (Vitamin B1 Tab)  100 mg PO DAILY [facility]


   Last Admin: 07/19/18 10:03 Dose:  100 mg


Venlafaxine HCl (Effexor)  37.5 mg PO DAILY [facility]


   Last Admin: 07/19/18 11:49 Dose:  37.5 mg


Zaleplon (Sonata)  5 mg PO HS PRN


   PRN Reason: Insomnia











- Labs


Labs: 


 











PT  12.6 SECONDS (9.4-12.5)  H  07/18/18  14:25    


 


INR  1.10  (0.93-1.08)  H  07/18/18  14:25    


 


APTT  29.5 Seconds (25.1-36.5)   07/18/18  14:25    














- Constitutional


Appears: Well, No Acute Distress





- Head Exam


Head Exam: ATRAUMATIC, NORMAL INSPECTION, NORMOCEPHALIC





- Eye Exam


Eye Exam: EOMI, Normal appearance, PERRL


Pupil Exam: NORMAL ACCOMODATION, PERRL





- ENT Exam


ENT Exam: Mucous Membranes Moist, Normal Exam





- Neck Exam


Neck Exam: Normal Inspection





- Respiratory Exam


Respiratory Exam: Clear to Ausculation Bilateral, NORMAL BREATHING PATTERN





- Cardiovascular Exam


Cardiovascular Exam: RRR, +S1, +S2





- GI/Abdominal Exam


GI & Abdominal Exam: Soft, Normal Bowel Sounds.  absent: Tenderness





- Extremities Exam


Extremities Exam: Normal Inspection.  absent: Pedal Edema





- Neurological Exam


Neurological Exam: Alert, Awake





- Psychiatric Exam


Psychiatric exam: Anxious, Flat Affect





- Skin


Skin Exam: Dry, Intact, Normal Color, Warm





Assessment and Plan





- Assessment and Plan (Free Text)


Assessment: 


Ms. Gruber is a 58 year old female with past medical history of multiple 

sclerosis, fibromyalgia, anxiety, hypertension, type 2 diabetes, neutropenia, 

bradycardia s/p pacemaker placement presenting with chief complaint of 

increased frequency of falls and weakness.





AMS 2/2 seizure vs UTI vs MS


- CT head shows no intracranial hemorrhage, mass effect, or edema. Chronic 

microvascular changes in the periventricular white matter. Old lacunar infarct 

in right thalamus. Unable to obtain MRI as patient has pacemaker


- F/u EEG results


- Followup CTA head and neck for evaluation of posterior circulation 


- Fall precautions in place 


- PT consulted for further evaluation of gait- f/u recommendations


- dexamethasone 10 mg given in advance of CTA head and neck for evaluation of 

posterior circulation (patient is allergic to IV dye)


- f/u further Neuro recommendations


- f/u AM labs


- f/u blood cultures





UTI


- Urinalysis: Positive for nitrates and Leukocyte esterase. 


- Currently on rocephin 1gm for UTI


- f/u urine cx





Fibromyalgia/anxiety


- continued on home med of Venlafaxine and pregabalin


- began sonata 5 mg and clonazepam for help sleeping


- f/u Psych evaluation and recommendations regarding her orientation and 

hallucinations


- Neurochecks in place





Multiple Sclerosis


- f/u for continuous care as an outpatient





Disposition


Patient currently living with daughter locally, but patient plans on moving 

back to [place] and live with her other daughter there. F/u social work 

recommendations





Patient seen, case reviewed, and plan agreed upon with Dr. THA Saha.


Martin Vela, PGY-1











<Natanael Saha - Last Filed: 07/19/18 18:24>





Objective





- Vital Signs/Intake and Output


Vital Signs (last 24 hours): 


 











Temp Pulse Resp BP Pulse Ox


 


 99.1 F   75   16   133/85   96 


 


 07/19/18 14:00  07/19/18 14:00  07/19/18 14:00  07/19/18 14:00  07/19/18 14:00








Intake and Output: 


 











 07/19/18 07/19/18





 06:59 18:59


 


Intake Total 120 


 


Balance 120 














- Medications


Medications: 


 Current Medications





Aspirin (Ecotrin)  81 mg PO DAILY [facility]


Clonazepam (Klonopin)  1 mg PO BID PRN; Protocol


   PRN Reason: Anxiety


   Last Admin: 07/19/18 17:18 Dose:  1 mg


Folic Acid (Folic Acid)  1 mg PO DAILY [facility]


   Last Admin: 07/19/18 10:03 Dose:  1 mg


Ceftriaxone Sodium (Rocephin 1 Gram Ivpb)  1 gm in 100 mls @ 100 mls/hr IVPB 

DAILY [facility]


   PRN Reason: Protocol


   Last Admin: 07/19/18 10:03 Dose:  100 mls/hr


Insulin Human Regular (Humulin R Low)  0 units SC ACHS [facility]


   PRN Reason: Protocol


   Last Admin: 07/19/18 12:00 Dose:  Not Given


Lorazepam (Ativan)  2 mg IVP Q4H PRN; Protocol


   PRN Reason: Agitation


   Last Admin: 07/19/18 12:30 Dose:  2 mg


Multivitamins (Thera Tab)  1 tab PO DAILY [facility]


   Last Admin: 07/19/18 10:03 Dose:  1 tab


Pregabalin (Lyrica)  100 mg PO TID [facility]


   Last Admin: 07/19/18 17:18 Dose:  100 mg


Thiamine HCl (Vitamin B1 Tab)  100 mg PO DAILY [facility]


   Last Admin: 07/19/18 10:03 Dose:  100 mg


Venlafaxine HCl (Effexor)  37.5 mg PO DAILY [facility]


   Last Admin: 07/19/18 11:49 Dose:  37.5 mg


Zaleplon (Sonata)  5 mg PO HS PRN


   PRN Reason: Insomnia











- Labs


Labs: 


 











PT  12.6 SECONDS (9.4-12.5)  H  07/18/18  14:25    


 


INR  1.10  (0.93-1.08)  H  07/18/18  14:25    


 


APTT  29.5 Seconds (25.1-36.5)   07/18/18  14:25    














Attending/Attestation





- Attestation


I have personally seen and examined this patient.: Yes


I have fully participated in the care of the patient.: Yes


I have reviewed all pertinent clinical information, including history, physical 

exam and plan: Yes


Notes (Text): 





07/19/18 18:19


58 year old female with past medical history of anxiety, hypertension, 

fibromyalgia, multiple sclerosis, bradycardia s/p PPM and diabetes who 

presented with complaint of altered mental status, hallucinations and weakness/

falls.  CT head was negative for acute findings.  UA suggestive of possible 

UTI.  She is on antibiotics.  UCx is pending.  Neurology and psychiatry 

evaluations were requested.  PT evaluation was requested as well.





Natanael Saha MD


Hospitalist.

## 2018-07-19 NOTE — CP.PCM.CON
History of Present Illness





- History of Present Illness


History of Present Illness: 





Vivi Apodaca PGY-1, Family Medicine Resident, Neurology Consult Note





Patient is a 58 year old female with past medical history multiple sclerosis, 

fibromyalgia, anxiety, hypertension, type 2 diabetes, neutropenia, bradycardia s

/p pacemaker placement presenting with chief complaint of weakness and 

increased frequency of falls. She states that she has had more than 3 falls in 

the past 4 months. She admits to loss of consciousness in the most recent 

episode which prompted her arrival at the ED. After regaining consciousness, 

she felt tired and had trouble standing up. She also describes foaming at the 

mouth. Recently she has been experiencing increased weakness, particularly in 

her lower extremities bilaterally, as well as tremors. She is also having 

increased difficulty carrying out her baseline daily activities. Of note, [place] 

[place] [facility] [place] flare and was 

treaterd with solu-medrol. She denies any recent changes to her medications. 

Admits to headache, dizziness. Denies changes in vision or hearing, chest pain, 

palpitations, shortness of breath, abdominal pain, dysuria, bowel or bladder 

incontinence. 





Past medical history: multiple sclerosis, fibromyalgia, anxiety, hypertension, 

type 2 diabetes, neutropenia, bradycardia s/p pacemaker 


Past surgical: pacemaker (2015), gastric bypass


Social: Lives at home with daughter and grandchildren. Denies alcohol or 

recreational drug use. Former smoker 1 PPD for 20 years. Quit approximately 25 

years ago. 


Family history: Mother (, osteoporosis), Father (, lung cancer)


Home medications: Losartan, norvasc, HCTZ, clonazepam, percocet, gabapentin, 

copaxone 





12 point ROS was benign except as stated above. 














Past Patient History





- Infectious Disease


Hx of Infectious Diseases: None





- Past Social History


Smoking Status: Former Smoker


Alcohol: None


Drugs: Denies


Home Situation {Lives}: With Family





- CARDIAC


Hx Hypertension: Yes





- PULMONARY


Hx Respiratory Disorders: No





- NEUROLOGICAL


Hx Neurological Disorder: Yes (MULTIPLE SCLEROSIS,FIBROMYALGIA)





- HEENT


Hx HEENT Problems: No





- RENAL


Hx Chronic Kidney Disease: No





- ENDOCRINE/METABOLIC


Hx Diabetes Mellitus Type 2: Yes





- HEMATOLOGICAL/ONCOLOGICAL


Hx Blood Disorders: No





- INTEGUMENTARY


Hx Dermatological Problems: No





- MUSCULOSKELETAL/RHEUMATOLOGICAL


Hx Back Pain: Yes


Hx Falls: Yes





- GASTROINTESTINAL


Other/Comment: colitis





- GENITOURINARY/GYNECOLOGICAL


Hx Genitourinary Disorders: No





- PSYCHIATRIC


Hx Psychophysiologic Disorder: Yes


Hx Anxiety: Yes


Hx Bipolar Disorder: Yes


Hx Depression: Yes


Hx Substance Use: No





- SURGICAL HISTORY


Hx Gastric Bypass Surgery: Yes


Other/Comment: pacemaker





- ANESTHESIA


Hx Anesthesia: No





Meds


Allergies/Adverse Reactions: 


 Allergies











Allergy/AdvReac Type Severity Reaction Status Date / Time


 


levofloxacin [From Levaquin] Allergy  RASH Verified 17 14:45


 


iv dye Allergy  RASH Uncoded 17 14:45














- Medications


Medications: 


 Current Medications





Folic Acid 1 mg/ Thiamine HCl 100 mg/ Multivitamins/Vitamin C 10 ml/ Dextrose  1

,011.2 mls @ 150 mls/hr IV .Q6H45M [facility]


   Last Admin: 18 06:05 Dose:  150 mls/hr


Ceftriaxone Sodium (Rocephin 1 Gram Ivpb)  1 gm in 100 mls @ 100 mls/hr IVPB 

DAILY [facility]


   PRN Reason: Protocol


Insulin Human Regular (Humulin R Low)  0 units SC ACHS [facility]


   PRN Reason: Protocol


Lorazepam (Ativan)  2 mg IVP Q4H PRN; Protocol


   PRN Reason: Agitation











Physical Exam





- Constitutional


Appears: Non-toxic, No Acute Distress





- Head Exam


Head Exam: ATRAUMATIC, NORMOCEPHALIC





- Eye Exam


Eye Exam: EOMI, Normal appearance, PERRL





- ENT Exam


ENT Exam: Mucous Membranes Moist, Normal External Ear Exam





- Neck Exam


Neck exam: Positive for: Full Rom, Normal Inspection





- Respiratory Exam


Respiratory Exam: Clear to Auscultation Bilateral, NORMAL BREATHING PATTERN





- Cardiovascular Exam


Cardiovascular Exam: REGULAR RHYTHM, +S1, +S2





- GI/Abdominal Exam


GI & Abdominal Exam: Normal Bowel Sounds, Soft.  absent: Distended, Tenderness





- Extremities Exam


Extremities exam: Positive for: normal inspection





- Back Exam


Back exam: NORMAL INSPECTION





- Neurological Exam


Neurological exam: Alert, CN II-XII Intact, Motor Sensory Deficit, Oriented x3


Additional comments: 





Unsteady gait.





- Expanded Neurological Exam


  ** Expanded


Patient oriented to: person, place, time


Cranial nerves: EOM's Intact: Normal, Tongue Deviation: Normal


Ataxia: No


Cerebellar Function: Finger to Nose: Normal, Heel to Shin: Normal


Upper motor neuron: Pronator Drift: Normal


Sensory exam: Lower Extremity Light Touch: Abnormal Left, Abnormal Right (

Numbness in feet bilaterally)


Neuro motor strength exam: Left Upper Extremity: 5, Right Upper Extremity: 5, 

Left Lower Extremity: 5, Right Lower Extremity: 5


Coma Scale Eye Opening: SPONTANEOUS


Coma Scale Motor Response: OBEYS COMMANDS


Coma Scale Verbal: Oriented


Coma Scale Total: 15





- Psychiatric Exam


Psychiatric exam: Normal Affect, Normal Mood





- Skin


Skin Exam: Dry, Intact, Normal Color





Results





- Vital Signs


Recent Vital Signs: 


 Last Vital Signs











Temp  98.4 F   18 02:46


 


Pulse  72   18 02:46


 


Resp  20   18 02:46


 


BP  108/66   18 02:46


 


Pulse Ox  94 L  18 22:30














- Labs


Result Diagrams: 


 18 14:25





 18 14:25





Assessment & Plan





- Assessment and Plan (Free Text)


Assessment: 





Patient is a 58 year old female with past medical history of multiple sclerosis

, fibromyalgia, anxiety, hypertension, type 2 diabetes, neutropenia, 

bradycardia s/p pacemaker placement presenting with chief complaint of weakness 

and increased frequency of falls and was found to have no acute intracranial 

findings on head CT.


Plan: 





Syncope 


- Possibly secondary to seizure


- CT head shows no intracranial hemorrhage, mass effect, or edema. Chronic 

microvascular changes in the periventricular white matter. Old lacunar infarct 

in right thalamus. 


- Unable to obtain MRI as patient has pacemaker


- Currently on rocephin for UTI


- Neurochecks 


- Fall precaution


- PT/OT consulted


- Followup EEG to rule out seizures


- Followup CTA head and neck for evaluation of posterior circulation 


- Further recommendations per Dr. Walls





Case discussed and plan approved by attending physician Dr. Titi Apodaca PGY 1

## 2018-07-19 NOTE — CON
DATE:  07/19/2018



HISTORY OF PRESENT ILLNESS:  Shortly, the patient is a 58-year-old 

female, recently moved from [place], currently lives with her daughter and

5 of her grand kids in [place].  The patient was admitted on the medical

site status post argument with her daughter.  The patient was found to be

confused and possible urinary tract infection.  The patient was seen and

examined today.  The patient presented to be confused.  The patient had

difficulty to stay focused and concentrate during the interview, obviously

in delirium stage, but not psychotic.  The patient reported that she had

argument with her daughter and she fell, she hit her head.  She does not

remember how she ended up in the hospital.  The patient reported that she

ran off her psychotropic medications.  She was on Effexor and Klonopin in

the past.  The patient reported that she was feeling more depressed, but

adamantly denied thoughts of harming herself or others.  The patient

reported that she has future-oriented plans to move back to [place].  The

patient reported that she wants to get better.  She wants to be medically

well and leave [place].  The patient denied hearing voices, denied seeing

things.  Denied paranoid ideation.  The patient reported that she did not

follow up with outpatient provider after previous admission to the medical

site and this writer provided the patient with information about local

psychiatrists and outpatient clinics, but the patient said that her

daughter was not taking her for appointments and that is why she ran short

on the medication for the past month or so.  The patient reported also that

she is self medicating herself with the Benadryl, which could contribute to

the patient's delirium and change in mental status.  This writer reviewed

the vital signs.  Vital signs seems to be stable, temp 98.2, pulse of 71,

blood pressure 140/87, respirations 20, oxygen saturation is 100. 

Medications reviewed.  The patient is on Rocephin, folic acid, insulin,

Ativan 2 mg IV push every 4 hours p.r.n. for agitation, multivitamins,

Effexor and Sonata were started.  Labs showed no leukocytosis.  Hemoglobin

and hematocrit 10.3 and 30.9.  Coagulation reviewed.  Blood gases reviewed.

Chemistry reviewed.  AST and ALT within normal limits.  Iron is low.  Total

iron-binding capacity 214 and saturation is 16.  Urinalysis showed

leukocyte esterase and nitrite positive.  Toxicology positive for

barbiturates and alcohol was less than 10.  This writer is very familiar

with this patient from the previous consultation services here in [place]. 

The patient was not suicidal back then.  Psychiatric history significant

for history of depression, history of psychiatric admission, it was more

than 3 years back.



MENTAL STATUS EXAMINATION:  The patient appears to be alert, somewhat

confused.  The patient was off with the date.  Intermittent eye contact. 

Speech was overproductive, but slow.  Thought process, the patient had

difficulty to stay focused and concentrate and some circumstantiality, but

it is related to the fact that the patient has difficulty to stay focused

and concentrate.  The patient reported being depressed.  Thought content,

the patient denied thoughts of harming herself or others.  The patient

denied hearing voices, denied seeing things.  Insight and judgment seems to

be fair.  Impulses are well controlled.



IMPRESSION:  As per history, the patient has depression and anxiety, rule

out major depressive disorder, rule out adjustment disorder.  At present

moment, the patient seems to be in delirium stage.



PLAN:  Effexor will be resumed 37.5.  Sonata will be given for insomnia. 

The patient complained that she was not able to sleep for the past week or

so.  The patient is on antibiotics for urinary tract infection.  The

patient has future oriented plans.  This writer will follow up on this

patient just to make sure that the patient tolerates medications well as

well as delirium is improving.



Thank you very much for letting me participate in the care of your patient.

Should you have any questions, give me a call back.





__________________________________________

Tamia Guerra MD





DD:  07/19/2018 11:44:24

DT:  07/19/2018 11:49:43

Job # [id number]

## 2018-07-20 ENCOUNTER — HOSPITAL ENCOUNTER (INPATIENT)
Dept: HOSPITAL 42 - PSYC | Age: 58
LOS: 2 days | Discharge: ANOTHER HOSPITAL | DRG: 885 | End: 2018-07-22
Attending: PSYCHIATRY & NEUROLOGY | Admitting: PSYCHIATRY & NEUROLOGY
Payer: MEDICARE

## 2018-07-20 VITALS — RESPIRATION RATE: 20 BRPM

## 2018-07-20 VITALS
HEART RATE: 67 BPM | DIASTOLIC BLOOD PRESSURE: 68 MMHG | SYSTOLIC BLOOD PRESSURE: 114 MMHG | OXYGEN SATURATION: 100 % | TEMPERATURE: 98.6 F

## 2018-07-20 VITALS — BODY MASS INDEX: 36.3 KG/M2

## 2018-07-20 DIAGNOSIS — Z16.12: ICD-10-CM

## 2018-07-20 DIAGNOSIS — D64.9: ICD-10-CM

## 2018-07-20 DIAGNOSIS — E11.9: ICD-10-CM

## 2018-07-20 DIAGNOSIS — Z98.84: ICD-10-CM

## 2018-07-20 DIAGNOSIS — Z87.891: ICD-10-CM

## 2018-07-20 DIAGNOSIS — Z95.810: ICD-10-CM

## 2018-07-20 DIAGNOSIS — M79.7: ICD-10-CM

## 2018-07-20 DIAGNOSIS — E66.9: ICD-10-CM

## 2018-07-20 DIAGNOSIS — N30.90: ICD-10-CM

## 2018-07-20 DIAGNOSIS — Z79.4: ICD-10-CM

## 2018-07-20 DIAGNOSIS — F41.0: ICD-10-CM

## 2018-07-20 DIAGNOSIS — Z91.14: ICD-10-CM

## 2018-07-20 DIAGNOSIS — B96.20: ICD-10-CM

## 2018-07-20 DIAGNOSIS — Z88.3: ICD-10-CM

## 2018-07-20 DIAGNOSIS — I10: ICD-10-CM

## 2018-07-20 DIAGNOSIS — F32.2: Primary | ICD-10-CM

## 2018-07-20 DIAGNOSIS — G35: ICD-10-CM

## 2018-07-20 LAB
ALBUMIN SERPL-MCNC: 3.2 G/DL (ref 3–4.8)
ALBUMIN/GLOB SERPL: 1 {RATIO} (ref 1.1–1.8)
ALT SERPL-CCNC: 27 U/L (ref 7–56)
AST SERPL-CCNC: 24 U/L (ref 14–36)
BASOPHILS # BLD AUTO: 0 K/MM3 (ref 0–2)
BASOPHILS NFR BLD: 0 % (ref 0–3)
BUN SERPL-MCNC: 9 MG/DL (ref 7–21)
CALCIUM SERPL-MCNC: 8.8 MG/DL (ref 8.4–10.5)
EOSINOPHIL # BLD: 0 10*3/UL (ref 0–0.7)
EOSINOPHIL NFR BLD: 0 % (ref 1.5–5)
ERYTHROCYTE [DISTWIDTH] IN BLOOD BY AUTOMATED COUNT: 12.9 % (ref 11.5–14.5)
GFR NON-AFRICAN AMERICAN: > 60
GRANULOCYTES # BLD: 1.95 10*3/UL (ref 1.4–6.5)
GRANULOCYTES NFR BLD: 59.4 % (ref 50–68)
HGB BLD-MCNC: 9.7 G/DL (ref 12–16)
LYMPHOCYTES # BLD: 1.2 10*3/UL (ref 1.2–3.4)
LYMPHOCYTES NFR BLD AUTO: 35.1 % (ref 22–35)
MCH RBC QN AUTO: 30.2 PG (ref 25–35)
MCHC RBC AUTO-ENTMCNC: 32.8 G/DL (ref 31–37)
MCV RBC AUTO: 92.2 FL (ref 80–105)
MONOCYTES # BLD AUTO: 0.2 10*3/UL (ref 0.1–0.6)
MONOCYTES NFR BLD: 5.5 % (ref 1–6)
PLATELET # BLD: 223 10^3/UL (ref 120–450)
PMV BLD AUTO: 10.3 FL (ref 7–11)
RBC # BLD AUTO: 3.21 10^6/UL (ref 3.5–6.1)
WBC # BLD AUTO: 3.3 10^3/UL (ref 4.5–11)

## 2018-07-20 RX ADMIN — CEFTRIAXONE SCH MLS/HR: 1 INJECTION, SOLUTION INTRAVENOUS at 09:13

## 2018-07-20 RX ADMIN — INSULIN HUMAN SCH: 100 INJECTION, SOLUTION PARENTERAL at 08:27

## 2018-07-20 RX ADMIN — INSULIN HUMAN SCH: 100 INJECTION, SOLUTION PARENTERAL at 12:00

## 2018-07-20 RX ADMIN — THERA TABS SCH TAB: TAB at 09:14

## 2018-07-20 NOTE — CT
Date of service: 



07/20/2018



PROCEDURE:  CT Angiography of the neck with contrast



HISTORY:

r/o aneurysm, carotid stenosis



COMPARISON:

None available. 



TECHNIQUE:

Contiguous axial images of the neck were obtained from the level of 

the skull-base to the superior mediastinum in the arteriographic 

phase of enhancement. Coronal and sagittal reformats or also 

generated. 



IV contrast dose: 



Radiation Dose - DLP:  mGy-cm 



This CT exam was performed using one or more of the following dose 

reduction techniques: Automated exposure control, adjustment of the 

mA and/or kV according to patient size, and/or use of iterative 

reconstruction technique.



FINDINGS:



RIGHT CAROTID ARTERIES:

Calcified plaque in the internal carotid with no stenosis 



LEFT CAROTID ARTERIES:

Calcified plaque in the internal carotid with no stenosis 



VERTEBRAL ARTERIES:

Right Vertebral Artery: Normal.



Left Vertebral Artery: Normal.



OTHER FINDINGS:

None.



IMPRESSION:

Calcified plaque in the internal carotids with no stenosis 



PROCEDURE:  CT Angiography of the Brain.



HISTORY:

r/o aneurysm, carotid stenosis



COMPARISON:

None available. 



TECHNIQUE:

CT angiography of the intracranial arteries was performed. Coronal 

and sagittal maximum intensity projection reformated images were 

generated.



This CT exam was performed using one or more of the following dose 

reduction techniques: Automated exposure control, adjustment of the 

mA and/or kV according to patient size, and/or use of iterative 

reconstruction technique.



FINDINGS:



INTERNAL CEREBRAL ARTERIES:

Unremarkable. The skull base, petrous, cavernous and supraclinoid 

segments are bilaterally widely patent. 



ANTERIOR CEREBRAL ARTERIES:

Unremarkable. A1 and A2 segments are widely patent. Smaller distal 

branches unremarkable, as visualized.



MIDDLE CEREBRAL ARTERIES:

Unremarkable. M1 and M2 segments are widely patent. Perisylvian 

branches grossly symmetric.



POSTERIOR CIRCULATION:

Basilar Artery: Unremarkable.



Distal Vertebral Arteries: Unremarkable.



Posterior Cerebral Arteries: Unremarkable.



Posterior Inferior Cerebellar Arteries: Unremarkable.



ANEURYSM/ VASCULAR MALFORMATIONS:

None.



OTHER FINDINGS:

None. 



IMPRESSION:

Unremarkable CT Angiography of the Brain.

## 2018-07-20 NOTE — CP.PCM.CON
Past Patient History





- Infectious Disease


Hx of Infectious Diseases: None





- Past Social History


Smoking Status: Former Smoker


Alcohol: None


Drugs: Denies


Home Situation {Lives}: With Family





- CARDIAC


Hx Hypertension: Yes





- PULMONARY


Hx Respiratory Disorders: No





- NEUROLOGICAL


Hx Neurological Disorder: Yes (MULTIPLE SCLEROSIS,FIBROMYALGIA)





- HEENT


Hx HEENT Problems: No





- RENAL


Hx Chronic Kidney Disease: No





- ENDOCRINE/METABOLIC


Hx Diabetes Mellitus Type 2: Yes





- HEMATOLOGICAL/ONCOLOGICAL


Hx Blood Disorders: No





- INTEGUMENTARY


Hx Dermatological Problems: No





- MUSCULOSKELETAL/RHEUMATOLOGICAL


Hx Back Pain: Yes


Hx Falls: Yes





- GASTROINTESTINAL


Other/Comment: colitis





- GENITOURINARY/GYNECOLOGICAL


Hx Genitourinary Disorders: No





- PSYCHIATRIC


Hx Psychophysiologic Disorder: Yes


Hx Anxiety: Yes


Hx Bipolar Disorder: Yes


Hx Depression: Yes


Hx Substance Use: No





- SURGICAL HISTORY


Hx Gastric Bypass Surgery: Yes


Other/Comment: pacemaker





- ANESTHESIA


Hx Anesthesia: No





Meds


Allergies/Adverse Reactions: 


 Allergies











Allergy/AdvReac Type Severity Reaction Status Date / Time


 


levofloxacin [From Levaquin] Allergy  RASH Verified 12/09/17 14:45


 


iv dye Allergy  RASH Uncoded 12/09/17 14:45














- Medications


Medications: 


 Current Medications





Acetaminophen (Tylenol 325mg Tab)  650 mg PO Q6H PRN


   PRN Reason: Pain, moderate (4-7)


Aspirin (Ecotrin)  81 mg PO DAILY JEFF


Clonazepam (Klonopin)  1 mg PO BID PRN; Protocol


   PRN Reason: Anxiety


   Last Admin: 07/19/18 17:18 Dose:  1 mg


Folic Acid (Folic Acid)  1 mg PO DAILY [facility]


   Last Admin: 07/19/18 10:03 Dose:  1 mg


Ceftriaxone Sodium (Rocephin 1 Gram Ivpb)  1 gm in 100 mls @ 100 mls/hr IVPB 

DAILY JEFF


   PRN Reason: Protocol


   Last Admin: 07/19/18 10:03 Dose:  100 mls/hr


Insulin Human Regular (Humulin R Low)  0 units SC ACHS JEFF


   PRN Reason: Protocol


   Last Admin: 07/19/18 22:29 Dose:  Not Given


Lorazepam (Ativan)  2 mg IVP Q4H PRN; Protocol


   PRN Reason: Agitation


   Last Admin: 07/19/18 12:30 Dose:  2 mg


Multivitamins (Thera Tab)  1 tab PO DAILY JEFF


   Last Admin: 07/19/18 10:03 Dose:  1 tab


Pantoprazole Sodium (Protonix Ec Tab)  40 mg PO 0600 JEFF


Pregabalin (Lyrica)  100 mg PO TID JEFF


   Last Admin: 07/19/18 17:18 Dose:  100 mg


Thiamine HCl (Vitamin B1 Tab)  100 mg PO DAILY JEFF


   Last Admin: 07/19/18 10:03 Dose:  100 mg


Venlafaxine HCl (Effexor)  37.5 mg PO DAILY JEFF


   Last Admin: 07/19/18 11:49 Dose:  37.5 mg


Zaleplon (Sonata)  5 mg PO HS PRN


   PRN Reason: Insomnia


   Last Admin: 07/19/18 23:06 Dose:  5 mg











Results





- Vital Signs


Recent Vital Signs: 


 Last Vital Signs











Temp  99 F   07/19/18 21:59


 


Pulse  77   07/19/18 21:59


 


Resp  16   07/19/18 21:59


 


BP  138/86   07/19/18 21:59


 


Pulse Ox  94 L  07/19/18 21:59














- Labs


Result Diagrams: 


 07/20/18 06:00





 07/20/18 06:00


Labs: 


 Laboratory Results - last 24 hr











  07/19/18 07/19/18 07/20/18





  17:11 21:45 06:00


 


WBC    3.3 L


 


RBC    3.21 L


 


Hgb    9.7 L


 


Hct    29.6 L


 


MCV    92.2


 


MCH    30.2


 


MCHC    32.8


 


RDW    12.9


 


Plt Count    223


 


MPV    10.3


 


Gran %    59.4


 


Lymph % (Auto)    35.1 H


 


Mono % (Auto)    5.5


 


Eos % (Auto)    0.0 L


 


Baso % (Auto)    0.0


 


Gran #    1.95


 


Lymph # (Auto)    1.2


 


Mono # (Auto)    0.2


 


Eos # (Auto)    0.0


 


Baso # (Auto)    0.00


 


Sodium   


 


Potassium   


 


Chloride   


 


Carbon Dioxide   


 


Anion Gap   


 


BUN   


 


Creatinine   


 


Est GFR ( Amer)   


 


Est GFR (Non-Af Amer)   


 


POC Glucose (mg/dL)  127 H  147 H 


 


Random Glucose   


 


Calcium   


 


Total Bilirubin   


 


AST   


 


ALT   


 


Alkaline Phosphatase   


 


Total Protein   


 


Albumin   


 


Globulin   


 


Albumin/Globulin Ratio   














  07/20/18 07/20/18





  06:00 06:41


 


WBC  


 


RBC  


 


Hgb  


 


Hct  


 


MCV  


 


MCH  


 


MCHC  


 


RDW  


 


Plt Count  


 


MPV  


 


Gran %  


 


Lymph % (Auto)  


 


Mono % (Auto)  


 


Eos % (Auto)  


 


Baso % (Auto)  


 


Gran #  


 


Lymph # (Auto)  


 


Mono # (Auto)  


 


Eos # (Auto)  


 


Baso # (Auto)  


 


Sodium  139 


 


Potassium  3.6 


 


Chloride  106 


 


Carbon Dioxide  25 


 


Anion Gap  12 


 


BUN  9 


 


Creatinine  0.6 L 


 


Est GFR ( Amer)  > 60 


 


Est GFR (Non-Af Amer)  > 60 


 


POC Glucose (mg/dL)   102


 


Random Glucose  108 


 


Calcium  8.8 


 


Total Bilirubin  0.4 


 


AST  24 


 


ALT  27 


 


Alkaline Phosphatase  103 


 


Total Protein  6.5 


 


Albumin  3.2 


 


Globulin  3.3 


 


Albumin/Globulin Ratio  1.0 L

## 2018-07-20 NOTE — CP.PCM.DIS
<Martin Vela - Last Filed: 07/20/18 18:02>





Provider





- Provider


Date of Admission: 


07/19/18 16:21





Attending physician: 


Natanael Saha MD





Consults: 


Psychiatry, Neurology, Physical Therapy





Time Spent in preparation of Discharge (in minutes): 45





Diagnosis





- Discharge Diagnosis


(1) UTI (urinary tract infection)


Status: Acute   Priority: Medium   





(2) Leukopenia


Status: Chronic   Priority: Medium   





(3) Depression


Status: Chronic   





(4) Weakness


Status: Chronic   





Hospital Course





- Lab Results


Lab Results: 


 Most Recent Lab Values











WBC  3.3 10^3/ul (4.5-11.0)  L  07/20/18  06:00    


 


RBC  3.21 10^6/uL (3.5-6.1)  L  07/20/18  06:00    


 


Hgb  9.7 g/dL (12.0-16.0)  L  07/20/18  06:00    


 


Hct  29.6 % (36.0-48.0)  L  07/20/18  06:00    


 


MCV  92.2 fl (80.0-105.0)   07/20/18  06:00    


 


MCH  30.2 pg (25.0-35.0)   07/20/18  06:00    


 


MCHC  32.8 g/dl (31.0-37.0)   07/20/18  06:00    


 


RDW  12.9 % (11.5-14.5)   07/20/18  06:00    


 


Plt Count  223 10^3/uL (120.0-450.0)   07/20/18  06:00    


 


MPV  10.3 fl (7.0-11.0)   07/20/18  06:00    


 


Gran %  59.4 % (50.0-68.0)   07/20/18  06:00    


 


Lymph % (Auto)  35.1 % (22.0-35.0)  H  07/20/18  06:00    


 


Mono % (Auto)  5.5 % (1.0-6.0)   07/20/18  06:00    


 


Eos % (Auto)  0.0 % (1.5-5.0)  L  07/20/18  06:00    


 


Baso % (Auto)  0.0 % (0.0-3.0)   07/20/18  06:00    


 


Gran #  1.95  (1.4-6.5)   07/20/18  06:00    


 


Lymph # (Auto)  1.2  (1.2-3.4)   07/20/18  06:00    


 


Mono # (Auto)  0.2  (0.1-0.6)   07/20/18  06:00    


 


Eos # (Auto)  0.0  (0.0-0.7)   07/20/18  06:00    


 


Baso # (Auto)  0.00 K/mm3 (0.0-2.0)   07/20/18  06:00    


 


PT  12.6 SECONDS (9.4-12.5)  H  07/18/18  14:25    


 


INR  1.10  (0.93-1.08)  H  07/18/18  14:25    


 


APTT  29.5 Seconds (25.1-36.5)   07/18/18  14:25    


 


pO2  71 mm/Hg (30-55)  H  07/18/18  18:26    


 


VBG pH  7.38  (7.32-7.43)   07/18/18  18:26    


 


VBG pCO2  44.0  (40-60)   07/18/18  18:26    


 


VBG HCO3  26.0 mmol/l (21-28)   07/18/18  18:26    


 


VBG Total CO2  27.4 mmol.L (22-28)   07/18/18  18:26    


 


VBG O2 Sat (Calc)  92.2 % (40-65)  H  07/18/18  18:26    


 


VBG Base Excess  0.5 mmol/L (0.0-2.0)   07/18/18  18:26    


 


VBG Potassium  3.8 mmol/L (3.6-5.2)   07/18/18  18:26    


 


Sodium  136.0 mmol/L (132-148)   07/18/18  18:26    


 


Chloride  107.0 mmol/L ()   07/18/18  18:26    


 


Glucose  81 mg/dl ()   07/18/18  18:26    


 


Lactate  0.6 mmol/L (0.7-2.1)  L  07/18/18  18:26    


 


FiO2  21.0 %  07/18/18  18:26    


 


Sodium  139 mmol/L (132-148)   07/20/18  06:00    


 


Potassium  3.6 mmol/L (3.6-5.0)   07/20/18  06:00    


 


Chloride  106 mmol/L ()   07/20/18  06:00    


 


Carbon Dioxide  25 mmol/L (21-33)   07/20/18  06:00    


 


Anion Gap  12  (10-20)   07/20/18  06:00    


 


BUN  9 mg/dL (7-21)   07/20/18  06:00    


 


Creatinine  0.6 mg/dl (0.7-1.2)  L  07/20/18  06:00    


 


Est GFR ( Amer)  > 60   07/20/18  06:00    


 


Est GFR (Non-Af Amer)  > 60   07/20/18  06:00    


 


POC Glucose (mg/dL)  85 mg/dL ()   07/20/18  11:15    


 


Random Glucose  108 mg/dL ()   07/20/18  06:00    


 


Hemoglobin A1c  5.0 % (4.2-6.5)   07/19/18  07:10    


 


Calcium  8.8 mg/dL (8.4-10.5)   07/20/18  06:00    


 


Iron  35 ug/dL ()  L  07/18/18  14:30    


 


TIBC  214 ug/dL (265-497)  L  07/18/18  14:30    


 


% Saturation  16 % (20-55)  L  07/18/18  14:30    


 


Total Bilirubin  0.4 mg/dL (0.2-1.3)   07/20/18  06:00    


 


AST  24 U/L (14-36)   07/20/18  06:00    


 


ALT  27 U/L (7-56)   07/20/18  06:00    


 


Alkaline Phosphatase  103 U/L ()   07/20/18  06:00    


 


Total Protein  6.5 g/dL (5.8-8.3)   07/20/18  06:00    


 


Albumin  3.2 g/dL (3.0-4.8)   07/20/18  06:00    


 


Globulin  3.3 gm/dL  07/20/18  06:00    


 


Albumin/Globulin Ratio  1.0  (1.1-1.8)  L  07/20/18  06:00    


 


Triglycerides  73 mg/dL ()   07/19/18  07:10    


 


Cholesterol  120 mg/dL (130-200)  L  07/19/18  07:10    


 


LDL Cholesterol Direct  48 mg/dL (0-129)   07/19/18  07:10    


 


HDL Cholesterol  48 mg/dL (29-60)   07/19/18  07:10    


 


TSH 3rd Generation  1.53 mIU/mL (0.46-4.68)   07/18/18  14:30    


 


Venous Blood Potassium  3.8 mmol/L (3.6-5.2)   07/18/18  18:26    


 


Urine Color  Yellow  (YELLOW)   07/18/18  16:13    


 


Urine Appearance  Sl cloudy  (CLEAR)   07/18/18  16:13    


 


Urine pH  6.0  (4.7-8.0)   07/18/18  16:13    


 


Ur Specific Gravity  >= 1.030  (1.005-1.035)   07/18/18  16:13    


 


Urine Protein  Negative mg/dL (<30 mg/dL)   07/18/18  16:13    


 


Urine Glucose (UA)  Negative mg/dL (NEGATIVE)   07/18/18  16:13    


 


Urine Ketones  Negative mg/dL (NEGATIVE)   07/18/18  16:13    


 


Urine Blood  Negative  (NEGATIVE)   07/18/18  16:13    


 


Urine Nitrate  Positive  (NEGATIVE)  H  07/18/18  16:13    


 


Urine Bilirubin  Negative  (NEGATIVE)   07/18/18  16:13    


 


Urine Urobilinogen  1.0 E.U./dL (<1 E.U./dL)  H  07/18/18  16:13    


 


Ur Leukocyte Esterase  Small Susan/uL (NEGATIVE)  H  07/18/18  16:13    


 


Urine RBC  Negative /hpf (0-2)   07/18/18  16:13    


 


Urine WBC  10 - 15 /hpf (0-6)   07/18/18  16:13    


 


Ur Epithelial Cells  6 - 8 /hpf (0-5)   07/18/18  16:13    


 


Urine Bacteria  Many  (NEG)   07/18/18  16:13    


 


Salicylates  < 1 mg/dL (2.0-20.0)  L  07/18/18  14:25    


 


Urine Opiates Screen  Negative  (NEGATIVE)   07/18/18  16:13    


 


Urine Methadone Screen  Negative  (NEGATIVE)   07/18/18  16:13    


 


Acetaminophen  < 10.0 ug/ml (10.0-20.0)  L  07/18/18  14:25    


 


Ur Barbiturates Screen  Positive  (NEGATIVE)  H  07/18/18  16:13    


 


Ur Phencyclidine Scrn  Negative  (NEGATIVE)   07/18/18  16:13    


 


Ur Amphetamines Screen  Negative  (NEGATIVE)   07/18/18  16:13    


 


U Benzodiazepines Scrn  Negative  (NEGATIVE)   07/18/18  16:13    


 


U Oth Cocaine Metabols  Negative  (NEGATIVE)   07/18/18  16:13    


 


U Cannabinoids Screen  Negative  (NEGATIVE)   07/18/18  16:13    


 


Alcohol, Quantitative  < 10 mg/dL (0-10)   07/18/18  14:25    


 


HIV 1&2 Ag/Ab, 4th Gen  Nonreactive  (Nonreactive)   07/19/18  07:10    


 


Blood Type  A POSITIVE   07/18/18  15:09    


 


Blood Type Confirm  A POSITIVE   07/19/18  07:10    


 


Antibody Screen  Negative   07/18/18  15:09    


 


BBK History Checked  No verified bt   07/18/18  15:09    














- Hospital Course


Hospital Course: 


58 year old female with a past medical history of multiple sclerosis, 

fibromyalgia, depression, diabetes, and pacemaker s/p bradycardia who presented 

with altered mental status and falls at her daughter's home. Patient admitted 

to having visual and auditory hallucinations. CT head was negative for acute 

findings. UA was positive for nitrates, urine bilirubin and small leukocyte 

esterase. Patient admitted to some burning on urination. Patient was placed on 

Rocephin for UTI. CXR was negative for active disease, and EKG performed showed 

sinus arrythmia at 93 bpm, likely due to pacemaker.  Psychiatry was consulted 

regarding the etiology of her AMS and patient was started on venlafaxine 37.5 

and sonata 5 for sleep. Neuro was consulted and they ordered a CT angiogram of 

the head along with an EEG. The CT angiogram was unremarkable, and the 

preliminary EEG was read as unremarkable as well. 





Urine culture returned positive for gram negative rods >100,000. Patient was 

evaluated by Physical Therapy, who evaluated her gait and mentioned upon 

discharge the need to go home with services and follow up 3-5 times a week for 

outpatient PT. Further inpatient PT sessions were refused.


Diabetic education was utilized to help patient learn more about her diabetes 

and allow her to make decisions accordingly.


Social work reviewed patient's case and once patient was deemed medically stable

, it was agreed to transfer patient to the psychiatry heath for further 

evaluation and placement once discharged.








Discharge Exam





- Head Exam


Head Exam: ATRAUMATIC, NORMAL INSPECTION, NORMOCEPHALIC





- Eye Exam


Eye Exam: EOMI, Normal appearance, PERRL


Pupil Exam: NORMAL ACCOMODATION





- ENT Exam


ENT Exam: Mucous Membranes Moist, Normal Exam





- Neck Exam


Neck exam: Full Rom





- Respiratory Exam


Respiratory Exam: NORMAL BREATHING PATTERN.  absent: Chest Wall Tenderness, 

Rales, Rhonchi, Wheezes, Respiratory Distress





- Cardiovascular Exam


Cardiovascular Exam: REGULAR RHYTHM, +S1, +S2





- GI/Abdominal Exam


GI & Abdominal Exam: Normal Bowel Sounds, Soft.  absent: Guarding, Rebound





- Extremities Exam


Extremities exam: full ROM





- Neurological Exam


Neurological exam: Alert





- Psychiatric Exam


Psychiatric exam: Depressed, Flat Affect





- Skin


Skin Exam: Dry, Intact, Normal Color





Discharge Plan





- Follow Up Plan


Condition: STABLE


Disposition: DISCHARGE TO PSYCH HOSPITAL


Instructions:  Urinary Tract Infection in Women (DC), Urinary Tract Infection 

in Men (DC), Depression (DC), Dysuria (GEN), Weakness (GEN)


Additional Instructions: 


1. Follow guidelines in psychiatry unit.


2. Please take medications as prescribed.


3. Follow up with neurologist within week of discharge.


4. Please return should symptoms persist or worsen.





<Natanael Saha - Last Filed: 07/21/18 08:06>





Provider





- Provider


Date of Admission: 


07/19/18 16:21





Attending physician: 


Natanael Saha MD








[facility] Course





- Lab Results


Lab Results: 


 Most Recent Lab Values











WBC  3.3 10^3/ul (4.5-11.0)  L  07/20/18  06:00    


 


RBC  3.21 10^6/uL (3.5-6.1)  L  07/20/18  06:00    


 


Hgb  9.7 g/dL (12.0-16.0)  L  07/20/18  06:00    


 


Hct  29.6 % (36.0-48.0)  L  07/20/18  06:00    


 


MCV  92.2 fl (80.0-105.0)   07/20/18  06:00    


 


MCH  30.2 pg (25.0-35.0)   07/20/18  06:00    


 


MCHC  32.8 g/dl (31.0-37.0)   07/20/18  06:00    


 


RDW  12.9 % (11.5-14.5)   07/20/18  06:00    


 


Plt Count  223 10^3/uL (120.0-450.0)   07/20/18  06:00    


 


MPV  10.3 fl (7.0-11.0)   07/20/18  06:00    


 


Gran %  59.4 % (50.0-68.0)   07/20/18  06:00    


 


Lymph % (Auto)  35.1 % (22.0-35.0)  H  07/20/18  06:00    


 


Mono % (Auto)  5.5 % (1.0-6.0)   07/20/18  06:00    


 


Eos % (Auto)  0.0 % (1.5-5.0)  L  07/20/18  06:00    


 


Baso % (Auto)  0.0 % (0.0-3.0)   07/20/18  06:00    


 


Gran #  1.95  (1.4-6.5)   07/20/18  06:00    


 


Lymph # (Auto)  1.2  (1.2-3.4)   07/20/18  06:00    


 


Mono # (Auto)  0.2  (0.1-0.6)   07/20/18  06:00    


 


Eos # (Auto)  0.0  (0.0-0.7)   07/20/18  06:00    


 


Baso # (Auto)  0.00 K/mm3 (0.0-2.0)   07/20/18  06:00    


 


PT  12.6 SECONDS (9.4-12.5)  H  07/18/18  14:25    


 


INR  1.10  (0.93-1.08)  H  07/18/18  14:25    


 


APTT  29.5 Seconds (25.1-36.5)   07/18/18  14:25    


 


pO2  71 mm/Hg (30-55)  H  07/18/18  18:26    


 


VBG pH  7.38  (7.32-7.43)   07/18/18  18:26    


 


VBG pCO2  44.0  (40-60)   07/18/18  18:26    


 


VBG HCO3  26.0 mmol/l (21-28)   07/18/18  18:26    


 


VBG Total CO2  27.4 mmol.L (22-28)   07/18/18  18:26    


 


VBG O2 Sat (Calc)  92.2 % (40-65)  H  07/18/18  18:26    


 


VBG Base Excess  0.5 mmol/L (0.0-2.0)   07/18/18  18:26    


 


VBG Potassium  3.8 mmol/L (3.6-5.2)   07/18/18  18:26    


 


Sodium  136.0 mmol/L (132-148)   07/18/18  18:26    


 


Chloride  107.0 mmol/L ()   07/18/18  18:26    


 


Glucose  81 mg/dl ()   07/18/18  18:26    


 


Lactate  0.6 mmol/L (0.7-2.1)  L  07/18/18  18:26    


 


FiO2  21.0 %  07/18/18  18:26    


 


Sodium  139 mmol/L (132-148)   07/20/18  06:00    


 


Potassium  3.6 mmol/L (3.6-5.0)   07/20/18  06:00    


 


Chloride  106 mmol/L ()   07/20/18  06:00    


 


Carbon Dioxide  25 mmol/L (21-33)   07/20/18  06:00    


 


Anion Gap  12  (10-20)   07/20/18  06:00    


 


BUN  9 mg/dL (7-21)   07/20/18  06:00    


 


Creatinine  0.6 mg/dl (0.7-1.2)  L  07/20/18  06:00    


 


Est GFR ( Amer)  > 60   07/20/18  06:00    


 


Est GFR (Non-Af Amer)  > 60   07/20/18  06:00    


 


POC Glucose (mg/dL)  93 mg/dL ()   07/20/18  16:36    


 


Random Glucose  108 mg/dL ()   07/20/18  06:00    


 


Hemoglobin A1c  5.0 % (4.2-6.5)   07/19/18  07:10    


 


Calcium  8.8 mg/dL (8.4-10.5)   07/20/18  06:00    


 


Iron  35 ug/dL ()  L  07/18/18  14:30    


 


TIBC  214 ug/dL (265-497)  L  07/18/18  14:30    


 


% Saturation  16 % (20-55)  L  07/18/18  14:30    


 


Total Bilirubin  0.4 mg/dL (0.2-1.3)   07/20/18  06:00    


 


AST  24 U/L (14-36)   07/20/18  06:00    


 


ALT  27 U/L (7-56)   07/20/18  06:00    


 


Alkaline Phosphatase  103 U/L ()   07/20/18  06:00    


 


Total Protein  6.5 g/dL (5.8-8.3)   07/20/18  06:00    


 


Albumin  3.2 g/dL (3.0-4.8)   07/20/18  06:00    


 


Globulin  3.3 gm/dL  07/20/18  06:00    


 


Albumin/Globulin Ratio  1.0  (1.1-1.8)  L  07/20/18  06:00    


 


Triglycerides  73 mg/dL ()   07/19/18  07:10    


 


Cholesterol  120 mg/dL (130-200)  L  07/19/18  07:10    


 


LDL Cholesterol Direct  48 mg/dL (0-129)   07/19/18  07:10    


 


HDL Cholesterol  48 mg/dL (29-60)   07/19/18  07:10    


 


TSH 3rd Generation  1.53 mIU/mL (0.46-4.68)   07/18/18  14:30    


 


Venous Blood Potassium  3.8 mmol/L (3.6-5.2)   07/18/18  18:26    


 


Urine Color  Yellow  (YELLOW)   07/18/18  16:13    


 


Urine Appearance  Sl cloudy  (CLEAR)   07/18/18  16:13    


 


Urine pH  6.0  (4.7-8.0)   07/18/18  16:13    


 


Ur Specific Gravity  >= 1.030  (1.005-1.035)   07/18/18  16:13    


 


Urine Protein  Negative mg/dL (<30 mg/dL)   07/18/18  16:13    


 


Urine Glucose (UA)  Negative mg/dL (NEGATIVE)   07/18/18  16:13    


 


Urine Ketones  Negative mg/dL (NEGATIVE)   07/18/18  16:13    


 


Urine Blood  Negative  (NEGATIVE)   07/18/18  16:13    


 


Urine Nitrate  Positive  (NEGATIVE)  H  07/18/18  16:13    


 


Urine Bilirubin  Negative  (NEGATIVE)   07/18/18  16:13    


 


Urine Urobilinogen  1.0 E.U./dL (<1 E.U./dL)  H  07/18/18  16:13    


 


Ur Leukocyte Esterase  Small Susan/uL (NEGATIVE)  H  07/18/18  16:13    


 


Urine RBC  Negative /hpf (0-2)   07/18/18  16:13    


 


Urine WBC  10 - 15 /hpf (0-6)   07/18/18  16:13    


 


Ur Epithelial Cells  6 - 8 /hpf (0-5)   07/18/18  16:13    


 


Urine Bacteria  Many  (NEG)   07/18/18  16:13    


 


Salicylates  < 1 mg/dL (2.0-20.0)  L  07/18/18  14:25    


 


Urine Opiates Screen  Negative  (NEGATIVE)   07/18/18  16:13    


 


Urine Methadone Screen  Negative  (NEGATIVE)   07/18/18  16:13    


 


Acetaminophen  < 10.0 ug/ml (10.0-20.0)  L  07/18/18  14:25    


 


Ur Barbiturates Screen  Positive  (NEGATIVE)  H  07/18/18  16:13    


 


Ur Phencyclidine Scrn  Negative  (NEGATIVE)   07/18/18  16:13    


 


Ur Amphetamines Screen  Negative  (NEGATIVE)   07/18/18  16:13    


 


U Benzodiazepines Scrn  Negative  (NEGATIVE)   07/18/18  16:13    


 


U Oth Cocaine Metabols  Negative  (NEGATIVE)   07/18/18  16:13    


 


U Cannabinoids Screen  Negative  (NEGATIVE)   07/18/18  16:13    


 


Alcohol, Quantitative  < 10 mg/dL (0-10)   07/18/18  14:25    


 


HIV 1&2 Ag/Ab, 4th Gen  Nonreactive  (Nonreactive)   07/19/18  07:10    


 


Blood Type  A POSITIVE   07/18/18  15:09    


 


Blood Type Confirm  A POSITIVE   07/19/18  07:10    


 


Antibody Screen  Negative   07/18/18  15:09    


 


BBK History Checked  No verified bt   07/18/18  15:09    














Attending/Attestation





- Attestation


I have personally seen and examined this patient.: Yes


I have fully participated in the care of the patient.: Yes


I have reviewed all pertinent clinical information, including history, physical 

exam and plan: Yes


Notes (Text): 





07/20/18


58 year old female with past medical history of anxiety, hypertension, 

fibromyalgia, multiple sclerosis, bradycardia s/p PPM and diabetes who 

presented with complaint of altered mental status, hallucinations and weakness/

falls.  CT head was negative for acute findings.  EEG and CTA head/neck was 

negative as well.  She was seen by neurology and psychiatyr.  She is on 

antibiotics for UTI.  UCx is growing gram negative rods.  Her mental status has 

improved to baseline.  She is still anxious and agitated at times.  Psychiatry 

recommended inpatient psychiatric evaluation to which patient is agreeable to.





Patient is transferred to inpatient psychiatric unit.


We will continue to follow.





Natanael Saha MD


Hospitalist.

## 2018-07-20 NOTE — CP.PCM.PN
Subjective





- Date & Time of Evaluation


Date of Evaluation: 07/20/18


Time of Evaluation: 09:30





- Subjective


Subjective: 





Vivi Apodaca PGY-1, Family Medicine Resident, Neurology Consult Progress Note





Patient had a headache overnight and was given Tylenol. Patient states she is 

still experiencing headache and dizziness but feels better overall. Denies loss 

of consciousness, changes in hearing or vision, changes in weakness or numbness

, bowel or bladder incontinence. 





Objective





- Vital Signs/Intake and Output


Vital Signs (last 24 hours): 


 











Temp Pulse Resp BP Pulse Ox


 


 99 F   77   16   138/86   94 L


 


 07/19/18 21:59  07/19/18 21:59  07/19/18 21:59  07/19/18 21:59  07/19/18 21:59








Intake and Output: 


 











 07/20/18 07/20/18





 06:59 18:59


 


Intake Total 700 


 


Output Total 2 


 


Balance 698 














- Medications


Medications: 


 Current Medications





Acetaminophen (Tylenol 325mg Tab)  650 mg PO Q6H PRN


   PRN Reason: Pain, moderate (4-7)


Aspirin (Ecotrin)  81 mg PO DAILY JEFF


Clonazepam (Klonopin)  1 mg PO BID PRN; Protocol


   PRN Reason: Anxiety


   Last Admin: 07/19/18 17:18 Dose:  1 mg


Folic Acid (Folic Acid)  1 mg PO DAILY JEFF


   Last Admin: 07/19/18 10:03 Dose:  1 mg


Ceftriaxone Sodium (Rocephin 1 Gram Ivpb)  1 gm in 100 mls @ 100 mls/hr IVPB 

DAILY JEFF


   PRN Reason: Protocol


   Last Admin: 07/19/18 10:03 Dose:  100 mls/hr


Insulin Human Regular (Humulin R Low)  0 units SC ACHS JEFF


   PRN Reason: Protocol


   Last Admin: 07/19/18 22:29 Dose:  Not Given


Lorazepam (Ativan)  2 mg IVP Q4H PRN; Protocol


   PRN Reason: Agitation


   Last Admin: 07/19/18 12:30 Dose:  2 mg


Multivitamins (Thera Tab)  1 tab PO DAILY JEFF


   Last Admin: 07/19/18 10:03 Dose:  1 tab


Pantoprazole Sodium (Protonix Ec Tab)  40 mg PO 0600 JEFF


Pregabalin (Lyrica)  100 mg PO TID JEFF


   Last Admin: 07/19/18 17:18 Dose:  100 mg


Thiamine HCl (Vitamin B1 Tab)  100 mg PO DAILY JEFF


   Last Admin: 07/19/18 10:03 Dose:  100 mg


Venlafaxine HCl (Effexor)  37.5 mg PO DAILY [facility]


   Last Admin: 07/19/18 11:49 Dose:  37.5 mg


Zaleplon (Sonata)  5 mg PO HS PRN


   PRN Reason: Insomnia


   Last Admin: 07/19/18 23:06 Dose:  5 mg











- Labs


Labs: 


 





 07/20/18 06:00 





 07/20/18 06:00 





 











PT  12.6 SECONDS (9.4-12.5)  H  07/18/18  14:25    


 


INR  1.10  (0.93-1.08)  H  07/18/18  14:25    


 


APTT  29.5 Seconds (25.1-36.5)   07/18/18  14:25    














- Additional Findings


Additional findings: 





- Constitutional


Appears: Non-toxic, No Acute Distress





- Head Exam


Head Exam: ATRAUMATIC, NORMOCEPHALIC





- Eye Exam


Eye Exam: EOMI, Normal appearance, PERRL





- ENT Exam


ENT Exam: Mucous Membranes Moist, Normal External Ear Exam





- Neck Exam


Neck exam: Positive for: Full Rom, Normal Inspection





- Respiratory Exam


Respiratory Exam: Clear to Auscultation Bilateral, NORMAL BREATHING PATTERN





- Cardiovascular Exam


Cardiovascular Exam: REGULAR RHYTHM, +S1, +S2





- GI/Abdominal Exam


GI & Abdominal Exam: Normal Bowel Sounds, Soft.  absent: Distended, Tenderness





- Extremities Exam


Extremities exam: Positive for: normal inspection





- Back Exam


Back exam: NORMAL INSPECTION





- Neurological Exam


Neurological exam: Alert, CN II-XII Intact, Motor Sensory Deficit, Oriented x3


Additional comments: 





Unsteady gait.





- Expanded Neurological Exam


  ** Expanded


Patient oriented to: person, place, time


Cranial nerves: EOM's Intact: Normal, Tongue Deviation: Normal


Ataxia: No


Cerebellar Function: Finger to Nose: Normal, Heel to Shin: Normal


Upper motor neuron: Pronator Drift: Normal


Sensory exam: Lower Extremity Light Touch: Abnormal Left, Abnormal Right (

Numbness in feet bilaterally)


Neuro motor strength exam: Left Upper Extremity: 5, Right Upper Extremity: 5, 

Left Lower Extremity: 5, Right Lower Extremity: 5


Coma Scale Eye Opening: SPONTANEOUS


Coma Scale Motor Response: OBEYS COMMANDS


Coma Scale Verbal: Oriented


Coma Scale Total: 15





- Psychiatric Exam


Psychiatric exam: Normal Affect, Normal Mood





- Skin


Skin Exam: Dry, Intact, Normal Color





Assessment and Plan





- Assessment and Plan (Free Text)


Assessment: 





Patient is a 58 year old female with past medical history of multiple sclerosis

, fibromyalgia, anxiety, hypertension, type 2 diabetes, neutropenia, 

bradycardia s/p pacemaker placement presenting with chief complaint of weakness 

and increased frequency of falls and was found to have no acute intracranial 

findings on head CT.


Plan: 





Syncope 


- Seizure vs. neurogenic vs. cardiogenic 


- CT head shows no intracranial hemorrhage, mass effect, or edema. Chronic 

microvascular changes in the periventricular white matter. Old lacunar infarct 

in right thalamus 


- Unable to obtain MRI as patient has pacemaker


- EEG preliminary read unremarkable


- CTA head and neck unremarkable


- PT/OT consulted


- Followup outpatient with neurologist 


- Further recommendations per Dr. Walls





Case discussed and plan approved by attending physician Dr. Titi Apodaca PGY 1

## 2018-07-21 LAB
HDLC SERPL-MCNC: 49 MG/DL (ref 29–60)
LDLC SERPL-MCNC: 56 MG/DL (ref 0–129)

## 2018-07-21 RX ADMIN — INSULIN HUMAN SCH: 100 INJECTION, SOLUTION PARENTERAL at 18:30

## 2018-07-21 RX ADMIN — INSULIN HUMAN SCH: 100 INJECTION, SOLUTION PARENTERAL at 12:29

## 2018-07-21 RX ADMIN — INSULIN HUMAN SCH: 100 INJECTION, SOLUTION PARENTERAL at 22:12
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
## 2018-07-21 NOTE — PCM.BM
<ConstantinoMitch - Last Filed: 07/21/18 04:47>





Treatment Plan Problems





- Problems identified on initial assessmt


  ** Ineffective Coping


Date Initiated: 07/20/18


Time Initiated: 22:00


Assessment reference: NA


Status: Active


Priority: 1





  ** Hopelessness/Helplessness


Date Initiated: 07/20/18


Time Initiated: 22:00


Assessment reference: NA


Status: Active


Priority: 2





  ** Feelings of Worthlessness


Date Initiated: 07/20/18


Time Initiated: 22:00


Assessment reference: NA


Status: Active


Priority: 3





  ** Self Care Deficit


Date Initiated: 07/20/18


Time Initiated: 22:00


Assessment reference: NA


Status: Active


Priority: 4





Treatment assets and liabiliti


Patient Assests: adapts well, cooperative, insightful, motivated, negotiates 

basic needs, cognitively intact, good interpersonal skills


Patient Liabilities: physical pain, relationship conflicts, medical problems, 

imparied memory





- Milieu Protocol


Maintain good personal hygiene: daily Encourage regular showers, every shift 

Remind patient to perform daily oral care, every shift Assist patient to 

perform ADL's


Maintain personal safety: every shift Educate patient to report safety concerns 

to staff, every shift Monitor environment for contraband/sharps


Medication safety: Monitor for expected outcome, potential side effects: every 

shift, Assess barriers to learning: every shift, Assess readiness for 

medication education: every shift





Family Contact


Family involvement: Family/SO is involved


Family contact: Patient agrees to contact





- Goals for Treatment


Patient goals for treatment: "Get my head together then go home. Do my own 

laundry, take my own meds, and walk with walker."





Discharge/Continuing Care





- Education Needs


Education Needs: Patient Medication, Patient Diagnosis/Disease Process, Patient 

Coping Skills, Patient Anger Management skills, Patient Placement options, 

Patient Community resources, Patient Activities of Daily Living, Patient Pain, 

Patient Nutrition, Patient Uses of Medical Equipment, Patient Health Practices/

Safety, Patient Personal Hygiene/Grooming, Patient Aftercare Safety Plan





- Discharge


Discharge Criteria: Tolerates medication w/o severe side effects, Ability to 

care for self





<Tamia Guerra - Last Filed: 07/21/18 12:21>





- Diagnosis


(1) MDD (major depressive disorder)


Status: Acute   


Interventions: 





07/21/18 12:20


Psychoeducation


Psychopharmacology/adjustment of medications as needed/ monitoring possible 

side effects


Evaluate pt on daily basis


Compliance with medications and follow up appointments


Suicide and homicide risk assessment and prevention


Relapse prevention


Reduction of symptoms


Improve functional status


Family involvement


As outpatient: cognitive behavioral therapy








(2) Panic disorder


Status: Acute   


Interventions: 





07/21/18 12:21


Psychoeducation


Psychopharmacology/adjustment of medications as needed/ monitoring possible 

side effects


Evaluate pt on daily basis


Discussion of importance of being compliant with medications and follow up 

appointments


Suicide and homicide risk assessment and prevention, coping strategies, safety 

plan


Reduction of symptoms


Relaxation techniques and breathing exercises 


Improve functional status


Family involvement


Cognitive behavioral therapy as outpatient

## 2018-07-22 ENCOUNTER — HOSPITAL ENCOUNTER (INPATIENT)
Dept: HOSPITAL 42 - ED | Age: 58
LOS: 3 days | Discharge: NURSING, REHAB OR LONG-TERM CARE | DRG: 690 | End: 2018-07-25
Attending: INTERNAL MEDICINE | Admitting: INTERNAL MEDICINE
Payer: MEDICARE

## 2018-07-22 VITALS
DIASTOLIC BLOOD PRESSURE: 88 MMHG | HEART RATE: 62 BPM | SYSTOLIC BLOOD PRESSURE: 133 MMHG | TEMPERATURE: 98.7 F | RESPIRATION RATE: 19 BRPM

## 2018-07-22 VITALS — BODY MASS INDEX: 34.7 KG/M2

## 2018-07-22 DIAGNOSIS — N39.0: Primary | ICD-10-CM

## 2018-07-22 DIAGNOSIS — F41.0: ICD-10-CM

## 2018-07-22 DIAGNOSIS — F32.9: ICD-10-CM

## 2018-07-22 DIAGNOSIS — D70.9: ICD-10-CM

## 2018-07-22 DIAGNOSIS — B96.20: ICD-10-CM

## 2018-07-22 DIAGNOSIS — T36.95XA: ICD-10-CM

## 2018-07-22 DIAGNOSIS — E11.9: ICD-10-CM

## 2018-07-22 DIAGNOSIS — D63.8: ICD-10-CM

## 2018-07-22 DIAGNOSIS — Z79.4: ICD-10-CM

## 2018-07-22 DIAGNOSIS — G35: ICD-10-CM

## 2018-07-22 DIAGNOSIS — B96.1: ICD-10-CM

## 2018-07-22 DIAGNOSIS — Z16.12: ICD-10-CM

## 2018-07-22 DIAGNOSIS — I10: ICD-10-CM

## 2018-07-22 DIAGNOSIS — Z98.84: ICD-10-CM

## 2018-07-22 DIAGNOSIS — F41.1: ICD-10-CM

## 2018-07-22 DIAGNOSIS — R19.7: ICD-10-CM

## 2018-07-22 DIAGNOSIS — Z87.891: ICD-10-CM

## 2018-07-22 DIAGNOSIS — R00.1: ICD-10-CM

## 2018-07-22 DIAGNOSIS — M79.7: ICD-10-CM

## 2018-07-22 DIAGNOSIS — Z95.810: ICD-10-CM

## 2018-07-22 DIAGNOSIS — F43.10: ICD-10-CM

## 2018-07-22 LAB
ALBUMIN SERPL-MCNC: 3.5 G/DL (ref 3–4.8)
ALBUMIN/GLOB SERPL: 1.1 {RATIO} (ref 1.1–1.8)
ALT SERPL-CCNC: 26 U/L (ref 7–56)
AST SERPL-CCNC: 34 U/L (ref 14–36)
BASE EXCESS BLDV CALC-SCNC: 2 MMOL/L (ref 0–2)
BASOPHILS # BLD AUTO: 0.01 K/MM3 (ref 0–2)
BASOPHILS NFR BLD: 0.3 % (ref 0–3)
BUN SERPL-MCNC: 15 MG/DL (ref 7–21)
CALCIUM SERPL-MCNC: 8.9 MG/DL (ref 8.4–10.5)
EOSINOPHIL # BLD: 0.1 10*3/UL (ref 0–0.7)
EOSINOPHIL NFR BLD: 1.6 % (ref 1.5–5)
ERYTHROCYTE [DISTWIDTH] IN BLOOD BY AUTOMATED COUNT: 13.1 % (ref 11.5–14.5)
GFR NON-AFRICAN AMERICAN: > 60
GRANULOCYTES # BLD: 1.41 10*3/UL (ref 1.4–6.5)
GRANULOCYTES NFR BLD: 45.8 % (ref 50–68)
HDLC SERPL-MCNC: 54 MG/DL (ref 29–60)
HGB BLD-MCNC: 9.7 G/DL (ref 12–16)
LDLC SERPL-MCNC: 50 MG/DL (ref 0–129)
LYMPHOCYTES # BLD: 1.4 10*3/UL (ref 1.2–3.4)
LYMPHOCYTES NFR BLD AUTO: 46.1 % (ref 22–35)
MCH RBC QN AUTO: 29.9 PG (ref 25–35)
MCHC RBC AUTO-ENTMCNC: 31.9 G/DL (ref 31–37)
MCV RBC AUTO: 93.8 FL (ref 80–105)
MONOCYTES # BLD AUTO: 0.2 10*3/UL (ref 0.1–0.6)
MONOCYTES NFR BLD: 6.2 % (ref 1–6)
PH BLDV: 7.4 [PH] (ref 7.32–7.43)
PLATELET # BLD: 204 10^3/UL (ref 120–450)
PMV BLD AUTO: 10 FL (ref 7–11)
RBC # BLD AUTO: 3.24 10^6/UL (ref 3.5–6.1)
T4 FREE SERPL-MCNC: 0.76 NG/DL (ref 0.78–2.19)
VENOUS BLOOD FIO2: 21 %
VENOUS BLOOD GAS PCO2: 44 (ref 40–60)
VENOUS BLOOD GAS PO2: 127 MM/HG (ref 30–55)
WBC # BLD AUTO: 3.1 10^3/UL (ref 4.5–11)

## 2018-07-22 RX ADMIN — MEROPENEM SCH MLS/HR: 1 INJECTION INTRAVENOUS at 21:46

## 2018-07-22 RX ADMIN — INSULIN HUMAN SCH: 100 INJECTION, SOLUTION PARENTERAL at 22:57

## 2018-07-22 RX ADMIN — VENLAFAXINE HYDROCHLORIDE SCH MG: 75 CAPSULE, EXTENDED RELEASE ORAL at 16:08

## 2018-07-22 RX ADMIN — INSULIN HUMAN SCH: 100 INJECTION, SOLUTION PARENTERAL at 08:14

## 2018-07-22 RX ADMIN — INSULIN HUMAN SCH: 100 INJECTION, SOLUTION PARENTERAL at 16:52

## 2018-07-22 NOTE — CON
DATE:  07/22/2018



LOCATION:  The patient is seen earlier today in psychiatric floor.  The

patient seen in room 513.



CHIEF COMPLAINT:  Dysuria, frequency times several days.



HISTORY OF PRESENT ILLNESS:  This is a 58-year-old female with obesity with

BMI of 34, fibromyalgia, multiple sclerosis, depression, anxiety and

hypertension, diabetic, anemia, who was admitted to the psychiatric floor

after the patient was transferred to the psychiatric floor from the acute

care.  The patient's urine culture was noted to be positive for an E. coli,

which is ESBL.  Infectious Disease consultation requested.  The patient is

complaining of pelvic pain, dysuria or frequency and no fevers and chills.



REVIEW OF SYSTEMS:  Thirteen-point review of systems performed and negative

except for what is in the HPI.



PAST MEDICAL HISTORY:  Significant for multiple sclerosis, fibromyalgia,

anxiety, depression, bipolar, anemia, hypertension, diabetes.



PAST SURGICAL HISTORY:  Significant for gastric bypass surgery.  The

patient has a defibrillator placement that was recently placed and right

chest wall port placement.



ALLERGIES:  THE PATIENT IS ALLERGIC TO LEVAQUIN.



MEDICATIONS AT HOME:  Reviewed and noted.



PHYSICAL EXAMINATION:

VITAL SIGNS:  On exam, the patient's temperature is 98, blood pressure is

120/70, respiratory rate of 18, heart rate of 60.

HEENT:  Examination of HEENT is unremarkable.

NECK:  Supple.

LUNGS:  Have decreased breath sounds.

HEART:  Normal S1, S2.

ABDOMEN:  Soft, nontender.  No organomegaly.  No rebound.  No guarding.  No

masses.



LABORATORY DATA:  Laboratory examination reveals the patient's white count

of 3.1, hemoglobin of 9.  The patient does have 46% lymphocytosis. 

Serology is negative for RPR.  The patient has had negative HIV and

hepatitis profile.  The patient has had a creatinine in the past, which is

0.6.



ASSESSMENT AND PLAN:  A 58-year-old female with fibromyalgia, multiple

sclerosis, depression, anxiety, hypertension, diabetes, anemia, a pacemaker

and defibrillator and gastric bypass and a chest wall Port-A-Cath _____

Levaquin.  Admitted now with symptomatic extended-spectrum beta-lactamase

Escherichia coli urinary tract infection and cystitis.  We will treat the

patient with meropenem.  The patient should be on extended-spectrum

beta-lactamase precautions as case discussed with Dr. Cantrell, Dr. Saha, Dr. Capellan, the Medical team and Psychiatric team today and if unable to isolate

the patient on psychiatric floor, we will give IV meropenem.  If not on

option, should be transferred to acute care to treat for symptomatic

extended-spectrum beta-lactamase Escherichia coli cystitis.  We will follow

closely with you.





__________________________________________

Manuel Gale MD





DD:  07/22/2018 8:20:36

DT:  07/22/2018 8:23:30

Job # [id number]
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
## 2018-07-23 LAB
ALBUMIN SERPL-MCNC: 2.9 G/DL (ref 3–4.8)
ALBUMIN/GLOB SERPL: 1 {RATIO} (ref 1.1–1.8)
ALT SERPL-CCNC: 29 U/L (ref 7–56)
AST SERPL-CCNC: 30 U/L (ref 14–36)
BASOPHILS # BLD AUTO: 0.01 K/MM3 (ref 0–2)
BASOPHILS NFR BLD: 0.4 % (ref 0–3)
BUN SERPL-MCNC: 15 MG/DL (ref 7–21)
CALCIUM SERPL-MCNC: 8.4 MG/DL (ref 8.4–10.5)
EOSINOPHIL # BLD: 0.1 10*3/UL (ref 0–0.7)
EOSINOPHIL NFR BLD: 1.8 % (ref 1.5–5)
ERYTHROCYTE [DISTWIDTH] IN BLOOD BY AUTOMATED COUNT: 13.2 % (ref 11.5–14.5)
GFR NON-AFRICAN AMERICAN: > 60
GRANULOCYTES # BLD: 1.31 10*3/UL (ref 1.4–6.5)
GRANULOCYTES NFR BLD: 46.1 % (ref 50–68)
HGB BLD-MCNC: 8.4 G/DL (ref 12–16)
LYMPHOCYTES # BLD: 1.3 10*3/UL (ref 1.2–3.4)
LYMPHOCYTES NFR BLD AUTO: 45.4 % (ref 22–35)
MCH RBC QN AUTO: 31 PG (ref 25–35)
MCHC RBC AUTO-ENTMCNC: 32.8 G/DL (ref 31–37)
MCV RBC AUTO: 94.5 FL (ref 80–105)
MONOCYTES # BLD AUTO: 0.2 10*3/UL (ref 0.1–0.6)
MONOCYTES NFR BLD: 6.3 % (ref 1–6)
PLATELET # BLD: 179 10^3/UL (ref 120–450)
PMV BLD AUTO: 10 FL (ref 7–11)
RBC # BLD AUTO: 2.71 10^6/UL (ref 3.5–6.1)
WBC # BLD AUTO: 2.8 10^3/UL (ref 4.5–11)

## 2018-07-23 RX ADMIN — MEROPENEM SCH MLS/HR: 1 INJECTION INTRAVENOUS at 22:03

## 2018-07-23 RX ADMIN — INSULIN HUMAN SCH: 100 INJECTION, SOLUTION PARENTERAL at 22:05

## 2018-07-23 RX ADMIN — MEROPENEM SCH MLS/HR: 1 INJECTION INTRAVENOUS at 13:23

## 2018-07-23 RX ADMIN — INSULIN HUMAN SCH: 100 INJECTION, SOLUTION PARENTERAL at 12:30

## 2018-07-23 RX ADMIN — MULTIPLE VITAMINS W/ MINERALS TAB SCH TAB: TAB at 10:04

## 2018-07-23 RX ADMIN — MEROPENEM SCH MLS/HR: 1 INJECTION INTRAVENOUS at 05:31

## 2018-07-23 RX ADMIN — VENLAFAXINE HYDROCHLORIDE SCH MG: 75 CAPSULE, EXTENDED RELEASE ORAL at 10:04

## 2018-07-23 RX ADMIN — PANTOPRAZOLE SODIUM SCH MG: 40 TABLET, DELAYED RELEASE ORAL at 06:23

## 2018-07-23 RX ADMIN — INSULIN HUMAN SCH: 100 INJECTION, SOLUTION PARENTERAL at 08:30

## 2018-07-23 NOTE — PN
DATE:  07/23/2018



SUBJECTIVE:  The patient was transferred from the psychiatric inpatient

unit on the medical site because the patient was found to have ESBL, E.

coli urinary tract infection.  At present moment, the patient needs to be

on IV antibiotics and on contact isolation.  Transfer was uneventful.  The

patient was seen today on the medical site.  The patient complained that

she did not sleep very well.  At the same time, on detailed questioning,

the patient fell asleep at 10 p.m. and woke up at 7 a.m.  It is altogether

9 hours of night sleep, and this writer would not consider that there are

problems with sleep.  At the same time, the patient asked Sonata to be

increased, which we will do.  The patient also asked Neurontin to be

resumed.  Other than that, the patient reports that she feels the same

depressed.  The patient was visited by her daughter and the patient is

concerned about her personal belongings and her money which left in the

daughter's apartment, and the patient said that purse was not brought in by

her.  The patient denied being hopeless or helpless.  The patient has

future oriented plans.



VITAL SIGNS:  Reviewed.  Temperature 98, pulse is 70, blood pressure

147/96, respirations 20, oxygen saturation is 97%.



MEDICATIONS:  Reviewed.  Multivitamin, Zofran, Protonix, Lyrica 100 mg

three times a day, Seroquel 25 mg at the nighttime, Effexor 75 mg extended

release, Sonata will be increased to 10 mg.



LABORATORY DATA:  Reviewed.  Most recent was from today.  WBC is 2.8. 

Blood gas reviewed.  Chemistry reviewed.



MENTAL STATUS EXAMINATION:  The patient presented to be alert and oriented,

depressed, flat affect.  Mood described "I don't no feel very good." 

Thought process:  Coherent and goal directed.  Thought content:  The

patient denied visual, auditory, or tactile hallucinations.  Denied

paranoid ideation.  The patient denied thoughts of harming herself or

others.  _____ feeling of hopelessness, but the patient has future oriented

plans.  Insight and judgment seem to be improving.  Impulses are well

controlled.



IMPRESSION:  Rule out major depressive disorder.  The patient has history

of posttraumatic stress disorder, generalized anxiety disorder and panic

disorder, multiple scleroses; at present moment, urinary tract infection

which needs to be on contact isolation and IV antibiotics.



PLAN:  Continue current management.  Medications reviewed.  Sonata

increased.  Neurontin resumed.  We will follow up and advise accordingly.



Thank you very much for letting me participate in care of your patient.





__________________________________________

Tamia Guerra MD



DD:  07/23/2018 15:39:47

DT:  07/23/2018 15:42:26

Job # [id number]
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
## 2018-07-23 NOTE — CP.PCM.PN
<Martin Vela - Last Filed: 07/23/18 14:57>





Subjective





- Date & Time of Evaluation


Date of Evaluation: 07/23/18


Time of Evaluation: 06:25





- Subjective


Subjective: 


Martin Vela PGY-1 For Hospitalist Service





Patient seen and evaluated at bedside. Patient complains of some back pain and 

headache. Did report some pain on urination. No bowel movements since yesterday 

morning. No blurry vision. 








Objective





- Vital Signs/Intake and Output


Vital Signs (last 24 hours): 


 











Temp Pulse Resp BP Pulse Ox


 


 98.5 F   68   20   142/86   100 


 


 07/22/18 21:58  07/22/18 21:58  07/22/18 21:58  07/22/18 21:58  07/22/18 21:58








Intake and Output: 


 











 07/22/18 07/23/18





 18:59 06:59


 


Intake Total  660


 


Balance  660














- Medications


Medications: 


 Current Medications





Acetaminophen (Tylenol 325mg Tab)  650 mg PO Q6H PRN


   PRN Reason: Pain, Mild (1-3)


   Last Admin: 07/23/18 02:58 Dose:  650 mg


Amlodipine Besylate (Norvasc)  5 mg PO DAILY JEFF


Clonazepam (Klonopin)  1 mg PO BID JEFF


   PRN Reason: Protocol


   Last Admin: 07/22/18 17:56 Dose:  1 mg


Folic Acid (Folic Acid)  1 mg PO DAILY JE[facility]


Meropenem (Merrem Iv 1 Gm Premix)  50 mls @ 100 mls/hr IVPB STAT JEFF


   PRN Reason: Protocol


   Last Admin: 07/22/18 11:00 Dose:  100 mls/hr


Meropenem (Merrem Iv 1 Gm Premix)  50 mls @ 100 mls/hr IVPB Q8 JEFF


   PRN Reason: Protocol


   Stop: 07/31/18 22:01


   Last Admin: 07/23/18 05:31 Dose:  100 mls/hr


Insulin Human Regular (Humulin R Low)  1 units SC ACHS JEFF


   PRN Reason: Protocol


   Last Admin: 07/22/18 22:57 Dose:  Not Given


Multivitamins/Minerals (Therapeutic-M Tab)  1 tab PO 0800 JE[facility]


Ondansetron HCl (Zofran Tab)  4 mg PO Q8H PRN


   PRN Reason: Nausea/Vomiting


   Last Admin: 07/22/18 17:57 Dose:  4 mg


Pantoprazole Sodium (Protonix Ec Tab)  40 mg PO 0600 JEFF


   Last Admin: 07/23/18 06:23 Dose:  40 mg


Quetiapine Fumarate (Seroquel)  25 mg PO HS JEFF


   PRN Reason: Protocol


   Last Admin: 07/22/18 21:46 Dose:  25 mg


Venlafaxine HCl (Effexor Xr)  75 mg PO DAILY JEFF


   Last Admin: 07/22/18 16:08 Dose:  75 mg


Zaleplon (Sonata)  5 mg PO HS PRN


   PRN Reason: Insomnia


   Last Admin: 07/22/18 22:23 Dose:  5 mg


Ziprasidone (Geodon Cap)  20 mg PO Q6H PRN; Protocol


   PRN Reason: Agitation











- Labs


Labs: 


 





 07/23/18 06:00 











- Constitutional


Appears: Well, No Acute Distress





- Head Exam


Head Exam: ATRAUMATIC, NORMAL INSPECTION, NORMOCEPHALIC





- Eye Exam


Eye Exam: EOMI, Normal appearance


Pupil Exam: NORMAL ACCOMODATION





- ENT Exam


ENT Exam: Mucous Membranes Moist, Normal Exam





- Neck Exam


Neck Exam: Full ROM, Normal Inspection





- Respiratory Exam


Respiratory Exam: Clear to Ausculation Bilateral, NORMAL BREATHING PATTERN.  

absent: Rales, Wheezes





- Cardiovascular Exam


Cardiovascular Exam: RRR, +S1, +S2





- GI/Abdominal Exam


GI & Abdominal Exam: Soft, Normal Bowel Sounds.  absent: Tenderness, Diminished 

Bowel Sounds





- Extremities Exam


Extremities Exam: Full ROM, Normal Inspection





- Neurological Exam


Neurological Exam: Alert, Awake, Normal Gait, Oriented x3


Additional comments: 


No focal deficits. Ambulating without assistance.








- Psychiatric Exam


Psychiatric exam: Anxious





- Skin


Skin Exam: Dry, Intact, Normal Color, Warm





Assessment and Plan





- Assessment and Plan (Free Text)


Assessment: 


58 year old female with past medical history of multiple sclerosis, fibromyalgia

, anxiety, hypertension, type 2 diabetes, neutropenia, bradycardia s/p 

pacemaker placement under medical management for treatment of ESBL symptomatic 

UTI.








Plan: 


ESBL E. coli UTI


 Urinalysis: Positive for nitrates and Leukocyte esterase, Urine CX shows ESBL 

E. Coli 


 Formerly on rocephin, then switched to Nitrofurantoin for UTI. Sensitivities 

returned, resistant to nitrofurantoin, so now on Meropenem 50 q8 day 2 


 Contact precautions


 ID consult: Dr. Gale


 MG 1.6 today. Will monitor.





Normocytic Normochromic Anemia with Leukopenia


Chronically anemic, likely 2/2 chronic disease with worsening leukopenia


Hgb 8.4 today (9.7 yesterday) and Leukopenic 2.8 


Likely 2/2 rocephin and meropenem effects


Will continue to monitor 


Hemodynamically stable, no active bleeding





Fibromyalgia/anxiety


 continue on home med of Venlafaxine and pregabalin


 continue  sonata 5 mg for help sleeping as per Dr. Cantrell


 Has better response to Ativan - Ativan 1 mg q6h prn added today for anxiety, d/

c klonipin


 Restarted home Lyrica 100 TID


 F/u Psych evaluation and recommendations regarding her orientation and 

hallucinations





Multiple Sclerosis


 f/u for continuous care as an outpatient





Prophylaxis


Protonix/SCD





Patient seen, case reviewed, and plan agreed upon with Dr. Saha.


Martin Vela, PGY-1








<Natanael Saha - Last Filed: 07/23/18 17:10>





Objective





- Vital Signs/Intake and Output


Vital Signs (last 24 hours): 


 











Temp Pulse Resp BP Pulse Ox


 


 98 F   70   20   147/96 H  97 


 


 07/23/18 06:00  07/23/18 10:04  07/23/18 06:00  07/23/18 10:04  07/23/18 06:00








Intake and Output: 


 











 07/23/18 07/23/18





 06:59 18:59


 


Intake Total 660 480


 


Balance 660 480














- Medications


Medications: 


 Current Medications





Acetaminophen (Tylenol 325mg Tab)  650 mg PO Q6H PRN


   PRN Reason: Pain, Mild (1-3)


   Last Admin: 07/23/18 10:03 Dose:  650 mg


Amlodipine Besylate (Norvasc)  5 mg PO DAILY [facility]


   Last Admin: 07/23/18 10:04 Dose:  5 mg


Clonazepam (Klonopin)  1 mg PO BID JEFF


   PRN Reason: Protocol


   Last Admin: 07/23/18 10:04 Dose:  1 mg


Folic Acid (Folic Acid)  1 mg PO DAILY [facility]


   Last Admin: 07/23/18 10:04 Dose:  1 mg


Gabapentin (Neurontin)  300 mg PO TID JEFF


   PRN Reason: Protocol


Meropenem (Merrem Iv 1 Gm Premix)  50 mls @ 100 mls/hr IVPB Q8 JEFF


   PRN Reason: Protocol


   Stop: 07/31/18 22:01


   Last Admin: 07/23/18 13:23 Dose:  100 mls/hr


Insulin Human Regular (Humulin R Low)  1 units SC ACHS JEFF


   PRN Reason: Protocol


   Last Admin: 07/23/18 12:30 Dose:  Not Given


Lorazepam (Ativan)  1 mg IVP Q6H PRN; Protocol


   PRN Reason: Anxiety


   Last Admin: 07/23/18 14:37 Dose:  1 mg


Multivitamins/Minerals (Therapeutic-M Tab)  1 tab PO 0800 JEFF


   Last Admin: 07/23/18 10:04 Dose:  1 tab


Ondansetron HCl (Zofran Tab)  4 mg PO Q8H PRN


   PRN Reason: Nausea/Vomiting


   Last Admin: 07/23/18 10:02 Dose:  4 mg


Pantoprazole Sodium (Protonix Ec Tab)  40 mg PO 0600 JEFF


   Last Admin: 07/23/18 06:23 Dose:  40 mg


Pregabalin (Lyrica)  100 mg PO TID JEFF


   Last Admin: 07/23/18 13:22 Dose:  100 mg


Quetiapine Fumarate (Seroquel)  25 mg PO HS JEFF


   PRN Reason: Protocol


   Last Admin: 07/22/18 21:46 Dose:  25 mg


Venlafaxine HCl (Effexor Xr)  75 mg PO DAILY JEFF


   Last Admin: 07/23/18 10:04 Dose:  75 mg


Zaleplon (Sonata)  10 mg PO HS PRN


   PRN Reason: Insomnia


Ziprasidone (Geodon Cap)  20 mg PO Q6H PRN; Protocol


   PRN Reason: Agitation











- Labs


Labs: 


 





 07/23/18 06:00 





 07/23/18 06:00 











Attending/Attestation





- Attestation


I have personally seen and examined this patient.: Yes


I have fully participated in the care of the patient.: Yes


I have reviewed all pertinent clinical information, including history, physical 

exam and plan: Yes


Notes (Text): 





07/23/18 17:08


58 year old female with past medical history of hypertension, diet controlled 

diabetes, fibromyalgias, multiple sclerosis, bradycardia s/p PPM and anxiety 

who initially presented with altered mental status, recent fall and 

hallucinations per family.  She was being managed in inpatient psychiatric unit 

however urine culture came back as ESBL E Coli so patient was transferred to 

medical floor to be treated with iv meropenem.  ID is following.  Will continue 

to monitor leukopenia.  Continue with norvasc for hypertension and insulin ss 

for diabetes.  She is currently on seroquel and effexor.  Klonopin was switched 

to ativan prn.  Will discuss with psychiatrist.





Natanael Saha MD


Hospitalist.
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
## 2018-07-24 LAB
ALBUMIN SERPL-MCNC: 3.1 G/DL (ref 3–4.8)
ALBUMIN/GLOB SERPL: 1 {RATIO} (ref 1.1–1.8)
ALT SERPL-CCNC: 29 U/L (ref 7–56)
AST SERPL-CCNC: 33 U/L (ref 14–36)
BASOPHILS # BLD AUTO: 0.01 K/MM3 (ref 0–2)
BASOPHILS NFR BLD: 0.3 % (ref 0–3)
BUN SERPL-MCNC: 13 MG/DL (ref 7–21)
CALCIUM SERPL-MCNC: 8.6 MG/DL (ref 8.4–10.5)
EOSINOPHIL # BLD: 0 10*3/UL (ref 0–0.7)
EOSINOPHIL NFR BLD: 1.3 % (ref 1.5–5)
ERYTHROCYTE [DISTWIDTH] IN BLOOD BY AUTOMATED COUNT: 13.2 % (ref 11.5–14.5)
GFR NON-AFRICAN AMERICAN: > 60
GRANULOCYTES # BLD: 1.44 10*3/UL (ref 1.4–6.5)
GRANULOCYTES NFR BLD: 46 % (ref 50–68)
HGB BLD-MCNC: 9.5 G/DL (ref 12–16)
LYMPHOCYTES # BLD: 1.4 10*3/UL (ref 1.2–3.4)
LYMPHOCYTES NFR BLD AUTO: 44.1 % (ref 22–35)
MCH RBC QN AUTO: 30.1 PG (ref 25–35)
MCHC RBC AUTO-ENTMCNC: 31.7 G/DL (ref 31–37)
MCV RBC AUTO: 94.9 FL (ref 80–105)
MONOCYTES # BLD AUTO: 0.3 10*3/UL (ref 0.1–0.6)
MONOCYTES NFR BLD: 8.3 % (ref 1–6)
PLATELET # BLD: 204 10^3/UL (ref 120–450)
PMV BLD AUTO: 10.1 FL (ref 7–11)
RBC # BLD AUTO: 3.16 10^6/UL (ref 3.5–6.1)
WBC # BLD AUTO: 3.1 10^3/UL (ref 4.5–11)

## 2018-07-24 RX ADMIN — MEROPENEM SCH MLS/HR: 1 INJECTION INTRAVENOUS at 21:15

## 2018-07-24 RX ADMIN — INSULIN HUMAN SCH: 100 INJECTION, SOLUTION PARENTERAL at 16:32

## 2018-07-24 RX ADMIN — MEROPENEM SCH MLS/HR: 1 INJECTION INTRAVENOUS at 14:47

## 2018-07-24 RX ADMIN — PANTOPRAZOLE SODIUM SCH MG: 40 TABLET, DELAYED RELEASE ORAL at 05:40

## 2018-07-24 RX ADMIN — MEROPENEM SCH MLS/HR: 1 INJECTION INTRAVENOUS at 05:41

## 2018-07-24 RX ADMIN — VENLAFAXINE HYDROCHLORIDE SCH MG: 75 CAPSULE, EXTENDED RELEASE ORAL at 09:57

## 2018-07-24 RX ADMIN — INSULIN HUMAN SCH: 100 INJECTION, SOLUTION PARENTERAL at 21:52

## 2018-07-24 RX ADMIN — INSULIN HUMAN SCH: 100 INJECTION, SOLUTION PARENTERAL at 12:15

## 2018-07-24 RX ADMIN — INSULIN HUMAN SCH: 100 INJECTION, SOLUTION PARENTERAL at 07:53

## 2018-07-24 RX ADMIN — MULTIPLE VITAMINS W/ MINERALS TAB SCH TAB: TAB at 09:56

## 2018-07-24 NOTE — CARD
--------------- APPROVED REPORT --------------





Date of service: 07/24/2018



EKG Measurement

Heart [id number]

VT [id number]

[id number]

[id number]

[id number]



<Conclusion>

Normal sinus rhythm with sinus arrhythmia

A Functioning demand Atrial pacemaker

Normal ECG

## 2018-07-24 NOTE — CP.PCM.PN
<Martin Vela - Last Filed: 07/24/18 15:06>





Subjective





- Date & Time of Evaluation


Date of Evaluation: 07/24/18


Time of Evaluation: 06:20





- Subjective


Subjective: 


Martin Vela PGY-1 Progress Note for Hospitalist Service





Patient seen and evaluated bedside. Patient admits to some pain on urination. 

Denies CP, SOB, and abdominal pain.








Objective





- Vital Signs/Intake and Output


Vital Signs (last 24 hours): 


 











Temp Pulse Resp BP Pulse Ox


 


 98.4 F   70   20   133/84   97 


 


 07/24/18 06:00  07/24/18 09:59  07/24/18 06:00  07/24/18 09:59  07/24/18 06:00








Intake and Output: 


 











 07/24/18 07/24/18





 06:59 18:59


 


Intake Total 620 


 


Balance 620 














- Medications


Medications: 


 Current Medications





Acetaminophen (Tylenol 325mg Tab)  650 mg PO Q4H PRN


   PRN Reason: Pain, Mild (1-3)


Amlodipine Besylate (Norvasc)  5 mg PO DAILY JEFF


   Last Admin: 07/24/18 09:59 Dose:  5 mg


Clonazepam (Klonopin)  1 mg PO BID JEFF


   PRN Reason: Protocol


   Last Admin: 07/23/18 10:04 Dose:  1 mg


Folic Acid (Folic Acid)  1 mg PO DAILY JEFF


   Last Admin: 07/24/18 09:57 Dose:  1 mg


Gabapentin (Neurontin)  300 mg PO TID JEFF


   PRN Reason: Protocol


   Last Admin: 07/24/18 09:56 Dose:  300 mg


Meropenem (Merrem Iv 1 Gm Premix)  50 mls @ 100 mls/hr IVPB Q8 JEFF


   PRN Reason: Protocol


   Stop: 07/31/18 22:01


   Last Admin: 07/24/18 05:41 Dose:  100 mls/hr


Insulin Human Regular (Humulin R Low)  1 units SC ACHS JEFF


   PRN Reason: Protocol


   Last Admin: 07/24/18 12:15 Dose:  Not Given


Lorazepam (Ativan)  1 mg IVP Q6H PRN; Protocol


   PRN Reason: Anxiety


   Last Admin: 07/24/18 10:53 Dose:  1 mg


Multivitamins/Minerals (Therapeutic-M Tab)  1 tab PO 0800 JEFF


   Last Admin: 07/24/18 09:56 Dose:  1 tab


Ondansetron HCl (Zofran Tab)  4 mg PO Q8H PRN


   PRN Reason: Nausea/Vomiting


   Last Admin: 07/24/18 12:38 Dose:  4 mg


Pantoprazole Sodium (Protonix Ec Tab)  40 mg PO 0600 [facility]


   Last Admin: 07/24/18 05:40 Dose:  40 mg


Pregabalin (Lyrica)  100 mg PO TID [facility]


   Last Admin: 07/24/18 09:57 Dose:  100 mg


Quetiapine Fumarate (Seroquel)  25 mg PO HS JEFF


   PRN Reason: Protocol


   Last Admin: 07/23/18 22:03 Dose:  25 mg


Venlafaxine HCl (Effexor Xr)  75 mg PO DAILY [facility]


   Last Admin: 07/24/18 09:57 Dose:  75 mg


Zaleplon (Sonata)  10 mg PO HS PRN


   PRN Reason: Insomnia


   Last Admin: 07/23/18 22:27 Dose:  10 mg











- Labs


Labs: 


 





 07/24/18 06:20 





 07/24/18 06:20 











- Constitutional


Appears: Well, Non-toxic, No Acute Distress





- Head Exam


Head Exam: ATRAUMATIC, NORMAL INSPECTION, NORMOCEPHALIC





- Eye Exam


Eye Exam: EOMI, PERRL


Pupil Exam: NORMAL ACCOMODATION





- ENT Exam


ENT Exam: Mucous Membranes Moist





- Neck Exam


Neck Exam: Normal Inspection





- Respiratory Exam


Respiratory Exam: Clear to Ausculation Bilateral, NORMAL BREATHING PATTERN.  

absent: Rales, Rhonchi, Wheezes





- Cardiovascular Exam


Cardiovascular Exam: RRR, +S1, +S2





- GI/Abdominal Exam


GI & Abdominal Exam: Soft, Normal Bowel Sounds.  absent: Tenderness





- Extremities Exam


Extremities Exam: Full ROM





- Neurological Exam


Neurological Exam: Alert, Awake





- Psychiatric Exam


Psychiatric exam: Anxious





- Skin


Skin Exam: Dry, Intact, Normal Color, Warm





Assessment and Plan





- Assessment and Plan (Free Text)


Assessment: 


Assessment: 


58 year old female with past medical history of multiple sclerosis, fibromyalgia

, anxiety, hypertension, type 2 diabetes, neutropenia, bradycardia s/p 

pacemaker placement under medical management for treatment of ESBL symptomatic 

UTI.





Plan: 


ESBL E. coli UTI


 Urinalysis: Positive for nitrates and Leukocyte esterase, Urine CX shows ESBL 

E. Coli 


 Formerly on rocephin, then switched to Nitrofurantoin for UTI. Sensitivities 

returned, resistant to nitrofurantoin. Now on Meropenem 50 q8 day 3. Would 

appreciate recommendations regarding the possibility of cutting down length of 

course of Meropenem based on clinical symptoms, hemodynamic stability, 

improving luekopenia and improving anemia


 Contact precautions


 ID consult: Dr. Gale


 MG 1.8 today. Will continue to monitor.





Normocytic Normochromic Anemia with Leukopenia - Improving


 Chronically anemic, likely 2/2 chronic disease with leukopenia


 Hgb 9.5 today (8.4 yesterday) and Leukopenic 3.1 (2.8 yesterday) 


 Likely 2/2 rocephin and meropenem effects


 Will continue to monitor 


 Hemodynamically stable, no active bleeding





Chest Pain


 Patient had complaints of chest pain, non-radiating, no N/V, no diaphoresis.


 EKG showed NSR @68 bpm


 Trop neg x1. Will trend 2nd troponin





Diarrhea 


 Patient complains of a few nonbloody bowel movements over the last few days


 Ordered c. diff toxin so will follow up on result





Fibromyalgia/anxiety


 continue on home med of Venlafaxine 75 and pregabalin 100 TID. Gabapentin 300 

TID added per Psych


 continue  sonata 5 mg for help sleeping as per Dr. Cantrell


 Has better response to Ativan - Ativan 1 mg q6h prn added today for anxiety, d/

c klonipin


 F/u Psych evaluation and recommendations regarding her orientation and 

hallucinations





Multiple Sclerosis


 f/u for continuous care as an outpatient





Prophylaxis


Protonix/SCD





Patient seen, case reviewed, and plan agreed upon with Dr. Pro.


Martin Vela, PGY-1











<Marcus Pro - Last Filed: 07/26/18 14:47>





Objective





- Vital Signs/Intake and Output


Vital Signs (last 24 hours): 


 











Temp Pulse Resp BP Pulse Ox


 


 98.8 F   73   20   136/85   100 


 


 07/25/18 14:00  07/25/18 14:00  07/25/18 14:00  07/25/18 14:00  07/25/18 14:00











- Labs


Labs: 


 





 07/25/18 08:00 





 07/25/18 06:45 











Attending/Attestation





- Attestation


I have personally seen and examined this patient.: Yes


I have fully participated in the care of the patient.: Yes


I have reviewed all pertinent clinical information, including history, physical 

exam and plan: Yes


Notes (Text): 





07/26/18 14:44


Medical record note made by the resident after discussion with my direction and 

input after the patient was personally seen and examined by me. I have reviewed 

the chart and agree that the record accurately reflects by personal performance 

of the history, physical exam, data review, and medical decision-making, in the 

course for the patient. I have also personally directed the plan of care.





58-year-old female with PMH of  multiple sclerosis, fibromyalgia,Gastric Bypass 

surgery,  bradycardia s/p AICD placement, hypertension, obesity, diabetes, 

chronic anemia with ESBL UTI.Patient is afebrile, no asymptomatic, blood 

cultures are negative for any growth on IV antibiotics as per ID.


Diarrhea is rersolved.


Patient is not having any hallucination.





Neutropenia is chronic and is improving.








Management plan was discussed in detail with patient 


Education was provided.

## 2018-07-25 ENCOUNTER — HOSPITAL ENCOUNTER (INPATIENT)
Dept: HOSPITAL 42 - TRCU | Age: 58
LOS: 8 days | Discharge: HOME | DRG: 690 | End: 2018-08-02
Attending: INTERNAL MEDICINE | Admitting: INTERNAL MEDICINE
Payer: COMMERCIAL

## 2018-07-25 VITALS
DIASTOLIC BLOOD PRESSURE: 85 MMHG | SYSTOLIC BLOOD PRESSURE: 136 MMHG | TEMPERATURE: 98.8 F | OXYGEN SATURATION: 100 % | HEART RATE: 73 BPM

## 2018-07-25 VITALS — RESPIRATION RATE: 20 BRPM

## 2018-07-25 VITALS — BODY MASS INDEX: 35.9 KG/M2

## 2018-07-25 DIAGNOSIS — N30.90: Primary | ICD-10-CM

## 2018-07-25 DIAGNOSIS — F41.9: ICD-10-CM

## 2018-07-25 DIAGNOSIS — Z95.0: ICD-10-CM

## 2018-07-25 DIAGNOSIS — B96.20: ICD-10-CM

## 2018-07-25 DIAGNOSIS — F32.89: ICD-10-CM

## 2018-07-25 DIAGNOSIS — B37.3: ICD-10-CM

## 2018-07-25 DIAGNOSIS — G62.9: ICD-10-CM

## 2018-07-25 DIAGNOSIS — B96.1: ICD-10-CM

## 2018-07-25 DIAGNOSIS — E11.9: ICD-10-CM

## 2018-07-25 DIAGNOSIS — Z79.899: ICD-10-CM

## 2018-07-25 DIAGNOSIS — I10: ICD-10-CM

## 2018-07-25 DIAGNOSIS — G35: ICD-10-CM

## 2018-07-25 DIAGNOSIS — Z87.440: ICD-10-CM

## 2018-07-25 DIAGNOSIS — M79.7: ICD-10-CM

## 2018-07-25 DIAGNOSIS — D64.9: ICD-10-CM

## 2018-07-25 DIAGNOSIS — Z98.84: ICD-10-CM

## 2018-07-25 DIAGNOSIS — E66.01: ICD-10-CM

## 2018-07-25 DIAGNOSIS — Z16.12: ICD-10-CM

## 2018-07-25 DIAGNOSIS — Z88.1: ICD-10-CM

## 2018-07-25 DIAGNOSIS — Z95.810: ICD-10-CM

## 2018-07-25 DIAGNOSIS — D70.9: ICD-10-CM

## 2018-07-25 DIAGNOSIS — Z79.4: ICD-10-CM

## 2018-07-25 LAB
ALBUMIN SERPL-MCNC: 3.1 G/DL (ref 3–4.8)
ALBUMIN/GLOB SERPL: 1 {RATIO} (ref 1.1–1.8)
ALT SERPL-CCNC: 31 U/L (ref 7–56)
AST SERPL-CCNC: 30 U/L (ref 14–36)
BASOPHILS # BLD AUTO: 0.01 K/MM3 (ref 0–2)
BASOPHILS NFR BLD: 0.3 % (ref 0–3)
BUN SERPL-MCNC: 15 MG/DL (ref 7–21)
CALCIUM SERPL-MCNC: 8.7 MG/DL (ref 8.4–10.5)
EOSINOPHIL # BLD: 0.1 10*3/UL (ref 0–0.7)
EOSINOPHIL NFR BLD: 1.5 % (ref 1.5–5)
ERYTHROCYTE [DISTWIDTH] IN BLOOD BY AUTOMATED COUNT: 13.1 % (ref 11.5–14.5)
GFR NON-AFRICAN AMERICAN: > 60
GRANULOCYTES # BLD: 1.61 10*3/UL (ref 1.4–6.5)
GRANULOCYTES NFR BLD: 48.6 % (ref 50–68)
HGB BLD-MCNC: 10 G/DL (ref 12–16)
LYMPHOCYTES # BLD: 1.4 10*3/UL (ref 1.2–3.4)
LYMPHOCYTES NFR BLD AUTO: 42.3 % (ref 22–35)
MCH RBC QN AUTO: 30.1 PG (ref 25–35)
MCHC RBC AUTO-ENTMCNC: 31.3 G/DL (ref 31–37)
MCV RBC AUTO: 96.1 FL (ref 80–105)
MONOCYTES # BLD AUTO: 0.2 10*3/UL (ref 0.1–0.6)
MONOCYTES NFR BLD: 7.3 % (ref 1–6)
PLATELET # BLD: 210 10^3/UL (ref 120–450)
PMV BLD AUTO: 9.7 FL (ref 7–11)
RBC # BLD AUTO: 3.32 10^6/UL (ref 3.5–6.1)
WBC # BLD AUTO: 3.3 10^3/UL (ref 4.5–11)

## 2018-07-25 RX ADMIN — INSULIN HUMAN SCH: 100 INJECTION, SOLUTION PARENTERAL at 21:14

## 2018-07-25 RX ADMIN — MEROPENEM SCH MLS/HR: 1 INJECTION INTRAVENOUS at 06:05

## 2018-07-25 RX ADMIN — VENLAFAXINE HYDROCHLORIDE SCH MG: 75 CAPSULE, EXTENDED RELEASE ORAL at 09:50

## 2018-07-25 RX ADMIN — INSULIN HUMAN SCH UNITS: 100 INJECTION, SOLUTION PARENTERAL at 16:02

## 2018-07-25 RX ADMIN — MEROPENEM SCH MLS/HR: 1 INJECTION INTRAVENOUS at 21:52

## 2018-07-25 RX ADMIN — PANTOPRAZOLE SODIUM SCH MG: 40 TABLET, DELAYED RELEASE ORAL at 06:06

## 2018-07-25 RX ADMIN — INSULIN HUMAN SCH: 100 INJECTION, SOLUTION PARENTERAL at 12:28

## 2018-07-25 RX ADMIN — MEROPENEM SCH MLS/HR: 1 INJECTION INTRAVENOUS at 14:32

## 2018-07-25 RX ADMIN — MULTIPLE VITAMINS W/ MINERALS TAB SCH TAB: TAB at 09:50

## 2018-07-25 RX ADMIN — INSULIN HUMAN SCH: 100 INJECTION, SOLUTION PARENTERAL at 08:00

## 2018-07-25 NOTE — PN
DATE:  07/25/2018



SUBJECTIVE:  The patient is in bed, in no acute distress, nontoxic.



PHYSICAL EXAMINATION:

VITAL SIGNS:  Temperature is 98, blood pressure is 116/70, respiratory rate

of 20.

HEENT:  Examination of HEENT is unremarkable.

NECK:  Supple.

LUNGS:  Have decreased breath sounds.

HEART:  Normal S1 and S2.

ABDOMEN:  Soft, nontender.



LABORATORY DATA:  The laboratory examination reveals a white count of 3.3,

hemoglobin of more than 10.  Chemistries are noted.  Blood cultures are

negative.  Review of orders reveals the patient to be on meropenem.



ASSESSMENT AND PLAN:  A 58-year-old female with depression, anxiety,

fibromyalgia, morbid obesity, body mass index of 58 that is super morbid

obesity, multiple sclerosis, diabetes, hypertension, automatic implantable

cardioverter-defibrillator, pacemaker placement, port placement.  Admitted

from the Psychiatry floor with extended-spectrum beta-lactamase Escherichia

coli cystitis, on day #4 of meropenem, would complete 5-7 days.  Review of

orders reveals the meropenem to be active.





__________________________________________

Manuel Gale MD





DD:  07/25/2018 12:17:06

DT:  07/25/2018 13:21:54

Job # [id number]
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
## 2018-07-25 NOTE — CP.PCM.DIS
<Martin Vela - Last Filed: 07/25/18 16:02>





Provider





- Provider


Date of Admission: 


07/22/18 16:04





Attending physician: 


Marcus Pro MD





Consults: 


Psychiatry (Mari), Infectious Diseases (Shahla), 





Time Spent in preparation of Discharge (in minutes): 35





Hospital Course





- Lab Results


Lab Results: 


 Most Recent Lab Values











WBC  3.3 10^3/ul (4.5-11.0)  L  07/25/18  08:00    


 


RBC  3.32 10^6/uL (3.5-6.1)  L  07/25/18  08:00    


 


Hgb  10.0 g/dL (12.0-16.0)  L  07/25/18  08:00    


 


Hct  31.9 % (36.0-48.0)  L  07/25/18  08:00    


 


MCV  96.1 fl (80.0-105.0)   07/25/18  08:00    


 


MCH  30.1 pg (25.0-35.0)   07/25/18  08:00    


 


MCHC  31.3 g/dl (31.0-37.0)   07/25/18  08:00    


 


RDW  13.1 % (11.5-14.5)   07/25/18  08:00    


 


Plt Count  210 10^3/uL (120.0-450.0)   07/25/18  08:00    


 


MPV  9.7 fl (7.0-11.0)   07/25/18  08:00    


 


Gran %  48.6 % (50.0-68.0)  L  07/25/18  08:00    


 


Lymph % (Auto)  42.3 % (22.0-35.0)  H  07/25/18  08:00    


 


Mono % (Auto)  7.3 % (1.0-6.0)  H  07/25/18  08:00    


 


Eos % (Auto)  1.5 % (1.5-5.0)   07/25/18  08:00    


 


Baso % (Auto)  0.3 % (0.0-3.0)   07/25/18  08:00    


 


Gran #  1.61  (1.4-6.5)   07/25/18  08:00    


 


Lymph # (Auto)  1.4  (1.2-3.4)   07/25/18  08:00    


 


Mono # (Auto)  0.2  (0.1-0.6)   07/25/18  08:00    


 


Eos # (Auto)  0.1  (0.0-0.7)   07/25/18  08:00    


 


Baso # (Auto)  0.01 K/mm3 (0.0-2.0)   07/25/18  08:00    


 


pO2  127 mm/Hg (30-55)  H  07/22/18  10:30    


 


VBG pH  7.40  (7.32-7.43)   07/22/18  10:30    


 


VBG pCO2  44.0  (40-60)   07/22/18  10:30    


 


VBG HCO3  27.3 mmol/l (21-28)   07/22/18  10:30    


 


VBG Total CO2  28.7 mmol.L (22-28)  H  07/22/18  10:30    


 


VBG O2 Sat (Calc)  95.1 % (40-65)  H  07/22/18  10:30    


 


VBG Base Excess  2.0 mmol/L (0.0-2.0)   07/22/18  10:30    


 


VBG Potassium  3.9 mmol/L (3.6-5.2)   07/22/18  10:30    


 


Sodium  140.0 mmol/L (132-148)   07/22/18  10:30    


 


Chloride  108.0 mmol/L ()  H  07/22/18  10:30    


 


Glucose  94 mg/dl ()   07/22/18  10:30    


 


Lactate  0.8 mmol/L (0.7-2.1)   07/22/18  10:30    


 


FiO2  21.0 %  07/22/18  10:30    


 


Sodium  142 mmol/L (132-148)   07/25/18  06:45    


 


Potassium  4.2 mmol/L (3.6-5.0)   07/25/18  06:45    


 


Chloride  104 mmol/L ()   07/25/18  06:45    


 


Carbon Dioxide  32 mmol/L (21-33)   07/25/18  06:45    


 


Anion Gap  10  (10-20)   07/25/18  06:45    


 


BUN  15 mg/dL (7-21)   07/25/18  06:45    


 


Creatinine  0.6 mg/dl (0.7-1.2)  L  07/25/18  06:45    


 


Est GFR ( Amer)  > 60   07/25/18  06:45    


 


Est GFR (Non-Af Amer)  > 60   07/25/18  06:45    


 


POC Glucose (mg/dL)  110 mg/dL ()   07/25/18  11:02    


 


Random Glucose  88 mg/dL ()   07/25/18  06:45    


 


Calcium  8.7 mg/dL (8.4-10.5)   07/25/18  06:45    


 


Phosphorus  3.7 mg/dL (2.5-4.5)   07/25/18  06:45    


 


Magnesium  1.9 mg/dL (1.7-2.2)   07/25/18  06:45    


 


Total Bilirubin  0.2 mg/dL (0.2-1.3)   07/25/18  06:45    


 


AST  30 U/L (14-36)   07/25/18  06:45    


 


ALT  31 U/L (7-56)   07/25/18  06:45    


 


Alkaline Phosphatase  76 U/L ()   07/25/18  06:45    


 


Troponin I  < 0.01 ng/mL  07/24/18  17:30    


 


Total Protein  6.2 g/dL (5.8-8.3)   07/25/18  06:45    


 


Albumin  3.1 g/dL (3.0-4.8)   07/25/18  06:45    


 


Globulin  3.1 gm/dL  07/25/18  06:45    


 


Albumin/Globulin Ratio  1.0  (1.1-1.8)  L  07/25/18  06:45    


 


Venous Blood Potassium  3.9 mmol/L (3.6-5.2)   07/22/18  10:30    














- Hospital Course


Hospital Course: 


Martin Vela, PGY-1 Discharge Summary for Hospitalist Service 





58 year old female with past medical history of hypertension, diet controlled 

diabetes, fibromyalgias, multiple sclerosis, bradycardia s/p PPM and anxiety 

who initially presented with altered mental status, recent fall and 

hallucinations per family.  She was being managed in inpatient psychiatric unit 

last week on Bactrim for her Urinary Tract Infection, however urine culture 

came back as ESBL E Coli so patient was transferred to medical floor to be 

treated with iv meropenem based on sensitivity profile. ID recommendations were 

to complete 7 days of Antibiotics, currently on day 4 of Meropenem. Patient had 

leukopenia which is improving based on labs today and is likely secondary to 

antibiotic effects. For hypertension, patient was on Norvasc. Patient was on 

insulin ss for diabetes. Per Psych recommendations, patient on seroquel and 

effexor and Neurontin.  Klonopin was switched to ativan. 


Patient had complaints of chest pain on 7/24- EKG showed NSR @ 68 bpm with 

atrial pacemaker. Troponins x2 were found to be negative.


Patient also complained of diarrhea x2 bowel movements yesterday, but patient 

was notified that need at least 3 bowel movements on consecutive days to be 

tested for c. diff toxin, as intermittent diarrhea may be due to antibiotic use 

in general. Only 1 soft bowel movement was documented today.


Physical therapy recommendations were to transfer the patient to TCU for rehab 

services on IV antibiotics. Patient understood reasons for transfer and all 

questions were answered to the patient's satisfaction.





We will continue medical management and follow up on patient's condition in the 

TCU tomorrow. 











Discharge Exam





- Head Exam


Head Exam: ATRAUMATIC, NORMAL INSPECTION, NORMOCEPHALIC





- Eye Exam


Eye Exam: EOMI, Normal appearance


Pupil Exam: PERRL





- ENT Exam


ENT Exam: Mucous Membranes Moist





- Respiratory Exam


Respiratory Exam: NORMAL BREATHING PATTERN, UNREMARKABLE.  absent: Rales, 

Rhonchi





- Cardiovascular Exam


Cardiovascular Exam: RRR, +S1, +S2


Additional comments: 


Atrial pacemaker in place. Skin Dry and intact.








- GI/Abdominal Exam


GI & Abdominal Exam: Normal Bowel Sounds, Soft, Unremarkable.  absent: 

Tenderness





- Neurological Exam


Neurological exam: Alert





- Psychiatric Exam


Psychiatric exam: Anxious





- Skin


Skin Exam: Dry, Intact, Normal Color, Warm





Discharge Plan





- Follow Up Plan


Condition: FAIR


Disposition: TRANSF TO SNF


Instructions:  Urinary Tract Infections in Adults, Generalized Anxiety Disorder

, Smoking: Not Just Harmful to Your Lungs and Heart, Extended-Spectrum Beta 

Lactamase Infection, Depression (DC)


Additional Instructions: 


Please follow all prescription recommendations, including to complete course of 

antibiotics.


Please follow physical therapy recommendations while in rehab in TCU.








<Marcus Pro - Last Filed: 07/26/18 14:57>





Provider





- Provider


Date of Admission: 


07/22/18 16:04





Attending physician: 


Marcus Pro MD








Hospital Course





- Lab Results


Lab Results: 


 Most Recent Lab Values











WBC  3.3 10^3/ul (4.5-11.0)  L  07/25/18  08:00    


 


RBC  3.32 10^6/uL (3.5-6.1)  L  07/25/18  08:00    


 


Hgb  10.0 g/dL (12.0-16.0)  L  07/25/18  08:00    


 


Hct  31.9 % (36.0-48.0)  L  07/25/18  08:00    


 


MCV  96.1 fl (80.0-105.0)   07/25/18  08:00    


 


MCH  30.1 pg (25.0-35.0)   07/25/18  08:00    


 


MCHC  31.3 g/dl (31.0-37.0)   07/25/18  08:00    


 


RDW  13.1 % (11.5-14.5)   07/25/18  08:00    


 


Plt Count  210 10^3/uL (120.0-450.0)   07/25/18  08:00    


 


MPV  9.7 fl (7.0-11.0)   07/25/18  08:00    


 


Gran %  48.6 % (50.0-68.0)  L  07/25/18  08:00    


 


Lymph % (Auto)  42.3 % (22.0-35.0)  H  07/25/18  08:00    


 


Mono % (Auto)  7.3 % (1.0-6.0)  H  07/25/18  08:00    


 


Eos % (Auto)  1.5 % (1.5-5.0)   07/25/18  08:00    


 


Baso % (Auto)  0.3 % (0.0-3.0)   07/25/18  08:00    


 


Gran #  1.61  (1.4-6.5)   07/25/18  08:00    


 


Lymph # (Auto)  1.4  (1.2-3.4)   07/25/18  08:00    


 


Mono # (Auto)  0.2  (0.1-0.6)   07/25/18  08:00    


 


Eos # (Auto)  0.1  (0.0-0.7)   07/25/18  08:00    


 


Baso # (Auto)  0.01 K/mm3 (0.0-2.0)   07/25/18  08:00    


 


pO2  127 mm/Hg (30-55)  H  07/22/18  10:30    


 


VBG pH  7.40  (7.32-7.43)   07/22/18  10:30    


 


VBG pCO2  44.0  (40-60)   07/22/18  10:30    


 


VBG HCO3  27.3 mmol/l (21-28)   07/22/18  10:30    


 


VBG Total CO2  28.7 mmol.L (22-28)  H  07/22/18  10:30    


 


VBG O2 Sat (Calc)  95.1 % (40-65)  H  07/22/18  10:30    


 


VBG Base Excess  2.0 mmol/L (0.0-2.0)   07/22/18  10:30    


 


VBG Potassium  3.9 mmol/L (3.6-5.2)   07/22/18  10:30    


 


Sodium  140.0 mmol/L (132-148)   07/22/18  10:30    


 


Chloride  108.0 mmol/L ()  H  07/22/18  10:30    


 


Glucose  94 mg/dl ()   07/22/18  10:30    


 


Lactate  0.8 mmol/L (0.7-2.1)   07/22/18  10:30    


 


FiO2  21.0 %  07/22/18  10:30    


 


Sodium  142 mmol/L (132-148)   07/25/18  06:45    


 


Potassium  4.2 mmol/L (3.6-5.0)   07/25/18  06:45    


 


Chloride  104 mmol/L ()   07/25/18  06:45    


 


Carbon Dioxide  32 mmol/L (21-33)   07/25/18  06:45    


 


Anion Gap  10  (10-20)   07/25/18  06:45    


 


BUN  15 mg/dL (7-21)   07/25/18  06:45    


 


Creatinine  0.6 mg/dl (0.7-1.2)  L  07/25/18  06:45    


 


Est GFR ( Amer)  > 60   07/25/18  06:45    


 


Est GFR (Non-Af Amer)  > 60   07/25/18  06:45    


 


POC Glucose (mg/dL)  117 mg/dL ()  H  07/25/18  21:14    


 


Random Glucose  88 mg/dL ()   07/25/18  06:45    


 


Calcium  8.7 mg/dL (8.4-10.5)   07/25/18  06:45    


 


Phosphorus  3.7 mg/dL (2.5-4.5)   07/25/18  06:45    


 


Magnesium  1.9 mg/dL (1.7-2.2)   07/25/18  06:45    


 


Total Bilirubin  0.2 mg/dL (0.2-1.3)   07/25/18  06:45    


 


AST  30 U/L (14-36)   07/25/18  06:45    


 


ALT  31 U/L (7-56)   07/25/18  06:45    


 


Alkaline Phosphatase  76 U/L ()   07/25/18  06:45    


 


Troponin I  < 0.01 ng/mL  07/24/18  17:30    


 


Total Protein  6.2 g/dL (5.8-8.3)   07/25/18  06:45    


 


Albumin  3.1 g/dL (3.0-4.8)   07/25/18  06:45    


 


Globulin  3.1 gm/dL  07/25/18  06:45    


 


Albumin/Globulin Ratio  1.0  (1.1-1.8)  L  07/25/18  06:45    


 


Venous Blood Potassium  3.9 mmol/L (3.6-5.2)   07/22/18  10:30    














Attending/Attestation





- Attestation


I have personally seen and examined this patient.: Yes


I have fully participated in the care of the patient.: Yes


I have reviewed all pertinent clinical information, including history, physical 

exam and plan: Yes


Notes (Text): 





07/26/18 14:53


Medical record note made by the resident after discussion with my direction and 

input after the patient was personally seen and examined by me. I have reviewed 

the chart and agree that the record accurately reflects by personal performance 

of the history, physical exam, data review, and medical decision-making, in the 

course for the patient. I have also personally directed the plan of care.





58-year-old female with PMH of  multiple sclerosis, fibromyalgia,Gastric Bypass 

surgery,  bradycardia s/p AICD placement, hypertension, obesity, diabetes, 

chronic anemia with ESBL UTI.Patient is afebrile, she is  asymptomatic, blood 

cultures are negative for any growth on IV Meropenem as per ID.


Diarrhea is rersolved.Patient is not having any hallucination.


Neutropenia is chronic and is improving.





Patient will be discharged to TCU for completion of IV antibiotic for ESBL UTI 

and physical rehabilitation.





Management plan was discussed in detail with patient 


Education was provided.

## 2018-07-25 NOTE — PN
DATE:  07/25/2018



SUBJECTIVE:  Over the weekend, the patient was transferred to the medical

site from the psychiatric inpatient unit for evaluation of ESBL E. coli

urinary tract infection.  This writer is following the patient on the

medical site for depression and medication management.  The patient was

seen today.  The patient presented to be depressed, flat affect.  Speech

was normal rate.  The patient reported that her daughter came over and

brought her belongings and her future plans are to move back to [place]

where she originally came from.  The patient reported that medications she

tolerates well.  Denied any side effects.  The patient is aware of all of

the medication what she is currently on.  The patient was educated again

about benefits and alternatives of all of the medications.  The patient was

willing to increase the dose of Effexor.  Vital signs are stable. 

Temperature 98, pulse is 60, blood pressure 116/78, respirations 26, oxygen

saturation is 98.  Medications reviewed.  The patient is on Tylenol,

Norvasc, Klonopin 1 mg twice a day, folic acid, Neurontin 300 mg three

times a day, multivitamins, Lyrica 100 mg three times a day, Seroquel 25 mg

at the nighttime for mood stabilization as well as for psychotic symptoms

due to delirium.  Effexor was increased to _____.  Sonata 10 mg as needed

for insomnia.  Labs reviewed, most recent is from today.  Hemoglobin and

hematocrit 10 and 31.9.  Blood gas reviewed.  Chemistry reviewed. 

Microbiology, no growth in the blood so far.



MENTAL STATUS EXAMINATION:  The patient appears to be alert and oriented,

pleasant, cooperative, flat affect.  Mood described, hanging in there. 

Affect was constricted.  Thought process, coherent and goal directed. 

Thought content, the patient denied visual, auditory or tactile

hallucinations.  Denied paranoid ideation.  The patient denied thoughts of

harming herself or others, and denied intent or plan, transient feeling of

hopelessness and helplessness.



IMPRESSION:  As per history, major depressive disorder, panic disorder and

anxiety spectrum disorder.



PLAN:  Continue current management.  Continue current medication.  We will

follow up and advise accordingly.  Effexor was increased today.  The

patient is on Neurontin, Seroquel and Klonopin.  Discussed with nurse

practitioner.  Plan is Transitional Care Unit for further IV antibiotics. 

This writer will follow up on this patient there.  Should you have any

questions, give me a call back.



Thank you very much for letting me participate in care of your patient.







__________________________________________

Tamia Guerra MD



DD:  07/25/2018 11:05:56

DT:  07/25/2018 11:07:49

Job # [id number]

## 2018-07-25 NOTE — CP.PCM.PN
Subjective





- Date & Time of Evaluation


Date of Evaluation: 07/25/18


Time of Evaluation: 06:30





- Subjective


Subjective: 


Martin Vela, PGY-1 Progress Note for Hospitalist Service





Patient seen and evaluated at bedside. Denies any acute complaints overnight. 

Pain controlled. 








Objective





- Vital Signs/Intake and Output


Vital Signs (last 24 hours): 


 











Temp Pulse Resp BP Pulse Ox


 


 98 F   57 L  20   116/78   98 


 


 07/25/18 06:00  07/25/18 06:00  07/25/18 06:00  07/25/18 06:00  07/25/18 06:00








Intake and Output: 


 











 07/25/18 07/25/18





 06:59 18:59


 


Intake Total 780 


 


Balance 780 














- Medications


Medications: 


 Current Medications





Acetaminophen (Tylenol 325mg Tab)  650 mg PO Q4H PRN


   PRN Reason: Pain, Mild (1-3)


   Last Admin: 07/25/18 03:31 Dose:  650 mg


Amlodipine Besylate (Norvasc)  5 mg PO DAILY JEFF


   Last Admin: 07/24/18 09:59 Dose:  5 mg


Clonazepam (Klonopin)  1 mg PO BID JEFF


   PRN Reason: Protocol


   Last Admin: 07/23/18 10:04 Dose:  1 mg


Folic Acid (Folic Acid)  1 mg PO DAILY JEFF


   Last Admin: 07/24/18 09:57 Dose:  1 mg


Gabapentin (Neurontin)  300 mg PO TID JEFF


   PRN Reason: Protocol


   Last Admin: 07/24/18 18:05 Dose:  300 mg


Meropenem (Merrem Iv 1 Gm Premix)  50 mls @ 100 mls/hr IVPB Q8 JEFF


   PRN Reason: Protocol


   Stop: 07/31/18 22:01


   Last Admin: 07/25/18 06:05 Dose:  100 mls/hr


Insulin Human Regular (Humulin R Low)  1 units SC ACHS JEFF


   PRN Reason: Protocol


   Last Admin: 07/24/18 21:52 Dose:  Not Given


Lorazepam (Ativan)  1 mg IVP Q6H PRN; Protocol


   PRN Reason: Anxiety


   Last Admin: 07/25/18 03:32 Dose:  1 mg


Multivitamins/Minerals (Therapeutic-M Tab)  1 tab PO 0800 JEFF


   Last Admin: 07/24/18 09:56 Dose:  1 tab


Ondansetron HCl (Zofran Tab)  4 mg PO Q8H PRN


   PRN Reason: Nausea/Vomiting


   Last Admin: 07/24/18 12:38 Dose:  4 mg


Pantoprazole Sodium (Protonix Ec Tab)  40 mg PO 0600 [facility]


   Last Admin: 07/25/18 06:06 Dose:  40 mg


Pregabalin (Lyrica)  100 mg PO TID [facility]


   Last Admin: 07/24/18 18:06 Dose:  100 mg


Quetiapine Fumarate (Seroquel)  25 mg PO HS JEFF


   PRN Reason: Protocol


   Last Admin: 07/24/18 21:15 Dose:  25 mg


Venlafaxine HCl (Effexor Xr)  75 mg PO DAILY [facility]


   Last Admin: 07/24/18 09:57 Dose:  75 mg


Zaleplon (Sonata)  10 mg PO HS PRN


   PRN Reason: Insomnia


   Last Admin: 07/25/18 02:26 Dose:  10 mg











- Labs


Labs: 


 





 07/25/18 08:00 





 07/25/18 06:45 











- Constitutional


Appears: Well, Non-toxic, No Acute Distress





- Head Exam


Head Exam: ATRAUMATIC, NORMAL INSPECTION, NORMOCEPHALIC





- Eye Exam


Eye Exam: EOMI, Normal appearance


Pupil Exam: PERRL





- ENT Exam


ENT Exam: Mucous Membranes Moist





- Neck Exam


Neck Exam: Normal Inspection





- Respiratory Exam


Respiratory Exam: Clear to Ausculation Bilateral, NORMAL BREATHING PATTERN.  

absent: Chest Wall Tenderness, Rales, Rhonchi





- Cardiovascular Exam


Cardiovascular Exam: RRR, +S1, +S2


Additional comments: 


Atrial Pacemaker in place in L upper chest








- GI/Abdominal Exam


GI & Abdominal Exam: Soft, Normal Bowel Sounds.  absent: Distended, Guarding, 

Tenderness





- Extremities Exam


Extremities Exam: Full ROM





- Back Exam


Back Exam: paraspinal tenderness





- Neurological Exam


Neurological Exam: Alert, Awake, Oriented x3





- Psychiatric Exam


Psychiatric exam: Anxious





- Skin


Skin Exam: Dry, Intact, Normal Color, Warm





Assessment and Plan





- Assessment and Plan (Free Text)


Assessment: 


58 year old female with past medical history of multiple sclerosis, fibromyalgia

, anxiety, hypertension, type 2 diabetes, neutropenia, bradycardia s/p 

pacemaker placement under medical management for treatment of ESBL symptomatic 

UTI.





Plan: 


ESBL E. coli UTI


 Urinalysis: Positive for nitrates and Leukocyte esterase, Urine CX shows ESBL 

E. Coli 


 Formerly on rocephin, then switched to Nitrofurantoin for UTI. Sensitivities 

returned, resistant to nitrofurantoin. Now on Meropenem 50 q8 day 3. Would 

appreciate recommendations regarding the possibility of cutting down length of 

course of Meropenem based on clinical symptoms, hemodynamic stability, 

improving luekopenia and improving anemia


 Contact precautions


 ID consult: Dr. Gale


 MG 1.8 today. Will continue to monitor.





Normocytic Normochromic Anemia with Leukopenia - Improving


 Chronically anemic, likely 2/2 chronic disease with leukopenia


 Hgb 9.5 today (8.4 yesterday) and Leukopenic 3.1 (2.8 yesterday) 


 Likely 2/2 rocephin and meropenem effects


 Will continue to monitor 


 Hemodynamically stable, no active bleeding





Chest Pain


 Patient had complaints of chest pain, non-radiating, no N/V, no diaphoresis.


 EKG showed NSR @68 bpm


 Trop neg x1. Will trend 2nd troponin





Diarrhea 


 Patient complains of a few nonbloody bowel movements over the last few days


 Ordered c. diff toxin so will follow up on result





Fibromyalgia/anxiety


 continue on home med of Venlafaxine 75 and pregabalin 100 TID. Gabapentin 300 

TID added per Psych


 continue  sonata 5 mg for help sleeping as per Dr. Cantrell


 Has better response to Ativan - Ativan 1 mg q6h prn added today for anxiety, d/

c klonipin


 F/u Psych evaluation and recommendations regarding her orientation and 

hallucinations





Multiple Sclerosis


 f/u for continuous care as an outpatient





Prophylaxis


Protonix/SCD





THIS IS A DRAFT





Martin Vela, PGY-1

## 2018-07-25 NOTE — PN
DATE:  07/24/2018



SUBJECTIVE:  The patient was seen yesterday.  No fevers and no chills.  No

nausea or vomiting.



PHYSICAL EXAMINATION:

VITAL SIGNS:  Temperature of 98, blood pressure is 120/70, respiratory rate

16.

HEENT:  Unremarkable.

NECK:  Supple.

LUNGS:  Have decreased breath sounds.

HEART:  Normal S1 and S2.

ABDOMEN:  Soft.



LABORATORY EXAMINATION:  Noted.



ASSESSMENT AND PLAN:  This is a 58-year-old female who was seen yesterday,

(the date of service is 07/24/2018) who was on meropenem, day #3 of 5-7

days for an extended-spectrum beta-lactamase cystitis Escherichia coli.





__________________________________________

Manuel Gale MD



DD:  07/25/2018 19:45:36

DT:  07/25/2018 20:43:01

Job # [id number]

## 2018-07-26 PROCEDURE — F08Z1ZZ DRESSING TECHNIQUES TREATMENT: ICD-10-PCS | Performed by: INTERNAL MEDICINE

## 2018-07-26 PROCEDURE — F07M6ZZ THERAPEUTIC EXERCISE TREATMENT OF MUSCULOSKELETAL SYSTEM - WHOLE BODY: ICD-10-PCS | Performed by: INTERNAL MEDICINE

## 2018-07-26 PROCEDURE — F08Z2ZZ GROOMING/PERSONAL HYGIENE TREATMENT: ICD-10-PCS | Performed by: INTERNAL MEDICINE

## 2018-07-26 PROCEDURE — F07Z9FZ GAIT TRAINING/FUNCTIONAL AMBULATION TREATMENT USING ASSISTIVE, ADAPTIVE, SUPPORTIVE OR PROTECTIVE EQUIPMENT: ICD-10-PCS | Performed by: INTERNAL MEDICINE

## 2018-07-26 PROCEDURE — F08Z0ZZ BATHING/SHOWERING TECHNIQUES TREATMENT: ICD-10-PCS | Performed by: INTERNAL MEDICINE

## 2018-07-26 PROCEDURE — F08Z4ZZ HOME MANAGEMENT TREATMENT: ICD-10-PCS | Performed by: INTERNAL MEDICINE

## 2018-07-26 RX ADMIN — INSULIN HUMAN SCH: 100 INJECTION, SOLUTION PARENTERAL at 11:29

## 2018-07-26 RX ADMIN — NYSTATIN SCH APPLIC: 100000 CREAM TOPICAL at 21:29

## 2018-07-26 RX ADMIN — NYSTATIN SCH: 100000 CREAM TOPICAL at 17:38

## 2018-07-26 RX ADMIN — MULTIPLE VITAMINS W/ MINERALS TAB SCH TAB: TAB at 09:05

## 2018-07-26 RX ADMIN — INSULIN HUMAN SCH UNIT: 100 INJECTION, SOLUTION PARENTERAL at 17:32

## 2018-07-26 RX ADMIN — INSULIN HUMAN SCH: 100 INJECTION, SOLUTION PARENTERAL at 06:31

## 2018-07-26 RX ADMIN — MEROPENEM SCH MLS/HR: 1 INJECTION INTRAVENOUS at 06:05

## 2018-07-26 RX ADMIN — VENLAFAXINE HYDROCHLORIDE SCH: 75 CAPSULE, EXTENDED RELEASE ORAL at 14:17

## 2018-07-26 RX ADMIN — MEROPENEM SCH MLS/HR: 1 INJECTION INTRAVENOUS at 14:19

## 2018-07-26 RX ADMIN — INSULIN HUMAN SCH: 100 INJECTION, SOLUTION PARENTERAL at 22:51

## 2018-07-26 RX ADMIN — MEROPENEM SCH MLS/HR: 1 INJECTION INTRAVENOUS at 21:31

## 2018-07-26 RX ADMIN — NYSTATIN SCH: 100000 CREAM TOPICAL at 17:37

## 2018-07-26 RX ADMIN — PANTOPRAZOLE SODIUM SCH MG: 40 TABLET, DELAYED RELEASE ORAL at 06:06
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
## 2018-07-27 LAB
ALBUMIN SERPL-MCNC: 3.3 G/DL (ref 3–4.8)
ALBUMIN/GLOB SERPL: 1.1 {RATIO} (ref 1.1–1.8)
ALT SERPL-CCNC: 33 U/L (ref 7–56)
AST SERPL-CCNC: 35 U/L (ref 14–36)
BASOPHILS # BLD AUTO: 0.01 K/MM3 (ref 0–2)
BASOPHILS NFR BLD: 0.3 % (ref 0–3)
BUN SERPL-MCNC: 15 MG/DL (ref 7–21)
CALCIUM SERPL-MCNC: 9 MG/DL (ref 8.4–10.5)
EOSINOPHIL # BLD: 0.1 10*3/UL (ref 0–0.7)
EOSINOPHIL NFR BLD: 2.6 % (ref 1.5–5)
ERYTHROCYTE [DISTWIDTH] IN BLOOD BY AUTOMATED COUNT: 13 % (ref 11.5–14.5)
GFR NON-AFRICAN AMERICAN: > 60
GRANULOCYTES # BLD: 1.43 10*3/UL (ref 1.4–6.5)
GRANULOCYTES NFR BLD: 46.4 % (ref 50–68)
HGB BLD-MCNC: 9.6 G/DL (ref 12–16)
LYMPHOCYTES # BLD: 1.3 10*3/UL (ref 1.2–3.4)
LYMPHOCYTES NFR BLD AUTO: 43.2 % (ref 22–35)
MCH RBC QN AUTO: 30.3 PG (ref 25–35)
MCHC RBC AUTO-ENTMCNC: 31.6 G/DL (ref 31–37)
MCV RBC AUTO: 95.9 FL (ref 80–105)
MONOCYTES # BLD AUTO: 0.2 10*3/UL (ref 0.1–0.6)
MONOCYTES NFR BLD: 7.5 % (ref 1–6)
PLATELET # BLD: 195 10^3/UL (ref 120–450)
PMV BLD AUTO: 10.1 FL (ref 7–11)
RBC # BLD AUTO: 3.17 10^6/UL (ref 3.5–6.1)
WBC # BLD AUTO: 3.1 10^3/UL (ref 4.5–11)

## 2018-07-27 RX ADMIN — PANTOPRAZOLE SODIUM SCH MG: 40 TABLET, DELAYED RELEASE ORAL at 06:31

## 2018-07-27 RX ADMIN — MULTIPLE VITAMINS W/ MINERALS TAB SCH TAB: TAB at 08:19

## 2018-07-27 RX ADMIN — INSULIN HUMAN SCH: 100 INJECTION, SOLUTION PARENTERAL at 17:55

## 2018-07-27 RX ADMIN — INSULIN HUMAN SCH: 100 INJECTION, SOLUTION PARENTERAL at 06:31

## 2018-07-27 RX ADMIN — NYSTATIN SCH APPLIC: 100000 CREAM TOPICAL at 17:58

## 2018-07-27 RX ADMIN — NYSTATIN SCH APPLIC: 100000 CREAM TOPICAL at 14:12

## 2018-07-27 RX ADMIN — NYSTATIN SCH APPLIC: 100000 CREAM TOPICAL at 21:33

## 2018-07-27 RX ADMIN — MEROPENEM SCH MLS/HR: 1 INJECTION INTRAVENOUS at 14:11

## 2018-07-27 RX ADMIN — NYSTATIN SCH APPLIC: 100000 CREAM TOPICAL at 10:28

## 2018-07-27 RX ADMIN — MEROPENEM SCH MLS/HR: 1 INJECTION INTRAVENOUS at 21:35

## 2018-07-27 RX ADMIN — VENLAFAXINE HYDROCHLORIDE SCH MG: 75 CAPSULE, EXTENDED RELEASE ORAL at 10:25

## 2018-07-27 RX ADMIN — MEROPENEM SCH MLS/HR: 1 INJECTION INTRAVENOUS at 06:31

## 2018-07-27 RX ADMIN — INSULIN HUMAN SCH: 100 INJECTION, SOLUTION PARENTERAL at 22:05

## 2018-07-27 RX ADMIN — INSULIN HUMAN SCH: 100 INJECTION, SOLUTION PARENTERAL at 12:06

## 2018-07-27 NOTE — CP.PCM.PN
Subjective





- Date & Time of Evaluation


Date of Evaluation: 07/27/18


Time of Evaluation: 12:35





- Subjective


Subjective: 





Comfortable, dysuria is improving, less vaginal itching, no fevers.





Objective





- Vital Signs/Intake and Output


Vital Signs (last 24 hours): 


 











Temp Pulse Resp BP Pulse Ox


 


 99.8 F H  75   18   128/92 H  96 


 


 07/26/18 16:00  07/26/18 16:00  07/26/18 16:00  07/26/18 16:00  07/26/18 16:00











- Medications


Medications: 


 Current Medications





Acetaminophen (Tylenol 325mg Tab)  650 mg PO Q4H PRN; Protocol


   PRN Reason: Pain, Mild (1-3)


   Last Admin: 07/27/18 02:58 Dose:  650 mg


Amlodipine Besylate (Norvasc)  5 mg PO DAILY [facility]


   PRN Reason: Protocol


   Last Admin: 07/26/18 09:04 Dose:  5 mg


Folic Acid (Folic Acid)  1 mg PO DAILY [facility]


   PRN Reason: Protocol


   Last Admin: 07/26/18 09:02 Dose:  1 mg


Gabapentin (Neurontin)  300 mg PO TID [facility]


   PRN Reason: Protocol


   Last Admin: 07/26/18 17:38 Dose:  300 mg


Meropenem (Merrem Iv 1 Gm Premix)  50 mls @ 100 mls/hr IVPB Q8 [facility]


   PRN Reason: Protocol


   Stop: 08/03/18 22:01


   Last Admin: 07/27/18 06:31 Dose:  100 mls/hr


Insulin Human Regular (Humulin R Low)  0 units SC ACHS [facility]


   PRN Reason: Protocol


   Last Admin: 07/27/18 06:31 Dose:  Not Given


Lorazepam (Ativan)  1 mg PO TID PRN; Protocol


   PRN Reason: Anxiety


   Last Admin: 07/27/18 08:22 Dose:  1 mg


Multivitamins/Minerals (Therapeutic-M Tab)  1 tab PO 0800 [facility]


   PRN Reason: Protocol


   Last Admin: 07/27/18 08:19 Dose:  1 tab


Nystatin (Mycostatin Cream)  0 ea TOP QID [facility]


   Last Admin: 07/26/18 21:29 Dose:  1 applic


Ondansetron HCl (Zofran Tab)  4 mg PO Q8H PRN; Protocol


   PRN Reason: Nausea/Vomiting


   Last Admin: 07/27/18 08:18 Dose:  4 mg


Pantoprazole Sodium (Protonix Ec Tab)  40 mg PO 0600 [facility]


   PRN Reason: Protocol


   Last Admin: 07/27/18 06:31 Dose:  40 mg


Pregabalin (Lyrica)  100 mg PO TID [facility]


   PRN Reason: Protocol


   Last Admin: 07/26/18 17:34 Dose:  100 mg


Quetiapine Fumarate (Seroquel)  25 mg PO HS [facility]


   PRN Reason: Protocol


   Last Admin: 07/26/18 21:30 Dose:  25 mg


Venlafaxine HCl (Effexor Xr)  150 mg PO DAILY [facility]


   Last Admin: 07/26/18 14:17 Dose:  Not Given


Zaleplon (Sonata)  10 mg PO HS PRN; Protocol


   PRN Reason: Insomnia


   Last Admin: 07/26/18 22:24 Dose:  10 mg











- Labs


Labs: 


 





 07/27/18 07:30 





 07/27/18 07:30 











- Constitutional


Appears: Chronically Ill





- Head Exam


Head Exam: NORMAL INSPECTION





- Respiratory Exam


Respiratory Exam: Decreased Breath Sounds





- Cardiovascular Exam


Cardiovascular Exam: +S1, +S2





- GI/Abdominal Exam


GI & Abdominal Exam: Soft.  absent: Tenderness





Assessment and Plan





- Assessment and Plan (Free Text)


Plan: 





Assessment


ESBL E. coli UTI (cystitis)


probable vaginal candidiasis


morbid obesity with BMI 58


depression


anxiety


fibromylagia


multiple sclerosis


DM


HTN


chronic anemia


S/P AICD and pacemaker placement


S/P port placement





Plan


Continue Merrem (Day 6) to complete 7 days of therapy and will monitor 

clinically, continue contact isolation


continue Nystatin for the candidiasis

## 2018-07-27 NOTE — CP.PCM.PN
Subjective





- Date & Time of Evaluation


Date of Evaluation: 07/27/18


Time of Evaluation: 06:00





- Subjective


Subjective: 


Martin Vela, PGY-1 Progress Note for Hospitalist Service





Patient seen and evaluated at bedside. No acute complaints overnight. Slept 

well per nursing. Admits that pain on urination has improved. Denies CP, SOB, 

leg pain, abdominal pain and headaches. 








Objective





- Vital Signs/Intake and Output


Vital Signs (last 24 hours): 


 











Temp Pulse Resp BP Pulse Ox


 


 99.8 F H  75   18   128/92 H  96 


 


 07/26/18 16:00  07/26/18 16:00  07/26/18 16:00  07/26/18 16:00  07/26/18 16:00











- Medications


Medications: 


 Current Medications





Acetaminophen (Tylenol 325mg Tab)  650 mg PO Q4H PRN; Protocol


   PRN Reason: Pain, Mild (1-3)


   Last Admin: 07/27/18 02:58 Dose:  650 mg


Amlodipine Besylate (Norvasc)  5 mg PO DAILY [facility]


   PRN Reason: Protocol


   Last Admin: 07/26/18 09:04 Dose:  5 mg


Folic Acid (Folic Acid)  1 mg PO DAILY [facility]


   PRN Reason: Protocol


   Last Admin: 07/26/18 09:02 Dose:  1 mg


Gabapentin (Neurontin)  300 mg PO TID [facility]


   PRN Reason: Protocol


   Last Admin: 07/26/18 17:38 Dose:  300 mg


Meropenem (Merrem Iv 1 Gm Premix)  50 mls @ 100 mls/hr IVPB Q8 [facility]


   PRN Reason: Protocol


   Stop: 08/03/18 22:01


   Last Admin: 07/27/18 06:31 Dose:  100 mls/hr


Insulin Human Regular (Humulin R Low)  0 units SC ACHS [facility]


   PRN Reason: Protocol


   Last Admin: 07/27/18 06:31 Dose:  Not Given


Lorazepam (Ativan)  1 mg PO TID PRN; Protocol


   PRN Reason: Anxiety


   Last Admin: 07/26/18 21:29 Dose:  1 mg


Multivitamins/Minerals (Therapeutic-M Tab)  1 tab PO 0800 [facility]


   PRN Reason: Protocol


   Last Admin: 07/26/18 09:05 Dose:  1 tab


Nystatin (Mycostatin Cream)  0 ea TOP QID [facility]


   Last Admin: 07/26/18 21:29 Dose:  1 applic


Ondansetron HCl (Zofran Tab)  4 mg PO Q8H PRN; Protocol


   PRN Reason: Nausea/Vomiting


   Last Admin: 07/26/18 14:22 Dose:  4 mg


Pantoprazole Sodium (Protonix Ec Tab)  40 mg PO 0600 [facility]


   PRN Reason: Protocol


   Last Admin: 07/27/18 06:31 Dose:  40 mg


Pregabalin (Lyrica)  100 mg PO TID [facility]


   PRN Reason: Protocol


   Last Admin: 07/26/18 17:34 Dose:  100 mg


Quetiapine Fumarate (Seroquel)  25 mg PO HS [facility]


   PRN Reason: Protocol


   Last Admin: 07/26/18 21:30 Dose:  25 mg


Venlafaxine HCl (Effexor Xr)  150 mg PO DAILY [facility]


   Last Admin: 07/26/18 14:17 Dose:  Not Given


Zaleplon (Sonata)  10 mg PO HS PRN; Protocol


   PRN Reason: Insomnia


   Last Admin: 07/26/18 22:24 Dose:  10 mg











- Labs


Labs: 





- Constitutional


Appears: Well, Non-toxic, No Acute Distress





- Head Exam


Head Exam: ATRAUMATIC, NORMAL INSPECTION





- Eye Exam


Eye Exam: EOMI, Normal appearance


Pupil Exam: PERRL





- ENT Exam


ENT Exam: Mucous Membranes Moist, Normal Exam





- Neck Exam


Neck exam: Positive for: Normal Inspection





- Respiratory Exam


Respiratory Exam: Clear to Auscultation Bilateral, NORMAL BREATHING PATTERN.  

absent: Rales, Rhonchi, Wheezes





- Cardiovascular Exam


Cardiovascular Exam: RRR, +S1, +S2.  absent: Gallop, Rubs


Additional comments: 


Pacemaker in place. Skin dry and nonerythematous.








- GI/Abdominal Exam


GI & Abdominal Exam: Normal Bowel Sounds, Soft.  absent: Guarding, Rebound, 

Tenderness





- Neurological Exam


Neurological exam: Alert, Oriented x3





- Psychiatric Exam


Psychiatric exam: Anxious





- Skin


Skin Exam: Dry, Intact, Normal Color, Warm





Assessment and Plan





- Assessment and Plan (Free Text)


Assessment: 


Assessment: 


58 year old female with past medical history of multiple sclerosis, fibromyalgia

, anxiety, hypertension, type 2 diabetes, neutropenia, bradycardia s/p 

pacemaker placement under medical management for treatment of ESBL symptomatic 

UTI.





Plan: 


ESBL E. coli UTI


 Urinalysis: Positive for nitrates and Leukocyte esterase, Urine CX shows ESBL 

E. Coli 


 Formerly on rocephin, then switched to Nitrofurantoin for UTI. Sensitivities 

returned, resistant to nitrofurantoin. Now on Meropenem 50 q8 day 6. Will 

complete week of ABx course tomorrow while in TCU.


 Nystatin cream given for vaginal candidiasis


 Contact precautions


 ID consult: Dr. Gale





Normocytic Normochromic Anemia with Leukopenia - Improved


 Chronically anemic, likely 2/2 chronic disease with leukopenia


 Most recent Hgb 10, Leukopenic 3.3. Follow up AM labs


 Likely 2/2 rocephin and meropenem effects


 Will continue to monitor 


 Hemodynamically stable, no active bleeding





Diarrhea - resolved


 will monitor frequency 





Fibromyalgia/anxiety


 continue on home med of Venlafaxine 75 and pregabalin 100 TID. Gabapentin 300 

TID added per Psych


 continue sonata 5 mg for help sleeping as per Dr. Cantrell


 Ativan 1 mg q6h prn added for anxiety


 Psychiatry has signed off





Prophylaxis


Protonix/SCD





Disposition: Patient transferred from Med-Surg to TCU for rehabilitation and 

completion of antibiotic course. Completes ABX course tomorrow. ...





Patient seen, case reviewed, and plan agreed upon with Dr. Pro.


Martin Vela, PGY-1

## 2018-07-28 LAB
ALBUMIN SERPL-MCNC: 3.2 G/DL (ref 3–4.8)
ALBUMIN/GLOB SERPL: 1.1 {RATIO} (ref 1.1–1.8)
ALT SERPL-CCNC: 31 U/L (ref 7–56)
AST SERPL-CCNC: 39 U/L (ref 14–36)
BASOPHILS # BLD AUTO: 0.01 K/MM3 (ref 0–2)
BASOPHILS NFR BLD: 0.3 % (ref 0–3)
BUN SERPL-MCNC: 16 MG/DL (ref 7–21)
CALCIUM SERPL-MCNC: 8.9 MG/DL (ref 8.4–10.5)
EOSINOPHIL # BLD: 0.1 10*3/UL (ref 0–0.7)
EOSINOPHIL NFR BLD: 2.2 % (ref 1.5–5)
ERYTHROCYTE [DISTWIDTH] IN BLOOD BY AUTOMATED COUNT: 13.1 % (ref 11.5–14.5)
GFR NON-AFRICAN AMERICAN: > 60
GRANULOCYTES # BLD: 1.56 10*3/UL (ref 1.4–6.5)
GRANULOCYTES NFR BLD: 49.8 % (ref 50–68)
HGB BLD-MCNC: 9.4 G/DL (ref 12–16)
LYMPHOCYTES # BLD: 1.3 10*3/UL (ref 1.2–3.4)
LYMPHOCYTES NFR BLD AUTO: 40.1 % (ref 22–35)
MCH RBC QN AUTO: 30.4 PG (ref 25–35)
MCHC RBC AUTO-ENTMCNC: 31.9 G/DL (ref 31–37)
MCV RBC AUTO: 95.5 FL (ref 80–105)
MONOCYTES # BLD AUTO: 0.2 10*3/UL (ref 0.1–0.6)
MONOCYTES NFR BLD: 7.6 % (ref 1–6)
PLATELET # BLD: 196 10^3/UL (ref 120–450)
PMV BLD AUTO: 10.3 FL (ref 7–11)
RBC # BLD AUTO: 3.09 10^6/UL (ref 3.5–6.1)
WBC # BLD AUTO: 3.1 10^3/UL (ref 4.5–11)

## 2018-07-28 RX ADMIN — INSULIN HUMAN SCH: 100 INJECTION, SOLUTION PARENTERAL at 21:36

## 2018-07-28 RX ADMIN — INSULIN HUMAN SCH: 100 INJECTION, SOLUTION PARENTERAL at 06:34

## 2018-07-28 RX ADMIN — NYSTATIN SCH APPLIC: 100000 CREAM TOPICAL at 09:41

## 2018-07-28 RX ADMIN — NYSTATIN SCH APPLIC: 100000 CREAM TOPICAL at 22:13

## 2018-07-28 RX ADMIN — INSULIN HUMAN SCH: 100 INJECTION, SOLUTION PARENTERAL at 12:24

## 2018-07-28 RX ADMIN — MEROPENEM SCH MLS/HR: 1 INJECTION INTRAVENOUS at 22:11

## 2018-07-28 RX ADMIN — MEROPENEM SCH MLS/HR: 1 INJECTION INTRAVENOUS at 13:24

## 2018-07-28 RX ADMIN — INSULIN HUMAN SCH: 100 INJECTION, SOLUTION PARENTERAL at 17:27

## 2018-07-28 RX ADMIN — NYSTATIN SCH APPLIC: 100000 CREAM TOPICAL at 17:27

## 2018-07-28 RX ADMIN — MULTIPLE VITAMINS W/ MINERALS TAB SCH TAB: TAB at 08:14

## 2018-07-28 RX ADMIN — PANTOPRAZOLE SODIUM SCH MG: 40 TABLET, DELAYED RELEASE ORAL at 05:40

## 2018-07-28 RX ADMIN — NYSTATIN SCH APPLIC: 100000 CREAM TOPICAL at 13:32

## 2018-07-28 RX ADMIN — VENLAFAXINE HYDROCHLORIDE SCH MG: 75 CAPSULE, EXTENDED RELEASE ORAL at 09:40

## 2018-07-28 RX ADMIN — MEROPENEM SCH MLS/HR: 1 INJECTION INTRAVENOUS at 05:40

## 2018-07-28 NOTE — PN
DATE:  07/28/2018



SUBJECTIVE:  The patient is in bed, in no acute distress, nontoxic.



PHYSICAL EXAMINATION:

VITAL SIGNS:  On exam, temperature is 98, blood pressure is 120/70,

respiratory rate of 16.

HEENT:  Examination of HEENT is unremarkable.

NECK:  Supple.

LUNGS:  Have decreased breath sounds.

HEART:  Normal S1, S2.

ABDOMEN:  Soft.



LABORATORY DATA:  Laboratory examination reveals a white count of 3.1,

hemoglobin of 9, platelets of 196.  BUN is 16, creatinine of 0.6. 

Microbiology is noted.



ASSESSMENT AND PLAN:  A 58-year-old female with Extended spectrum

beta-lactamases Escherichia coli cystitis, vaginal candidiasis, morbid

obesity, body mass index of 58, depression, anxiety, fibromyalgia, multiple

sclerosis.  Today is day #7 of meropenem therapy.  Discontinue the

antibiotics after today's last dose.



__________________________________________

Manuel Gale MD







DD:  07/28/2018 8:10:31

DT:  07/28/2018 8:12:04

Job # [id number]

## 2018-07-28 NOTE — CP.PCM.PN
<Marlo Kahn - Last Filed: 07/28/18 11:56>





Subjective





- Date & Time of Evaluation


Date of Evaluation: 07/28/18


Time of Evaluation: 09:25





- Subjective


Subjective: 


Marlohardy Kahn DO PGY-1, Family Medicine Resident


Medicine Progress Note





Pt seen and examined at bedside. States she slept well overnight. Tolerating PO 

diet, ambulating with PT. No acute events reported overnight. 





Objective





- Vital Signs/Intake and Output


Vital Signs (last 24 hours): 


 











Temp Pulse Resp BP Pulse Ox


 


 99.8 F H  64   18   115/70   96 


 


 07/26/18 16:00  07/28/18 09:40  07/26/18 16:00  07/28/18 09:40  07/26/18 16:00











- Medications


Medications: 


 Current Medications





Acetaminophen (Tylenol 325mg Tab)  650 mg PO Q4H PRN; Protocol


   PRN Reason: Pain, Mild (1-3)


   Last Admin: 07/28/18 08:16 Dose:  650 mg


Amlodipine Besylate (Norvasc)  5 mg PO DAILY JEFF


   PRN Reason: Protocol


   Last Admin: 07/28/18 09:40 Dose:  5 mg


Folic Acid (Folic Acid)  1 mg PO DAILY JEFF


   PRN Reason: Protocol


   Last Admin: 07/28/18 09:40 Dose:  1 mg


Gabapentin (Neurontin)  300 mg PO TID JEFF


   PRN Reason: Protocol


   Last Admin: 07/28/18 09:40 Dose:  300 mg


Meropenem (Merrem Iv 1 Gm Premix)  50 mls @ 100 mls/hr IVPB Q8 JEFF


   PRN Reason: Protocol


   Stop: 08/03/18 22:01


   Last Admin: 07/28/18 05:40 Dose:  100 mls/hr


Insulin Human Regular (Humulin R Low)  0 units SC ACHS JEFF


   PRN Reason: Protocol


   Last Admin: 07/28/18 06:34 Dose:  Not Given


Lorazepam (Ativan)  1 mg PO TID PRN; Protocol


   PRN Reason: Anxiety


   Last Admin: 07/28/18 07:23 Dose:  1 mg


Multivitamins/Minerals (Therapeutic-M Tab)  1 tab PO 0800 JEFF


   PRN Reason: Protocol


   Last Admin: 07/28/18 08:14 Dose:  1 tab


Nystatin (Mycostatin Cream)  0 ea TOP QID JEFF


   Last Admin: 07/28/18 09:41 Dose:  1 applic


Ondansetron HCl (Zofran Tab)  4 mg PO Q8H PRN; Protocol


   PRN Reason: Nausea/Vomiting


   Last Admin: 07/28/18 07:25 Dose:  4 mg


Pantoprazole Sodium (Protonix Ec Tab)  40 mg PO 0600 JEFF


   PRN Reason: Protocol


   Last Admin: 07/28/18 05:40 Dose:  40 mg


Pregabalin (Lyrica)  100 mg PO TID JEFF


   PRN Reason: Protocol


   Last Admin: 07/28/18 09:41 Dose:  100 mg


Quetiapine Fumarate (Seroquel)  25 mg PO HS JEFF


   PRN Reason: Protocol


   Last Admin: 07/27/18 21:36 Dose:  25 mg


Venlafaxine HCl (Effexor Xr)  150 mg PO DAILY JEFF


   Last Admin: 07/28/18 09:40 Dose:  150 mg


Zaleplon (Sonata)  10 mg PO HS PRN; Protocol


   PRN Reason: Insomnia


   Last Admin: 07/27/18 22:36 Dose:  10 mg











- Labs


Labs: 


 





 07/28/18 06:30 





 07/28/18 06:30 











- Constitutional


Appears: Non-toxic, No Acute Distress





- Head Exam


Head Exam: ATRAUMATIC, NORMAL INSPECTION, NORMOCEPHALIC





- Eye Exam


Eye Exam: EOMI, Normal appearance, PERRL





- ENT Exam


ENT Exam: Mucous Membranes Moist, Normal Oropharynx





- Neck Exam


Neck Exam: Full ROM, Normal Inspection





- Respiratory Exam


Respiratory Exam: Clear to Ausculation Bilateral, NORMAL BREATHING PATTERN





- Cardiovascular Exam


Cardiovascular Exam: REGULAR RHYTHM, +S1, +S2





- GI/Abdominal Exam


GI & Abdominal Exam: Soft, Normal Bowel Sounds





- Extremities Exam


Extremities Exam: Full ROM, Normal Capillary Refill, Normal Inspection





- Back Exam


Back Exam: NORMAL INSPECTION


Additional comments: 


no CVA tenderness b/l





- Neurological Exam


Neurological Exam: Alert, Awake, Oriented x3





- Psychiatric Exam


Psychiatric exam: Flat Affect





- Skin


Skin Exam: Dry, Intact, Normal Color, Warm





Assessment and Plan





- Assessment and Plan (Free Text)


Assessment: 


58 year old female with past medical history of multiple sclerosis, fibromyalgia

, anxiety, hypertension, type 2 diabetes, neutropenia, bradycardia s/p 

pacemaker placement under medical management for treatment of ESBL symptomatic 

UTI. 


Plan: 


ESBL E. coli UTI


 Urinalysis: Positive for nitrates and Leukocyte esterase, Urine CX shows ESBL 

E. Coli 


 Formerly on rocephin, then switched to Nitrofurantoin for UTI. Sensitivities 

returned, resistant to nitrofurantoin. Now on Meropenem 50 q8 day 7. Will d/c 

antibiotics tomorrow as per ID


Nystatin cream given for vaginal candidiasis


Contact precautions


ID consulted, recs appreciated





Normocytic Normochromic Anemia with Leukopenia - Improved


 Chronically anemic, likely 2/2 chronic disease with leukopenia


 Likely 2/2 rocephin and meropenem effects


 Will continue to monitor 


 Hemodynamically stable, no active bleeding





Fibromyalgia/anxiety


 continue on home med of Venlafaxine 75 and pregabalin 100 TID. Gabapentin 300 

TID added per Psych


 continue sonata 5 mg for help sleeping as per Dr. Cantrell


 Ativan 1 mg q6h prn for anxiety


 F/u Psych recommendations





Prophylaxis


Protonix/SCD





Disposition: Patient transferred from Med-Surg to TCU for rehabilitation and 

completion of antibiotic course. Plan to be in TCU through 8/2/18.





Pt seen, examined with, and plan discussed with Dr. Pro, attending.





Marlo Kahn DO PGY-1, Family Medicine Resident


Pager #[phone]





<Marcus Pro - Last Filed: 07/28/18 16:15>





Objective





- Vital Signs/Intake and Output


Vital Signs (last 24 hours): 


 











Temp Pulse Resp BP Pulse Ox


 


 99.8 F H  64   18   115/70   96 


 


 07/26/18 16:00  07/28/18 09:40  07/26/18 16:00  07/28/18 09:40  07/26/18 16:00











- Medications


Medications: 


 Current Medications





Acetaminophen (Tylenol 325mg Tab)  650 mg PO Q4H PRN; Protocol


   PRN Reason: Pain, Mild (1-3)


   Last Admin: 07/28/18 08:16 Dose:  650 mg


Amlodipine Besylate (Norvasc)  5 mg PO DAILY JEFF


   PRN Reason: Protocol


   Last Admin: 07/28/18 09:40 Dose:  5 mg


Folic Acid (Folic Acid)  1 mg PO DAILY JEFF


   PRN Reason: Protocol


   Last Admin: 07/28/18 09:40 Dose:  1 mg


Gabapentin (Neurontin)  300 mg PO TID JEFF


   PRN Reason: Protocol


   Last Admin: 07/28/18 13:25 Dose:  300 mg


Meropenem (Merrem Iv 1 Gm Premix)  50 mls @ 100 mls/hr IVPB Q8 JEFF


   PRN Reason: Protocol


   Stop: 08/03/18 22:01


   Last Admin: 07/28/18 13:24 Dose:  100 mls/hr


Insulin Human Regular (Humulin R Low)  0 units SC ACHS JEFF


   PRN Reason: Protocol


   Last Admin: 07/28/18 12:24 Dose:  Not Given


Lorazepam (Ativan)  1 mg PO TID PRN; Protocol


   PRN Reason: Anxiety


   Last Admin: 07/28/18 13:24 Dose:  1 mg


Multivitamins/Minerals (Therapeutic-M Tab)  1 tab PO 0800 JEFF


   PRN Reason: Protocol


   Last Admin: 07/28/18 08:14 Dose:  1 tab


Nystatin (Mycostatin Cream)  0 ea TOP QID JEFF


   Last Admin: 07/28/18 13:32 Dose:  1 applic


Ondansetron HCl (Zofran Tab)  4 mg PO Q8H PRN; Protocol


   PRN Reason: Nausea/Vomiting


   Last Admin: 07/28/18 07:25 Dose:  4 mg


Pantoprazole Sodium (Protonix Ec Tab)  40 mg PO 0600 JEFF


   PRN Reason: Protocol


   Last Admin: 07/28/18 05:40 Dose:  40 mg


Pregabalin (Lyrica)  100 mg PO TID JEFF


   PRN Reason: Protocol


   Last Admin: 07/28/18 13:24 Dose:  100 mg


Quetiapine Fumarate (Seroquel)  25 mg PO HS JEFF


   PRN Reason: Protocol


   Last Admin: 07/27/18 21:36 Dose:  25 mg


Venlafaxine HCl (Effexor Xr)  150 mg PO DAILY [facility]


   Last Admin: 07/28/18 09:40 Dose:  150 mg


Zaleplon (Sonata)  10 mg PO HS PRN; Protocol


   PRN Reason: Insomnia


   Last Admin: 07/27/18 22:36 Dose:  10 mg











- Labs


Labs: 


 





 07/28/18 06:30 





 07/28/18 06:30 











Attending/Attestation





- Attestation


I have personally seen and examined this patient.: Yes


I have fully participated in the care of the patient.: Yes


I have reviewed all pertinent clinical information, including history, physical 

exam and plan: Yes


Notes (Text): 





07/28/18 16:11


Medical record note made by the resident after discussion with my direction and 

input after the patient was personally seen and examined by me. I have reviewed 

the chart and agree that the record accurately reflects by personal performance 

of the history, physical exam, data review, and medical decision-making, in the 

course for the patient. I have also personally directed the plan of care.





58-year-old female with PMH of  multiple sclerosis, fibromyalgia,Gastric Bypass 

surgery,  bradycardia s/p AICD placement, hypertension, obesity, diabetes, 

chronic anemia with ESBL UTI.Patient was initially admitted to medical floor 

and then transfered to to TCU for completion of IV antibiotic for ESBL UTI and 

physical rehabilitation.Patient is afebrile, she is  asymptomatic, blood 

cultures are negative for any growth on IV Meropenem as per ID.Patient will 

finish antibiotics today ( total 7).





Diarrhea is resolved.Patient is not having any hallucination.





Neutropenia is chronic and is improving.











Management plan was discussed in detail with patient 


Education was provided.

## 2018-07-29 LAB
ALBUMIN SERPL-MCNC: 3.4 G/DL (ref 3–4.8)
ALBUMIN/GLOB SERPL: 1.1 {RATIO} (ref 1.1–1.8)
ALT SERPL-CCNC: 36 U/L (ref 7–56)
AST SERPL-CCNC: 47 U/L (ref 14–36)
BASOPHILS # BLD AUTO: 0 K/MM3 (ref 0–2)
BASOPHILS NFR BLD: 0 % (ref 0–3)
BUN SERPL-MCNC: 17 MG/DL (ref 7–21)
CALCIUM SERPL-MCNC: 8.9 MG/DL (ref 8.4–10.5)
EOSINOPHIL # BLD: 0.1 10*3/UL (ref 0–0.7)
EOSINOPHIL NFR BLD: 2.2 % (ref 1.5–5)
ERYTHROCYTE [DISTWIDTH] IN BLOOD BY AUTOMATED COUNT: 13.1 % (ref 11.5–14.5)
GFR NON-AFRICAN AMERICAN: > 60
GRANULOCYTES # BLD: 1.83 10*3/UL (ref 1.4–6.5)
GRANULOCYTES NFR BLD: 50.9 % (ref 50–68)
HGB BLD-MCNC: 9.5 G/DL (ref 12–16)
LYMPHOCYTES # BLD: 1.4 10*3/UL (ref 1.2–3.4)
LYMPHOCYTES NFR BLD AUTO: 38.3 % (ref 22–35)
MCH RBC QN AUTO: 30.2 PG (ref 25–35)
MCHC RBC AUTO-ENTMCNC: 31.6 G/DL (ref 31–37)
MCV RBC AUTO: 95.6 FL (ref 80–105)
MONOCYTES # BLD AUTO: 0.3 10*3/UL (ref 0.1–0.6)
MONOCYTES NFR BLD: 8.6 % (ref 1–6)
PLATELET # BLD: 160 10^3/UL (ref 120–450)
PMV BLD AUTO: 10.3 FL (ref 7–11)
RBC # BLD AUTO: 3.15 10^6/UL (ref 3.5–6.1)
WBC # BLD AUTO: 3.6 10^3/UL (ref 4.5–11)

## 2018-07-29 RX ADMIN — MULTIPLE VITAMINS W/ MINERALS TAB SCH TAB: TAB at 07:59

## 2018-07-29 RX ADMIN — INSULIN HUMAN SCH: 100 INJECTION, SOLUTION PARENTERAL at 17:04

## 2018-07-29 RX ADMIN — MEROPENEM SCH MLS/HR: 1 INJECTION INTRAVENOUS at 05:28

## 2018-07-29 RX ADMIN — INSULIN HUMAN SCH: 100 INJECTION, SOLUTION PARENTERAL at 22:56

## 2018-07-29 RX ADMIN — VENLAFAXINE HYDROCHLORIDE SCH MG: 75 CAPSULE, EXTENDED RELEASE ORAL at 10:20

## 2018-07-29 RX ADMIN — NYSTATIN SCH APPLIC: 100000 CREAM TOPICAL at 13:54

## 2018-07-29 RX ADMIN — INSULIN HUMAN SCH: 100 INJECTION, SOLUTION PARENTERAL at 06:40

## 2018-07-29 RX ADMIN — NYSTATIN SCH APPLIC: 100000 CREAM TOPICAL at 21:19

## 2018-07-29 RX ADMIN — PANTOPRAZOLE SODIUM SCH MG: 40 TABLET, DELAYED RELEASE ORAL at 05:30

## 2018-07-29 RX ADMIN — INSULIN HUMAN SCH: 100 INJECTION, SOLUTION PARENTERAL at 11:35

## 2018-07-29 RX ADMIN — NYSTATIN SCH APPLIC: 100000 CREAM TOPICAL at 17:08

## 2018-07-29 RX ADMIN — NYSTATIN SCH APPLIC: 100000 CREAM TOPICAL at 09:35

## 2018-07-29 NOTE — PN
DATE:  07/29/2018



SUBJECTIVE:  The patient is in bed in no acute distress, nontoxic.



OBJECTIVE:

VITAL SIGNS:  On exam, temperature is 98, blood pressure is 130/80,

respiratory rate 18, heart rate of 64.

HEENT:  Examination is unremarkable.

NECK:  Supple.

LUNGS:  Have decreased breath sounds.

HEART:  Normal S1, S2.

ABDOMEN:  Soft, nontender.



DATA:  Laboratory examination reveals a white count of 3.6, hemoglobin of 9

and chemistries are noted.



ASSESSMENT AND PLAN:  This is a 58-year-old female seen at [facility] with extended spectrum beta-lactamases cystitis, vaginal candidiasis,

morbid obesity, body mass index of 58, has received 7 days of antibiotics

and actually today is day #8.  The patient has had adequate antibiotics. 

We will discontinue the meropenem.  No further antibiotics at this point..







__________________________________________

Manuel Gale MD



DD:  07/29/2018 9:06:38

DT:  07/29/2018 9:08:03

Job # [id number]

## 2018-07-30 RX ADMIN — INSULIN HUMAN SCH: 100 INJECTION, SOLUTION PARENTERAL at 16:52

## 2018-07-30 RX ADMIN — INSULIN HUMAN SCH: 100 INJECTION, SOLUTION PARENTERAL at 06:32

## 2018-07-30 RX ADMIN — NYSTATIN SCH APPLIC: 100000 CREAM TOPICAL at 17:39

## 2018-07-30 RX ADMIN — MULTIPLE VITAMINS W/ MINERALS TAB SCH TAB: TAB at 07:49

## 2018-07-30 RX ADMIN — VENLAFAXINE HYDROCHLORIDE SCH MG: 75 CAPSULE, EXTENDED RELEASE ORAL at 09:46

## 2018-07-30 RX ADMIN — INSULIN HUMAN SCH: 100 INJECTION, SOLUTION PARENTERAL at 11:36

## 2018-07-30 RX ADMIN — PANTOPRAZOLE SODIUM SCH MG: 40 TABLET, DELAYED RELEASE ORAL at 06:01

## 2018-07-30 RX ADMIN — NYSTATIN SCH APPLIC: 100000 CREAM TOPICAL at 09:46

## 2018-07-30 RX ADMIN — INSULIN HUMAN SCH: 100 INJECTION, SOLUTION PARENTERAL at 22:22

## 2018-07-30 RX ADMIN — NYSTATIN SCH APPLIC: 100000 CREAM TOPICAL at 14:16

## 2018-07-30 RX ADMIN — NYSTATIN SCH: 100000 CREAM TOPICAL at 22:31

## 2018-07-30 NOTE — CP.PCM.PN
Subjective





- Date & Time of Evaluation


Date of Evaluation: 07/30/18


Time of Evaluation: 12:10





- Subjective


Subjective: 





No fevers, not in distress, still with vaginal itching but much better, no 

dysuria.





Objective





- Vital Signs/Intake and Output


Vital Signs (last 24 hours): 


 











Temp Pulse Resp BP Pulse Ox


 


 98.2 F   95 H  22   127/84   100 


 


 07/29/18 22:00  07/29/18 16:30  07/29/18 16:30  07/30/18 09:47  07/29/18 16:30











- Medications


Medications: 


 Current Medications





Acetaminophen (Tylenol 325mg Tab)  650 mg PO Q4H PRN; Protocol


   PRN Reason: Pain, Mild (1-3)


   Last Admin: 07/30/18 09:47 Dose:  650 mg


Amlodipine Besylate (Norvasc)  5 mg PO DAILY JEFF


   PRN Reason: Protocol


   Last Admin: 07/30/18 09:47 Dose:  5 mg


Folic Acid (Folic Acid)  1 mg PO DAILY JEFF


   PRN Reason: Protocol


   Last Admin: 07/30/18 09:46 Dose:  1 mg


Gabapentin (Neurontin)  300 mg PO TID JEFF


   PRN Reason: Protocol


   Last Admin: 07/30/18 09:46 Dose:  300 mg


Insulin Human Regular (Humulin R Low)  0 units SC ACHS JEFF


   PRN Reason: Protocol


   Last Admin: 07/30/18 06:32 Dose:  Not Given


Lorazepam (Ativan)  1 mg PO TID PRN; Protocol


   PRN Reason: Anxiety


   Last Admin: 07/30/18 06:33 Dose:  1 mg


Multivitamins/Minerals (Therapeutic-M Tab)  1 tab PO 0800 JEFF


   PRN Reason: Protocol


   Last Admin: 07/30/18 07:49 Dose:  1 tab


Nystatin (Mycostatin Cream)  0 ea TOP QID [facility]


   Last Admin: 07/30/18 09:46 Dose:  1 applic


Ondansetron HCl (Zofran Tab)  4 mg PO Q8H PRN; Protocol


   PRN Reason: Nausea/Vomiting


   Last Admin: 07/30/18 07:27 Dose:  4 mg


Pantoprazole Sodium (Protonix Ec Tab)  40 mg PO 0600 JEFF


   PRN Reason: Protocol


   Last Admin: 07/30/18 06:01 Dose:  40 mg


Pregabalin (Lyrica)  100 mg PO TID JEFF


   PRN Reason: Protocol


   Last Admin: 07/30/18 09:46 Dose:  100 mg


Quetiapine Fumarate (Seroquel)  25 mg PO HS JEFF


   PRN Reason: Protocol


   Last Admin: 07/29/18 21:19 Dose:  25 mg


Venlafaxine HCl (Effexor Xr)  150 mg PO DAILY JEFF


   Last Admin: 07/30/18 09:46 Dose:  150 mg


Zaleplon (Sonata)  10 mg PO HS PRN; Protocol


   PRN Reason: Insomnia


   Last Admin: 07/29/18 23:04 Dose:  10 mg











- Labs


Labs: 


 





 07/29/18 06:25 





 07/29/18 06:25 











- Constitutional


Appears: Non-toxic, Chronically Ill





- Head Exam


Head Exam: NORMAL INSPECTION





- Respiratory Exam


Respiratory Exam: Decreased Breath Sounds





- Cardiovascular Exam


Cardiovascular Exam: +S1, +S2





- GI/Abdominal Exam


GI & Abdominal Exam: Soft.  absent: Tenderness





Assessment and Plan





- Assessment and Plan (Free Text)


Plan: 








Assessment


ESBL E. coli UTI (cystitis), S/P treatment with antibiotics


probable vaginal candidiasis


morbid obesity with BMI 58


depression


anxiety


fibromylagia


multiple sclerosis


DM


HTN


chronic anemia


S/P AICD and pacemaker placement


S/P port placement





Plan


completed 7 days of Merrem - will continue to monitor off antibiotics


continue Nystatin for the candidiasis
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
## 2018-07-30 NOTE — CP.PCM.PN
<Martin Vela - Last Filed: 07/30/18 12:21>





Subjective





- Date & Time of Evaluation


Date of Evaluation: 07/30/18


Time of Evaluation: 09:00





- Subjective


Subjective: 


Martin Vela PGY-1 Progress Note for Hospitalist Service





Patient seen and evaluated at bedside. Patient complains of a minor headache 

and improvement of her urinary symptoms. No acute events overnight and slept 

well per nursing. 








Objective





- Vital Signs/Intake and Output


Vital Signs (last 24 hours): 


 











Temp Pulse Resp BP Pulse Ox


 


 98.2 F   95 H  22   127/84   100 


 


 07/29/18 22:00  07/29/18 16:30  07/29/18 16:30  07/30/18 09:47  07/29/18 16:30











- Medications


Medications: 


 Current Medications





Acetaminophen (Tylenol 325mg Tab)  650 mg PO Q4H PRN; Protocol


   PRN Reason: Pain, Mild (1-3)


   Last Admin: 07/30/18 09:47 Dose:  650 mg


Amlodipine Besylate (Norvasc)  5 mg PO DAILY JEFF


   PRN Reason: Protocol


   Last Admin: 07/30/18 09:47 Dose:  5 mg


Folic Acid (Folic Acid)  1 mg PO DAILY JEFF


   PRN Reason: Protocol


   Last Admin: 07/30/18 09:46 Dose:  1 mg


Gabapentin (Neurontin)  300 mg PO TID JEFF


   PRN Reason: Protocol


   Last Admin: 07/30/18 09:46 Dose:  300 mg


Insulin Human Regular (Humulin R Low)  0 units SC ACHS JEFF


   PRN Reason: Protocol


   Last Admin: 07/30/18 11:36 Dose:  Not Given


Lorazepam (Ativan)  1 mg PO TID PRN; Protocol


   PRN Reason: Anxiety


   Last Admin: 07/30/18 11:41 Dose:  1 mg


Multivitamins/Minerals (Therapeutic-M Tab)  1 tab PO 0800 JEFF


   PRN Reason: Protocol


   Last Admin: 07/30/18 07:49 Dose:  1 tab


Nystatin (Mycostatin Cream)  0 ea TOP QID JEFF


   Last Admin: 07/30/18 09:46 Dose:  1 applic


Ondansetron HCl (Zofran Tab)  4 mg PO Q8H PRN; Protocol


   PRN Reason: Nausea/Vomiting


   Last Admin: 07/30/18 07:27 Dose:  4 mg


Pantoprazole Sodium (Protonix Ec Tab)  40 mg PO 0600 JEFF


   PRN Reason: Protocol


   Last Admin: 07/30/18 06:01 Dose:  40 mg


Pregabalin (Lyrica)  100 mg PO TID JEFF


   PRN Reason: Protocol


   Last Admin: 07/30/18 09:46 Dose:  100 mg


Quetiapine Fumarate (Seroquel)  25 mg PO HS JEFF


   PRN Reason: Protocol


   Last Admin: 07/29/18 21:19 Dose:  25 mg


Venlafaxine HCl (Effexor Xr)  150 mg PO DAILY [facility]


   Last Admin: 07/30/18 09:46 Dose:  150 mg


Zaleplon (Sonata)  10 mg PO HS PRN; Protocol


   PRN Reason: Insomnia


   Last Admin: 07/29/18 23:04 Dose:  10 mg











- Labs


Labs: 


 





 07/29/18 06:25 





 07/29/18 06:25 





- Constitutional


Appears: Non-toxic, No Acute Distress





- Head Exam


Head Exam: ATRAUMATIC, NORMAL INSPECTION, NORMOCEPHALIC





- Eye Exam


Eye Exam: EOMI, Normal appearance, PERRL





- ENT Exam


ENT Exam: Mucous Membranes Moist, Normal Oropharynx





- Neck Exam


Neck Exam: Full ROM, Normal Inspection





- Respiratory Exam


Respiratory Exam: Clear to Ausculation Bilateral, NORMAL BREATHING PATTERN





- Cardiovascular Exam


Cardiovascular Exam: REGULAR RHYTHM, +S1, +S2





- GI/Abdominal Exam


GI & Abdominal Exam: Soft, Normal Bowel Sounds





- Extremities Exam


Extremities Exam: Full ROM, Normal Capillary Refill, Normal Inspection





- Back Exam


Back Exam: NORMAL INSPECTION


Additional comments: 


no CVA tenderness b/l





- Neurological Exam


Neurological Exam: Alert, Awake, Oriented x3





- Psychiatric Exam


Psychiatric exam: Flat Affect





- Skin


Skin Exam: Dry, Intact, Normal Color, Warm








Assessment and Plan





- Assessment and Plan (Free Text)


Assessment: 


Assessment: 


58 year old female with past medical history of multiple sclerosis, fibromyalgia

, anxiety, hypertension, type 2 diabetes, neutropenia, bradycardia s/p 

pacemaker placement who completed medical management for treatment of ESBL 

symptomatic UTI. 





Plan: 


ESBL E. coli UTI


 Urinalysis: Positive for nitrates and Leukocyte esterase, Urine CX showed ESBL 

E. Coli 


 Formerly on rocephin, then switched to Nitrofurantoin for UTI. Sensitivities 

returned, resistant to nitrofurantoin. Completed course of Meropenem 50 q8.


 Nystatin cream given for vaginal candidiasis 


 ID consulted, recs appreciated regarding use of contact precautions and 

repeating urine studies at this time





Normocytic Normochromic Anemia with Leukopenia - Improved


 Chronically anemic, likely 2/2 chronic disease with leukopenia


 Likely 2/2 rocephin and meropenem effects


 Will continue to monitor 


 Hemodynamically stable, no active bleeding





Fibromyalgia/anxiety


 continue on home med of Venlafaxine 75, pregabalin 100 TID and gabapentin 300 

TID


 continue sonata 10 mg for help sleeping as per Dr. Cantrell


 Ativan 1 mg TID prn for anxiety





Prophylaxis


Protonix/SCD





Disposition: Patient transferred from Med-Surg to TCU for rehabilitation and 

completion of antibiotic course. Plan to be in TCU through 8/2/18.





Patient seen, case reviewed, and plan discussed with Dr. Saha.





Martin Vela, PGY-1








<Natanael Saha - Last Filed: 07/30/18 13:54>





Objective





- Vital Signs/Intake and Output


Vital Signs (last 24 hours): 


 











Temp Pulse Resp BP Pulse Ox


 


 98.2 F   95 H  22   127/84   100 


 


 07/29/18 22:00  07/29/18 16:30  07/29/18 16:30  07/30/18 09:47  07/29/18 16:30











- Medications


Medications: 


 Current Medications





Acetaminophen (Tylenol 325mg Tab)  650 mg PO Q4H PRN; Protocol


   PRN Reason: Pain, Mild (1-3)


   Last Admin: 07/30/18 09:47 Dose:  650 mg


Amlodipine Besylate (Norvasc)  5 mg PO DAILY JEFF


   PRN Reason: Protocol


   Last Admin: 07/30/18 09:47 Dose:  5 mg


Folic Acid (Folic Acid)  1 mg PO DAILY JEFF


   PRN Reason: Protocol


   Last Admin: 07/30/18 09:46 Dose:  1 mg


Gabapentin (Neurontin)  300 mg PO TID JEFF


   PRN Reason: Protocol


   Last Admin: 07/30/18 09:46 Dose:  300 mg


Insulin Human Regular (Humulin R Low)  0 units SC ACHS JEFF


   PRN Reason: Protocol


   Last Admin: 07/30/18 11:36 Dose:  Not Given


Lorazepam (Ativan)  1 mg PO TID PRN; Protocol


   PRN Reason: Anxiety


   Last Admin: 07/30/18 11:41 Dose:  1 mg


Multivitamins/Minerals (Therapeutic-M Tab)  1 tab PO 0800 JEFF


   PRN Reason: Protocol


   Last Admin: 07/30/18 07:49 Dose:  1 tab


Nystatin (Mycostatin Cream)  0 ea TOP QID JEFF


   Last Admin: 07/30/18 09:46 Dose:  1 applic


Ondansetron HCl (Zofran Tab)  4 mg PO Q8H PRN; Protocol


   PRN Reason: Nausea/Vomiting


   Last Admin: 07/30/18 07:27 Dose:  4 mg


Pantoprazole Sodium (Protonix Ec Tab)  40 mg PO 0600 JEFF


   PRN Reason: Protocol


   Last Admin: 07/30/18 06:01 Dose:  40 mg


Pregabalin (Lyrica)  100 mg PO TID JEFF


   PRN Reason: Protocol


   Last Admin: 07/30/18 09:46 Dose:  100 mg


Quetiapine Fumarate (Seroquel)  25 mg PO HS JEFF


   PRN Reason: Protocol


   Last Admin: 07/29/18 21:19 Dose:  25 mg


Venlafaxine HCl (Effexor Xr)  150 mg PO DAILY JEFF


   Last Admin: 07/30/18 09:46 Dose:  150 mg


Zaleplon (Sonata)  10 mg PO HS PRN; Protocol


   PRN Reason: Insomnia


   Last Admin: 07/29/18 23:04 Dose:  10 mg











- Labs


Labs: 


 





 07/29/18 06:25 





 07/29/18 06:25 











Attending/Attestation





- Attestation


I have personally seen and examined this patient.: Yes


I have fully participated in the care of the patient.: Yes


I have reviewed all pertinent clinical information, including history, physical 

exam and plan: Yes


Notes (Text): 





07/30/18 13:51


58 year old female with past medical history of bradycardia s/p AICD, 

hypertension, diabetes, anxiey and fibromyalgia who was admitted to medicine 

floor for ESBL UTI.  She was transferred to TCU for iv antibiotics and physical 

therapy.  She has now completed iv antibiotics therapy with meropenem.  

Continue with physical therapy while in TCU.  Leukopenia is chronic and stable.





Natanael Saha MD


Hospitalist.

## 2018-07-31 VITALS — OXYGEN SATURATION: 99 %

## 2018-07-31 RX ADMIN — INSULIN HUMAN SCH: 100 INJECTION, SOLUTION PARENTERAL at 07:47

## 2018-07-31 RX ADMIN — VENLAFAXINE HYDROCHLORIDE SCH MG: 75 CAPSULE, EXTENDED RELEASE ORAL at 08:19

## 2018-07-31 RX ADMIN — NYSTATIN SCH APPLIC: 100000 CREAM TOPICAL at 13:44

## 2018-07-31 RX ADMIN — NYSTATIN SCH APPLIC: 100000 CREAM TOPICAL at 10:19

## 2018-07-31 RX ADMIN — NYSTATIN SCH APPLIC: 100000 CREAM TOPICAL at 18:00

## 2018-07-31 RX ADMIN — INSULIN HUMAN SCH: 100 INJECTION, SOLUTION PARENTERAL at 12:04

## 2018-07-31 RX ADMIN — MULTIPLE VITAMINS W/ MINERALS TAB SCH TAB: TAB at 08:19

## 2018-07-31 RX ADMIN — INSULIN HUMAN SCH: 100 INJECTION, SOLUTION PARENTERAL at 22:02

## 2018-07-31 RX ADMIN — INSULIN HUMAN SCH: 100 INJECTION, SOLUTION PARENTERAL at 17:09

## 2018-07-31 RX ADMIN — NYSTATIN SCH APPLIC: 100000 CREAM TOPICAL at 21:18

## 2018-07-31 RX ADMIN — PANTOPRAZOLE SODIUM SCH MG: 40 TABLET, DELAYED RELEASE ORAL at 06:01

## 2018-07-31 NOTE — PN
Copied To:  Manuel Gale MD



Attending MD:  Manuel Gale MD.



DATE:  07/31/2018



SUBJECTIVE:  The patient is in bed, in no acute distress, nontoxic.



PHYSICAL EXAMINATION:

VITAL SIGNS:  Temperature is 99, blood pressure is 115/60, respiratory rate

of 18.

HEENT:  Examination of HEENT is unremarkable.

NECK:  Supple.

LUNGS:  Have decreased breath sounds.

HEART:  Normal S1, S2.

ABDOMEN:  Soft, nontender.



LABORATORY DATA:  Laboratory examination reveals a white count of 3.6,

hemoglobin of 9, platelets of 160.  Chemistries are noted.  Creatinine 0.7.

Microbiology is noted.



ASSESSMENT AND PLAN:  A 58-year-old female with extended-spectrum

beta-lactamase Escherichia coli urinary tract infection and cystitis,

status post treatment with antibiotics in a patient with morbid obesity,

body mass index of 58, depression, anxiety, fibromyalgia, multiple

sclerosis, diabetes, hypertension and automatic implantable

cardioverter-defibrillator.  Has completed antibiotic therapy.  Review of

orders confirms the patient to be off of antibiotics.





__________________________________________

Manuel Gale MD





DD:  07/31/2018 13:16:12

DT:  07/31/2018 13:18:01

Job # [id number]

## 2018-08-01 RX ADMIN — NYSTATIN SCH APPLIC: 100000 CREAM TOPICAL at 10:00

## 2018-08-01 RX ADMIN — INSULIN HUMAN SCH: 100 INJECTION, SOLUTION PARENTERAL at 17:26

## 2018-08-01 RX ADMIN — NYSTATIN SCH APPLIC: 100000 CREAM TOPICAL at 17:29

## 2018-08-01 RX ADMIN — INSULIN HUMAN SCH: 100 INJECTION, SOLUTION PARENTERAL at 06:30

## 2018-08-01 RX ADMIN — NYSTATIN SCH APPLIC: 100000 CREAM TOPICAL at 22:33

## 2018-08-01 RX ADMIN — INSULIN HUMAN SCH: 100 INJECTION, SOLUTION PARENTERAL at 11:54

## 2018-08-01 RX ADMIN — NYSTATIN SCH APPLIC: 100000 CREAM TOPICAL at 14:27

## 2018-08-01 RX ADMIN — PANTOPRAZOLE SODIUM SCH MG: 40 TABLET, DELAYED RELEASE ORAL at 05:58

## 2018-08-01 RX ADMIN — MULTIPLE VITAMINS W/ MINERALS TAB SCH TAB: TAB at 07:45

## 2018-08-01 RX ADMIN — INSULIN HUMAN SCH: 100 INJECTION, SOLUTION PARENTERAL at 21:57

## 2018-08-01 RX ADMIN — VENLAFAXINE HYDROCHLORIDE SCH MG: 75 CAPSULE, EXTENDED RELEASE ORAL at 07:45

## 2018-08-01 NOTE — PN
Copied To:  Tamia Guerra MD

Attending MD:  Tamia Guerra MD.



DATE:  08/01/2018



SUBJECTIVE:  This writer signed off from this case but medical team called

this writer back because the patient requested to speak this writer.  The

patient was followed up today.  The patient presented to be in anxious

mood.  The patient reports that she feels anxious.  She asked for Ativan to

be increased at the nighttime.  The patient reports that her plans have

changed.  The patient needs to stay into [place] for the next year in

order to be able to go back to [place].  The patient denied being depressed

but also was concerned about her living situation.  The patient's daughter

is arranging section 8 housing with her daughter.



VITAL SIGNS:  Seem to be stable.  Temperature 99, pulse is 60, blood

pressure 108/76, respirations _____.



MEDICATIONS:  Reviewed.  Tylenol; Norvasc; folic acid; gabapentin; Humulin;

Ativan 1 mg three times a day, it is okay to increase by 1 mg at the

nighttime; multivitamins; nystatin; Lyrica; Effexor 150 mg daily; Sonata 10

mg at the nighttime as needed.



Labs reviewed.  Microbiology reviewed.



MENTAL STATUS EXAMINATION:  The patient presented to be alert and oriented,

pleasant, cooperative, socially appropriate.  Good eye contact.  Speech was

normal rate, tone, quality, and quantity.  Mood described "I feel better." 

Affect was constricted, but reactive, mood congruent.  The patient appears

mildly anxious.  Thought process seems to be coherent and goal directed. 

Thought content, the patient denied visual, auditory, or tactile

hallucinations.  Denied paranoid ideation.  The patient denied thoughts of

harming herself or others.  Denied intent or plan.  Insight and judgment

seem to be improving.  Impulses are well controlled.



IMPRESSION:  As per history of depression and anxiety, the patient was in

delirium stage, but it is improving.



PLAN:  The patient should continue on all of her psychotropic medications. 

The patient was advised to follow up with outpatient clinic including

[facility] doctors on the ground floor.  The patient pose no

imminent danger to self or others.  It is okay to increase the dose of

Ativan to 4 mg a day.  The patient was advised to follow up with outpatient

provider.  Should you have any questions, give me a call back.



Thank you very much for letting me to participate in the care of your

patient.







__________________________________________

Tamia Guerra MD



DD:  08/01/2018 14:18:15

DT:  08/01/2018 14:23:49

Job # [id number]

## 2018-08-01 NOTE — CP.PCM.PN
<Martin Vela - Last Filed: 08/01/18 20:57>





Subjective





- Date & Time of Evaluation


Date of Evaluation: 08/01/18


Time of Evaluation: 08:30





- Subjective


Subjective: 


Martin Vela PGY-1 Progress Note for Hospitalist Service





Patient seen and evaluated at bedside. Patient denies any acute events 

overnight. Admits to improving burning on urination and some facial numbness 

and irritation after self-applying vicks rub to her face. Denies CP, SOB, 

abdominal pain.








Objective





- Vital Signs/Intake and Output


Vital Signs (last 24 hours): 


 











Temp Pulse Resp BP Pulse Ox


 


 98.4 F   70   18   132/92 H  99 


 


 08/01/18 16:00  08/01/18 16:00  08/01/18 16:00  08/01/18 16:00  08/01/18 16:00











- Medications


Medications: 


 Current Medications





Acetaminophen (Tylenol 325mg Tab)  650 mg PO Q4H PRN; Protocol


   PRN Reason: Pain, Mild (1-3)


   Last Admin: 08/01/18 04:26 Dose:  650 mg


Amlodipine Besylate (Norvasc)  5 mg PO DAILY JEFF


   PRN Reason: Protocol


   Last Admin: 08/01/18 10:01 Dose:  5 mg


Folic Acid (Folic Acid)  1 mg PO DAILY JEFF


   PRN Reason: Protocol


   Last Admin: 08/01/18 09:59 Dose:  1 mg


Gabapentin (Neurontin)  300 mg PO TID JEFF


   PRN Reason: Protocol


   Last Admin: 08/01/18 17:30 Dose:  300 mg


Insulin Human Regular (Humulin R Low)  0 units SC ACHS JEFF


   PRN Reason: Protocol


   Last Admin: 08/01/18 17:26 Dose:  Not Given


Lorazepam (Ativan)  1 mg PO TID PRN; Protocol


   PRN Reason: Anxiety


   Last Admin: 08/01/18 14:53 Dose:  1 mg


Multivitamins/Minerals (Therapeutic-M Tab)  1 tab PO 0800 JEFF


   PRN Reason: Protocol


   Last Admin: 08/01/18 07:45 Dose:  1 tab


Nystatin (Mycostatin Cream)  0 ea TOP QID JEFF


   Last Admin: 08/01/18 17:29 Dose:  1 applic


Ondansetron HCl (Zofran Tab)  4 mg PO Q8H PRN; Protocol


   PRN Reason: Nausea/Vomiting


   Last Admin: 08/01/18 14:57 Dose:  4 mg


Pantoprazole Sodium (Protonix Ec Tab)  40 mg PO 0600 JEFF


   PRN Reason: Protocol


   Last Admin: 08/01/18 05:58 Dose:  40 mg


Pregabalin (Lyrica)  100 mg PO TID JEFF


   PRN Reason: Protocol


   Last Admin: 08/01/18 18:18 Dose:  100 mg


Quetiapine Fumarate (Seroquel)  25 mg PO HS JEFF


   PRN Reason: Protocol


   Last Admin: 07/31/18 21:18 Dose:  25 mg


Venlafaxine HCl (Effexor Xr)  150 mg PO 0800 JEFF


   PRN Reason: Protocol


   Last Admin: 08/01/18 07:45 Dose:  150 mg


Zaleplon (Sonata)  10 mg PO HS PRN; Protocol


   PRN Reason: Insomnia


   Last Admin: 07/31/18 22:01 Dose:  10 mg











- Labs


Labs: 


 





 07/29/18 06:25 





 07/29/18 06:25 








- Constitutional


Appears: Non-toxic, No Acute Distress





- Head Exam


Head Exam: ATRAUMATIC, NORMAL INSPECTION, NORMOCEPHALIC





- Eye Exam


Eye Exam: EOMI, Normal appearance, PERRL





- ENT Exam


ENT Exam: Mucous Membranes Moist, Normal Oropharynx





- Neck Exam


Neck Exam: Full ROM, Normal Inspection





- Respiratory Exam


Respiratory Exam: Clear to Ausculation Bilateral, NORMAL BREATHING PATTERN





- Cardiovascular Exam


Cardiovascular Exam: REGULAR RHYTHM, +S1, +S2





- GI/Abdominal Exam


GI & Abdominal Exam: Soft, Normal Bowel Sounds





- Extremities Exam


Extremities Exam: Full ROM, Normal Capillary Refill, Normal Inspection





- Back Exam


Back Exam: NORMAL INSPECTION


Additional comments: 


no CVA tenderness b/l





- Neurological Exam


Neurological Exam: Alert, Awake, Oriented x2





- Psychiatric Exam


Psychiatric exam: Flat Affect





- Skin


Skin Exam: Dry, Intact, Normal Color, Warm








Assessment and Plan





- Assessment and Plan (Free Text)


Assessment: 


Assessment: 


58 year old female with past medical history of multiple sclerosis, fibromyalgia

, anxiety, hypertension, type 2 diabetes, neutropenia, bradycardia s/p 

pacemaker placement who completed medical management for treatment of ESBL 

symptomatic UTI. 





Plan: 


ESBL E. coli UTI - resolved


 Urinalysis: Positive for nitrates and Leukocyte esterase, Urine CX showed ESBL 

E. Coli 


 Formerly on rocephin, then switched to Nitrofurantoin for UTI. Sensitivities 

returned, resistant to nitrofurantoin. Completed course of Meropenem 50 q8.


 Nystatin cream given for vaginal candidiasis 


 ID consulted, recs appreciated regarding use of contact precautions and 

repeating urine studies at this time





Normocytic Normochromic Anemia with Leukopenia - Resolved


 Chronically anemic, likely 2/2 chronic disease with leukopenia


 Likely 2/2 rocephin and meropenem effects


 Will continue to monitor 


 Hemodynamically stable, no active bleeding





Fibromyalgia/anxiety


 continue on home med of Venlafaxine 75, pregabalin 100 TID and gabapentin 300 

TID


 continue sonata 10 mg for help sleeping as per Dr. Cantrell


 Ativan 2 mg BID at night and Ativan 1 BID prn for anxiety





Prophylaxis


Protonix/SCD





Disposition: Patient is medically stable and was given the option to go home 

today, but patient refused and requested one more day to get her affairs in 

order and mentally prepared for discharge tomorrow. Patient is already set up 

with apartment in [place] with daughter's help. Patient's daughter was notified 

of patient's pending discharge and is prepared for mother's pickup tomorrow.





Patient seen, case reviewed, and plan discussed with Dr. Saha.





Martin Vela, PGY-1








<Natanael Saha - Last Filed: 08/02/18 07:03>





Objective





- Vital Signs/Intake and Output


Vital Signs (last 24 hours): 


 











Temp Pulse Resp BP Pulse Ox


 


 98.4 F   70   18   132/92 H  99 


 


 08/01/18 16:00  08/01/18 16:00  08/01/18 16:00  08/01/18 16:00  08/01/18 16:00











- Medications


Medications: 


 Current Medications





Acetaminophen (Tylenol 325mg Tab)  650 mg PO Q4H PRN; Protocol


   PRN Reason: Pain, Mild (1-3)


   Last Admin: 08/01/18 04:26 Dose:  650 mg


Amlodipine Besylate (Norvasc)  5 mg PO DAILY JEFF


   PRN Reason: Protocol


   Last Admin: 08/01/18 10:01 Dose:  5 mg


Folic Acid (Folic Acid)  1 mg PO DAILY JEFF


   PRN Reason: Protocol


   Last Admin: 08/01/18 09:59 Dose:  1 mg


Gabapentin (Neurontin)  300 mg PO TID JEFF


   PRN Reason: Protocol


   Last Admin: 08/01/18 17:30 Dose:  300 mg


Insulin Human Regular (Humulin R Low)  0 units SC ACHS JEFF


   PRN Reason: Protocol


   Last Admin: 08/02/18 07:01 Dose:  Not Given


Lorazepam (Ativan)  1 mg PO TID PRN; Protocol


   PRN Reason: Anxiety


   Last Admin: 08/01/18 14:53 Dose:  1 mg


Multivitamins/Minerals (Therapeutic-M Tab)  1 tab PO 0800 JEFF


   PRN Reason: Protocol


   Last Admin: 08/01/18 07:45 Dose:  1 tab


Nystatin (Mycostatin Cream)  0 ea TOP QID JEFF


   Last Admin: 08/01/18 22:33 Dose:  1 applic


Ondansetron HCl (Zofran Tab)  4 mg PO Q8H PRN; Protocol


   PRN Reason: Nausea/Vomiting


   Last Admin: 08/01/18 22:34 Dose:  4 mg


Pantoprazole Sodium (Protonix Ec Tab)  40 mg PO 0600 JEFF


   PRN Reason: Protocol


   Last Admin: 08/02/18 05:33 Dose:  40 mg


Pregabalin (Lyrica)  100 mg PO TID JEFF


   PRN Reason: Protocol


   Last Admin: 08/01/18 18:18 Dose:  100 mg


Quetiapine Fumarate (Seroquel)  25 mg PO HS JEFF


   PRN Reason: Protocol


   Last Admin: 08/01/18 22:32 Dose:  25 mg


Venlafaxine HCl (Effexor Xr)  150 mg PO 0800 JEFF


   PRN Reason: Protocol


   Last Admin: 08/01/18 07:45 Dose:  150 mg


Zaleplon (Sonata)  10 mg PO HS PRN; Protocol


   PRN Reason: Insomnia


   Last Admin: 08/01/18 23:06 Dose:  10 mg











- Labs


Labs: 


 





 07/29/18 06:25 





 07/29/18 06:25 











Attending/Attestation





- Attestation


I have personally seen and examined this patient.: Yes


I have fully participated in the care of the patient.: Yes


I have reviewed all pertinent clinical information, including history, physical 

exam and plan: Yes


Notes (Text): 





08/01/18


58 year old female with past medical history of bradycardia s/p AICD, 

hypertension, diabetes, anxiey and fibromyalgia who was admitted to medicine 

floor for ESBL UTI.  She was transferred to TCU for iv antibiotics and physical 

therapy.  She has now completed iv antibiotics therapy with meropenem.  She has 

been doing well with physical therapy.  Leukopenia is chronic and stable.  

Psychiatry follow up was appreciated for anxiety.  D/c planning for tomorrow as 

above.





Natanael Saha MD


Hospitalist.

## 2018-08-01 NOTE — PN
Copied To:  Manuel Gale MD

Attending MD:  Manuel Gale MD



DATE:  08/01/2018



SUBJECTIVE:   The patient is in bed, in no acute distress, nontoxic.



PHYSICAL EXAMINATION:

VITAL SIGNS:  On exam, temperature is 99, blood pressure is 160/90,

respiratory rate of 16.

HEENT:  Examination of HEENT is unremarkable.

NECK:  Supple.

LUNGS:  Have decreased breath sounds.

HEART:  Normal S1, S2.

ABDOMEN:  Soft, nontender.



LABORATORY DATA:  Laboratory examination reveals a white count of 3.6. 

Laboratory examination reveals a hemoglobin of 9 and review of orders

reveals the patient to be off of antibiotics.



ASSESSMENT AND PLAN:  A 58-year-old female was seen in room 317 with

extended-spectrum beta-lactamase Escherichia. coli urinary tract infection

and cystitis, status post treatment with antibiotics and morbid obesity,

body mass index of 58, depression, anxiety, fibromyalgia, multiple

sclerosis, diabetes, hypertension, automated implantable

cardioverter-defibrillator has completed the antibiotic therapy.  The

patient is at risk for developing nosocomial infections.  We will follow

with you.



__________________________________________

Manuel Gale MD





DD:  08/01/2018 7:30:59

DT:  08/01/2018 7:32:04

Job # [id number]

## 2018-08-02 VITALS
HEART RATE: 70 BPM | SYSTOLIC BLOOD PRESSURE: 119 MMHG | DIASTOLIC BLOOD PRESSURE: 74 MMHG | RESPIRATION RATE: 16 BRPM | TEMPERATURE: 99.3 F

## 2018-08-02 RX ADMIN — VENLAFAXINE HYDROCHLORIDE SCH MG: 75 CAPSULE, EXTENDED RELEASE ORAL at 07:51

## 2018-08-02 RX ADMIN — INSULIN HUMAN SCH: 100 INJECTION, SOLUTION PARENTERAL at 07:01

## 2018-08-02 RX ADMIN — NYSTATIN SCH APPLIC: 100000 CREAM TOPICAL at 13:39

## 2018-08-02 RX ADMIN — MULTIPLE VITAMINS W/ MINERALS TAB SCH TAB: TAB at 07:51

## 2018-08-02 RX ADMIN — PANTOPRAZOLE SODIUM SCH MG: 40 TABLET, DELAYED RELEASE ORAL at 05:33

## 2018-08-02 RX ADMIN — INSULIN HUMAN SCH UNIT: 100 INJECTION, SOLUTION PARENTERAL at 12:21

## 2018-08-02 RX ADMIN — NYSTATIN SCH APPLIC: 100000 CREAM TOPICAL at 09:29

## 2018-08-02 NOTE — CP.PCM.DIS
<BetokrzysztofjeremyMartin - Last Filed: 08/02/18 19:53>





Provider





- Provider


Date of Admission: 


07/25/18 16:35





Attending physician: 


Natanael Saha MD





Primary care physician: 


None





Consults: 


Psychiatry - Dr. Karimi


Infectious Disease -Dr. Hernández /Shahla


Podiatry: Dr. Moscoso





Time Spent in preparation of Discharge (in minutes): 45





Diagnosis





- Discharge Diagnosis


(1) Urinary tract infection due to ESBL Klebsiella


Status: Resolved   





Hospital Course





- Lab Results


Lab Results: 


 Most Recent Lab Values











WBC  3.6 10^3/ul (4.5-11.0)  L  07/29/18  06:25    


 


RBC  3.15 10^6/uL (3.5-6.1)  L  07/29/18  06:25    


 


Hgb  9.5 g/dL (12.0-16.0)  L  07/29/18  06:25    


 


Hct  30.1 % (36.0-48.0)  L  07/29/18  06:25    


 


MCV  95.6 fl (80.0-105.0)   07/29/18  06:25    


 


MCH  30.2 pg (25.0-35.0)   07/29/18  06:25    


 


MCHC  31.6 g/dl (31.0-37.0)   07/29/18  06:25    


 


RDW  13.1 % (11.5-14.5)   07/29/18  06:25    


 


Plt Count  160 10^3/uL (120.0-450.0)   07/29/18  06:25    


 


MPV  10.3 fl (7.0-11.0)   07/29/18  06:25    


 


Gran %  50.9 % (50.0-68.0)   07/29/18  06:25    


 


Lymph % (Auto)  38.3 % (22.0-35.0)  H  07/29/18  06:25    


 


Mono % (Auto)  8.6 % (1.0-6.0)  H  07/29/18  06:25    


 


Eos % (Auto)  2.2 % (1.5-5.0)   07/29/18  06:25    


 


Baso % (Auto)  0.0 % (0.0-3.0)   07/29/18  06:25    


 


Gran #  1.83  (1.4-6.5)   07/29/18  06:25    


 


Lymph # (Auto)  1.4  (1.2-3.4)   07/29/18  06:25    


 


Mono # (Auto)  0.3  (0.1-0.6)   07/29/18  06:25    


 


Eos # (Auto)  0.1  (0.0-0.7)   07/29/18  06:25    


 


Baso # (Auto)  0.00 K/mm3 (0.0-2.0)   07/29/18  06:25    


 


Sodium  138 mmol/L (132-148)   07/29/18  06:25    


 


Potassium  4.7 mmol/L (3.6-5.0)   07/29/18  06:25    


 


Chloride  100 mmol/L ()   07/29/18  06:25    


 


Carbon Dioxide  28 mmol/L (21-33)   07/29/18  06:25    


 


Anion Gap  15  (10-20)   07/29/18  06:25    


 


BUN  17 mg/dL (7-21)   07/29/18  06:25    


 


Creatinine  0.7 mg/dl (0.7-1.2)   07/29/18  06:25    


 


Est GFR ( Amer)  > 60   07/29/18  06:25    


 


Est GFR (Non-Af Amer)  > 60   07/29/18  06:25    


 


POC Glucose (mg/dL)  154 mg/dL ()  H  08/02/18  11:09    


 


Random Glucose  114 mg/dL ()  H  07/29/18  06:25    


 


Calcium  8.9 mg/dL (8.4-10.5)   07/29/18  06:25    


 


Total Bilirubin  0.3 mg/dL (0.2-1.3)   07/29/18  06:25    


 


AST  47 U/L (14-36)  H D 07/29/18  06:25    


 


ALT  36 U/L (7-56)   07/29/18  06:25    


 


Alkaline Phosphatase  71 U/L ()   07/29/18  06:25    


 


Total Protein  6.6 g/dL (5.8-8.3)   07/29/18  06:25    


 


Albumin  3.4 g/dL (3.0-4.8)   07/29/18  06:25    


 


Globulin  3.1 gm/dL  07/29/18  06:25    


 


Albumin/Globulin Ratio  1.1  (1.1-1.8)   07/29/18  06:25    














- Hospital Course


Hospital Course: 


Martin Vela, PGY-1 Discharge Summary for Hospitalist Service 





58 year old female with past medical history of hypertension, diet controlled 

diabetes, fibromyalgias, multiple sclerosis, bradycardia s/p PPM and anxiety 

who initially presented with altered mental status, recent fall and 

hallucinations per family.  She was being managed in inpatient psychiatric unit 

on Bactrim for her Urinary Tract Infection, however urine culture came back as 

ESBL E Coli so patient was transferred to medical floor to be treated with iv 

meropenem based on sensitivity profile. ID recommendations were to complete 7 

days of Antibiotics. On day 4 of Meropenem, patient had leukopenia which 

improved based on labs and is likely secondary to antibiotic effects. For 

hypertension, patient was on Norvasc. Patient was on insulin ss for diabetes. 

Per Psych recommendations, patient on seroquel and effexor and Neurontin.  

Klonopin was switched to ativan per patient preference and psychiatry 

recommendations. 


Patient had complaints of chest pain on 7/24- EKG showed NSR @ 68 bpm with 

atrial pacemaker. Troponins x2 were found to be negative. Patient did not 

complain of chest pain, shortness of breath or palpitations after that time. 


Patient also complained of multiple bowel movements, but patient was notified 

that need at least 3 bowel movements on consecutive days to be tested for c. 

diff toxin, as intermittent diarrhea may be due to antibiotic use in general. 

Only 1 soft bowel movement was documented at that time. No further 

documentation of suspected diarrhea subsequent.


Physical therapy recommendations at that time were to transfer the patient to 

TCU for rehab services on IV antibiotics. Patient completed 7 day course on 7/ 28 and Infectious Disease consults had no further antibiotic recommendations 

after that point.


Patient subsequently complained of vaginal discomfort and was given Nystatin 

cream for vaginal candidiasis that improved over her course.


Patient received physical therapy and psychiatry consults over the last few 

days of her course. Seroquel, Venlafaxine and Gabapentin medications were 

adjusted. Ativan was changed to 2 mg at night and 1 mg BID during the day per 

psychiatry recommendations. Psychiatry then signed off the patient's case.





Patient was medically stabilized for discharge on 8/1/18 but felt unprepared to 

leave at that time. Patient reported she will go to an apartment in [place] her 

daughter helped her secure.


This morning, patient was in good spirits without acute events overnight. 

Patient denied chest pain, shortness of breath, palpitations, headache, burning 

on urination, leg pain, gait unsteadiness, and hallucinations. Psychiatry re-

examined the patient and deemed the patient safe and stable for discharge.


Patient expressed understanding of her circumstances being discharged and 

questions regarding her hospital course and follow up were addressed at length 

to patient's satisfaction.


Patient's daughter to  patient.


Patient will follow up in [facility] clinic with Dr. Mims next week.





Please refer to chart for further details.





Patient seen, case reviewed, and plan discussed with Dr. Saha.











Discharge Exam





- Head Exam


Head Exam: NORMAL INSPECTION, NORMOCEPHALIC





- Eye Exam


Eye Exam: EOMI, Normal appearance


Pupil Exam: PERRL





- ENT Exam


ENT Exam: Mucous Membranes Moist, Normal Exam





- Neck Exam


Neck exam: Normal Inspection





- Respiratory Exam


Respiratory Exam: Clear to PA & Lateral, NORMAL BREATHING PATTERN, 

UNREMARKABLE.  absent: Accessory Muscle Use, Rhonchi, Wheezes, Respiratory 

Distress





- Cardiovascular Exam


Cardiovascular Exam: REGULAR RHYTHM, RRR, +S1, +S2.  absent: Rubs, +S4





- GI/Abdominal Exam


GI & Abdominal Exam: Normal Bowel Sounds, Soft.  absent: Distended, Guarding, 

Organomegaly, Tenderness





- Extremities Exam


Extremities exam: normal capillary refill





- Back Exam


Back exam: absent: CVA tenderness (L), CVA tenderness (R), rash noted





- Neurological Exam


Neurological exam: Alert, Oriented x3





- Psychiatric Exam


Psychiatric exam: Flat Affect





- Skin


Skin Exam: Dry, Intact, Normal Color, Warm





Discharge Plan





- Discharge Medications


Prescriptions: 


amLODIPine [Norvasc] 5 mg PO DAILY #30 tab


LORazepam [Ativan] 1 mg PO BID #28 tab


Pantoprazole [Protonix EC Tab] 40 mg PO 0600 #30 ect


Venlafaxine [Effexor XR] 150 mg PO 0800 #14 cer


Zaleplon [Sonata] 10 mg PO HS PRN #14 cap


 PRN Reason: Insomnia





- Follow Up Plan


Condition: GOOD


Disposition: HOME/ ROUTINE


Instructions:  Urinary Tract Infection, Adult (DC), Anxiety, Adult (DC), 

Extended-Spectrum Beta Lactamase Infection


Additional Instructions: 


1. Patient should take her medications as prescribed. Medications prescribed are

:





Ativan 2 mg PO HS and Ativan 1 mg PO BID


Multimineral/Multivitamin 1 tab in AM


Folic acid 1 mg PO


Amlodipine 5 mg PO


Sonata 10 mg PO


Venlafaxine 150 mg PO


Protonix 40 PO





2. Patient should follow up with psychiatry recommendations.


3. Patient should return to [facility] clinic with Dr. Mims on Friday August 10th 

at 2 pm for outpatient follow up and any outstanding questions.


4. Should symptoms reoccur or worsen, please return to nearest emergency 

department.





<Natanael Saha - Last Filed: 08/03/18 06:44>





Provider





- Provider


Date of Admission: 


07/25/18 16:35





Attending physician: 


Natanael Saha MD








[facility] Course





- Lab Results


Lab Results: 


 Most Recent Lab Values











WBC  3.6 10^3/ul (4.5-11.0)  L  07/29/18  06:25    


 


RBC  3.15 10^6/uL (3.5-6.1)  L  07/29/18  06:25    


 


Hgb  9.5 g/dL (12.0-16.0)  L  07/29/18  06:25    


 


Hct  30.1 % (36.0-48.0)  L  07/29/18  06:25    


 


MCV  95.6 fl (80.0-105.0)   07/29/18  06:25    


 


MCH  30.2 pg (25.0-35.0)   07/29/18  06:25    


 


MCHC  31.6 g/dl (31.0-37.0)   07/29/18  06:25    


 


RDW  13.1 % (11.5-14.5)   07/29/18  06:25    


 


Plt Count  160 10^3/uL (120.0-450.0)   07/29/18  06:25    


 


MPV  10.3 fl (7.0-11.0)   07/29/18  06:25    


 


Gran %  50.9 % (50.0-68.0)   07/29/18  06:25    


 


Lymph % (Auto)  38.3 % (22.0-35.0)  H  07/29/18  06:25    


 


Mono % (Auto)  8.6 % (1.0-6.0)  H  07/29/18  06:25    


 


Eos % (Auto)  2.2 % (1.5-5.0)   07/29/18  06:25    


 


Baso % (Auto)  0.0 % (0.0-3.0)   07/29/18  06:25    


 


Gran #  1.83  (1.4-6.5)   07/29/18  06:25    


 


Lymph # (Auto)  1.4  (1.2-3.4)   07/29/18  06:25    


 


Mono # (Auto)  0.3  (0.1-0.6)   07/29/18  06:25    


 


Eos # (Auto)  0.1  (0.0-0.7)   07/29/18  06:25    


 


Baso # (Auto)  0.00 K/mm3 (0.0-2.0)   07/29/18  06:25    


 


Sodium  138 mmol/L (132-148)   07/29/18  06:25    


 


Potassium  4.7 mmol/L (3.6-5.0)   07/29/18  06:25    


 


Chloride  100 mmol/L ()   07/29/18  06:25    


 


Carbon Dioxide  28 mmol/L (21-33)   07/29/18  06:25    


 


Anion Gap  15  (10-20)   07/29/18  06:25    


 


BUN  17 mg/dL (7-21)   07/29/18  06:25    


 


Creatinine  0.7 mg/dl (0.7-1.2)   07/29/18  06:25    


 


Est GFR ( Amer)  > 60   07/29/18  06:25    


 


Est GFR (Non-Af Amer)  > 60   07/29/18  06:25    


 


POC Glucose (mg/dL)  154 mg/dL ()  H  08/02/18  11:09    


 


Random Glucose  114 mg/dL ()  H  07/29/18  06:25    


 


Calcium  8.9 mg/dL (8.4-10.5)   07/29/18  06:25    


 


Total Bilirubin  0.3 mg/dL (0.2-1.3)   07/29/18  06:25    


 


AST  47 U/L (14-36)  H D 07/29/18  06:25    


 


ALT  36 U/L (7-56)   07/29/18  06:25    


 


Alkaline Phosphatase  71 U/L ()   07/29/18  06:25    


 


Total Protein  6.6 g/dL (5.8-8.3)   07/29/18  06:25    


 


Albumin  3.4 g/dL (3.0-4.8)   07/29/18  06:25    


 


Globulin  3.1 gm/dL  07/29/18  06:25    


 


Albumin/Globulin Ratio  1.1  (1.1-1.8)   07/29/18  06:25    














Attending/Attestation





- Attestation


I have personally seen and examined this patient.: Yes


I have fully participated in the care of the patient.: Yes


I have reviewed all pertinent clinical information, including history, physical 

exam and plan: Yes


Notes (Text): 





08/02/18


58 year old female with past medical history of bradycardia s/p AICD, 

hypertension, diabetes, anxiey and fibromyalgia who was admitted to medicine 

floor for ESBL UTI.  She was transferred to TCU for iv antibiotics and physical 

therapy.  She has now completed iv antibiotics therapy with meropenem.  She has 

been doing well with physical therapy.  She was being followed by psychiatry 

for anxiety and her medications were adjusted as above.





Overall her symptoms have improved.


She is discharged home to follow up at [facility] next week.


Follow up with psychiatrist or [facility] clinic.


Counselled on risks of substance / opiod abuse.





Natanael Saha MD


Hospitalist.

## 2018-08-02 NOTE — PN
Copied To:  Manuel Gale MD

Attending MD:  Manuel Gale MD



DATE:  08/02/2018



SUBJECTIVE:  The patient is in bed, in no acute distress, nontoxic.



PHYSICAL EXAMINATION:

VITAL SIGNS:  Temperature is 98, blood pressure is 130/90, respiratory rate

of 18, heart rate of 67.

HEENT:  Examination of HEENT is unremarkable.

NECK:  Supple.

LUNGS:  Have decreased breath sounds.

HEART:  Normal S1, S2.

ABDOMEN:  Soft, nontender.



LABORATORY DATA:  Laboratory examination reveals a white count of 3.6,

hemoglobin of 9, platelets of 160.  Chemistries are creatinine is 0.7. 

Microbiology is noted.



ASSESSMENT AND PLAN:  A 58-year-old female who was seen earlier this

morning in room 317 with an extended-spectrum beta-lactamase Escherichia

coli urinary tract infection and cystitis and status post treatment with

antibiotics with the patient with morbid obesity with body mass index of

58, depression, anxiety, fibromyalgia, multiple sclerosis, diabetes,

hypertension, automatic implantable cardioverter-defibrillator and

currently off of antibiotics.  The patient is for possible discharge today

and the patient is at risk for developing nosocomial infections.





__________________________________________

Manuel Gale MD





DD:  08/02/2018 8:49:00

DT:  08/02/2018 8:49:55

Job # [id number]

## 2018-09-14 ENCOUNTER — HOSPITAL ENCOUNTER (EMERGENCY)
Dept: HOSPITAL 31 - C.ER | Age: 58
Discharge: HOME | End: 2018-09-14
Payer: SELF-PAY

## 2018-09-14 VITALS — BODY MASS INDEX: 34.7 KG/M2

## 2018-09-14 VITALS — TEMPERATURE: 98.8 F

## 2018-09-14 VITALS
DIASTOLIC BLOOD PRESSURE: 56 MMHG | RESPIRATION RATE: 16 BRPM | SYSTOLIC BLOOD PRESSURE: 132 MMHG | OXYGEN SATURATION: 97 % | HEART RATE: 85 BPM

## 2018-09-14 DIAGNOSIS — F41.9: Primary | ICD-10-CM

## 2018-09-14 DIAGNOSIS — G35: ICD-10-CM

## 2018-09-14 LAB
ALBUMIN SERPL-MCNC: 3.6 G/DL (ref 3.5–5)
ALBUMIN/GLOB SERPL: 1.3 {RATIO} (ref 1–2.1)
ALT SERPL-CCNC: 29 U/L (ref 9–52)
AST SERPL-CCNC: 31 U/L (ref 14–36)
BASOPHILS # BLD AUTO: 0 K/UL (ref 0–0.2)
BASOPHILS NFR BLD: 0.4 % (ref 0–2)
BILIRUB UR-MCNC: NEGATIVE MG/DL
BUN SERPL-MCNC: 19 MG/DL (ref 7–17)
CALCIUM SERPL-MCNC: 8.6 MG/DL (ref 8.6–10.4)
EOSINOPHIL # BLD AUTO: 0 K/UL (ref 0–0.7)
EOSINOPHIL NFR BLD: 0.5 % (ref 0–4)
ERYTHROCYTE [DISTWIDTH] IN BLOOD BY AUTOMATED COUNT: 13.2 % (ref 11.5–14.5)
GFR NON-AFRICAN AMERICAN: > 60
GLUCOSE UR STRIP-MCNC: NORMAL MG/DL
HGB BLD-MCNC: 10.5 G/DL (ref 11–16)
LEUKOCYTE ESTERASE UR-ACNC: (no result) LEU/UL
LYMPHOCYTES # BLD AUTO: 1.2 K/UL (ref 1–4.3)
LYMPHOCYTES NFR BLD AUTO: 34 % (ref 20–40)
MCH RBC QN AUTO: 31.9 PG (ref 27–31)
MCHC RBC AUTO-ENTMCNC: 33.5 G/DL (ref 33–37)
MCV RBC AUTO: 95.2 FL (ref 81–99)
MONOCYTES # BLD: 0.4 K/UL (ref 0–0.8)
MONOCYTES NFR BLD: 11.4 % (ref 0–10)
NEUTROPHILS # BLD: 1.9 K/UL (ref 1.8–7)
NEUTROPHILS NFR BLD AUTO: 53.7 % (ref 50–75)
NRBC BLD AUTO-RTO: 0.1 % (ref 0–2)
PH UR STRIP: 5 [PH] (ref 5–8)
PLATELET # BLD: 155 K/UL (ref 130–400)
PMV BLD AUTO: 9.4 FL (ref 7.2–11.7)
PROT UR STRIP-MCNC: NEGATIVE MG/DL
RBC # BLD AUTO: 3.28 MIL/UL (ref 3.8–5.2)
RBC # UR STRIP: NEGATIVE /UL
SP GR UR STRIP: 1.03 (ref 1–1.03)
SQUAMOUS EPITHIAL: 1 /HPF (ref 0–5)
UROBILINOGEN UR-MCNC: NORMAL MG/DL (ref 0.2–1)
WBC # BLD AUTO: 3.6 K/UL (ref 4.8–10.8)

## 2018-09-14 NOTE — C.PDOC
History Of Present Illness





58-year-old female, presents to the emergency department with complaints of 

fall today and two days ago. Patient has a hx of multiple sclerosis and lower 

extremity neuropathy. She denies any chest pain, shortness of breath, dizziness

, or any other associated symptoms. Patient denies any SI/HI


Time Seen by Provider: 09/14/18 16:33


Chief Complaint (Nursing): Weakness/Neurological Deficit


History Per: Patient


History/Exam Limitations: no limitations





Past Medical History


Reviewed: Historical Data, Nursing Documentation, Vital Signs


Vital Signs: 


 Last Vital Signs











Temp  98.8 F   09/14/18 18:38


 


Pulse  85   09/14/18 20:16


 


Resp  16   09/14/18 20:16


 


BP  132/56 L  09/14/18 20:16


 


Pulse Ox  97   09/14/18 21:45














- Medical History


PMH: Anemia, Anxiety, Arthritis, Back Problems, Bipolar Disorder, Cardia 

Arrhythmia, Depression, Fibromyalgia, Fractures (L RIB), HTN, 

Hypercholesterolemia, Kidney Stones, Migraine, Multiple Sclerosis


Surgical History: Pacemaker





- [facility] Procedures








BATHING/SHOWERING TECHNIQUES TREATMENT (07/25/18)


DRESSING TECHNIQUES TREATMENT (07/25/18)


GAIT TRAINING/AMBULAT TREATMENT USING ASSIST EQUIPMENT (07/25/18)


GROOMING/PERSONAL HYGIENE TREATMENT (07/25/18)


HOME MANAGEMENT TREATMENT (07/25/18)


INJECT/INFUSE NEC (08/08/14)


THERAPEUTIC EXERCISE TREATMENT OF MUSCULOSK WHOLE (07/25/18)








Family History: States: No Known Family Hx





- Social History


Hx Alcohol Use: No


Hx Substance Use: No





- Immunization History


Hx Tetanus Toxoid Vaccination: No





Physical Exam





- Physical Exam


Appears: Non-toxic, No Acute Distress


Skin: Normal Color, Warm, Dry, No Rash


Head: Atraumatic


Eye(s): bilateral: Normal Inspection


Nose: Normal


Oral Mucosa: Moist


Lips: Normal Appearing


Neck: Normal ROM


Chest: Symmetrical


Cardiovascular: Rhythm Regular, No Murmur


Respiratory: Normal Breath Sounds, No Accessory Muscle Use


Gastrointestinal/Abdominal: Soft, No Tenderness


Extremity: Normal ROM, No Deformity


Neurological/Psych: Oriented x3, Normal Speech





ED Course And Treatment





- Laboratory Results


Result Diagrams: 


 09/14/18 17:35





 09/14/18 17:35


O2 Sat by Pulse Oximetry: 97


Pulse Ox Interpretation: Normal (RA)





Disposition





- Disposition


Referrals: 


 Service [Outside]


[facility] [Outside]


Disposition: HOME/ ROUTINE


Disposition Time: 19:10


Condition: GOOD


Additional Instructions: 





LANE [facility], thank you for letting us take care of you today. The emergency 

medical care you received today was directed at your acute symptoms. If you 

were prescribed any medication, please fill it and take as directed. It may 

take several days for your symptoms to resolve. Return to the Emergency 

Department if your symptoms worsen, do not improve, or if you have any other 

problems.





Please contact your doctor or call one of the physicians/clinics you have been 

referred to that are listed on the Patient Visit Information form that is 

included in your discharge packet. Bring any paperwork you were given at 

discharge with you along with any medications you are taking to your follow up 

visit. Our treatment cannot replace ongoing medical care by a primary care 

provider outside of the emergency department.





Thank you for allowing the [facility] team to be part of your care today.














Call the [facility] service for further information for pain management and 

outpatient psychiatric care.


Prescriptions: 


Ibuprofen [Motrin Tab] 800 mg PO Q6 PRN #20 tab


 PRN Reason: Pain, Moderate (4-7)


Instructions:  Chronic Pain (DC)


Forms:  [facility] (English)





- Clinical Impression


Clinical Impression: 


 Anxiety, Multiple sclerosis








- Scribe Statement


The provider has reviewed the documentation as recorded by the Scribe (Citlalli Cruz)


Provider Attestation: 








All medical record entries made by the Scribe were at my direction and 

personally dictated by me. I have reviewed the chart and agree that the record 

accurately reflects my personal performance of the history, physical exam, 

medical decision making, and the department course for this patient. I have 

also personally directed, reviewed, and agree with the discharge instructions 

and disposition.

## 2018-09-14 NOTE — RAD
Date of service: 



09/14/2018



PROCEDURE:  CHEST RADIOGRAPH, 1 VIEW



HISTORY:

chest pain



COMPARISON:

None available.



FINDINGS:



LUNGS:

Clear.



PLEURA:

No pneumothorax or pleural fluid seen.



CARDIOVASCULAR:

 No radiographic findings to suggest acute or significant 

cardiovascular disease. Position/ configuration of pacemaker

device: Satisfactory. Venous access catheter in satisfactory position.



OSSEOUS STRUCTURES:

No significant abnormalities.



VISUALIZED UPPER ABDOMEN:

Normal.



OTHER FINDINGS:

None. 



IMPRESSION:

No active pulmonary disease.

## 2018-09-14 NOTE — CT
Date of service: 



09/14/2018



PROCEDURE:  CT HEAD WITHOUT CONTRAST.



HISTORY:

r/o ICH - h/o falls and MS



COMPARISON:

None available.



TECHNIQUE:

Axial computed tomography images were obtained through the head/brain 

without intravenous contrast.  



Coronal and sagittal reconstructed images.



Radiation dose:



Total exam DLP = 836.05 mGy-cm.



This CT exam was performed using one or more of the following dose 

reduction techniques: Automated exposure control, adjustment of the 

mA and/or kV according to patient size, and/or use of iterative 

reconstruction technique.



FINDINGS:



HEMORRHAGE:

No intracranial hemorrhage. 



BRAIN:

No mass effect or edema.  Cortical and cerebellar atrophy, 

periventricular small vessel disease.  Additional lacune or infarcts 

identified right thalamus. Punctate foci of encephalomalacia adjacent 

to the sylvian fissure on the left and internal capsule on the right. 



VENTRICLES:

Unremarkable. No hydrocephalus. 



CALVARIUM:

Unremarkable.



PARANASAL SINUSES:

Unremarkable as visualized. No significant inflammatory changes.



MASTOID AIR CELLS:

Unremarkable as visualized. No inflammatory changes.



OTHER FINDINGS:

None.



IMPRESSION:

No acute intracranial abnormalities. No significant findings to 

account for the clinical presentation.

## 2018-09-16 ENCOUNTER — HOSPITAL ENCOUNTER (EMERGENCY)
Dept: HOSPITAL 14 - H.ER | Age: 58
Discharge: HOME | End: 2018-09-16
Payer: MEDICARE

## 2018-09-16 VITALS
DIASTOLIC BLOOD PRESSURE: 90 MMHG | HEART RATE: 68 BPM | TEMPERATURE: 100.6 F | OXYGEN SATURATION: 100 % | RESPIRATION RATE: 17 BRPM | SYSTOLIC BLOOD PRESSURE: 157 MMHG

## 2018-09-16 VITALS — BODY MASS INDEX: 34.7 KG/M2

## 2018-09-16 DIAGNOSIS — E78.00: ICD-10-CM

## 2018-09-16 DIAGNOSIS — Z95.0: ICD-10-CM

## 2018-09-16 DIAGNOSIS — I10: ICD-10-CM

## 2018-09-16 DIAGNOSIS — G35: Primary | ICD-10-CM

## 2018-09-16 DIAGNOSIS — M79.7: ICD-10-CM

## 2018-09-16 DIAGNOSIS — R29.6: ICD-10-CM

## 2018-09-16 LAB
ALBUMIN SERPL-MCNC: 3.1 G/DL (ref 3.5–5)
ALBUMIN/GLOB SERPL: 1 {RATIO} (ref 1–2.1)
ALT SERPL-CCNC: 34 U/L (ref 9–52)
AST SERPL-CCNC: 29 U/L (ref 14–36)
BASOPHILS # BLD AUTO: 0 K/UL (ref 0–0.2)
BASOPHILS NFR BLD: 0.3 % (ref 0–2)
BUN SERPL-MCNC: 18 MG/DL (ref 7–17)
CALCIUM SERPL-MCNC: 8.3 MG/DL (ref 8.4–10.2)
EOSINOPHIL # BLD AUTO: 0 K/UL (ref 0–0.7)
EOSINOPHIL NFR BLD: 1.3 % (ref 0–4)
ERYTHROCYTE [DISTWIDTH] IN BLOOD BY AUTOMATED COUNT: 13.1 % (ref 11.5–14.5)
GFR NON-AFRICAN AMERICAN: > 60
HGB BLD-MCNC: 10.1 G/DL (ref 12–16)
LYMPHOCYTES # BLD AUTO: 1.4 K/UL (ref 1–4.3)
LYMPHOCYTES NFR BLD AUTO: 41.6 % (ref 20–40)
MCH RBC QN AUTO: 31.7 PG (ref 27–31)
MCHC RBC AUTO-ENTMCNC: 32.9 G/DL (ref 33–37)
MCV RBC AUTO: 96.2 FL (ref 81–99)
MONOCYTES # BLD: 0.3 K/UL (ref 0–0.8)
MONOCYTES NFR BLD: 10.5 % (ref 0–10)
NEUTROPHILS # BLD: 1.5 K/UL (ref 1.8–7)
NEUTROPHILS NFR BLD AUTO: 46.3 % (ref 50–75)
NRBC BLD AUTO-RTO: 0.1 % (ref 0–0)
PLATELET # BLD: 151 K/UL (ref 130–400)
PMV BLD AUTO: 9.3 FL (ref 7.2–11.7)
RBC # BLD AUTO: 3.18 MIL/UL (ref 3.8–5.2)
WBC # BLD AUTO: 3.3 K/UL (ref 4.8–10.8)

## 2018-09-16 PROCEDURE — 80053 COMPREHEN METABOLIC PANEL: CPT

## 2018-09-16 PROCEDURE — 96375 TX/PRO/DX INJ NEW DRUG ADDON: CPT

## 2018-09-16 PROCEDURE — 82948 REAGENT STRIP/BLOOD GLUCOSE: CPT

## 2018-09-16 PROCEDURE — 99283 EMERGENCY DEPT VISIT LOW MDM: CPT

## 2018-09-16 PROCEDURE — 96374 THER/PROPH/DIAG INJ IV PUSH: CPT

## 2018-09-16 PROCEDURE — 85025 COMPLETE CBC W/AUTO DIFF WBC: CPT

## 2018-09-16 NOTE — ED PDOC
Arrival/HPI





- General


Chief Complaint: Pain, Chronic


Time Seen by Provider: 09/16/18 07:18





Past Medical History





- Past History


Past History: Non-Contributing





- Infectious Disease


Hx of Infectious Diseases: ESL





- Cardiac


Hx Cardiac Arrhythmia: Yes


Hx Hypertension: Yes


Hx Pacemaker: Yes





- Pulmonary


Hx Respiratory Disorders: No





- Neurological


Hx Migraine: Yes


Hx Multiple Sclerosis: Yes





- HEENT


Hx HEENT Disorder: No





- Renal


Hx Kidney Stones: Yes





- Endocrine/Metabolic


Hx Endocrine Disorders: Yes


Hx Diabetes Mellitus Type 2: Yes





- Hematological/Oncological


Hx Anemia: Yes





- Integumentary


Hx Dermatological Disorder: No





- Musculoskeletal/Rheumatological


Hx Arthritis: Yes


Hx Fractures: Yes (L RIB)


Hx Osteoporosis: Yes





- Gastrointestinal


Hx Gastrointestinal Disorders: Yes (COLITIS)





- Genitourinary/Gynecological


Hx Genitourinary Disorders: Yes (MULTIPLE UTIS)


Hx Reproductive Disorders: Yes (VAGINAL ITCH STILL)





- Psychiatric


Hx Anxiety: Yes


Hx Bipolar Disorder: Yes


Hx Depression: Yes


Hx Substance Use: No





- Surgical History


Hx Gastric Bypass Surgery: Yes


Hx Hysterectomy: Yes


Other/Comment: pacemaker; fibroids removal; benign tumor removed from breast





- Anesthesia


Hx Anesthesia: Yes


Hx Anesthesia Reactions: No


Hx Malignant Hyperthermia: No





Family/Social History


Smoking Status: Never Smoked


Hx Alcohol Use: No


Hx Substance Use: No


Hx Substance Use Treatment: No





Allergies/Home Meds


Allergies/Adverse Reactions: 


Allergies





levofloxacin [From Levaquin] Allergy (Verified 09/14/18 16:34)


 RASH











Physical Exam





Vital Signs











  Temp Pulse Resp BP Pulse Ox


 


 09/16/18 05:51  100.6 F H  68  17  157/90 H  100














Medical Decision Making





- Lab Interpretations


Lab Results: 





 Lab Results





09/16/18 07:02: POC Glucose (mg/dL) 78











Disposition/Present on Arrival





- Present on Arrival


History of DVT/PE: No


History of Uncontrolled Diabetes: No


Urinary Catheter: No


History Surgical Site Infection Following: None





- Disposition

## 2018-09-16 NOTE — ED PDOC
HPI: General Adult


Time Seen by Provider: 09/16/18 07:18


Chief Complaint (Nursing): Pain, Chronic


Chief Complaint (Provider): Pain, Chronic


History Per: Patient


History/Exam Limitations: no limitations


Additional Complaint(s): 


58 years old female with history of multiple sclerosis, hypertension and 

depression presents to the ED for evaluation of generalized bodyaches 

associated with falling frequently. Patient reports last time she was seen at 

[facility] was last Thursday and was evaluated with CTs and x-rays, which 

all were negative. She states she hasn't been taking her medications for MS in 

the last two months.





PMD: Dr. Kaye





Past Medical History


Reviewed: Historical Data, Nursing Documentation, Vital Signs


Vital Signs: 


 Last Vital Signs











Temp  100.6 F H  09/16/18 05:51


 


Pulse  68   09/16/18 05:51


 


Resp  17   09/16/18 05:51


 


BP  157/90 H  09/16/18 05:51


 


Pulse Ox  100   09/16/18 07:56














- Medical History


PMH: Anemia, Anxiety, Arthritis, Back Problems, Bipolar Disorder, Cardia 

Arrhythmia, Depression, Fibromyalgia, Fractures (L RIB), HTN, 

Hypercholesterolemia, Kidney Stones, Migraine, Multiple Sclerosis, Osteoporosis


   Denies: Chronic Kidney Disease





- Surgical History


Surgical History: Pacemaker





- Family History


Family History: States: Unknown Family Hx





- Social History


Current smoker - smoking cessation education provided: No


Alcohol: None


Drugs: Denies





- Immunization History


Hx Tetanus Toxoid Vaccination: No





- Home Medications


Home Medications: 


 Ambulatory Orders











 Medication  Instructions  Recorded


 


Folic Acid 1 mg PO DAILY  tab 07/25/18


 


Multimineral/Multivitamin 1 tab PO 0800  tab 07/25/18





[Therapeutic-M Tab]  


 


LORazepam [Ativan] 1 mg PO BID #28 tab 08/02/18


 


Pantoprazole [Protonix EC Tab] 40 mg PO 0600 #30 ect 08/02/18


 


Venlafaxine [Effexor XR] 150 mg PO 0800 #14 cer 08/02/18


 


Zaleplon [Sonata] 10 mg PO HS PRN #14 cap 08/02/18


 


amLODIPine [Norvasc] 5 mg PO DAILY #30 tab 08/02/18


 


Ibuprofen [Motrin Tab] 800 mg PO Q6 PRN #20 tab 09/14/18


 


traMADol [Ultram] 50 mg PO Q8 #10 tab 09/16/18














- Allergies


Allergies/Adverse Reactions: 


 Allergies











Allergy/AdvReac Type Severity Reaction Status Date / Time


 


levofloxacin [From Levaquin] Allergy  RASH Verified 09/14/18 16:34














Review of Systems


ROS Statement: Except As Marked, All Systems Reviewed And Found Negative


Constitutional: Positive for: Other (Bodyaches)





Physical Exam





- Reviewed


Nursing Documentation Reviewed: Yes


Vital Signs Reviewed: Yes





- Physical Exam


Appears: Positive for: Non-toxic, No Acute Distress


Head Exam: Positive for: ATRAUMATIC, NORMOCEPHALIC


Skin: Positive for: Normal Color, Warm, Dry


Eye Exam: Positive for: Normal appearance


Neck: Positive for: Normal, Painless ROM, Supple


Cardiovascular/Chest: Positive for: Regular Rate, Rhythm.  Negative for: Murmur


Respiratory: Positive for: Normal Breath Sounds.  Negative for: Respiratory 

Distress


Gastrointestinal/Abdominal: Positive for: Normal Exam, Soft.  Negative for: 

Tenderness


Extremity: Positive for: Normal ROM, Other (Ecchymosis of right upper extremity)

.  Negative for: Swelling


Neurologic/Psych: Positive for: Alert, Oriented (x3).  Negative for: Motor/

Sensory Deficits





- Laboratory Results


Result Diagrams: 


 09/16/18 08:25





 09/16/18 08:25





- ECG


O2 Sat by Pulse Oximetry: 100 (RA)


Pulse Ox Interpretation: Normal





Medical Decision Making


Medical Decision Making: 


Time: 0744





Initial plan:


--CBC


--CMP


--Toradol 30 mg IVP


--Zofran 4 mg IVP





-------------------------------------------------------------------


Scribe Attestation:


Documented by Xiomy Encarnacion, acting as a scribe for Gaudencio Ulloa MD.





Provider Scribe Attestation:


All medical record entries made by the Scribe were at my direction and 

personally dictated by me. I have reviewed the chart and agree that the record 

accurately reflects my personal performance of the history, physical exam, 

medical decision making, and the department course for this patient. I have 

also personally directed, reviewed, and agree with the discharge instructions 

and disposition.





Disposition





- Clinical Impression


Clinical Impression: 


 Multiple sclerosis exacerbation








- Patient ED Disposition


Is Patient to be Admitted: No


Counseled Patient/Family Regarding: Studies Performed, Diagnosis, Need For 

Followup, Rx Given





- Disposition


Referrals: 


Marianela Painting MD [Staff Provider] - 


Disposition: Routine/Home


Disposition Time: 11:15


Condition: FAIR


Prescriptions: 


traMADol [Ultram] 50 mg PO Q8 #10 tab


Instructions:  Multiple Sclerosis in Adults


Forms:  [facility] (English)

## 2018-09-19 NOTE — CARD
--------------- APPROVED REPORT --------------





Date of service: 09/14/2018



EKG Measurement

Heart [id number]

MS 128P46

[id number]

[id number]

[id number]



<Conclusion>

Atrial-paced rhythm

Nonspecific T wave abnormality

Abnormal ECG

## 2018-09-21 ENCOUNTER — HOSPITAL ENCOUNTER (EMERGENCY)
Dept: HOSPITAL 14 - H.ER | Age: 58
Discharge: HOME | End: 2018-09-21
Payer: MEDICARE

## 2018-09-21 VITALS
RESPIRATION RATE: 16 BRPM | DIASTOLIC BLOOD PRESSURE: 78 MMHG | SYSTOLIC BLOOD PRESSURE: 127 MMHG | OXYGEN SATURATION: 99 % | TEMPERATURE: 97.8 F

## 2018-09-21 VITALS — HEART RATE: 66 BPM

## 2018-09-21 VITALS — BODY MASS INDEX: 34.7 KG/M2

## 2018-09-21 DIAGNOSIS — M79.1: Primary | ICD-10-CM

## 2018-09-21 DIAGNOSIS — Z87.442: ICD-10-CM

## 2018-09-21 DIAGNOSIS — I10: ICD-10-CM

## 2018-09-21 DIAGNOSIS — G35: ICD-10-CM

## 2018-09-21 DIAGNOSIS — Z86.59: ICD-10-CM

## 2018-09-21 NOTE — ED PDOC
HPI: Chest Pain


Time Seen by Provider: 09/21/18 02:45


Chief Complaint (Nursing): Chest Pain


Chief Complaint (Provider): Chest pain 


History Per: Patient, EMS


History/Exam Limitations: no limitations


Additional Complaint(s): 


57yo male, with extensive past medical history including Anemia, Anxiety, 

Arthritis, Back Problems, Bipolar Disorder, Cardiac Arrhythmia, Depression, 

Fibromyalgia, HTN, Hypercholesterolemia, Kidney Stones, Migraine, Multiple 

Sclerosis, brought to ER by EMS with complaints of chest pain. Patient was 

recently discharged from this facility 1 hour ago and was also seen on 9/14/18 

and 9/16/18. In her most recent visit, patient had lower extremity US which was 

normal. Currently, patient states she has diffuse bodyaches and deneis any chest

pain or shortness of breath. She does report she has been off her MS medications

however she just got her insurance and has a follow up scheduled with a PMD as 

well neurologist.





Past Medical History


Reviewed: Historical Data, Nursing Documentation, Vital Signs


Vital Signs: 


                                Last Vital Signs











Temp  97.8 F   09/21/18 03:02


 


Pulse  66   09/21/18 04:17


 


Resp  16   09/21/18 03:02


 


BP  127/78   09/21/18 03:02


 


Pulse Ox  99   09/21/18 04:17














- Medical History


PMH: Anemia, Anxiety, Arthritis, Back Problems, Bipolar Disorder, Cardia 

Arrhythmia, Depression, Fibromyalgia, Fractures (L RIB), HTN, 

Hypercholesterolemia, Kidney Stones, Migraine, Multiple Sclerosis, Osteoporosis


   Denies: Chronic Kidney Disease





- Surgical History


Surgical History: Pacemaker





- Family History


Family History: States: Unknown Family Hx





- Immunization History


Hx Tetanus Toxoid Vaccination: No





- Home Medications


Home Medications: 


                                Ambulatory Orders











 Medication  Instructions  Recorded


 


RX: Folic Acid 1 mg PO DAILY  tab 07/25/18


 


RX: Multimineral/Multivitamin 1 tab PO 0800  tab 07/25/18





[Therapeutic-M Tab]  


 


RX: LORazepam [Ativan] 1 mg PO BID #28 tab 08/02/18


 


RX: Pantoprazole [Protonix EC Tab] 40 mg PO 0600 #30 ect 08/02/18


 


RX: Zaleplon [Sonata] 10 mg PO HS PRN #14 cap 08/02/18


 


RX: amLODIPine [Norvasc] 5 mg PO DAILY #30 tab 08/02/18


 


Venlafaxine [Effexor XR] 150 mg PO 0800 #14 cer 08/02/18


 


Ibuprofen [Motrin Tab] 800 mg PO Q6 PRN #20 tab 09/14/18


 


RX: traMADol [Ultram] 50 mg PO Q8 #10 tab 09/16/18














- Allergies


Allergies/Adverse Reactions: 


                                    Allergies











Allergy/AdvReac Type Severity Reaction Status Date / Time


 


levofloxacin [From Levaquin] Allergy  RASH Verified 09/21/18 03:03














Review of Systems


ROS Statement: Except As Marked, All Systems Reviewed And Found Negative


Constitutional: Positive for: Malaise.  Negative for: Fever, Chills


Cardiovascular: Negative for: Chest Pain


Respiratory: Negative for: Shortness of Breath





Physical Exam





- Reviewed


Nursing Documentation Reviewed: Yes


Vital Signs Reviewed: Yes





- Physical Exam


Appears: Positive for: Non-toxic


Head Exam: Positive for: ATRAUMATIC, NORMAL INSPECTION, NORMOCEPHALIC


Skin: Positive for: Normal Color


Eye Exam: Positive for: Normal appearance


Neck: Positive for: Supple


Cardiovascular/Chest: Positive for: Regular Rate, Rhythm


Respiratory: Positive for: Normal Breath Sounds


Gastrointestinal/Abdominal: Positive for: Normal Exam, Soft


Back: Positive for: Normal Inspection


Extremity: Positive for: Normal ROM


Neurologic/Psych: Positive for: Alert, Oriented





- ECG


ECG: Positive for: Interpreted By Me, Viewed By Me


ECG Rhythm: Positive for: Atrial Paced


Rate: 66


O2 Sat by Pulse Oximetry: 99 (RA)


Pulse Ox Interpretation: Normal





Medical Decision Making


Medical Decision Making: 


Impression: Bodyaches, non-compliant with MS medications


Plan:


-- EKG


-- Troponin


-- Tramadol 50mg PO





04:17


On reassessment, patient reports feeling better and is stable for discharge 

home.





Scribe Attestation:


Documented by Lisa Pollack, acting as a scribe for Zachary James MD





Provider Scribe Attestation:


All medical record entries made by the Scribe were at my direction and 

personally dictated by me. I have reviewed the chart and agree that the record 

accurately reflects my personal performance of the history, physical exam, 

medical decision making, and the department course for this patient. I have also

personally directed, reviewed, and agree with the discharge instructions and 

disposition.








Disposition





- Clinical Impression


Clinical Impression: 


 Whole body pain








- Patient ED Disposition


Is Patient to be Admitted: No


Counseled Patient/Family Regarding: Studies Performed, Diagnosis, Need For Foll

owup





- Disposition


Referrals: 


Kian Kaye MD [Primary Care Provider] - 


Disposition: Routine/Home


Disposition Time: 04:17


Condition: IMPROVED


Additional Instructions: 


follow up with your primary doctor in 1-2 days


return to the ED with any worsening or concerning symptoms


Instructions:  Acute Pain, Adult (DC)


Forms:  [facility] (English)

## 2018-09-21 NOTE — CARD
--------------- APPROVED REPORT --------------





Date of service: 09/21/2018



EKG Measurement

Heart [id number]

MT 126P77

[id number]

[id number]

[id number]



<Conclusion>

Atrial-paced rhythm with premature atrial complexes

Nonspecific ST abnormality

Abnormal ECG

## 2018-10-05 ENCOUNTER — HOSPITAL ENCOUNTER (OUTPATIENT)
Dept: HOSPITAL 42 - ED | Age: 58
Setting detail: OBSERVATION
LOS: 1 days | Discharge: HOME | End: 2018-10-06
Attending: INTERNAL MEDICINE | Admitting: INTERNAL MEDICINE
Payer: COMMERCIAL

## 2018-10-05 VITALS — BODY MASS INDEX: 36.3 KG/M2

## 2018-10-05 VITALS — RESPIRATION RATE: 18 BRPM

## 2018-10-05 DIAGNOSIS — Z88.1: ICD-10-CM

## 2018-10-05 DIAGNOSIS — B96.1: ICD-10-CM

## 2018-10-05 DIAGNOSIS — E11.42: ICD-10-CM

## 2018-10-05 DIAGNOSIS — Z87.440: ICD-10-CM

## 2018-10-05 DIAGNOSIS — I50.9: ICD-10-CM

## 2018-10-05 DIAGNOSIS — I48.91: ICD-10-CM

## 2018-10-05 DIAGNOSIS — Z16.12: ICD-10-CM

## 2018-10-05 DIAGNOSIS — K21.9: ICD-10-CM

## 2018-10-05 DIAGNOSIS — Z91.19: ICD-10-CM

## 2018-10-05 DIAGNOSIS — E66.9: ICD-10-CM

## 2018-10-05 DIAGNOSIS — R07.9: ICD-10-CM

## 2018-10-05 DIAGNOSIS — Z98.84: ICD-10-CM

## 2018-10-05 DIAGNOSIS — J32.0: ICD-10-CM

## 2018-10-05 DIAGNOSIS — N39.0: ICD-10-CM

## 2018-10-05 DIAGNOSIS — Z87.891: ICD-10-CM

## 2018-10-05 DIAGNOSIS — G35: Primary | ICD-10-CM

## 2018-10-05 DIAGNOSIS — Z86.73: ICD-10-CM

## 2018-10-05 DIAGNOSIS — Z23: ICD-10-CM

## 2018-10-05 DIAGNOSIS — D61.818: ICD-10-CM

## 2018-10-05 DIAGNOSIS — Z90.710: ICD-10-CM

## 2018-10-05 DIAGNOSIS — Z90.49: ICD-10-CM

## 2018-10-05 DIAGNOSIS — F41.0: ICD-10-CM

## 2018-10-05 DIAGNOSIS — E78.5: ICD-10-CM

## 2018-10-05 DIAGNOSIS — Z79.899: ICD-10-CM

## 2018-10-05 DIAGNOSIS — F31.9: ICD-10-CM

## 2018-10-05 DIAGNOSIS — I11.0: ICD-10-CM

## 2018-10-05 DIAGNOSIS — M81.0: ICD-10-CM

## 2018-10-05 DIAGNOSIS — Z95.0: ICD-10-CM

## 2018-10-05 DIAGNOSIS — M79.7: ICD-10-CM

## 2018-10-05 DIAGNOSIS — Z87.442: ICD-10-CM

## 2018-10-05 DIAGNOSIS — K27.9: ICD-10-CM

## 2018-10-05 LAB
ALBUMIN SERPL-MCNC: 3.4 G/DL (ref 3–4.8)
ALBUMIN/GLOB SERPL: 1.1 {RATIO} (ref 1.1–1.8)
ALT SERPL-CCNC: 27 U/L (ref 7–56)
AMORPH SED URNS QL MICRO: (no result)
APPEARANCE UR: CLEAR
APTT BLD: 29.9 SECONDS (ref 25.1–36.5)
AST SERPL-CCNC: 23 U/L (ref 14–36)
BACTERIA #/AREA URNS HPF: (no result) /[HPF]
BASOPHILS # BLD AUTO: 0.01 K/MM3 (ref 0–2)
BASOPHILS NFR BLD: 0.2 % (ref 0–3)
BILIRUB UR-MCNC: NEGATIVE MG/DL
BUN SERPL-MCNC: 20 MG/DL (ref 7–21)
CALCIUM SERPL-MCNC: 8.6 MG/DL (ref 8.4–10.5)
COLOR UR: YELLOW
EOSINOPHIL # BLD: 0.1 10*3/UL (ref 0–0.7)
EOSINOPHIL NFR BLD: 1.8 % (ref 1.5–5)
ERYTHROCYTE [DISTWIDTH] IN BLOOD BY AUTOMATED COUNT: 13.1 % (ref 11.5–14.5)
GFR NON-AFRICAN AMERICAN: > 60
GLUCOSE UR STRIP-MCNC: NEGATIVE MG/DL
GRANULOCYTES # BLD: 3.8 10*3/UL (ref 1.4–6.5)
GRANULOCYTES NFR BLD: 78.1 % (ref 50–68)
HGB BLD-MCNC: 9.9 G/DL (ref 12–16)
INR PPP: 1.04
LEUKOCYTE ESTERASE UR-ACNC: NEGATIVE LEU/UL
LYMPHOCYTES # BLD: 0.8 10*3/UL (ref 1.2–3.4)
LYMPHOCYTES NFR BLD AUTO: 16.4 % (ref 22–35)
MCH RBC QN AUTO: 30.2 PG (ref 25–35)
MCHC RBC AUTO-ENTMCNC: 31.4 G/DL (ref 31–37)
MCV RBC AUTO: 96 FL (ref 80–105)
MONOCYTES # BLD AUTO: 0.2 10*3/UL (ref 0.1–0.6)
MONOCYTES NFR BLD: 3.5 % (ref 1–6)
PH UR STRIP: 6 [PH] (ref 4.7–8)
PLATELET # BLD: 197 10^3/UL (ref 120–450)
PMV BLD AUTO: 10.7 FL (ref 7–11)
PROT UR STRIP-MCNC: (no result) MG/DL
PROTHROMBIN TIME: 12 SECONDS (ref 9.4–12.5)
RBC # BLD AUTO: 3.28 10^6/UL (ref 3.5–6.1)
RBC # UR STRIP: NEGATIVE /UL
RBC #/AREA URNS HPF: (no result) /HPF (ref 0–2)
SP GR UR STRIP: >= 1.03 (ref 1–1.03)
URINE FINE GRANULAR CAST: (no result) /HPF (ref 0–2)
UROBILINOGEN UR STRIP-ACNC: 0.2 E.U./DL
WBC # BLD AUTO: 4.9 10^3/UL (ref 4.5–11)
WBC #/AREA URNS HPF: (no result) /HPF (ref 0–6)

## 2018-10-05 PROCEDURE — 85730 THROMBOPLASTIN TIME PARTIAL: CPT

## 2018-10-05 PROCEDURE — 85025 COMPLETE CBC W/AUTO DIFF WBC: CPT

## 2018-10-05 PROCEDURE — 82746 ASSAY OF FOLIC ACID SERUM: CPT

## 2018-10-05 PROCEDURE — 90732 PPSV23 VACC 2 YRS+ SUBQ/IM: CPT

## 2018-10-05 PROCEDURE — 72170 X-RAY EXAM OF PELVIS: CPT

## 2018-10-05 PROCEDURE — 70450 CT HEAD/BRAIN W/O DYE: CPT

## 2018-10-05 PROCEDURE — 82728 ASSAY OF FERRITIN: CPT

## 2018-10-05 PROCEDURE — 83735 ASSAY OF MAGNESIUM: CPT

## 2018-10-05 PROCEDURE — 83550 IRON BINDING TEST: CPT

## 2018-10-05 PROCEDURE — 73560 X-RAY EXAM OF KNEE 1 OR 2: CPT

## 2018-10-05 PROCEDURE — 71045 X-RAY EXAM CHEST 1 VIEW: CPT

## 2018-10-05 PROCEDURE — 82607 VITAMIN B-12: CPT

## 2018-10-05 PROCEDURE — 96365 THER/PROPH/DIAG IV INF INIT: CPT

## 2018-10-05 PROCEDURE — 36415 COLL VENOUS BLD VENIPUNCTURE: CPT

## 2018-10-05 PROCEDURE — 82948 REAGENT STRIP/BLOOD GLUCOSE: CPT

## 2018-10-05 PROCEDURE — 80053 COMPREHEN METABOLIC PANEL: CPT

## 2018-10-05 PROCEDURE — 96375 TX/PRO/DX INJ NEW DRUG ADDON: CPT

## 2018-10-05 PROCEDURE — 96376 TX/PRO/DX INJ SAME DRUG ADON: CPT

## 2018-10-05 PROCEDURE — 81001 URINALYSIS AUTO W/SCOPE: CPT

## 2018-10-05 PROCEDURE — 85610 PROTHROMBIN TIME: CPT

## 2018-10-05 PROCEDURE — 83540 ASSAY OF IRON: CPT

## 2018-10-05 PROCEDURE — 90471 IMMUNIZATION ADMIN: CPT

## 2018-10-05 PROCEDURE — 93005 ELECTROCARDIOGRAM TRACING: CPT

## 2018-10-05 PROCEDURE — 99285 EMERGENCY DEPT VISIT HI MDM: CPT

## 2018-10-05 NOTE — ED PDOC
Arrival/HPI





- General


Chief Complaint: Trauma


Time Seen by Provider: 10/05/18 08:49


Historian: Patient





- History of Present Illness


Narrative History of Present Illness (Text): 





10/05/18 11:04


Patient is a 58 year old female, whose past medical history includes Multiple 

sclerosis, hypertension, hyperlipidemia, diabetes, pacemaker,and fibromyalgia, 

presents to the emergency department complaining of MS exacerbation, Patient 

reports experiencing leg pains and has been frequently falling. Patient note she

is feeling unsteady and is experiencing associated dizziness, right leg numbness

and left arm numbness for more than a month. Patient also reports experiencing 

an increased urinary frequency and a general weakness. Patient states her last 

fall was 2 weeks ago and has noted bruising.  Patient denies any fever, chills, 

shortness of breath, chest pain, diarrhea, nausea, vomiting, back pain, neck 

pain, headache, or any other complaints.   





Time/Duration: > month


Symptom Onset: Gradual


Symptom Course: Unchanged


Activities at Onset: Light


Context: Home





Past Medical History





- Provider Review


Nursing Documentation Reviewed: Yes





- Past History


Past History: Non-Contributing





- Infectious Disease


Hx of Infectious Diseases: None





- Cardiac


Hx Cardiac Arrhythmia: Yes


Hx Hypertension: Yes


Hx Pacemaker: Yes





- Pulmonary


Hx Respiratory Disorders: No





- Neurological


Hx Migraine: Yes


Hx Multiple Sclerosis: Yes





- HEENT


Hx HEENT Disorder: No





- Renal


Hx Renal Disorder: No


Hx Kidney Stones: Yes





- Endocrine/Metabolic


Hx Endocrine Disorders: Yes


Hx Diabetes Mellitus Type 2: Yes





- Hematological/Oncological


Hx Anemia: Yes





- Integumentary


Hx Dermatological Disorder: No





- Musculoskeletal/Rheumatological


Hx Arthritis: Yes


Hx Fractures: Yes (L RIB)


Hx Osteoporosis: Yes





- Gastrointestinal


Hx Gastrointestinal Disorders: Yes (COLITIS)





- Genitourinary/Gynecological


Hx Genitourinary Disorders: Yes (MULTIPLE UTIS)


Hx Reproductive Disorders: Yes (VAGINAL ITCH STILL)





- Psychiatric


Hx Anxiety: Yes


Hx Bipolar Disorder: Yes


Hx Depression: Yes


Hx Substance Use: No





- Surgical History


Hx Gastric Bypass Surgery: Yes


Hx Hysterectomy: Yes


Other/Comment: pacemaker; fibroids removal; benign tumor removed from breast





- Anesthesia


Hx Anesthesia: Yes


Hx Anesthesia Reactions: No


Hx Malignant Hyperthermia: No





Family/Social History





- Physician Review


Nursing Documentation Reviewed: Yes


Family/Social History: Unknown Family HX


Smoking Status: Never Smoked


Hx Alcohol Use: No


Hx Substance Use: No


Hx Substance Use Treatment: No





Allergies/Home Meds


Allergies/Adverse Reactions: 


Allergies





levofloxacin [From Levaquin] Allergy (Verified 09/21/18 03:03)


   RASH











Review of Systems





- Physician Review


All systems were reviewed & negative as marked: Yes





- Review of Systems


Constitutional: absent: Fevers, Night Sweats


Respiratory: absent: SOB


Cardiovascular: absent: Chest Pain


Gastrointestinal: absent: Diarrhea, Nausea, Vomiting


Genitourinary Female: Frequency (+increased urinary frequency)


Musculoskeletal: Other (+right leg numbness and general leg pain).  absent: Back

Pain, Neck Pain


Skin: Other (+noted bruising)


Neurological: Dizziness, Focal Weakness (+feeling unsteady and has generalized 

weakness).  absent: Headache





Physical Exam


Vital Signs Reviewed: Yes





Vital Signs











  Temp Pulse Resp BP Pulse Ox


 


 10/05/18 08:57  98.8 F  68  18  131/86  100











Temperature: Afebrile


Blood Pressure: Normal


Pulse: Regular


Respiratory Rate: Normal


Appearance: Positive for: Well-Appearing, Non-Toxic


Pain Distress: None


Mental Status: Positive for: Alert and Oriented X 3





- Systems Exam


Head: Present: Atraumatic, Normocephalic


Pupils: Present: PERRL


Extroacular Muscles: Present: EOMI


Conjunctiva: Present: Normal


Mouth: Present: Moist Mucous Membranes


Neck: Present: Normal Range of Motion


Respiratory/Chest: Present: Clear to Auscultation, Good Air Exchange.  No: 

Respiratory Distress, Accessory Muscle Use


Cardiovascular: Present: Regular Rate and Rhythm, Normal S1, S2.  No: Murmurs


Abdomen: No: Tenderness, Distention, Peritoneal Signs


Back: No: Midline Tenderness


Upper Extremity: Present: Normal Inspection.  No: Cyanosis, Edema


Lower Extremity: Present: Normal ROM.  No: Normal Inspection


Neurological: Present: GCS=15, CN II-XII Intact, Speech Normal, Motor Func 

Grossly Intact, Normal Sensory Function, Norm Deep Tendon Reflexes, Memory 

Normal.  No: Gait Normal (unsteady gait.)


Skin: Present: Warm, Normal Color, Other (+noted bruises on on knees and right 

hip).  No: Diaphoretic, Erythematous, Induration, Hot, Cold, Pale, Laceration, 

Abscess, Abrasion


Psychiatric: Present: Alert, Oriented x 3, Normal Insight, Normal Concentration





Medical Decision Making


ED Course and Treatment: 





10/05/18 11:06


Impression: 58 year old female presenting to the emergency department for MS 

exacerbation





Plan:


-- EKG


-- Labs


-- Chest X-Ray 


-- Xray of left knee


-- Xray of right knee


-- Xray of pelvis


-- Urinalysis 


-- Reassess and disposition





Prior Visits:


Notes and results from previous visits were reviewed.  





Progress Notes:





10/05/18 11:24


EKG:


Ordered, reviewed, and independently interpreted the EKG.


Rate : 72 BPM


Rhythm : NSR


Interpretation : No ST-segment elevations or depressions, no T-wave inversions, 

normal intervals.


Comparison : No previous EKG for comparison.





10/05/18 12:45





Procedure: Head CT


Impression: Moderate nonspecific white matter changes.  Chronic right thalamus 

lacunar infarct. 


Marked mucosal thickening and fluid involving the right maxillary sinus.  

Mucosal thickening of the ethmoid air cells.  Correlate for sinusitis.


Dictator: Maria M Etienne MD





10/05/18 13:15


Case was discussed with Dr. Walls, Neurology on call and who previously saw 

patient on previous admission. He recommended Solumedrol 1000mg IV and admit for

MS Evaluation. 





10/05/18 13:28


Cased discussed with Dr. Wall on call for Medicine who will take admission and 

agrees patient to be [place] [facility] She advises consult to 

Dr. Daniel instead of Dr. Walls. Consult placed. 








- RAD Interpretation


Radiology Orders: 











10/05/18 10:54


CHEST PORTABLE [RAD] Stat 





10/05/18 10:55


KNEE LEFT 2 VIEWS (AP & LAT) [RAD] Stat 


KNEE RIGHT 2 VIEWS (AP & LAT) [RAD] Stat 


PELVIS ONE VIEW [RAD] Stat 














- Scribe Statement


The provider has reviewed the documentation as recorded by the Scribhomar Harris








All medical record entries made by the Scribe were at my direction and 

personally dictated by me. I have reviewed the chart and agree that the record 

accurately reflects my personal performance of the history, physical exam, 

medical decision making, and the department course for this patient. I have also

 personally directed, reviewed, and agree with the discharge instructions and 

disposition.





Disposition/Present on Arrival





- Present on Arrival


Any Indicators Present on Arrival: No


History of DVT/PE: No


History of Uncontrolled Diabetes: No


Urinary Catheter: No


History of Decub. Ulcer: No


History Surgical Site Infection Following: None





- Disposition


Have Diagnosis and Disposition been Completed?: Yes


Diagnosis: 


 Multiple sclerosis exacerbation





Disposition: HOSPITALIZED


Disposition Time: 13:34


Patient Plan: Observation


Condition: FAIR


Forms:  [employer] (English)

## 2018-10-05 NOTE — CARD
--------------- APPROVED REPORT --------------





Date of service: 10/05/2018



EKG Measurement

Heart [id number]

WI 130P52

[id number]

[id number]

[id number]



<Conclusion>

Sinus rhythm with premature atrial complexes

Nonspecific T wave abnormality

Abnormal ECG

## 2018-10-05 NOTE — CP.PCM.CON
History of Present Illness





- History of Present Illness


History of Present Illness: 





57 y/o female admitted to [facility] for Multiple sclerosis exacerbation, pt has hx of 

fibromyalgia, htn, hyperlipidemia and pasemaker.  Otolaryngolgoy has been 

consulted for sinusitis findings on CT.  Pt reports hx of 4 sinus surgeries and 

frequent sinus infections/exacerbation.  Pt also attests to living with cats and

allergy symptoms.  She has chronic cheep pain bilaterally.  Denies discharge at 

present





Review of Systems





- Constitutional


Constitutional: As Per HPI





- EENT


Eyes: As Per HPI


Ears: As Per HPI


Nose/Mouth/Throat: As Per HPI





- Breasts


Breasts: As Per HPI





- Cardiovascular


Cardiovascular: As Per HPI





- Respiratory


Respiratory: As Per HPI





- Gastrointestinal


Gastrointestinal: As Per HPI





- Genitourinary


Genitourinary: As Per HPI





- Reproductive: Female


Reproductive:Female: As Per HPI





- Menstruation


Menstruation: As Per HPI





- Musculoskeletal


Musculoskeletal: As Per HPI





- Integumentary


Integumentary: As Per HPI





- Neurological


Neurological: As Per HPI





- Psychiatric


Psychiatric: As Per HPI





- Endocrine


Endocrine: As Per HPI





- Hematologic/Lymphatic


Hematologic: As Per HPI





Past Patient History





- Infectious Disease


Hx of Infectious Diseases: None





- Past Social History


Smoking Status: Former Smoker





- CARDIAC


Hx Congestive Heart Failure: Yes


Hx Hypertension: Yes


Hx Pacemaker: Yes





- PULMONARY


Hx Asthma: Yes





- NEUROLOGICAL


HX Cerebrovascular Accident: Yes (1994)


Other/Comment: MS





- HEENT


Hx HEENT Problems: No





- RENAL


Hx Chronic Kidney Disease: No


Hx Kidney Stones: Yes





- ENDOCRINE/METABOLIC


Hx Diabetes Mellitus Type 2: Yes





- HEMATOLOGICAL/ONCOLOGICAL


Hx Anemia: Yes





- INTEGUMENTARY


Hx Dermatological Problems: No





- MUSCULOSKELETAL/RHEUMATOLOGICAL


Hx Falls: Yes


Hx Unsteady Gait: Yes





- GASTROINTESTINAL


Hx Gastrointestinal Disorders: Yes (COLITIS)





- GENITOURINARY/GYNECOLOGICAL


Hx Genitourinary Disorders: Yes (MULTIPLE UTIS)


Hx Reproductive Disorders: Yes (VAGINAL ITCH STILL)





- PSYCHIATRIC


Hx Anxiety: Yes


Hx Depression: Yes





- SURGICAL HISTORY


Hx Surgeries: Yes


Hx Cholecystectomy: Yes


Hx Gastric Bypass Surgery: Yes





- ANESTHESIA


Hx Anesthesia: Yes


Hx Anesthesia Reactions: No


Hx Malignant Hyperthermia: No





Meds


Allergies/Adverse Reactions: 


                                    Allergies











Allergy/AdvReac Type Severity Reaction Status Date / Time


 


levofloxacin [From Levaquin] Allergy  RASH Verified 09/21/18 03:03














- Medications


Medications: 


                               Current Medications





Methylprednisolone 1 gm/ (Sodium Chloride)  250 mls @ 500 mls/hr IV ONCE ONE


   Stop: 10/06/18 10:29


Ondansetron HCl (Zofran Inj)  4 mg IVP Q4H PRN


   PRN Reason: Nausea/Vomiting











Physical Exam





- Constitutional


Appears: Well, Non-toxic





- Head Exam


Head Exam: ATRAUMATIC, NORMAL INSPECTION





- Eye Exam


Eye Exam: EOMI, Normal appearance, PERRL


Pupil Exam: NORMAL ACCOMODATION





- ENT Exam


ENT Exam: Mucous Membranes Moist


Additional comments: 


Ears; wnl bilaterally canals and tm's


Nose mild rhinitis bilaterally


Throat: wnl no rhinitis


Neck : wnl no mass





- Respiratory Exam


Respiratory Exam: absent: Respiratory Distress





Results





- Vital Signs


Recent Vital Signs: 


                                Last Vital Signs











Temp  99.8 F H  10/05/18 14:44


 


Pulse  75   10/05/18 16:17


 


Resp  16   10/05/18 17:01


 


BP  157/105 H  10/05/18 16:17


 


Pulse Ox  98   10/05/18 14:44














- Labs


Result Diagrams: 


                                 10/05/18 10:55





                                 10/05/18 10:55


Labs: 


                         Laboratory Results - last 24 hr











  10/05/18 10/05/18 10/05/18





  10:55 10:55 10:55


 


WBC  4.9  D  


 


RBC  3.28 L  


 


Hgb  9.9 L  


 


Hct  31.5 L  


 


MCV  96.0  


 


MCH  30.2  


 


MCHC  31.4  


 


RDW  13.1  


 


Plt Count  197  


 


MPV  10.7  


 


Gran %  78.1 H  


 


Lymph % (Auto)  16.4 L  


 


Mono % (Auto)  3.5  


 


Eos % (Auto)  1.8  


 


Baso % (Auto)  0.2  


 


Gran #  3.80  


 


Lymph # (Auto)  0.8 L  


 


Mono # (Auto)  0.2  


 


Eos # (Auto)  0.1  


 


Baso # (Auto)  0.01  


 


PT   12.0 


 


INR   1.04 


 


APTT   29.9 


 


Sodium   


 


Potassium   


 


Chloride   


 


Carbon Dioxide   


 


Anion Gap   


 


BUN   


 


Creatinine   


 


Est GFR ( Amer)   


 


Est GFR (Non-Af Amer)   


 


POC Glucose (mg/dL)   


 


Random Glucose   


 


Calcium   


 


Magnesium   


 


Total Bilirubin   


 


AST   


 


ALT   


 


Alkaline Phosphatase   


 


Total Protein   


 


Albumin   


 


Globulin   


 


Albumin/Globulin Ratio   


 


Urine Color    Yellow


 


Urine Appearance    Clear


 


Urine pH    6.0


 


Ur Specific Gravity    >= 1.030


 


Urine Protein    Trace H


 


Urine Glucose (UA)    Negative


 


Urine Ketones    Negative


 


Urine Blood    Negative


 


Urine Nitrate    Negative


 


Urine Bilirubin    Negative


 


Urine Urobilinogen    0.2


 


Ur Leukocyte Esterase    Negative


 


Urine RBC    0 - 2


 


Urine WBC    0 - 2


 


Ur Epithelial Cells    4 - 5


 


Amorphous Sediment    Few


 


Urine Bacteria    Many


 


Fine Granular Casts    0 - 2


 


Urine Other    Uyeast














  10/05/18 10/05/18





  10:55 13:04


 


WBC  


 


RBC  


 


Hgb  


 


Hct  


 


MCV  


 


MCH  


 


MCHC  


 


RDW  


 


Plt Count  


 


MPV  


 


Gran %  


 


Lymph % (Auto)  


 


Mono % (Auto)  


 


Eos % (Auto)  


 


Baso % (Auto)  


 


Gran #  


 


Lymph # (Auto)  


 


Mono # (Auto)  


 


Eos # (Auto)  


 


Baso # (Auto)  


 


PT  


 


INR  


 


APTT  


 


Sodium  139 


 


Potassium  4.0 


 


Chloride  109 H 


 


Carbon Dioxide  24 


 


Anion Gap  10 


 


BUN  20 


 


Creatinine  0.6 L 


 


Est GFR ( Amer)  > 60 


 


Est GFR (Non-Af Amer)  > 60 


 


POC Glucose (mg/dL)   73


 


Random Glucose  104 


 


Calcium  8.6 


 


Magnesium  1.8 


 


Total Bilirubin  0.2 


 


AST  23 


 


ALT  27 


 


Alkaline Phosphatase  143 H D 


 


Total Protein  6.6 


 


Albumin  3.4 


 


Globulin  3.2 


 


Albumin/Globulin Ratio  1.1 


 


Urine Color  


 


Urine Appearance  


 


Urine pH  


 


Ur Specific Gravity  


 


Urine Protein  


 


Urine Glucose (UA)  


 


Urine Ketones  


 


Urine Blood  


 


Urine Nitrate  


 


Urine Bilirubin  


 


Urine Urobilinogen  


 


Ur Leukocyte Esterase  


 


Urine RBC  


 


Urine WBC  


 


Ur Epithelial Cells  


 


Amorphous Sediment  


 


Urine Bacteria  


 


Fine Granular Casts  


 


Urine Other  














- Impressions


Impression: 





CT head reviewed





Assessment & Plan


(1) Chronic maxillary sinusitis


Status: Acute   





(2) Chronic sinusitis of both maxillary sinuses


Status: Acute   





(3) Multiple sclerosis exacerbation


Status: Acute   Priority: High   





(4) Anxiety


Status: Acute   





(5) Chest pain


Status: Acute   





(6) Chest pain with minimal risk for cardiac etiology


Status: Acute   Priority: Low   





(7) Multiple sclerosis


Status: Acute   





(8) Pancytopenia


Status: Acute   





(9) UTI (urinary tract infection)


Status: Acute   Priority: Medium   





(10) Whole body pain


Status: Acute   





(11) Depression


Status: Chronic   





(12) Leukopenia


Status: Chronic   Priority: Medium   





(13) MDD (major depressive disorder)


Status: Chronic   Priority: High   





(14) Panic disorder


Status: Chronic   Priority: Medium   





(15) Weakness


Status: Chronic   





(16) Urinary tract infection due to ESBL Klebsiella


Status: Resolved   





- Assessment and Plan (Free Text)


Plan: 





Trial of flonase, dedicated CT of the sinuses, F/u in office for out patient 

further w/u





- Date & Time


Date: 10/05/18


Time: 17:43

## 2018-10-05 NOTE — RAD
Date of service: 



10/05/2018



PROCEDURE:  CHEST RADIOGRAPH, 1 VIEW



HISTORY:

fall



COMPARISON:

None available.



FINDINGS:



LUNGS:

Clear.



PLEURA:

No pneumothorax or pleural fluid seen.



CARDIOVASCULAR:

Normal.



OSSEOUS STRUCTURES:

No significant abnormalities.



VISUALIZED UPPER ABDOMEN:

Normal.



OTHER FINDINGS:

Pacemaker.  Port-A-Cath 



IMPRESSION:

No active disease.

## 2018-10-05 NOTE — CT
Date of service: 2018-10-05 11:44:48



PROCEDURE:  CT HEAD WITHOUT CONTRAST.



HISTORY:

fall



COMPARISON:

None available.



TECHNIQUE:

Axial computed tomography images were obtained through the head/brain 

without intravenous contrast.  



Radiation dose:



Total exam DLP = 880.22 mGy-cm.



This CT exam was performed using one or more of the following dose 

reduction techniques: Automated exposure control, adjustment of the 

mA and/or kV according to patient size, and/or use of iterative 

reconstruction technique.



FINDINGS:



HEMORRHAGE:

No intracranial hemorrhage. 



BRAIN:

Diffuse atrophy with prominence of the ventricles and sulci noted. No 

mass effect or edema.  Intracranial atherosclerosis.  Scattered 

periventricular and subcortical white matter hypodensities, which are 

nonspecific, but often seen with chronic microvascular ischemic 

disease.  7 mm right thalamus lacunar infarct appears chronic.  

Please note that MRI with diffusion imaging is more sensitive in the 

detection of acute ischemic event.



VENTRICLES:

No hydrocephalus. 



CALVARIUM:

Unremarkable.



PARANASAL SINUSES:

Marked mucosal thickening and fluid involving the right maxillary 

sinus.  Mucosal thickening of the ethmoid air cells.



MASTOID AIR CELLS:

Unremarkable as visualized. No inflammatory changes.



OTHER FINDINGS:

None.



IMPRESSION:

Moderate nonspecific white matter changes.  Chronic right thalamus 

lacunar infarct. 



Marked mucosal thickening and fluid involving the right maxillary 

sinus.  Mucosal thickening of the ethmoid air cells.  Correlate for 

sinusitis.

## 2018-10-05 NOTE — CON
DATE:  10/05/2018



NEUROLOGY CONSULTATION



CHIEF COMPLAINT:  Poor balance, falls, right leg numbness, and left arm

numbness.



HISTORY OF PRESENT ILLNESS:  This is a 58-year-old woman with history of

multiple scleroses and was on Copaxone diagnosed in [place] 10 years ago;

hypertension; hyperlipidemia; diabetes; bradycardia, status post pacemaker;

fibromyalgia; came to the ER for having pain in her legs in terms of

tingling, numbness, and felt unsteady in her feet and extreme

lightheadedness, in addition felt her right leg was more numb than her left

arm for the past month and she feels generally weak and fatigued.  She

cannot have an MRI of the brain since she has a pacemaker, but had a CAT

scan of the head which shows some questionable fungal colonization in her

right maxillary sinus, otherwise showed diffuse atrophy in the prominence

of the ventricles and sulci and scattered periventricular and subcortical

white matter hyperintensities which are nonspecific _____ chronic

microvascular changes.  She has old right thalamic lacunar infarct.  She is

noncompliant with same physicians or neurologist.  She has not seen

neurologist in a year.  She has an appointment with a neurologist in [place] on 10/18/2018.  She was given 1 g of IV Solu-Medrol in the ER and is

feeling much better since the 1 g of Solu-Medrol.  She also is historic in

terms of behavior and has seen Dr. Newsome in regards her anxiety and

depression.



PAST MEDICAL HISTORY:  As above.



ALLERGIES:  LEVOFLOXACIN.



SOCIAL HISTORY:  No illicit drug use, smoking, or EtOH abuse.



REVIEW OF SYSTEMS:  A 14-point review of system is negative except as per

the HPI.



MEDICATIONS:  Reviewed by nurse per reconciliation sheet.



LABORATORY DATA:  Sodium is 139, potassium 4, chloride 109, carbon dioxide

24.  BUN of 20, creatinine 0.6.  Random glucose of 73.



PHYSICAL EXAMINATION

VITAL SIGNS:  Temperature of 99.8, pulse rate of 72, blood pressure 128/69,

respiratory rate of 18, and oxygen saturation 98% by room air.

GENERAL:  The patient is sitting up in bed, in no acute distress.

HEENT:  Atraumatic and normocephalic.  PERRLA.  Extraocular muscles intact.

NECK:  Supple.  No JVD.  No adenopathy noted.

LUNGS:  Clear to auscultation. No adventitious sounds.

HEART:  S1 and S2.  Normal rate and rhythm.  No murmur, rubs, or gallops.

ABDOMEN:  Soft, nontender, and nondistended.  Bowel sounds present.

EXTREMITIES:  No clubbing.  No cyanosis.  Peripheral pulses 2+ felt

bilaterally.

NEUROLOGIC:  The patient is alert and oriented to person, place, month, and

year.  Recall after 5 minutes is 2/3.  Poor attention span and slow thought

process.  Very depressed affect.  Cranial nerves II through XII intact. 

Motor exam:  Moves all extremities equally.  No pronator drift seen. 

Sensory exam:  Decreased light touch and pinprick up to the calves

bilaterally.  Decreased vibration of the toes.  DTRs are 2+ throughout, 1

at both knees and ankles.  Coordination:  Finger-to-nose intact.  No

dysmetria noted.  Gait is deferred for now.



IMPRESSION:  This is a 58-year-old woman with history of supposed chronic

multiple scleroses and was on Copaxone, is not on any disease modifying

therapy, has not seen a neurologist for many years, who has an appointment

with a neurologist on 10/18/2018, in [place], history of fibromyalgia,

history of diabetes, coronary artery disease, and bradycardia and

pacemaker, who came in for generalized weakness, paresthesias in the

extremities, right leg numbness, left arm numbness, and frequent falls. 

She got 1 g of IV Solu-Medrol in the ER, thinking that it was a possible

multiple scleroses exacerbation.  _____ she is a very poor historian and

clinically, based on the exam, I think like her poor balance is most likely

secondary to underlying diabetic sensorimotor peripheral neuropathy with

multifactorial indicating that she has also some fatigue which is likely

related to her underlying multiple scleroses.  At this time, we will

recommend;

1.  Need to see a neurologist that she has an appointment with for disease

modifying therapy for actual multiple scleroses.

2.  Need to keep her blood sugar between 140 to 180 and needs a diabetic

diet.

3.  PT/OT as an outpatient from physical therapy for muscle strengthening,

coordination, and gait exercises.

4.  We will give one more dose of IV Solu-Medrol 1 g and can be discharged

home once deemed clinically stable.  From my standpoint, she is stable. 

Most of her treatments are currently as an outpatient.  We will also

recommend for her to be on aspirin 81 mg daily for stroke prevention in

addition to a statin at 40 mg given that she has chronic lacunar infarction

on her CAT scan of the head.  The CAT scan of the head showed some chronic

T2 hypo-intensities making MS questionable diagnosis, but has been told

that she has MS over the past 10 years according to her neurologist in

[place].  At this time, we will give her one more dose of IV Solu-Medrol

after today 1 g and follow up with her neurologist as an outpatient.



Thank you for this consult.







__________________________________________

Ildefonso Daniel MD



DD:  10/05/2018 16:37:47

DT:  10/05/2018 17:16:41

Job # [id number]

## 2018-10-05 NOTE — RAD
Date of service: 



10/05/2018



PROCEDURE:  Bilateral Knee Radiographs.



HISTORY:

fall r/o fx



COMPARISON:

None.



FINDINGS:



BONES:

Right Knee:  Normal. No fracture. 



Left Knee:  Normal. No fracture. 



JOINTS:

Right Knee:  Normal. No osteoarthritis. 



Left knee: Normal. No osteoarthritis. 



SOFT TISSUES:

Right Knee: Normal.



Left Knee: Normal.



JOINT EFFUSION:

Right Knee: None. 



Left Knee: None.



OTHER FINDINGS:

None.



IMPRESSION:

Normal radiographs of the knees.

## 2018-10-05 NOTE — RAD
Date of service: 



10/05/2018



PROCEDURE:  Radiographs of the pelvis.



HISTORY:

fall r/o fx



COMPARISON:

None.



FINDINGS:



BONES:

Pelvic Bones: Unremarkable.



Hips: Grossly unremarkable.



JOINTS:

Sacroiliac Joints: Unremarkable.



Pubic Symphysis: Unremarkable.



OTHER FINDINGS:

None.



IMPRESSION:

Unremarkable radiographs of the pelvis.

## 2018-10-06 VITALS
DIASTOLIC BLOOD PRESSURE: 68 MMHG | HEART RATE: 70 BPM | SYSTOLIC BLOOD PRESSURE: 110 MMHG | TEMPERATURE: 98.4 F | OXYGEN SATURATION: 95 %

## 2018-10-06 LAB
% IRON SATURATION: 10 % (ref 20–55)
FERRITIN SERPL-MCNC: 55.6 NG/ML
FOLATE SERPL-MCNC: > 20 NG/ML
IRON SERPL-MCNC: 27 UG/DL (ref 45–180)
TIBC SERPL-MCNC: 269 UG/DL (ref 265–497)
VIT B12 SERPL-MCNC: 174 PG/ML (ref 239–931)

## 2018-10-06 NOTE — HP
DATE OF EXAM:  10/05/2018



HISTORY OF PRESENT ILLNESS:  This 58-year-old female was examined at her

bedside in the presence of her nurse,  Sourav Overton, registered nurse and

this case was discussed in detail with Dr. Jerod Newsome from Psychiatry,

Dr. Ildefonso Daniel from Neurology and Dr. Tre Adamson, medical doctor

from [facility].  The patient presented earlier today to the

[facility] ER where she was admitted with concerns of a multiple

sclerosis flare.  She is a 58-year-old female with longstanding history of

multiple sclerosis, who states she took Copaxone therapy while residing in

[place] 10 years ago.  She has history of chronic hypertension,

hyperlipidemia, diabetes, bradycardia and is status post a permanent

pacemaker placement with comorbidities of obesity, degenerative arthritis,

fibromyalgia, anxiety and recurrent depression.  In the emergency room, she

complained of tingling, numbness and an unsteady gait in her feet and legs

as well as associated lightheadedness.  She stated that her right leg was

more numb than her left and that her left arm felt numb over the past

month.  The patient cannot have a MRI of the brain due to her pacemaker, but

a CT of the head was performed which showed questionable sinusitis in the

right maxillary sinus region as well as cerebral atrophy, prominence of her

cerebral ventricles and an old right thalamic lacunar infarct.  According

to her history, she has been noncompliant with medical,



 psychiatric and

neurological followup.  According to Neurology, she has not seen a

neurologist in the past year, but has a scheduled appointment through her

managed care Medicare plan for 10/18/2018.  She was given a pulse of IV

Solu-Medrol in the emergency room and is ordered to receive one by Dr. Ildefonso Daniel from Neurology and was evaluated by Dr. Newsome for recurrent

anxiety and depression.  The patient's outpatient medications include

Ultram, Norvasc, Sonata, Effexor, Protonix, multivitamin, Cozaar, Ativan,

Motrin, Neurontin and folic acid.  When I questioned the patient if she was

taking any diabetic medication, she said none and states SHE HAS AN ALLERGY

TO LEVOFLOXACIN.  She is a current nondrinker, nonsmoker, non IV drug

misuser and is obese with a BMI of 36.4 kg per meter squared.  Family

history is noncontributory.



REVIEW OF SYSTEMS:  Constitutional review, she denied fever or chills. 

Head review, denied any headache or knowledge, but does have a CT finding

of an old thalamic infarct.  Eye review, no change in visual acuity.  Ear

review, no hearing loss.  Throat review, no swallowing difficulty.  Neck

review, no stiffness.  Cardiac review, has chronic pacemaker for previous

bradycardia.  Pulmonary:  No cough.  No hemoptysis.  GI:  Has chronic GERD.

:  No knowledge of renal failure.  Vascular:  No claudication. 

Psychological:  As per HPI.  Neurological:  As per HPI.  Hematological: 

Appears to have anemia on current ER blood testing.



PHYSICAL EXAMINATION:

VITAL SIGNS:  Temperature 99.8, respirations 18, pulse 72, blood pressure

126/69.  Pulse ox 98% on room air.

HEAD:  Normocephalic, atraumatic.  Eyes, no icterus.  Ears clear.  Throat: 

Noninjected.

NECK:  Supple.

HEART:  Was regular S1, S2.

LUNGS:  Clear.

ABDOMEN:  Obese.

EXTREMITIES:  No edema.

SKIN:  Without rash.

NEUROLOGICAL:  Alert and oriented x3.  Cranial nerves II-XII intact.  She

moves all extremities easily.  She has decreased sensory exam to pinprick

on both lower extremities.  Motor strength was 5/5.

VASCULAR:  Legs warm to touch.

PSYCHOLOGICAL:  Alert and anxious.

 

LABORATORY DATA:  White count 4900, hemoglobin 9.9, hematocrit 31.5, MCV

96, platelets 197,000.  PT/INR 1.04, PTT 29.9.  Sodium 139, K 4, chloride

109, bicarb 24, BUN 20, creatinine 0.6, random blood sugar 104, calcium

8.6.  Magnesium 1.8.  Bilirubin 0.2, AST 23, ALT 27, alk phos 143. 

Urinalysis showed many bacteria with trace protein.  Chest x-ray was

reviewed.  It showed a pacemaker, a Port-A-Cath.  No active disease.  There

was no evidence of pneumonia, pneumothorax, pleural effusion or congestive

heart failure.  EKG was reviewed and showed normal sinus rhythm with PACs, 

nonspecific ST-T wave changes.  Bilateral knee x-rays were reviewed.  There

was no evidence of fracture in either her right or left knee.  No

degenerative arthritis was noted.  Pelvic x-ray was reviewed.  Pelvic bones

were unremarkable.  Hips were grossly unremarkable.  Her sacral iliac and

pubic symphysis joints were unremarkable.  There was no obvious fracture

noted.  Head CT was reviewed.  It showed evidence of proteinaceous material

in her right maxillary sinus consistent with sinusitis.  Also, she was

noted to have diffuse brain atrophy.  A chronic right thalamic lacunar

infarct.



IMPRESSION:  An obese 58-year-old female presenting with history of

multiple sclerosis for which she takes no medication and has not had any

neurological followup within the past year, raising concerns of multiple

sclerosis flare versus diabetic neuropathy with comorbidities of anxiety,

depression, chronic hypertension.  Presume diet-controlled diabetes

mellitus, peripheral neuropathy, anemia, peptic ulcer disease with

gastroesophageal reflux disease and sinusitis.



PLAN:  My plans are to outline blood pressure medication continuation

including Norvasc 5 mg p.o. daily, Cozaar 100 mg p.o. daily and the patient

will have a steroid pulse under the direction of Dr. Ildefonso Daniel as

outlined.  As discussed with Dr. Newsome from Psychiatry, she will be

recommended to the local mental health clinic for her psychiatric needs

including chronic anxiety, chronic depression and the patient is not

suicidal or homicidal.  At present, she can hopefully be discharged to

tomorrow after the patient has her second steroid pulse and is 

evaluated by [facility] Th[facility] and results are noted of her response to

steroid pulse.  The patient will need followup with her primary care

physician within her Medicare advantage plan and she has been strongly

advised to complete her scheduled appointment on 10/18/2018 with her

neurologist within her Medicare advantage plan.



Greater than 75 minutes was spent in the care management, review of labs,

orders, x-rays and outlining of care for this patient today as well as

discussion of her management with herself, her nurse, Sourav Overton, Dr. Newsome and Dr. Ildefonso Dainel.  All questions were answered.







__________________________________________

Amisha Wall MD









DD:  10/06/2018 8:39:20

DT:  10/06/2018 8:43:22

Job # [id number]



JOHNSON
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
## 2018-10-09 NOTE — DS
DATE OF DISCHARGE:  Saturday, 10/06/2018



HISTORY OF PRESENT ILLNESS:  This 58-year-old female was discharged from

[facility] on Saturday, 10/06/2018.  This case was reviewed and

medications were reviewed with the patient and nurse, Kim Baugh,

registered nurse.  The patient was advised to follow up with her PMD within

48 hours and also to keep her scheduled Neurological appointment with her

Managed care, Medicare neurologist on 10/18/2018.  Consultants included Dr. Ildefonso Daniel from Neurology, Dr. Newsome from Psychiatry and Dr. Tre Adamson from [facility].  The patient was also advised she will

need a dedicated CT of her sinuses to be performed as an outpatient and was

given Dr. Tre Adamson's office number to schedule follow-up ENT

appointment for sinusitis noted on CT of her head.  The patient also was

reminded to schedule an appointment with Mental Health Clinic as

recommended by Dr. Newsome from Psychiatry and discharge medications

included Norvasc 5 mg p.o. daily; multivitamin 1 tablet daily; Cozaar 100

mg p.o. daily; Neurontin 300 mg p.o. b.i.d.; folic acid 1 mg daily; regular

low-dose Humulin insulin coverage before meals and at bedtime; Flonase one

spray to each nostril daily; Klonopin 1 mg p.o. b.i.d., #14, no refill;

Lipitor 40 mg p.o. daily; Ecotrin 81 mg p.o. daily.



DISCHARGE DIAGNOSES:  Will be exacerbation of multiple sclerosis, multiple

sclerosis flare, anxiety, chronic depression, recurrent depression,

sinusitis, chronic hypertension, type 2 diabetes mellitus, obesity,

peripheral neuropathy, hyperlipidemia, peptic ulcer disease with

gastroesophageal reflux disease.



DISPOSITION:  Home.



DISCHARGE MEDICATIONS:  As above.



SUMMARY:  This 58-year-old female who presented to [facility],

was admitted for multiple sclerosis flare and underwent a steroid pulse x2

under the direction of Dr. Ildefonso Daniel from Neurology.  She was seen in

consultation by Dr. Newsome from Psychiatry and cleared for discharge with

followup in the [facility] and was seen by Dr. Tre Adamson

from [facility] N[facility] Thro[facility] and given clearance for discharge for followup in

his medical office as an outpatient.  At the time of discharge, her

temperature was 98.4, respirations 18, pulse 70 and blood pressure 110/68

with a pulse ox of 95% on room air.  White count 4900, hemoglobin 9.9,

hematocrit 31.5, platelets 197,000.  Sodium 139, K 4, chloride 109, bicarb

24, BUN 20, creatinine 0.6, random blood sugar 73.  Her iron level was 27,

TIBC 269, percent saturation 10 with a ferritin level of 55.6.  Vitamin B12

is 174, folic acid was 20.  The patient was advised that she will need a

B12 1000 mcg supplement sublingual daily and also to follow up with her PMD

to schedule outpatient colonoscopy and endoscopy if not done within the

recent past.  The patient is aware that these are her responsibilities and

hopefully she will be compliant with the above.  At the time of discharge,

all of these instructions were relayed to the patient in the presence of

her nurse and hopefully the patient will be compliant with these

recommendations.  Greater than 35 minutes was spent in the care management,

review of labs and orders and discussion of this patient's care with her

today.





__________________________________________

Amisha Wall MD





DD:  10/08/2018 10:22:07

DT:  10/08/2018 10:37:00

[facility] # [id number]

## 2018-10-11 ENCOUNTER — HOSPITAL ENCOUNTER (INPATIENT)
Dept: HOSPITAL 42 - ED | Age: 58
LOS: 13 days | Discharge: HOME | DRG: 881 | End: 2018-10-24
Attending: PSYCHIATRY & NEUROLOGY | Admitting: PSYCHIATRY & NEUROLOGY
Payer: MEDICARE

## 2018-10-11 VITALS — OXYGEN SATURATION: 97 %

## 2018-10-11 VITALS — BODY MASS INDEX: 36.3 KG/M2

## 2018-10-11 DIAGNOSIS — F41.9: ICD-10-CM

## 2018-10-11 DIAGNOSIS — Z98.84: ICD-10-CM

## 2018-10-11 DIAGNOSIS — E78.5: ICD-10-CM

## 2018-10-11 DIAGNOSIS — Z95.0: ICD-10-CM

## 2018-10-11 DIAGNOSIS — F43.10: ICD-10-CM

## 2018-10-11 DIAGNOSIS — Z87.442: ICD-10-CM

## 2018-10-11 DIAGNOSIS — I25.10: ICD-10-CM

## 2018-10-11 DIAGNOSIS — Z90.49: ICD-10-CM

## 2018-10-11 DIAGNOSIS — Z87.440: ICD-10-CM

## 2018-10-11 DIAGNOSIS — E78.00: ICD-10-CM

## 2018-10-11 DIAGNOSIS — I50.9: ICD-10-CM

## 2018-10-11 DIAGNOSIS — E11.9: ICD-10-CM

## 2018-10-11 DIAGNOSIS — Z86.73: ICD-10-CM

## 2018-10-11 DIAGNOSIS — K52.9: ICD-10-CM

## 2018-10-11 DIAGNOSIS — M79.7: ICD-10-CM

## 2018-10-11 DIAGNOSIS — F41.0: ICD-10-CM

## 2018-10-11 DIAGNOSIS — I11.0: ICD-10-CM

## 2018-10-11 DIAGNOSIS — G35: ICD-10-CM

## 2018-10-11 DIAGNOSIS — K21.9: ICD-10-CM

## 2018-10-11 DIAGNOSIS — F12.90: ICD-10-CM

## 2018-10-11 DIAGNOSIS — E66.9: ICD-10-CM

## 2018-10-11 DIAGNOSIS — F32.9: Primary | ICD-10-CM

## 2018-10-11 DIAGNOSIS — J44.9: ICD-10-CM

## 2018-10-11 DIAGNOSIS — Z91.14: ICD-10-CM

## 2018-10-11 LAB
ALBUMIN SERPL-MCNC: 3.3 G/DL (ref 3–4.8)
ALBUMIN/GLOB SERPL: 1.1 {RATIO} (ref 1.1–1.8)
ALT SERPL-CCNC: 34 U/L (ref 7–56)
APPEARANCE UR: CLEAR
APTT BLD: 29.2 SECONDS (ref 25.1–36.5)
AST SERPL-CCNC: 25 U/L (ref 14–36)
BACTERIA #/AREA URNS HPF: (no result) /[HPF]
BASOPHILS # BLD AUTO: 0.01 K/MM3 (ref 0–2)
BASOPHILS NFR BLD: 0.2 % (ref 0–3)
BILIRUB UR-MCNC: NEGATIVE MG/DL
BUN SERPL-MCNC: 12 MG/DL (ref 7–21)
CALCIUM SERPL-MCNC: 8.4 MG/DL (ref 8.4–10.5)
COLOR UR: YELLOW
EOSINOPHIL # BLD: 0.2 10*3/UL (ref 0–0.7)
EOSINOPHIL NFR BLD: 4.5 % (ref 1.5–5)
ERYTHROCYTE [DISTWIDTH] IN BLOOD BY AUTOMATED COUNT: 12.9 % (ref 11.5–14.5)
GFR NON-AFRICAN AMERICAN: > 60
GLUCOSE UR STRIP-MCNC: NEGATIVE MG/DL
GRANULOCYTES # BLD: 3.49 10*3/UL (ref 1.4–6.5)
GRANULOCYTES NFR BLD: 68.3 % (ref 50–68)
HGB BLD-MCNC: 10.2 G/DL (ref 12–16)
INR PPP: 1.06
LEUKOCYTE ESTERASE UR-ACNC: (no result) LEU/UL
LYMPHOCYTES # BLD: 1.2 10*3/UL (ref 1.2–3.4)
LYMPHOCYTES NFR BLD AUTO: 23.5 % (ref 22–35)
MCH RBC QN AUTO: 30.1 PG (ref 25–35)
MCHC RBC AUTO-ENTMCNC: 31.8 G/DL (ref 31–37)
MCV RBC AUTO: 94.7 FL (ref 80–105)
MONOCYTES # BLD AUTO: 0.2 10*3/UL (ref 0.1–0.6)
MONOCYTES NFR BLD: 3.5 % (ref 1–6)
PH UR STRIP: 6 [PH] (ref 4.7–8)
PLATELET # BLD: 206 10^3/UL (ref 120–450)
PMV BLD AUTO: 10.4 FL (ref 7–11)
PROT UR STRIP-MCNC: NEGATIVE MG/DL
PROTHROMBIN TIME: 12.2 SECONDS (ref 9.4–12.5)
RBC # BLD AUTO: 3.39 10^6/UL (ref 3.5–6.1)
RBC # UR STRIP: NEGATIVE /UL
RBC #/AREA URNS HPF: (no result) /HPF (ref 0–2)
SP GR UR STRIP: 1.01 (ref 1–1.03)
TROPONIN I SERPL-MCNC: < 0.01 NG/ML
UROBILINOGEN UR STRIP-ACNC: 0.2 E.U./DL
WBC # BLD AUTO: 5.1 10^3/UL (ref 4.5–11)

## 2018-10-11 RX ADMIN — INSULIN HUMAN SCH: 100 INJECTION, SOLUTION PARENTERAL at 21:51
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
## 2018-10-11 NOTE — PCM.BM
<Luis Breaux - Last Filed: 10/11/18 18:45>





Treatment Plan Problems





- Problems identified on initial assessmt


  ** hopelessness/Helplessness


Date Initiated: 10/11/18


Time Initiated: 18:00


Assessment reference: NA


Status: Active


Priority: 1


Comment: feeling depressed





  ** Ineffective coping skills.


Date Initiated: 10/11/18


Time Initiated: 18:00


Assessment reference: NA


Status: Active


Priority: 2


Comment: having problem dealing with living situation.





  ** Medication noandherenace


Date Initiated: 10/11/18


Time Initiated: 18:00


Assessment reference: NA


Status: Active


Priority: 3


Comment: Unable to obtain medications





  ** Activity intolerance


Date Initiated: 10/11/18


Time Initiated: 18:00


Assessment reference: NA


Status: Active


Priority: 4


Comment: Medical condition





  ** Altered sleep Patter


Date Initiated: 10/11/18


Time Initiated: 18:00


Assessment reference: NA


Status: Active


Priority: 5





Treatment assets and liabiliti


Patient Assests: adapts well, cooperative, insightful, motivated, negotiates 

basic needs, cognitively intact, good interpersonal skills


Patient Liabilities: live alone, physical pain, dietary restrictions, medical 

problems





- Milieu Protocol


Maintain good personal hygiene: daily Encourage regular showers, daily Assist 

patient to perform ADL's, every shift Remind patient to perform daily oral care


Maintain personal safety: every shift Educate patient to report safety concerns 

to staff, every shift Monitor environment for contraband/sharps


Medication safety: Monitor for expected outcome, potential side effects: every 

shift, Assess barriers to learning: every shift, Assess readiness for medication

education: every shift





Discharge/Continuing Care





- Education Needs


Education Needs: Patient Medication, Patient Diagnosis/Disease Process, Patient 

Coping Skills, Patient Community resources, Patient Activities of Daily Living, 

Patient Pain, Patient Nutrition, Patient Uses of Medical Equipment, Patient 

Health Practices/Safety, Patient Personal Hygiene/Grooming, Patient Aftercare 

Safety Plan





- Discharge


Discharge Criteria: Tolerates medication w/o severe side effects, Normal sleep 

pattern, Ability to care for self, Reduction of target symptoms


Discharge to:: Home





<April Michel - Last Filed: 10/12/18 13:46>





Family Contact


Family involvement: Famliy/SO not involved





<Nirali Campoverde - Last Filed: 10/12/18 16:26>

## 2018-10-11 NOTE — CARD
--------------- APPROVED REPORT --------------





Date of service: 10/11/2018



EKG Measurement

Heart [id number]

[id number]

[id number]

[id number]

[id number]



<Conclusion>

Sinus rhythm with marked sinus arrhythmia

Low voltage QRS

Nonspecific ST and T wave abnormality

Abnormal ECG

## 2018-10-11 NOTE — RAD
Date of service: 



10/11/2018



HISTORY:

 chest pain 



COMPARISON:

10/05/2018 



FINDINGS:



LUNGS:

No active pulmonary disease.



PLEURA:

No significant pleural effusion identified, no pneumothorax apparent.



CARDIOVASCULAR:

Normal.



OSSEOUS STRUCTURES:

No significant abnormalities.



VISUALIZED UPPER ABDOMEN:

Normal.



OTHER FINDINGS:

Port-A-Cath and pacemaker



IMPRESSION:

No active disease.

## 2018-10-12 LAB
HDLC SERPL-MCNC: 46 MG/DL (ref 29–60)
LDLC SERPL-MCNC: 46 MG/DL (ref 0–129)
T4 FREE SERPL-MCNC: 1.05 NG/DL (ref 0.78–2.19)

## 2018-10-12 RX ADMIN — INSULIN HUMAN SCH: 100 INJECTION, SOLUTION PARENTERAL at 12:39

## 2018-10-12 RX ADMIN — THERA TABS SCH TAB: TAB at 09:36

## 2018-10-12 RX ADMIN — INSULIN HUMAN SCH: 100 INJECTION, SOLUTION PARENTERAL at 17:44

## 2018-10-12 RX ADMIN — INSULIN HUMAN SCH: 100 INJECTION, SOLUTION PARENTERAL at 21:58

## 2018-10-12 RX ADMIN — INSULIN HUMAN SCH: 100 INJECTION, SOLUTION PARENTERAL at 07:30

## 2018-10-12 RX ADMIN — PANTOPRAZOLE SODIUM SCH MG: 40 TABLET, DELAYED RELEASE ORAL at 06:21
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
## 2018-10-13 LAB
ALBUMIN SERPL-MCNC: 3.4 G/DL (ref 3–4.8)
ALBUMIN/GLOB SERPL: 1.1 {RATIO} (ref 1.1–1.8)
ALT SERPL-CCNC: 24 U/L (ref 7–56)
AST SERPL-CCNC: 21 U/L (ref 14–36)
BUN SERPL-MCNC: 16 MG/DL (ref 7–21)
CALCIUM SERPL-MCNC: 8.8 MG/DL (ref 8.4–10.5)
ERYTHROCYTE [DISTWIDTH] IN BLOOD BY AUTOMATED COUNT: 12.9 % (ref 11.5–14.5)
GFR NON-AFRICAN AMERICAN: > 60
HGB BLD-MCNC: 11.2 G/DL (ref 12–16)
MCH RBC QN AUTO: 30.3 PG (ref 25–35)
MCHC RBC AUTO-ENTMCNC: 31.9 G/DL (ref 31–37)
MCV RBC AUTO: 94.9 FL (ref 80–105)
PLATELET # BLD: 235 10^3/UL (ref 120–450)
PMV BLD AUTO: 10.4 FL (ref 7–11)
RBC # BLD AUTO: 3.7 10^6/UL (ref 3.5–6.1)
WBC # BLD AUTO: 4.9 10^3/UL (ref 4.5–11)

## 2018-10-13 RX ADMIN — INSULIN HUMAN SCH: 100 INJECTION, SOLUTION PARENTERAL at 13:02

## 2018-10-13 RX ADMIN — INSULIN HUMAN SCH: 100 INJECTION, SOLUTION PARENTERAL at 23:17

## 2018-10-13 RX ADMIN — PANTOPRAZOLE SODIUM SCH MG: 40 TABLET, DELAYED RELEASE ORAL at 07:11

## 2018-10-13 RX ADMIN — THERA TABS SCH TAB: TAB at 09:14

## 2018-10-13 RX ADMIN — INSULIN HUMAN SCH: 100 INJECTION, SOLUTION PARENTERAL at 08:33

## 2018-10-13 RX ADMIN — INSULIN HUMAN SCH: 100 INJECTION, SOLUTION PARENTERAL at 16:33

## 2018-10-13 NOTE — CON
DATE:  10/12/2018



HISTORY OF PRESENT ILLNESS:  I came to see her today.  She is resting

comfortably in bed.  She is a 58-year-old female who presents with being

out of her medication for few weeks.  They have not seen a psychiatrist in

4 months.  She is feeling very depressed, worsening of her depression, also

some anxiety.  She has a past medical history of diabetes, hypertension,

pacemaker implantation, CHF, asthma, CVA history, depression, anxiety,

congestive heart failure, kidney stones, anemia, falls, unsteady gait.  I

will get Physical Therapy to see her.  She has a history of colitis.  She

had multiple urinary tract infections, multiple vaginal itching at times,

anxiety, depression.  No substance abuse.  She has had a cholecystectomy. 

She had gastric bypass surgery, permanent pacemaker.  She is also telling

me she wants Imodium on a regular basis.  I will get GI consult for

evaluation of her constant diarrhea, I do not know if that is true or not. 

The nurses said she did not have any bowel movements at that time, so I am

not sure if that is real or not.



ALLERGIES:  SHE HAS ALLERGIES TO LEVOFLOXACIN.



MEDICATIONS:  She does take Ativan, Cozaar, Ecotrin, Effexor, Flonase,

folic acid, Klonopin, Lipitor, Motrin, Norvasc, Protonix, Sonata,

multivitamins, and Ultram at home.



PHYSICAL EXAMINATION

GENERAL:  She is well appearing, comfortable, nontoxic, talking to me.

VITAL SIGNS:  She has a 99.3 temperature, 64 pulse, 18 respiratory rate,

149/84 blood pressure, 98% O2 sat.

HEENT:  Head is atraumatic and normocephalic.  Extraocular muscles are

intact.  Pupils are equal and reactive to light.  Throat is moist..

NECK:  Supple.

HEART:  Regular rate.  Normal S1 and S2..

LUNGS:  Decreased breath sounds.  Poor inspiration, but clear to

auscultation.  No wheezes, no rhonchi, no rales.

ABDOMEN:  Soft and nontender.  Positive bowel sounds.  No guarding, no

rebound, no CVA tenderness.

EXTREMITIES:  No edema.

NEUROLOGIC:  GCS is 15.  Cranial nerves II through XII grossly intact. 

Normal speech.  She could stick her tongue midline.  She could raise her

arms overhead.  She could close her eyes tight.  Neurologically, she seems

intact.

SKIN:  Warm and dry.  No apparent rashes or ulcers appreciated.

PSYCHIATRIC:  Alert and oriented x3.

LYMPHATICS:  Thyroid midline.  No appreciable lymphadenopathy to palpation.



LABORATORY DATA:  She had tests done.  She has a sodium 139, potassium 3.9,

BUN 12, creatinine 0.6,  GFR is greater than 60.  Blood sugar is 94. 

Calcium is 8.4, magnesium 1.7.  Total bilirubin is 0.28, AST is 25, ALT is

34, alk phos 127, lactate dehydrogenase is 384.  Total creatine kinase is

39, troponin I less than 0.01, total protein 6.2, cholesterol is 120,

triglycerides are 107.  TSH is 1.4.  White count is 5.1, hemoglobin 10.2,

hematocrit 32.1, platelets of 206.  INR is 1.06.  She was positive for

barbiturates.  Urine with trace leukocytes, few bacteria.



ASSESSMENT AND PLAN:  She will be on her medications.  The chest x-ray was

clear.  The EKG was clear.  I will check her labs tomorrow.  I will call in

Gastrointestinal for this chronic diarrhea that she told me that she has,

physical therapy and hopefully we will continue aggressive treatment and

care.



Thank you for allowing me to participate in her care.





__________________________________________

Brina Stanley DO





DD:  10/12/2018 12:15:35

DT:  10/12/2018 22:33:57

Job # [id number]

## 2018-10-13 NOTE — PCM.PYCHPN
Psychiatric Progress Note





- Psychiatric Progress Note


Patient seen today, length of contact: 30min


Patient Chief Complaint: 


"I'm not feeling good, I cannot concentrate, I need to relax, I feel very angry,

my mind is racing"


Problems Identified/Issues Discussed: 


Suicide/ homicide prevention, past psychiatric h/o, current psychiatric 

symptoms, medical problems, risk/benefits and alternatives of medications, 

medications compliance, coping strategies, substance abuse h/o, relapse 

prevention, importance of follow up with psychiatrist and therapist, discharge 

plan. 





Medical Problems: 





see HPI


Diagnostic Results: 














                                 10/13/18 08:00 





                                 10/13/18 08:00 





                                   Lab Results





10/13/18 08:00: Sodium 139, Potassium 4.2, Chloride 105, Carbon Dioxide 27, 

Anion Gap 12, BUN 16, Creatinine 0.6 L, Est GFR ( Amer) > 60, Est GFR 

(Non-Af Amer) > 60, Random Glucose 99, Calcium 8.8, Total Bilirubin 0.3, AST 21,

ALT 24, Alkaline Phosphatase 130 H, Total Protein 6.6, Albumin 3.4, Globulin 

3.1, Albumin/Globulin Ratio 1.1


10/13/18 08:00: WBC 4.9, RBC 3.70, Hgb 11.2 L, Hct 35.1 L, MCV 94.9, MCH 30.3, 

MCHC 31.9, RDW 12.9, Plt Count 235, MPV 10.4


10/13/18 07:16: POC Glucose (mg/dL) 94


10/12/18 21:04: POC Glucose (mg/dL) 89


10/12/18 16:07: POC Glucose (mg/dL) 108


10/12/18 12:32: POC Glucose (mg/dL) 85


10/12/18 07:55: RPR Nonreactive


10/12/18 07:55: Free T4 1.05, TSH 3rd Generation 1.40


10/12/18 07:55: Fasting Glucose 94, Triglycerides 107, Cholesterol 120 L, LDL 

Cholesterol Direct 46, HDL Cholesterol 46


10/12/18 07:10: POC Glucose (mg/dL) 92


10/11/18 21:49: POC Glucose (mg/dL) 103


10/11/18 11:20: Alcohol, Quantitative < 10


10/11/18 11:20: Urine Opiates Screen Negative, Urine Methadone Screen Negative, 

Ur Barbiturates Screen Positive H, Ur Phencyclidine Scrn Negative, Ur Amphetami

christopher Screen Negative, U Benzodiazepines Scrn Negative, U Oth Cocaine Metabols 

Negative, U Cannabinoids Screen Negative


10/11/18 11:20: Sodium 139, Potassium 3.9, Chloride 106, Carbon Dioxide 27, 

Anion Gap 10, BUN 12, Creatinine 0.6 L, Est GFR ( Amer) > 60, Est GFR 

(Non-Af Amer) > 60, Random Glucose 127 H, Calcium 8.4, Magnesium 1.7, Total 

Bilirubin 0.2, AST 25, ALT 34, Alkaline Phosphatase 127 H, Lactate Dehydrogenase

384, Total Creatine Kinase 39, Troponin I < 0.01, Total Protein 6.2, Albumin 

3.3, Globulin 2.9, Albumin/Globulin Ratio 1.1


10/11/18 11:20: Urine Color Yellow, Urine Appearance Clear, Urine pH 6.0, Ur 

Specific Gravity 1.010, Urine Protein Negative, Urine Glucose (UA) Negative, 

Urine Ketones Negative, Urine Blood Negative, Urine Nitrate Negative, Urine 

Bilirubin Negative, Urine Urobilinogen 0.2, Ur Leukocyte Esterase Trace H, Urine

RBC 0 - 2, Urine WBC 1 - 3, Ur Epithelial Cells 4 - 5, Urine Bacteria Few


10/11/18 11:20: PT 12.2, INR 1.06, APTT 29.2


10/11/18 11:20: WBC 5.1, RBC 3.39 L, Hgb 10.2 L, Hct 32.1 L, MCV 94.7, MCH 30.1,

MCHC 31.8, RDW 12.9, Plt Count 206, MPV 10.4, Gran % 68.3 H, Lymph % (Auto) 

23.5, Mono % (Auto) 3.5, Eos % (Auto) 4.5, Baso % (Auto) 0.2, Gran # 3.49, Lymph

# (Auto) 1.2, Mono # (Auto) 0.2, Eos # (Auto) 0.2, Baso # (Auto) 0.01








Vital Signs











  Temp Pulse Pulse Resp BP Pulse Ox


 


 10/13/18 09:13   59 L    122/80 


 


 10/13/18 07:24  98.3 F  59 L   20  122/80 


 


 10/12/18 15:59   72    142/79 


 


 10/12/18 07:13  97.8 F  61   20  155/93 H 


 


 10/12/18 07:00   61    155/93 H 


 


 10/11/18 17:33    65  18  


 


 10/11/18 15:09   98 H   18  142/78  97


 


 10/11/18 13:04   63   18  143/78  98


 


 10/11/18 09:38  99.3 F  64   18  149/84  98











DSM 5 Symptoms Update: 


shortly pt is shortly, patient is 58 year old  female, long history of 

depression as well as anxiety, 3 previous hospitalizations at age of 52, 54, 58,

denied history of suicidal attempts, recently moved to [place] from [place] to live 

with her daughter who has 5 kids, pt recently moved out from her daughter's 

apartment because of the conflict with her, pt currently lives independently in 

[place], pt has multiple medical issues including MS,Diabetes, hypertension, 

Pacemaker, CHF, asthma, CVA, pt brought herself to the hospital for evaluation 

and stabilization of depressive symptoms, feeling of hopelessness, passive wish 

to be dead, patient reported that she was noncompliant with the medications 

Effexor/Klonopin because she was not sure if she has insurance or not, patient 

requires further evaluation and stabilization and medication resumption and 

titration.





pt was seen next to the nursing station. 





patient presented depressed today, patient reported that she was not able to 

sleep, patient reported difficulty to concentrate and "mind racing", patient 

requested Seroquel to be resumed because she was doing well on that medication 

before, patient reported that she doesn't feel like eating, patient complain of 

upsets,, patient reported that she was taking Imodium in the past. Patient was 

seen by gastroenterology team today, briefly discussed with gastroenterologist.





patient reported that she does not hear any voices or seeing things, but reports

feeling very anxious.





So far patient tolerates medications well, no side effects observed or reported,

aims 0, no EPS.





As per staff patient complain of the pain, patient is concerned about her 

medical issues,no agitation, no aggression, patient is socially appropriate, but

self isolating.





Impression:


DSM 5 Diagnosis: 


as per h/o:


MDD


GUY


panic disorder


PTSD


Medication Change: Yes (Seroquel 25 mg at the nighttime started, sonata 

discontinue)


Medical Record Reviewed: Yes


Consults ordered or reviewed: 





medical consult appreciated


Gastroenterology consultation appreciated





Mental Status Examination





- Cognitive Function


Orientation: Person, Place, Situation, Time


Memory: Intact


Attention: Poor


Concentration: Poor


Association: Loose


Fund of Knowledge: Poor





- Mood


Mood: Depressed, Anxious





- Affect


Affect: Flat





- Formal Thought Process


Formal Thought Process: No Impairment





- Suicidal Ideation


Suicidal Ideation: No





- Homicidal Ideation


Homicidal Ideation: No





Goal/Treatment Plan





- Goal/Treatment Plan


Need for Continued Stay: Remain at risks for inpatient hospitalization, Severe 

depression anxiety, Discharge may exacerbated symptoms, Severe functional 

impairment


Progress Toward Problem(s) and Goals/Treatment Plan: 


Milieu/structure/supportive therapy 


Medical consult appreciated 


will call cardiology consult EKG changes, h/o chest pain and CVA


SW consultation for discharge plan and social issues 


Med list was confirmed by [facility] pharmacy


clonazepam 1mg po bid for anxiety


atorvastatin 40mg daily


ASA 81mg po daily


zofran 8mg po daily


Lyrica 100mg po daily filled June 20th, 2018


neurontin 300mg po tid


sonata d/c


seroquel 25mg po hs for mood stablization


effexor 37.5mg po daily for depression adn anxiety


Family involvement 


Follow up on labs 


Will monitor closely 


Pt was educated about risk/benefits and alternatives of medications, coping 

strategies (safety plan, suicide prevention), relapse prevention, importance of 

follow up with psychiatrist and therapist, stay away from drugs/alcohol/smoking





Estimated Date of D/C: 10/19/18

## 2018-10-13 NOTE — CON
DATE:  10/12/2018



REASON FOR CONSULTATION:  Abnormal EKG, history of a pacemaker, cardiac

evaluation and followup.



BRIEF MEDICAL HISTORY:  This is a 58-year-old female with past medical

history significant for pacemaker done three years ago in [place], admitted

to the psych floor with depression.  She had an episode of chest pain

earlier at home, so cardiac consult was called.  The patient is currently

in psych floor with past medical history significant for diabetes,

hypertension, hyperlipidemia, pacemaker, and depression.  Denies any chest

pain now, but on admission, the patient was complaining of some chest pain.



PAST MEDICAL HISTORY:  Significant for multiple sclerosis; fibromyalgia;

hyperlipidemia; colitis; obesity, status post bariatric surgery for

obesity; status post permanent pacemaker, [employer], done in [place];

diabetes; hypertension; hyperlipidemia; COPD.

 

SOCIAL HISTORY:  Denies smoking.  Denies any history of alcohol abuse.



ALLERGIES:  LEVAQUIN.



CURRENT MEDICATIONS:  The patient is taking at home tramadol, amlodipine 5

mg daily, Effexor, multivitamin, losartan 100 mg daily, insulin, ibuprofen,

folic acid, atorvastatin, and aspirin.

 

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

in [place], history of permanent pacemaker three years ago, history of

gastric bypass couple of years ago in [place] also.  The patient states

that he is being followed in [place] and had a stress test done before she

came in here and was told negative.



REVIEW OF SYSTEMS:  As per HPI.



PHYSICAL EXAMINATION:  As follows:

VITAL SIGNS:  Height of the patient is 5 feet, weight of the patient 180

pounds, body mass index 36.4 kg/m2.



LABORATORY DATA:  EKG shows normal sinus rhythm, nonspecific ST-T changes. 

Blood workup, WBC 5.1, hemoglobin 10.2, hematocrit 32.1, platelet count

206.  Chemistry shows sodium 139, potassium 3.9, chloride 106, carbon

dioxide 27, anion gap of 10, BUN 12, creatinine 0.6.  Troponin 0.01.  Total

cholesterol 120, triglycerides 107, LDL 46, HDL 46.  TSH 1.4.



IMPRESSION:  A 58-year-old female with past medical history significant for

diabetes, hypertension, hyperlipidemia, obesity, history of permanent

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

the past, admitted with depression, one episode of chest pain that is

atypical, history of recent cardiac workup in [place] as per patient stress

test was negative.  The patient had echocardiography done on 05/12/2018

that showed normal left ventricular size, normal left ventricular wall

thickness, normal left ventricular function.  Reported normal mitral and

tricuspid valve structure.  Calculated ejection fraction 57%, dated

05/11/2018.



RECOMMENDATION:  Continue psych evaluation.  So far, no evidence of acute

MI.  EKG essentially did not show any acute ST-T changes.  History of

pacemaker made of [employer].  History of evaluation of the pacemaker six

months ago.  Continue current treatment.  We will follow with you.



Thank you, Dr. Guerra for providing us the opportunity in taking care

of the patient Yasmani Otero.  We will get lipid profile, TSH, hemoglobin

A1c.  Further recommendation depending upon hospital course.  We will

follow with you.





__________________________________________

Marcus Anderson MD





__________________________________________

<>



DD:  10/12/2018 18:51:11

DT:  10/13/2018 3:02:45

Job # [id number]

## 2018-10-14 RX ADMIN — INSULIN HUMAN SCH UNIT: 100 INJECTION, SOLUTION PARENTERAL at 09:13

## 2018-10-14 RX ADMIN — PANTOPRAZOLE SODIUM SCH MG: 40 TABLET, DELAYED RELEASE ORAL at 06:03

## 2018-10-14 RX ADMIN — INSULIN HUMAN SCH: 100 INJECTION, SOLUTION PARENTERAL at 16:59

## 2018-10-14 RX ADMIN — THERA TABS SCH TAB: TAB at 09:11

## 2018-10-14 RX ADMIN — INSULIN HUMAN SCH: 100 INJECTION, SOLUTION PARENTERAL at 12:26

## 2018-10-14 RX ADMIN — INSULIN HUMAN SCH: 100 INJECTION, SOLUTION PARENTERAL at 21:34

## 2018-10-14 NOTE — PN
DATE:  10/14/2018



SUBJECTIVE:  I saw her eating breakfast today.  She is doing better.  She

is back on the Imodium as per GI, now she is telling me she is having low

back pain and she has used Lidoderm cream.  I will give her Lidoderm patch.

She is feeling better emotionally.



She is on Ativan, Cozaar, Ecotrin, Effexor, folic acid, Imodium, now

Klonopin.  I added Lidoderm patch, Lipitor, Motrin, Neurontin, Norvasc,

Protonix, Seroquel, Thera-Tabs, Vistaril and Zofran.



PHYSICAL EXAMINATION:

VITAL SIGNS:  Temperature 97.8, 59 pulse, 110/56 blood pressure, 18

respiratory rate.

HEAD:  Atraumatic, normocephalic.

HEART:  Regular rate.

LUNGS:  Clear to auscultation.

ABDOMEN:  Soft, obese.

EXTREMITIES:  No edema.



LABORATORY DATA:  Last labs on 10/13/2018, she has a 4.9 white count, 11.2

hemoglobin, 235 platelets.  Sodium 139, potassium 4.2, BUN 16, creatinine

0.6, GFR greater than 60.  Last blood sugar was 96.  AST is 21, ALT is 24,

alk phos of 130.



Hopefully, she will continue to improve.  She is on insulin coverage.  I

want to watch her low sugar diet as per Psychiatry and GI was appreciated. 

I gave her Lidoderm patch for the low back pain.  We will follow.







__________________________________________

Brian Stanley DO







DD:  10/14/2018 10:41:46

DT:  10/14/2018 10:43:48

Job # [id number]

## 2018-10-14 NOTE — PCM.PYCHPN
Psychiatric Progress Note





- Psychiatric Progress Note


Patient seen today, length of contact: 30min


Patient Chief Complaint: 


"can you increase my antidepressant?"


Problems Identified/Issues Discussed: 


Suicide/ homicide prevention, past psychiatric h/o, current psychiatric sym

ptoms, medical problems, risk/benefits and alternatives of medications, 

medications compliance, coping strategies, substance abuse h/o, relapse 

prevention, importance of follow up with psychiatrist and therapist, discharge 

plan. 





Medical Problems: 





see HPI


Diagnostic Results: 














                                 10/13/18 08:00 





                                 10/13/18 08:00 





                                   Lab Results





10/13/18 08:00: Sodium 139, Potassium 4.2, Chloride 105, Carbon Dioxide 27, 

Anion Gap 12, BUN 16, Creatinine 0.6 L, Est GFR ( Amer) > 60, Est GFR 

(Non-Af Amer) > 60, Random Glucose 99, Calcium 8.8, Total Bilirubin 0.3, AST 21,

ALT 24, Alkaline Phosphatase 130 H, Total Protein 6.6, Albumin 3.4, Globulin 

3.1, Albumin/Globulin Ratio 1.1


10/13/18 08:00: WBC 4.9, RBC 3.70, Hgb 11.2 L, Hct 35.1 L, MCV 94.9, MCH 30.3, 

MCHC 31.9, RDW 12.9, Plt Count 235, MPV 10.4


10/13/18 07:16: POC Glucose (mg/dL) 94


10/12/18 21:04: POC Glucose (mg/dL) 89


10/12/18 16:07: POC Glucose (mg/dL) 108


10/12/18 12:32: POC Glucose (mg/dL) 85


10/12/18 07:55: RPR Nonreactive


10/12/18 07:55: Free T4 1.05, TSH 3rd Generation 1.40


10/12/18 07:55: Fasting Glucose 94, Triglycerides 107, Cholesterol 120 L, LDL 

Cholesterol Direct 46, HDL Cholesterol 46


10/12/18 07:10: POC Glucose (mg/dL) 92


10/11/18 21:49: POC Glucose (mg/dL) 103


10/11/18 11:20: Alcohol, Quantitative < 10


10/11/18 11:20: Urine Opiates Screen Negative, Urine Methadone Screen Negative, 

Ur Barbiturates Screen Positive H, Ur Phencyclidine Scrn Negative, Ur 

Amphetamines Screen Negative, U Benzodiazepines Scrn Negative, U Oth Cocaine 

Metabols Negative, U Cannabinoids Screen Negative


10/11/18 11:20: Sodium 139, Potassium 3.9, Chloride 106, Carbon Dioxide 27, 

Anion Gap 10, BUN 12, Creatinine 0.6 L, Est GFR ( Amer) > 60, Est GFR 

(Non-Af Amer) > 60, Random Glucose 127 H, Calcium 8.4, Magnesium 1.7, Total 

Bilirubin 0.2, AST 25, ALT 34, Alkaline Phosphatase 127 H, Lactate Dehydrogenase

384, Total Creatine Kinase 39, Troponin I < 0.01, Total Protein 6.2, Albumin 

3.3, Globulin 2.9, Albumin/Globulin Ratio 1.1


10/11/18 11:20: Urine Color Yellow, Urine Appearance Clear, Urine pH 6.0, Ur 

Specific Gravity 1.010, Urine Protein Negative, Urine Glucose (UA) Negative, 

Urine Ketones Negative, Urine Blood Negative, Urine Nitrate Negative, Urine 

Bilirubin Negative, Urine Urobilinogen 0.2, Ur Leukocyte Esterase Trace H, Urine

RBC 0 - 2, Urine WBC 1 - 3, Ur Epithelial Cells 4 - 5, Urine Bacteria Few


10/11/18 11:20: PT 12.2, INR 1.06, APTT 29.2


10/11/18 11:20: WBC 5.1, RBC 3.39 L, Hgb 10.2 L, Hct 32.1 L, MCV 94.7, MCH 30.1,

MCHC 31.8, RDW 12.9, Plt Count 206, MPV 10.4, Gran % 68.3 H, Lymph % (Auto) 

23.5, Mono % (Auto) 3.5, Eos % (Auto) 4.5, Baso % (Auto) 0.2, Gran # 3.49, Lymph

# (Auto) 1.2, Mono # (Auto) 0.2, Eos # (Auto) 0.2, Baso # (Auto) 0.01








Vital Signs











  Temp Pulse Pulse Resp BP Pulse Ox


 


 10/13/18 09:13   59 L    122/80 


 


 10/13/18 07:24  98.3 F  59 L   20  122/80 


 


 10/12/18 15:59   72    142/79 


 


 10/12/18 07:13  97.8 F  61   20  155/93 H 


 


 10/12/18 07:00   61    155/93 H 


 


 10/11/18 17:33    65  18  


 


 10/11/18 15:09   98 H   18  142/78  97


 


 10/11/18 13:04   63   18  143/78  98


 


 10/11/18 09:38  99.3 F  64   18  149/84  98











DSM 5 Symptoms Update: 





shortly pt is shortly, patient is 58 year old  female, long history of 

depression as well as anxiety, 3 previous hospitalizations at age of 52, 54, 58,

denied history of suicidal attempts, recently moved to [place] from [place] to live 

with her daughter who has 5 kids, pt recently moved out from her daughter's 

apartment because of the conflict with her, pt currently lives independently in 

[place], pt has multiple medical issues including MS,Diabetes, hypertension, 

Pacemaker, CHF, asthma, CVA, pt brought herself to the hospital for evaluation 

and stabilization of depressive symptoms, feeling of hopelessness, passive wish 

to be dead, patient reported that she was noncompliant with the medications 

Effexor/Klonopin because she was not sure if she has insurance or not, patient 

requires further evaluation and stabilization and medication resumption and 

titration.





pt was seen next to the nursing station. 





patient presented depressed today, asked her effexor to be increased. 


pt reported that she slept little better. 





pt was seen by GI team. 





patient reported that she does not hear any voices or seeing things, but reports

feeling very anxious.





So far patient tolerates medications well, no side effects observed or reported,

aims 0, no EPS.





As per staff patient complain of the pain, patient is concerned about her 

medical issues,no agitation, no aggression, patient is socially appropriate, but

self isolating.





Impression:


DSM 5 Diagnosis: 


as per h/o:


MDD


GUY


panic disorder


PTSD


Medication Change: Yes (effexor increased)


Medical Record Reviewed: Yes


Consults ordered or reviewed: 





medical consult appreciated


Gastroenterology consultation appreciated





Mental Status Examination





- Cognitive Function


Orientation: Person, Place, Situation, Time


Memory: Intact


Attention: Poor


Concentration: Poor


Association: Loose


Fund of Knowledge: Poor





- Mood


Mood: Depressed, Anxious





- Affect


Affect: Flat





- Formal Thought Process


Formal Thought Process: No Impairment





- Suicidal Ideation


Suicidal Ideation: No





- Homicidal Ideation


Homicidal Ideation: No





Goal/Treatment Plan





- Goal/Treatment Plan


Need for Continued Stay: Remain at risks for inpatient hospitalization, Severe 

depression anxiety, Discharge may exacerbated symptoms, Severe functional 

impairment


Progress Toward Problem(s) and Goals/Treatment Plan: 


Milieu/structure/supportive therapy 


Medical consult appreciated 


will call cardiology consult EKG changes, h/o chest pain and CVA


SW consultation for discharge plan and social issues 


Med list was confirmed by [facility] pharmacy


clonazepam 1mg po bid for anxiety


atorvastatin 40mg daily


ASA 81mg po daily


zofran 8mg po daily


Lyrica 100mg po daily filled June 20th, 2018


neurontin 300mg po tid


sonata d/c


seroquel 25mg po hs for mood stablization


effexor 75mg po daily for depression and anxiety


Family involvement 


Follow up on labs 


Will monitor closely 


Pt was educated about risk/benefits and alternatives of medications, coping 

strategies (safety plan, suicide prevention), relapse prevention, importance of 

follow up with psychiatrist and therapist, stay away from drugs/alcohol/smoking





Estimated Date of D/C: 10/19/18

## 2018-10-15 RX ADMIN — PANTOPRAZOLE SODIUM SCH MG: 40 TABLET, DELAYED RELEASE ORAL at 06:40

## 2018-10-15 RX ADMIN — INSULIN HUMAN SCH: 100 INJECTION, SOLUTION PARENTERAL at 13:49

## 2018-10-15 RX ADMIN — INSULIN HUMAN SCH: 100 INJECTION, SOLUTION PARENTERAL at 09:27

## 2018-10-15 RX ADMIN — THERA TABS SCH TAB: TAB at 09:26

## 2018-10-15 RX ADMIN — INSULIN HUMAN SCH: 100 INJECTION, SOLUTION PARENTERAL at 17:15

## 2018-10-15 RX ADMIN — INSULIN HUMAN SCH: 100 INJECTION, SOLUTION PARENTERAL at 22:30

## 2018-10-15 NOTE — PN
DATE:  10/13/2018



SUBJECTIVE:  I saw her sitting out of bed to a chair, drawing on a piece of

paper.  She tells me she is feeling a bit better.  She is less depressed.



MEDICATIONS:  She is on Ativan, Cozaar, Ecotrin, Effexor, folic acid,

insulin, Klonopin, Lipitor, Motrin, Neurontin, Norvasc, Protonix, Sonata,

Thera-Tabs, 



PHYSICAL EXAMINATION:

VITAL SIGNS:  She has a 98.3 temp; 59 pulse; 122/80 blood pressure, good;

20 respiratory rate.

HEENT:  Head is atraumatic, normocephalic.

HEART:  Regular rate.

LUNGS:  Clear to auscultation.

ABDOMEN:  Soft.

EXTREMITIES:  No edema.



LABORATORY DATA:  Blood test with 4.9 white count, 11.2 hemoglobin, 35.1

hematocrit with 235 platelets.  139 sodium, potassium 4.2, BUN 16,

creatinine 0.6, GFR is greater than 60, sugar is 99, calcium is 8.8, total

bili is 0.3, AST is 21, ALT is 24, alk phos is 130, total protein 6.6,

albumin is 3.4.  TSH is 1.4, which is good.



ASSESSMENT AND PLAN:  She has been seen by Psychiatry.  I encouraged her to

participate in groups, eat the food, take the medication.  I do think she

is starting to improve.  I will continue to follow.  We will continue with

aggressive treatment and care on Shanna Gruber.





__________________________________________

Brian Stanley DO





DD:  10/13/2018 10:22:54

DT:  10/13/2018 10:25:31

Job # [id number]



MTDD

## 2018-10-15 NOTE — PCM.PYCHPN
Psychiatric Progress Note





- Psychiatric Progress Note


Patient seen today, length of contact: 30min


Patient Chief Complaint: 


"I am not very well"


Problems Identified/Issues Discussed: 


Suicide/ homicide prevention, past psychiatric h/o, current psychiatric 

symptoms, medical problems, risk/benefits and alternatives of medications, 

medications compliance, coping strategies, substance abuse h/o, relapse 

prevention, importance of follow up with psychiatrist and therapist, discharge 

plan. 





Medical Problems: 





see HPI


Diagnostic Results: 














                                 10/13/18 08:00 





                                 10/13/18 08:00 





                                   Lab Results





10/13/18 08:00: Sodium 139, Potassium 4.2, Chloride 105, Carbon Dioxide 27, 

Anion Gap 12, BUN 16, Creatinine 0.6 L, Est GFR ( Amer) > 60, Est GFR 

(Non-Af Amer) > 60, Random Glucose 99, Calcium 8.8, Total Bilirubin 0.3, AST 21,

ALT 24, Alkaline Phosphatase 130 H, Total Protein 6.6, Albumin 3.4, Globulin 

3.1, Albumin/Globulin Ratio 1.1


10/13/18 08:00: WBC 4.9, RBC 3.70, Hgb 11.2 L, Hct 35.1 L, MCV 94.9, MCH 30.3, 

MCHC 31.9, RDW 12.9, Plt Count 235, MPV 10.4


10/13/18 07:16: POC Glucose (mg/dL) 94


10/12/18 21:04: POC Glucose (mg/dL) 89


10/12/18 16:07: POC Glucose (mg/dL) 108


10/12/18 12:32: POC Glucose (mg/dL) 85


10/12/18 07:55: RPR Nonreactive


10/12/18 07:55: Free T4 1.05, TSH 3rd Generation 1.40


10/12/18 07:55: Fasting Glucose 94, Triglycerides 107, Cholesterol 120 L, LDL 

Cholesterol Direct 46, HDL Cholesterol 46


10/12/18 07:10: POC Glucose (mg/dL) 92


10/11/18 21:49: POC Glucose (mg/dL) 103


10/11/18 11:20: Alcohol, Quantitative < 10


10/11/18 11:20: Urine Opiates Screen Negative, Urine Methadone Screen Negative, 

Ur Barbiturates Screen Positive H, Ur Phencyclidine Scrn Negative, Ur 

Amphetamines Screen Negative, U Benzodiazepines Scrn Negative, U Oth Cocaine 

Metabols Negative, U Cannabinoids Screen Negative


10/11/18 11:20: Sodium 139, Potassium 3.9, Chloride 106, Carbon Dioxide 27, 

Anion Gap 10, BUN 12, Creatinine 0.6 L, Est GFR ( Amer) > 60, Est GFR 

(Non-Af Amer) > 60, Random Glucose 127 H, Calcium 8.4, Magnesium 1.7, Total 

Bilirubin 0.2, AST 25, ALT 34, Alkaline Phosphatase 127 H, Lactate Dehydrogenase

384, Total Creatine Kinase 39, Troponin I < 0.01, Total Protein 6.2, Albumin 

3.3, Globulin 2.9, Albumin/Globulin Ratio 1.1


10/11/18 11:20: Urine Color Yellow, Urine Appearance Clear, Urine pH 6.0, Ur 

Specific Gravity 1.010, Urine Protein Negative, Urine Glucose (UA) Negative, 

Urine Ketones Negative, Urine Blood Negative, Urine Nitrate Negative, Urine 

Bilirubin Negative, Urine Urobilinogen 0.2, Ur Leukocyte Esterase Trace H, Urine

RBC 0 - 2, Urine WBC 1 - 3, Ur Epithelial Cells 4 - 5, Urine Bacteria Few


10/11/18 11:20: PT 12.2, INR 1.06, APTT 29.2


10/11/18 11:20: WBC 5.1, RBC 3.39 L, Hgb 10.2 L, Hct 32.1 L, MCV 94.7, MCH 30.1,

MCHC 31.8, RDW 12.9, Plt Count 206, MPV 10.4, Gran % 68.3 H, Lymph % (Auto) 

23.5, Mono % (Auto) 3.5, Eos % (Auto) 4.5, Baso % (Auto) 0.2, Gran # 3.49, Lymph

# (Auto) 1.2, Mono # (Auto) 0.2, Eos # (Auto) 0.2, Baso # (Auto) 0.01








Vital Signs











  Temp Pulse Pulse Resp BP Pulse Ox


 


 10/13/18 09:13   59 L    122/80 


 


 10/13/18 07:24  98.3 F  59 L   20  122/80 


 


 10/12/18 15:59   72    142/79 


 


 10/12/18 07:13  97.8 F  61   20  155/93 H 


 


 10/12/18 07:00   61    155/93 H 


 


 10/11/18 17:33    65  18  


 


 10/11/18 15:09   98 H   18  142/78  97


 


 10/11/18 13:04   63   18  143/78  98


 


 10/11/18 09:38  99.3 F  64   18  149/84  98











DSM 5 Symptoms Update: 





shortly pt is shortly, patient is 58 year old  female, long history of 

depression as well as anxiety, 3 previous hospitalizations at age of 52, 54, 58,

denied history of suicidal attempts, recently moved to [place] from [place] to live 

with her daughter who has 5 kids, pt recently moved out from her daughter's 

apartment because of the conflict with her, pt currently lives independently in 

[place], pt has multiple medical issues including MS,Diabetes, hypertension, 

Pacemaker, CHF, asthma, CVA, pt brought herself to the hospital for evaluation 

and stabilization of depressive symptoms, feeling of hopelessness, passive wish 

to be dead, patient reported that she was noncompliant with the medications 

Effexor/Klonopin because she was not sure if she has insurance or not, patient 

requires further evaluation and stabilization and medication resumption and 

titration.





pt was seen at the dinning area


pt reported that she feels little better, but "I am not well at all", pt 

reported transient feeling of hopelessness. 


reported that she slept relatively well. 





patient reported that she does not hear any voices or seeing things, but reports

feeling very anxious.





So far patient tolerates medications well, no side effects observed or reported,

aims 0, no EPS.





As per staff,no agitation, no aggression, patient is socially appropriate, but 

self isolating.





Impression:


DSM 5 Diagnosis: 


as per h/o:


MDD


GUY


panic disorder


PTSD


Medication Change: Yes (effexor increased 10/14/18)


Medical Record Reviewed: Yes





Mental Status Examination





- Cognitive Function


Orientation: Person, Place, Situation, Time


Memory: Intact


Attention: Poor


Concentration: Poor


Association: Loose


Fund of Knowledge: Poor





- Mood


Mood: Depressed, Anxious





- Affect


Affect: Flat





- Formal Thought Process


Formal Thought Process: No Impairment





- Suicidal Ideation


Suicidal Ideation: No





- Homicidal Ideation


Homicidal Ideation: No





Goal/Treatment Plan





- Goal/Treatment Plan


Need for Continued Stay: Remain at risks for inpatient hospitalization, Severe 

depression anxiety, Discharge may exacerbated symptoms, Severe functional 

impairment


Progress Toward Problem(s) and Goals/Treatment Plan: 


Milieu/structure/supportive therapy 


Medical consult appreciated 


will call cardiology consult EKG changes, h/o chest pain and CVA


SW consultation for discharge plan and social issues 


Med list was confirmed by [facility] pharmacy


clonazepam 1mg po bid for anxiety


atorvastatin 40mg daily


ASA 81mg po daily


zofran 8mg po daily


Lyrica 100mg po daily filled June 20th, 2018


neurontin 300mg po tid


sonata d/c


seroquel 25mg po hs for mood stablization


effexor 75mg po daily for depression and anxiety


Family involvement 


Follow up on labs 


Will monitor closely 


Pt was educated about risk/benefits and alternatives of medications, coping 

strategies (safety plan, suicide prevention), relapse prevention, importance of 

follow up with psychiatrist and therapist, stay away from drugs/alcohol/smoking





Estimated Date of D/C: 10/19/18 Principal Discharge DX:	Chest pain  Secondary Diagnosis:	Numbness

## 2018-10-15 NOTE — PN
DATE:  10/15/2018



REASON FOR CONSULTATION AND FOLLOWUP:  Abnormal EKG, history of pacemaker,

cardiac evaluation and followup.



SUBJECTIVE:  The patient denies any chest pain, shortness of breath or any

palpitation.



OBJECTIVE:

GENERAL:  Not in any apparent distress, lying flat in the bed in the Psych

floor.

VITAL SIGNS:  Temperature afebrile, heart rate 62, blood pressure 112/66.

HEENT:  PERRLA.  Extraocular muscles intact.

NECK:  Supple.  No carotid bruit.  No thyromegaly.

CHEST:  Clear to auscultation.

HEART:  S1 and S2 regular.

ABDOMEN:  Soft.

EXTREMITIES:  Clubbing and cyanosis negative.



LABORATORY DATA:  Blood workup; WBC 4.9, hemoglobin 11.8, hematocrit 35.1,

platelet count 235.  Chemistry shows sodium 130, potassium 4.2, chloride

105, carbon dioxide 27, anion gap of 12, BUN 16, creatinine 0.6, AST 21,

ALT 24, alkaline phosphatase 130, total protein 6.6, albumin 3.4, albumin

and globulin ratio 1.1.  TSH 1.4.  Total triglyceride 107, cholesterol 120,

LDL 46, HDL 46.



IMPRESSION:  A 58-year-old obese female with past medical history

significant for diabetes, hypertension, hyperlipidemia, history of

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

repair in the past.  Admitted with depression.  The patient had a pacemaker

done in [place] and has a stress test was negative.  Last echocardiogram

here on 05/02/2018 shows left ventricular size normal.  Normal wall

thickness.  Normal left ventricular function.  Normal tricuspid and mitral

valve reported.  Calculated ejection fraction 57% dated 05/11/2018.



RECOMMENDATION:  No evidence of acute MI.  The patient had [facility] [facility]

pacemaker and being evaluated 6 months ago, normal.  Continue current

medication.  CVS status is stable.  We will see periodically.



Thank you, Dr. Guerra for providing us the opportunity in taking care

of the patient, Shanna Gruber.





__________________________________________

Marcus Anderson MD





DD:  10/15/2018 9:55:38

DT:  10/15/2018 10:49:12

Job # [id number]

## 2018-10-16 RX ADMIN — PANTOPRAZOLE SODIUM SCH MG: 40 TABLET, DELAYED RELEASE ORAL at 06:50

## 2018-10-16 RX ADMIN — ALBUTEROL SULFATE PRN PUFF: 90 AEROSOL, METERED RESPIRATORY (INHALATION) at 22:43

## 2018-10-16 RX ADMIN — INSULIN HUMAN SCH: 100 INJECTION, SOLUTION PARENTERAL at 21:46

## 2018-10-16 RX ADMIN — THERA TABS SCH TAB: TAB at 08:25

## 2018-10-16 RX ADMIN — FLUTICASONE PROPIONATE SCH SPR: 50 SPRAY, METERED NASAL at 08:36

## 2018-10-16 RX ADMIN — INSULIN HUMAN SCH: 100 INJECTION, SOLUTION PARENTERAL at 17:08

## 2018-10-16 RX ADMIN — INSULIN HUMAN SCH: 100 INJECTION, SOLUTION PARENTERAL at 08:24

## 2018-10-16 RX ADMIN — INSULIN HUMAN SCH: 100 INJECTION, SOLUTION PARENTERAL at 13:11

## 2018-10-16 NOTE — PCM.PYCHPN
Psychiatric Progress Note





- Psychiatric Progress Note


Patient seen today, length of contact: 30min


Problems Identified/Issues Discussed: 


I reviewed assessment and recent notes. Patient was interviewed at bedside. She 

is groomed and oriented x3. Appears cooperative and calm though reports that she

still has periods of nervousness. Patient remains depressed and she appears 

depressed though seems to be improving a little sinced admission. Denies 

hopelessness or SI. She also denies any perceptual disturbance. 


Patient has been compliant with her medications and denies any new s/e 

discomfort or pain. Sleep and appetite have been good. She has been more visible

on the unit and attending groups. There were no behavioral issues overnight. 





 





Diagnostic Results: 


as per h/o:


MDD


GUY


panic disorder


PTSD





Medication Change: No ( )


Medical Record Reviewed: Yes





Mental Status Examination





- Cognitive Function


Orientation: Person, Place, Situation, Time


Memory: Intact


Attention: Poor


Concentration: Poor


Association: Loose


Fund of Knowledge: Poor





- Mood


Mood: Depressed, Anxious





- Affect


Affect: Flat





- Formal Thought Process


Formal Thought Process: No Impairment





- Suicidal Ideation


Suicidal Ideation: No





- Homicidal Ideation


Homicidal Ideation: No





Goal/Treatment Plan





- Goal/Treatment Plan


Need for Continued Stay: Remain at risks for inpatient hospitalization, Severe 

depression anxiety, Discharge may exacerbated symptoms, Severe functional 

impairment


Progress Toward Problem(s) and Goals/Treatment Plan: 





* c/w current tx and plan


* Appreciate f/u by Dr. Stanley on 10/16/18~putting patient back on her asthma 

  medications. Cardiology is following. 


* No new lab results thus far


* Vitals reviewed and noted below:


                                                                Selected Entries











  10/15/18 10/15/18





  06:44 15:38


 


Temperature 97.8 F 


 


Pulse Rate 59 L 62


 


Respiratory 18 





Rate  


 


Blood Pressure 107/68 112/69





 


Estimated Date of D/C: 10/19/18

## 2018-10-16 NOTE — PN
DATE:  10/16/2018



REASON FOR THE CONSULTATION:  Followup abnormal EKG, history of pacemaker,

cardiac evaluation.



SUBJECTIVE:  The patient denies any chest pain, shortness of breath or any

palpitation.



OBJECTIVE:

GENERAL:  Not in any apparent distress.

VITAL SIGNS:  Temperature afebrile, heart rate 62, blood pressure 122/74.

HEENT:  PERRLA.  Extraocular muscles intact.

NECK:  Supple.  No carotid bruit.  No thyromegaly.

CHEST:  Clear to auscultation.

HEART:  S1 and S2 regular.

ABDOMEN:  Soft.

EXTREMITIES:  Clubbing and cyanosis negative.



LABORATORY DATA:  Blood workup as follows; WBC 4.9, hemoglobin 11.2,

hematocrit 35.1, platelet count 235 as of 10/13/2018.



IMPRESSION:  A 58-year-old female with past medical history of obesity,

admitted with depression, history of pacemaker, hypertension, diabetic,

status post permanent pacemaker secondary to bradycardia, history of

gastric bypass and pacemaker in the past.  Last echocardiogram on

05/02/2018 shows normal left ventricular function.  Normal wall thickness. 

Normal ventricular function.  Tricuspid mitral valve reported normal. 

Ejection fraction 57%.



RECOMMENDATIONS:  Continue current therapy.  CVS status is stable.



Thank you, Dr. Guerra for providing us the opportunity in taking care

of the patient, Shanna Gruber.





__________________________________________

Marcus Anderson MD





DD:  10/16/2018 11:01:28

DT:  10/16/2018 11:23:21

Job # [id number]

## 2018-10-16 NOTE — PN
DATE:  10/16/2018



SUBJECTIVE:  I see her resting comfortably in bed.  She slept well last

night.  She is here for multiple reasons; depression, asthma, CHF,

diabetes.  She had low back pain.  Presently she is asking for her ProAir

and a Flonase to be put back on her medication list.  She is on Ativan,

Cozaar, Ecotrin, Effexor, Flonase now, folic acid, insulin coverage, 

Imodium, Klonopin, Lidoderm, Lipitor, Motrin, Neurontin, Norvasc, Protonix,

Seroquel, multivitamins.  I added Ventolin, Vistaril and Zofran.



PHYSICAL EXAMINATION:

VITAL SIGNS:  She has a 98.5 temp, 62 pulse, 122/79 blood pressure, 20

respiratory rate.

HEAD:  Atraumatic, normocephalic.

HEART:  Regular rate.

LUNGS:  Decreased breath sounds but clear.  No wheezes or rhonchi today.

ABDOMEN:  Soft, obese, nontender.

EXTREMITIES:  No edema.



LABORATORY DATA:  Last labs on the 10/13/2018, CBC with 4.9 white count,

11.2 hemoglobin, 235 platelets.  Last blood sugar was 140 on the

10/13/2018.  Sodium 139, potassium 4.2, BUN 16, creatinine 0.6.  Alk phos

was 130.  RPR was nonreactive.



ASSESSMENT AND PLAN:  She is being seen by Psychiatry, Cardiology.  No

changes from Cardiology evaluation of the EKG.  We will continue as per

Psychiatry.  I will put her back on medications for her asthma.





__________________________________________

Brian Stanley DO





DD:  10/16/2018 7:34:45

DT:  10/16/2018 7:36:13

Job # [id number]

## 2018-10-17 RX ADMIN — PANTOPRAZOLE SODIUM SCH MG: 40 TABLET, DELAYED RELEASE ORAL at 05:57

## 2018-10-17 RX ADMIN — THERA TABS SCH TAB: TAB at 09:35

## 2018-10-17 RX ADMIN — INSULIN HUMAN SCH: 100 INJECTION, SOLUTION PARENTERAL at 17:40

## 2018-10-17 RX ADMIN — INSULIN HUMAN SCH: 100 INJECTION, SOLUTION PARENTERAL at 13:28

## 2018-10-17 RX ADMIN — INSULIN HUMAN SCH: 100 INJECTION, SOLUTION PARENTERAL at 09:33

## 2018-10-17 RX ADMIN — FLUTICASONE PROPIONATE SCH SPR: 50 SPRAY, METERED NASAL at 09:37

## 2018-10-17 RX ADMIN — INSULIN HUMAN SCH: 100 INJECTION, SOLUTION PARENTERAL at 21:17

## 2018-10-17 NOTE — PN
DATE:  10/17/2018



SUBJECTIVE:  I saw her this morning resting comfortably in bed.  She slept

well.  No complaints this morning.  She is on Ativan, Cozaar, Ecotrin,

Effexor, Flonase, folic acid, insulin, , Imodium, Klonopin, Lipitor,

Lidoderm, Motrin, Neurontin, Norvasc, Protonix, Seroquel, Thera-Tabs,

Ventolin, Vistaril and Zofran.  I have given all the medications that she

requested.  She has no back pain this morning.  She is trying to get better

with psychological issues.



PHYSICAL EXAMINATION:

VITAL SIGNS:  98.5 temperature, 62 pulse, 136/75 blood pressure, 20

respiratory rate.

HEAD:  Atraumatic, normocephalic.

HEART:  Regular rate.

LUNGS:  Clear to auscultation.

ABDOMEN:  Soft, nontender.  Positive bowel sounds.  Obese.

EXTREMITIES:  No edema.



She is doing fairly well this morning.  I encouraged her to participate in

groups and take the medication and eat the foods.  The last blood sugar was

209, but she is comfortable this morning.  She did not complain of any back

pain today.  She was seen by the cardiologist this morning.  Continue

therapy and stable.  We will continue aggressive treatment and care.  We

will follow as per Psychiatry.  Thank you very much.







__________________________________________

Brian Stanley DO



DD:  10/17/2018 8:47:51

DT:  10/17/2018 8:48:44

Job # [id number]

[email or web address]

## 2018-10-17 NOTE — PN
DATE:  10/17/2018



REASON FOR CONSULTATION AND FOLLOWUP:  In the psychiatric floor, history of

coronary artery disease, history of pacemaker.



SUBJECTIVE:  The patient denies any chest pain, shortness of breath, or any

palpitation.



PHYSICAL EXAMINATION:

GENERAL:  Not in any apparent distress.

VITAL SIGNS:  Temperature afebrile, heart rate 62, blood pressure 133/75.

HEENT:  PERRLA.  Extraocular muscles are intact.

NECK:  Supple.  No carotid bruits or thyromegaly.

CHEST:  Clear to auscultation.

HEART:  S1 and S2 regular.

ABDOMEN:  Soft.

EXTREMITIES:  Clubbing and cyanosis negative.



LABORATORY DATA:  Blood workup as follows:  WBC of 4.9, hemoglobin 11,

hematocrit 35.1, and platelet count 235.  Chemistry shows sodium 139,

potassium 4.2, chloride 105, carbon dioxide 27, anion gap of 12, BUN 16,

and creatinine 0.6 as of 10/13/2018.



IMPRESSION:  A 58-year-old obese female with past medical history

significant for depression, history of pacemaker, hypertension, diabetes,

status post pacemaker secondary to bradycardia, history of gastric bypass. 

Pacemaker interrogated two months ago by [facility], functioning normal;

history of recent echo on 05/02/2018, normal left ventricular function,

normal wall thickness, tricuspid valve reported normal, ejection fraction

57%.



RECOMMENDATIONS:  Continue current treatment.  CVS status is stable. 

Continue atorvastatin.  Continue amlodipine.



Thank you, Dr. Guerra, for providing us the opportunity in taking care

of the patient, Shanna Gruber.







__________________________________________

Marcus Anderson MD



DD:  10/17/2018 9:13:23

DT:  10/17/2018 11:09:37

Job # [id number]

## 2018-10-18 RX ADMIN — PANTOPRAZOLE SODIUM SCH MG: 40 TABLET, DELAYED RELEASE ORAL at 05:40

## 2018-10-18 RX ADMIN — INSULIN HUMAN SCH: 100 INJECTION, SOLUTION PARENTERAL at 12:59

## 2018-10-18 RX ADMIN — INSULIN HUMAN SCH: 100 INJECTION, SOLUTION PARENTERAL at 09:50

## 2018-10-18 RX ADMIN — INSULIN HUMAN SCH: 100 INJECTION, SOLUTION PARENTERAL at 17:19

## 2018-10-18 RX ADMIN — THERA TABS SCH TAB: TAB at 09:29

## 2018-10-18 RX ADMIN — FLUTICASONE PROPIONATE SCH SPR: 50 SPRAY, METERED NASAL at 09:34

## 2018-10-18 RX ADMIN — ALBUTEROL SULFATE PRN PUFF: 90 AEROSOL, METERED RESPIRATORY (INHALATION) at 09:35

## 2018-10-18 NOTE — PN
DATE:  10/18/2018



LOCATION:  The patient is in room 514, bed 1.



REASON FOR CONSULTATION AND FOLLOWUP:  Status post pacemaker insertion,

hypertension, diabetes.



SUBJECTIVE:  The patient is sitting in chair without any chest pain,

shortness of breath, palpitation.



PHYSICAL EXAMINATION:

VITAL SIGNS:  Blood pressure 102/62, respirations 18, pulse 60, temperature

97.7.

HEENT:  Head is normocephalic.  Eyes:  Pupils normal.  Conjunctivae normal.

NECK:  JVP low, carotid equal.

THORAX:  AP diameter normal.

LUNGS:  Clear.

CARDIOVASCULAR:  S1, S2.

ABDOMEN:  Soft, nontender.  No organomegaly.  Bowel sounds normal.

EXTREMITIES:  No clubbing.  No cyanosis.



LABORATORY DATA:  WBC 4.9, hemoglobin 11.2, hematocrit 35.1, platelet 235. 

Random sugar 73.



DIAGNOSES:  Status post pacemaker insertion, depression, hypertension,

diabetes mellitus, history of gastric bypass surgery.  Patient's pacemaker

had interrogation done two months ago by [facility] and the

functioning was normal.  Echo on 05/02/2018 showed normal left ventricular

function, ejection fraction 57%.



PLAN:  Patient is on losartan 100 mg daily, aspirin 81 mg daily, Effexor

150 daily, amlodipine 5 mg daily, Protonix 40 daily, Seroquel 50 mg p.o. at

bedtime, albuterol hand nebulizer therapy.  Clinically, cardiac status is

stable.  We will follow with you.







__________________________________________

Marcus Lawton MD



DD:  10/18/2018 17:40:47

DT:  10/18/2018 18:52:59

Job # [id number]
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
## 2018-10-18 NOTE — PCM.PYCHPN
Psychiatric Progress Note





- Psychiatric Progress Note


Patient seen today, length of contact: 30min


Patient Chief Complaint: 


"I am alright, hanging in there"


Problems Identified/Issues Discussed: 


Suicide/ homicide prevention, past psychiatric h/o, current psychiatric symptoms

, medical problems, risk/benefits and alternatives of medications, medications 

compliance, coping strategies, substance abuse h/o, relapse prevention, 

importance of follow up with psychiatrist and therapist, discharge plan. 





Medical Problems: 





see HPI


Diagnostic Results: 














                                 10/13/18 08:00 





                                 10/13/18 08:00 





                                   Lab Results





10/13/18 08:00: Sodium 139, Potassium 4.2, Chloride 105, Carbon Dioxide 27, 

Anion Gap 12, BUN 16, Creatinine 0.6 L, Est GFR ( Amer) > 60, Est GFR 

(Non-Af Amer) > 60, Random Glucose 99, Calcium 8.8, Total Bilirubin 0.3, AST 21,

ALT 24, Alkaline Phosphatase 130 H, Total Protein 6.6, Albumin 3.4, Globulin 

3.1, Albumin/Globulin Ratio 1.1


10/13/18 08:00: WBC 4.9, RBC 3.70, Hgb 11.2 L, Hct 35.1 L, MCV 94.9, MCH 30.3, 

MCHC 31.9, RDW 12.9, Plt Count 235, MPV 10.4


10/13/18 07:16: POC Glucose (mg/dL) 94


10/12/18 21:04: POC Glucose (mg/dL) 89


10/12/18 16:07: POC Glucose (mg/dL) 108


10/12/18 12:32: POC Glucose (mg/dL) 85


10/12/18 07:55: RPR Nonreactive


10/12/18 07:55: Free T4 1.05, TSH 3rd Generation 1.40


10/12/18 07:55: Fasting Glucose 94, Triglycerides 107, Cholesterol 120 L, LDL 

Cholesterol Direct 46, HDL Cholesterol 46


10/12/18 07:10: POC Glucose (mg/dL) 92


10/11/18 21:49: POC Glucose (mg/dL) 103


10/11/18 11:20: Alcohol, Quantitative < 10


10/11/18 11:20: Urine Opiates Screen Negative, Urine Methadone Screen Negative, 

Ur Barbiturates Screen Positive H, Ur Phencyclidine Scrn Negative, Ur 

Amphetamines Screen Negative, U Benzodiazepines Scrn Negative, U Oth Cocaine 

Metabols Negative, U Cannabinoids Screen Negative


10/11/18 11:20: Sodium 139, Potassium 3.9, Chloride 106, Carbon Dioxide 27, 

Anion Gap 10, BUN 12, Creatinine 0.6 L, Est GFR ( Amer) > 60, Est GFR 

(Non-Af Amer) > 60, Random Glucose 127 H, Calcium 8.4, Magnesium 1.7, Total 

Bilirubin 0.2, AST 25, ALT 34, Alkaline Phosphatase 127 H, Lactate Dehydrogenase

384, Total Creatine Kinase 39, Troponin I < 0.01, Total Protein 6.2, Albumin 

3.3, Globulin 2.9, Albumin/Globulin Ratio 1.1


10/11/18 11:20: Urine Color Yellow, Urine Appearance Clear, Urine pH 6.0, Ur 

Specific Gravity 1.010, Urine Protein Negative, Urine Glucose (UA) Negative, 

Urine Ketones Negative, Urine Blood Negative, Urine Nitrate Negative, Urine 

Bilirubin Negative, Urine Urobilinogen 0.2, Ur Leukocyte Esterase Trace H, Urine

RBC 0 - 2, Urine WBC 1 - 3, Ur Epithelial Cells 4 - 5, Urine Bacteria Few


10/11/18 11:20: PT 12.2, INR 1.06, APTT 29.2


10/11/18 11:20: WBC 5.1, RBC 3.39 L, Hgb 10.2 L, Hct 32.1 L, MCV 94.7, MCH 30.1,

MCHC 31.8, RDW 12.9, Plt Count 206, MPV 10.4, Gran % 68.3 H, Lymph % (Auto) 

23.5, Mono % (Auto) 3.5, Eos % (Auto) 4.5, Baso % (Auto) 0.2, Gran # 3.49, Lymph

# (Auto) 1.2, Mono # (Auto) 0.2, Eos # (Auto) 0.2, Baso # (Auto) 0.01








Vital Signs











  Temp Pulse Pulse Resp BP Pulse Ox


 


 10/13/18 09:13   59 L    122/80 


 


 10/13/18 07:24  98.3 F  59 L   20  122/80 


 


 10/12/18 15:59   72    142/79 


 


 10/12/18 07:13  97.8 F  61   20  155/93 H 


 


 10/12/18 07:00   61    155/93 H 


 


 10/11/18 17:33    65  18  


 


 10/11/18 15:09   98 H   18  142/78  97


 


 10/11/18 13:04   63   18  143/78  98


 


 10/11/18 09:38  99.3 F  64   18  149/84  98











                                        











Temp Pulse Resp BP Pulse Ox


 


 97.7 F   60   106 H  106/68   97 


 


 10/18/18 07:23  10/18/18 07:23  10/18/18 07:23  10/18/18 07:23  10/11/18 15:09








                              Abnormal Lab Results











  10/17/18 10/17/18 10/17/18





  07:15 11:00 16:36


 


POC Glucose (mg/dL)  73  87  101














  10/17/18





  21:13


 


POC Glucose (mg/dL)  88











DSM 5 Symptoms Update: 





shortly pt is shortly, patient is 58 year old  female, long history of 

depression as well as anxiety, 3 previous hospitalizations at age of 52, 54, 58,

denied history of suicidal attempts, recently moved to [place] from [place] to live 

with her daughter who has 5 kids, pt recently moved out from her daughter's 

apartment because of the conflict with her, pt currently lives independently in 

[place], pt has multiple medical issues including MS,Diabetes, hypertension, 

Pacemaker, CHF, asthma, CVA, pt brought herself to the hospital for evaluation 

and stabilization of depressive symptoms, feeling of hopelessness, passive wish 

to be dead, patient reported that she was noncompliant with the medications 

Effexor/Klonopin because she was not sure if she has insurance or not, patient 

requires further evaluation and stabilization and medication resumption and 

titration.





pt was seen in her room, pt reported that she feels "so-so, just handing in 

there".


pt reported that she feels little better, but "I am not well at all", pt 

reported transient feeling of hopelessness. 


reported that she slept relatively well. 





patient reported that she does not hear any voices or seeing things, but reports

feeling very anxious.





So far patient tolerates medications well, no side effects observed or reported,

aims 0, no EPS.





As per staff,no agitation, no aggression, patient is socially appropriate, but 

self isolating.





Impression:


DSM 5 Diagnosis: 


as per h/o:


MDD


GUY


panic disorder


PTSD


Medication Change: Yes (effexor increased)


Medical Record Reviewed: Yes


Consults ordered or reviewed: 





medical consult appreciated


Gastroenterology consultation appreciated





Mental Status Examination





- Cognitive Function


Orientation: Person, Place, Situation, Time


Memory: Intact


Attention: Poor


Concentration: Poor


Association: Loose


Fund of Knowledge: Poor





- Mood


Mood: Depressed, Anxious





- Affect


Affect: Flat





- Formal Thought Process


Formal Thought Process: No Impairment





- Suicidal Ideation


Suicidal Ideation: No





- Homicidal Ideation


Homicidal Ideation: No





Goal/Treatment Plan





- Goal/Treatment Plan


Need for Continued Stay: Remain at risks for inpatient hospitalization, Severe 

depression anxiety, Discharge may exacerbated symptoms, Severe functional 

impairment


Progress Toward Problem(s) and Goals/Treatment Plan: 


Milieu/structure/supportive therapy 


Medical consult appreciated 


will call cardiology consult EKG changes, h/o chest pain and CVA


SW consultation for discharge plan and social issues 


Med [place] [facility]


clonazepam 1mg po bid for anxiety


atorvastatin 40mg daily


ASA 81mg po daily


zofran 8mg po daily


Lyrica 100mg po daily filled June 20th, 2018


neurontin 300mg po tid


sonata d/c


seroquel 25mg po hs for mood stablization


effexor 150mg po daily for depression and anxiety


Family involvement 


Follow up on labs 


Will monitor closely 


Pt was educated about risk/benefits and alternatives of medications, coping 

strategies (safety plan, suicide prevention), relapse prevention, importance of 

follow up with psychiatrist and therapist, stay away from drugs/alcohol/smoking





Estimated Date of D/C: 10/22/18

## 2018-10-19 RX ADMIN — INSULIN HUMAN SCH: 100 INJECTION, SOLUTION PARENTERAL at 00:39

## 2018-10-19 RX ADMIN — THERA TABS SCH TAB: TAB at 09:13

## 2018-10-19 RX ADMIN — INSULIN HUMAN SCH: 100 INJECTION, SOLUTION PARENTERAL at 22:21

## 2018-10-19 RX ADMIN — FLUTICASONE PROPIONATE SCH SPR: 50 SPRAY, METERED NASAL at 09:16

## 2018-10-19 RX ADMIN — INSULIN HUMAN SCH: 100 INJECTION, SOLUTION PARENTERAL at 12:17

## 2018-10-19 RX ADMIN — INSULIN HUMAN SCH: 100 INJECTION, SOLUTION PARENTERAL at 21:31

## 2018-10-19 RX ADMIN — ALBUTEROL SULFATE PRN PUFF: 90 AEROSOL, METERED RESPIRATORY (INHALATION) at 09:16

## 2018-10-19 RX ADMIN — INSULIN HUMAN SCH: 100 INJECTION, SOLUTION PARENTERAL at 17:12

## 2018-10-19 RX ADMIN — INSULIN HUMAN SCH UNIT: 100 INJECTION, SOLUTION PARENTERAL at 09:12

## 2018-10-19 RX ADMIN — PANTOPRAZOLE SODIUM SCH MG: 40 TABLET, DELAYED RELEASE ORAL at 06:44

## 2018-10-19 NOTE — PCM.BM
<Ramy Smith - Last Filed: 10/19/18 11:50>





Treatment Plan Problems





- Problems identified on initial assessmt


  ** hopelessness/Helplessness


Date Initiated: 10/11/18


Time Initiated: 18:00


Assessment reference: NA


Status: Active


Priority: 1


Comment: feeling depressed





  ** Ineffective coping skills.


Date Initiated: 10/11/18


Time Initiated: 18:00


Assessment reference: NA


Status: Active


Priority: 2


Comment: having problem dealing with living situation.





  ** Medication noandherenace


Date Initiated: 10/11/18


Time Initiated: 18:00


Assessment reference: NA


Status: Active


Priority: 3


Comment: Unable to obtain medications





  ** Activity intolerance


Date Initiated: 10/11/18


Time Initiated: 18:00


Assessment reference: NA


Status: Active


Priority: 4


Comment: Medical condition





  ** Altered sleep Patter


Date Initiated: 10/11/18


Time Initiated: 18:00


Assessment reference: NA


Status: Active


Priority: 5





Treatment assets and liabiliti


Patient Assests: adapts well, cooperative, insightful, motivated, negotiates 

basic needs, cognitively intact, good interpersonal skills


Patient Liabilities: live alone, physical pain, dietary restrictions, medical 

problems





- Milieu Protocol


Maintain good personal hygiene: daily Encourage regular showers, daily Assist 

patient to perform ADL's, every shift Remind patient to perform daily oral care


Maintain personal safety: every shift Educate patient to report safety concerns 

to staff, every shift Monitor environment for contraband/sharps


Medication safety: Monitor for expected outcome, potential side effects: every 

shift, Assess barriers to learning: every shift, Assess readiness for medication

education: every shift


Milieu Narrative: 


Milieu/structure/supportive therapy 


Medical consult appreciated 


will call cardiology consult EKG changes, h/o chest pain and CVA


SW consultation for discharge plan and social issues 


Med list was confirmed by [facility] pharmacy


clonazepam 1mg po bid for anxiety


atorvastatin 40mg daily


ASA 81mg po daily


zofran 8mg po daily


Lyrica 100mg po daily filled June 20th, 2018


neurontin 300mg po tid


sonata d/c


seroquel 25mg po hs for mood stablization


effexor 150mg po daily for depression and anxiety


Family involvement 


Follow up on labs 


Will monitor closely 


Pt was educated about risk/benefits and alternatives of medications, coping 

strategies (safety plan, suicide prevention), relapse prevention, importance of 

follow up with psychiatrist and therapist, stay away from drugs/alcohol/smoking








Family Contact


Family involvement: Famliy/SO not involved





Discharge/Continuing Care





- Education Needs


Education Needs: Patient Medication, Patient Diagnosis/Disease Process, Patient 

Coping Skills, Patient Community resources, Patient Activities of Daily Living, 

Patient Pain, Patient Nutrition, Patient Uses of Medical Equipment, Patient 

Health Practices/Safety, Patient Personal Hygiene/Grooming, Patient Aftercare 

Safety Plan





- Discharge


Discharge Criteria: Tolerates medication w/o severe side effects, Normal sleep 

pattern, Ability to care for self, Reduction of target symptoms


Discharge to:: Home





- Treatment Team Participation


Patient/Family/SO Statement: 


Milieu/structure/supportive therapy 


Medical consult appreciated 


will call cardiology consult EKG changes, h/o chest pain and CVA


SW consultation for discharge plan and social issues 


Med list was confirmed by [facility]


clonazepam 1mg po bid for anxiety


atorvastatin 40mg daily


ASA 81mg po daily


zofran 8mg po daily


Lyrica 100mg po daily filled June 20th, 2018


neurontin 300mg po tid


sonata d/c


seroquel 25mg po hs for mood stablization


effexor 150mg po daily for depression and anxiety


Family involvement 


Follow up on labs 


Will monitor closely 


Pt was educated about risk/benefits and alternatives of medications, coping 

strategies (safety plan, suicide prevention), relapse prevention, importance of 

follow up with psychiatrist and therapist, stay away from drugs/alcohol/smoking








Treatment Plan Review





- Problem


  ** hopelessness/Helplessness


Time Initiated: 18:00


Progress toward outcomes: improved





  ** Ineffective coping skills.


Time Initiated: 18:00


Progress toward outcomes: improved





  ** Medication noandherenace


Time Initiated: 18:00


Progress toward outcomes: resolved





  ** Activity intolerance


Time Initiated: 18:00


Progress toward outcomes: improved





  ** Altered sleep Patter


Time Initiated: 18:00


Progress toward outcomes: resolved





<April Michel Y - Last Filed: 10/19/18 13:03>





Family Contact


Family involvement: Famliy/SO not involved





<Tamia Guerra - Last Filed: 10/19/18 16:27>





- Diagnosis


(1) MDD (major depressive disorder)


Status: Chronic   


Interventions: 





10/19/18 16:27


patient is less depressed, more optimistic, denied any thoughts of harming 

herself or others, patient compliant with the medications, tolerated well, no 

side effects observed or reported








(2) Anxiety


Status: Acute   


Interventions: 





10/19/18 16:28


patient still reports feeling anxious, asking for when necessary medications.








<Nirali Campoverde - Last Filed: 10/19/18 16:40>

## 2018-10-19 NOTE — PCM.PYCHPN
Psychiatric Progress Note





- Psychiatric Progress Note


Patient seen today, length of contact: 30min


Patient Chief Complaint: 


"I am alright, hanging in there, increase my dose of Klonopin because and very 

anxious"


Problems Identified/Issues Discussed: 


Suicide/ homicide prevention, past psychiatric h/o, current psychiatric 

symptoms, medical problems, risk/benefits and alternatives of medications, 

medications compliance, coping strategies, substance abuse h/o, relapse preven

tion, importance of follow up with psychiatrist and therapist, discharge plan. 





Medical Problems: 





see HPI


Diagnostic Results: 














                                 10/13/18 08:00 





                                 10/13/18 08:00 





                                   Lab Results





10/13/18 08:00: Sodium 139, Potassium 4.2, Chloride 105, Carbon Dioxide 27, 

Anion Gap 12, BUN 16, Creatinine 0.6 L, Est GFR ( Amer) > 60, Est GFR 

(Non-Af Amer) > 60, Random Glucose 99, Calcium 8.8, Total Bilirubin 0.3, AST 21,

ALT 24, Alkaline Phosphatase 130 H, Total Protein 6.6, Albumin 3.4, Globulin 

3.1, Albumin/Globulin Ratio 1.1


10/13/18 08:00: WBC 4.9, RBC 3.70, Hgb 11.2 L, Hct 35.1 L, MCV 94.9, MCH 30.3, 

MCHC 31.9, RDW 12.9, Plt Count 235, MPV 10.4


10/13/18 07:16: POC Glucose (mg/dL) 94


10/12/18 21:04: POC Glucose (mg/dL) 89


10/12/18 16:07: POC Glucose (mg/dL) 108


10/12/18 12:32: POC Glucose (mg/dL) 85


10/12/18 07:55: RPR Nonreactive


10/12/18 07:55: Free T4 1.05, TSH 3rd Generation 1.40


10/12/18 07:55: Fasting Glucose 94, Triglycerides 107, Cholesterol 120 L, LDL 

Cholesterol Direct 46, HDL Cholesterol 46


10/12/18 07:10: POC Glucose (mg/dL) 92


10/11/18 21:49: POC Glucose (mg/dL) 103


10/11/18 11:20: Alcohol, Quantitative < 10


10/11/18 11:20: Urine Opiates Screen Negative, Urine Methadone Screen Negative, 

Ur Barbiturates Screen Positive H, Ur Phencyclidine Scrn Negative, Ur 

Amphetamines Screen Negative, U Benzodiazepines Scrn Negative, U Oth Cocaine 

Metabols Negative, U Cannabinoids Screen Negative


10/11/18 11:20: Sodium 139, Potassium 3.9, Chloride 106, Carbon Dioxide 27, 

Anion Gap 10, BUN 12, Creatinine 0.6 L, Est GFR ( Amer) > 60, Est GFR 

(Non-Af Amer) > 60, Random Glucose 127 H, Calcium 8.4, Magnesium 1.7, Total 

Bilirubin 0.2, AST 25, ALT 34, Alkaline Phosphatase 127 H, Lactate Dehydrogenase

384, Total Creatine Kinase 39, Troponin I < 0.01, Total Protein 6.2, Albumin 

3.3, Globulin 2.9, Albumin/Globulin Ratio 1.1


10/11/18 11:20: Urine Color Yellow, Urine Appearance Clear, Urine pH 6.0, Ur 

Specific Gravity 1.010, Urine Protein Negative, Urine Glucose (UA) Negative, 

Urine Ketones Negative, Urine Blood Negative, Urine Nitrate Negative, Urine 

Bilirubin Negative, Urine Urobilinogen 0.2, Ur Leukocyte Esterase Trace H, Urine

RBC 0 - 2, Urine WBC 1 - 3, Ur Epithelial Cells 4 - 5, Urine Bacteria Few


10/11/18 11:20: PT 12.2, INR 1.06, APTT 29.2


10/11/18 11:20: WBC 5.1, RBC 3.39 L, Hgb 10.2 L, Hct 32.1 L, MCV 94.7, MCH 30.1,

MCHC 31.8, RDW 12.9, Plt Count 206, MPV 10.4, Gran % 68.3 H, Lymph % (Auto) 

23.5, Mono % (Auto) 3.5, Eos % (Auto) 4.5, Baso % (Auto) 0.2, Gran # 3.49, Lymph

# (Auto) 1.2, Mono # (Auto) 0.2, Eos # (Auto) 0.2, Baso # (Auto) 0.01








Vital Signs











  Temp Pulse Pulse Resp BP Pulse Ox


 


 10/13/18 09:13   59 L    122/80 


 


 10/13/18 07:24  98.3 F  59 L   20  122/80 


 


 10/12/18 15:59   72    142/79 


 


 10/12/18 07:13  97.8 F  61   20  155/93 H 


 


 10/12/18 07:00   61    155/93 H 


 


 10/11/18 17:33    65  18  


 


 10/11/18 15:09   98 H   18  142/78  97


 


 10/11/18 13:04   63   18  143/78  98


 


 10/11/18 09:38  99.3 F  64   18  149/84  98











                                        











Temp Pulse Resp BP Pulse Ox


 


 97.7 F   60   106 H  106/68   97 


 


 10/18/18 07:23  10/18/18 07:23  10/18/18 07:23  10/18/18 07:23  10/11/18 15:09








                              Abnormal Lab Results











  10/17/18 10/17/18 10/17/18





  07:15 11:00 16:36


 


POC Glucose (mg/dL)  73  87  101














  10/17/18





  21:13


 


POC Glucose (mg/dL)  88











DSM 5 Symptoms Update: 





shortly pt is shortly, patient is 58 year old  female, long history of 

depression as well as anxiety, 3 previous hospitalizations at age of 52, 54, 58,

denied history of suicidal attempts, recently moved to [place] from [place] to live 

with her daughter who has 5 kids, pt recently moved out from her daughter's 

apartment because of the conflict with her, pt currently lives independently in 

[place], pt has multiple medical issues including MS,Diabetes, hypertension, 

Pacemaker, CHF, asthma, CVA, pt brought herself to the hospital for evaluation 

and stabilization of depressive symptoms, feeling of hopelessness, passive wish 

to be dead, patient reported that she was noncompliant with the medications 

Effexor/Klonopin because she was not sure if she has insurance or not, patient 

requires further evaluation and stabilization and medication resumption and 

titration.





pt was seen at the treatment team meeting, patient presented to be depressed, 

reported that she feels "a little bit better", patient reported that her main 

problem is anxiety) asked for Klonopin to be increased, patient was educated 

about dangerousness off increasing benzodiazepines for patient of her age, 

patient ambulates with a walker, patient verbalized understanding.





pt reported that she feels little better, but "I am not well enough", pt 

reported transient feeling of hopelessness. 


reported that she slept relatively well. 





So far patient tolerates medications well, no side effects observed or reported,

aims 0, no EPS.





As per staff,no agitation, no aggression, patient is socially appropriate, 

started to attend groups.





Impression:


DSM 5 Diagnosis: 


as per h/o:


MDD


GUY


panic disorder


PTSD


Medication Change: Yes (effexor increased)


Medical Record Reviewed: Yes





Mental Status Examination





- Cognitive Function


Orientation: Person, Place, Situation, Time


Memory: Intact


Attention: Poor (some improvement)


Concentration: Poor (ome improvements)


Association: Loose (some improvements)


Fund of Knowledge: Poor





- Mood


Mood: Depressed (I feel little better), Anxious (I still very anxious)





- Affect


Affect: Flat





- Formal Thought Process


Formal Thought Process: No Impairment





- Suicidal Ideation


Suicidal Ideation: No





- Homicidal Ideation


Homicidal Ideation: No





Goal/Treatment Plan





- Goal/Treatment Plan


Need for Continued Stay: Remain at risks for inpatient hospitalization, Severe 

depression anxiety, Discharge may exacerbated symptoms, Severe functional 

impairment


Progress Toward Problem(s) and Goals/Treatment Plan: 


Milieu/structure/supportive therapy 


Medical consult appreciated 


will call cardiology consult EKG changes, h/o chest pain and CVA


SW consultation for discharge plan and social issues 


Med list was confirmed by [facility]


clonazepam 1mg po bid for anxiety


atorvastatin 40mg daily


ASA 81mg po daily


zofran 8mg po daily


Lyrica 100mg po daily filled June 20th, 2018


neurontin 300mg po tid


sonata d/c


seroquel 25mg po hs for mood stablization


effexor 150mg po daily for depression and anxiety


Family involvement 


Follow up on labs 


Will monitor closely 


Pt was educated about risk/benefits and alternatives of medications, coping st

rategies (safety plan, suicide prevention), relapse prevention, importance of 

follow up with psychiatrist and therapist, stay away from drugs/alcohol/smoking





Estimated Date of D/C: 10/22/18

## 2018-10-20 RX ADMIN — THERA TABS SCH TAB: TAB at 08:42

## 2018-10-20 RX ADMIN — INSULIN HUMAN SCH: 100 INJECTION, SOLUTION PARENTERAL at 16:52

## 2018-10-20 RX ADMIN — INSULIN HUMAN SCH: 100 INJECTION, SOLUTION PARENTERAL at 08:44

## 2018-10-20 RX ADMIN — FLUTICASONE PROPIONATE SCH SPR: 50 SPRAY, METERED NASAL at 08:45

## 2018-10-20 RX ADMIN — INSULIN HUMAN SCH: 100 INJECTION, SOLUTION PARENTERAL at 12:44

## 2018-10-20 RX ADMIN — PANTOPRAZOLE SODIUM SCH MG: 40 TABLET, DELAYED RELEASE ORAL at 07:23

## 2018-10-20 NOTE — PCM.PYCHPN
Psychiatric Progress Note





- Psychiatric Progress Note


Patient seen today, length of contact: 30min


Problems Identified/Issues Discussed: 


I reviewed  recent notes and met with patient at bedside. She is familiar to me 

from our prior interview x4 days ago. She appears the same and indicates that 

she has improved "only a little". Indicates that she remains depressed, anxious 

and hopeless. Denies suicidal thoughts. Grooming is fair and patient remains 

oriented x3. Her affect is constricted and congruent to reported mood. 


Patient has been compliant with her medications and denies any new s/e 

discomfort or pain. Staff have noted that she is more visible on the unit, 

attending groups. Requested Ativan and Zofran for anxiety and nausea, 

respectively yesterday.  There were no behavioral issues overnight. 





 


Diagnostic Results: 


as per h/o:


MDD


GUY


panic disorder


PTSD





Medication Change: Yes (effexor increased)


Medical Record Reviewed: Yes





Mental Status Examination





- Cognitive Function


Orientation: Person, Place, Situation, Time


Memory: Intact


Attention: Poor (some improvement)


Concentration: Poor (ome improvements)


Association: Loose (some improvements)


Fund of Knowledge: Poor





- Mood


Mood: Depressed (I feel little better), Anxious (I still very anxious)





- Affect


Affect: Constricted, Flat





- Formal Thought Process


Formal Thought Process: No Impairment





- Suicidal Ideation


Suicidal Ideation: No





- Homicidal Ideation


Homicidal Ideation: No





Goal/Treatment Plan





- Goal/Treatment Plan


Need for Continued Stay: Remain at risks for inpatient hospitalization, Severe 

depression anxiety, Discharge may exacerbated symptoms, Severe functional 

impairment


Progress Toward Problem(s) and Goals/Treatment Plan: 





* c/w current tx and plan


* No new lab results thus far


* Vitals reviewed and noted below:


                                                                Selected Entries











  10/19/18 10/19/18 10/19/18





  07:22 09:13 16:30


 


Temperature 98.5 F  


 


Pulse Rate 69 69 64


 


Respiratory 20  





Rate   


 


Blood Pressure 98/64 L 98/64 L 99/63 L








                                                                                


Estimated Date of D/C: 10/22/18

## 2018-10-20 NOTE — PN
DATE:  10/20/2018



LOCATION:  The patient is in room 514, bed 1.



REASON FOR CONSULTATION AND FOLLOWUP:  Status post pacemaker insertion,

hypertension, diabetes mellitus.



SUBJECTIVE:  The patient denies chest pain, shortness of breath,

palpitation.  The patient is ambulating on psychiatric floor without any

cardiac symptoms.



PHYSICAL EXAMINATION:

VITAL SIGNS:  Blood pressure 117/83, respirations 18, pulse 61, temperature

97.9.

HEENT:  Head is normocephalic.  Eyes:  Pupils normal.  Conjunctivae

slightly pale.

NECK:  JVP low.  Carotids equal.

THORAX:  AP diameter normal.

LUNGS:  Clear.

CARDIOVASCULAR:  S1 and S2.

ABDOMEN:  Soft.  No tenderness.  No organomegaly.  Bowel sounds normal.

EXTREMITIES:  No clubbing.  No cyanosis.



LABORATORY DATA:  WBC 4.9, hemoglobin 11.2, hematocrit 35.1, platelets 235.

Random sugar 102.  _____ lab was done on 10/13/2018 shows sodium 139,

potassium 4.2, BUN 16, creatinine 0.6.  Total protein, albumin normal. 

AST, ALT normal.



DIAGNOSES:  Status post permanent pacemaker insertion, depression,

hypertension, diabetes mellitus, history of gastric bypass surgery.  The

patient's pacemaker had interrogation performed 2 months ago by [employer] and it was functioning normal.  Echocardiogram on 05/02/2018 showed

normal left ventricular function with ejection fraction of 57%.



PLAN:  Continue physical therapy.  The patient continue on the psychiatric

medication, losartan 75 mg daily, aspirin 81 mg daily, Effexor at 150 mg

p.o. daily, atorvastatin 40 p.o. daily, gabapentin 300 mg t.i.d.,

amlodipine 5 mg daily, Klonopin 1 mg p.o. b.i.d.  Clinically, cardiac

status is stable.  Continue psychiatric therapy.  Continue physical

therapy.  We will follow with you.





__________________________________________

Marcus Lawton MD





DD:  10/20/2018 11:30:31

DT:  10/20/2018 11:56:10

Job # [id number]

## 2018-10-21 RX ADMIN — INSULIN HUMAN SCH: 100 INJECTION, SOLUTION PARENTERAL at 21:17

## 2018-10-21 RX ADMIN — PANTOPRAZOLE SODIUM SCH MG: 40 TABLET, DELAYED RELEASE ORAL at 07:15

## 2018-10-21 RX ADMIN — INSULIN HUMAN SCH: 100 INJECTION, SOLUTION PARENTERAL at 05:04

## 2018-10-21 RX ADMIN — INSULIN HUMAN SCH: 100 INJECTION, SOLUTION PARENTERAL at 08:42

## 2018-10-21 RX ADMIN — INSULIN HUMAN SCH: 100 INJECTION, SOLUTION PARENTERAL at 13:03

## 2018-10-21 RX ADMIN — THERA TABS SCH TAB: TAB at 08:39

## 2018-10-21 RX ADMIN — INSULIN HUMAN SCH: 100 INJECTION, SOLUTION PARENTERAL at 17:37

## 2018-10-21 RX ADMIN — PANTOPRAZOLE SODIUM SCH: 40 TABLET, DELAYED RELEASE ORAL at 07:10

## 2018-10-21 RX ADMIN — FLUTICASONE PROPIONATE SCH SPR: 50 SPRAY, METERED NASAL at 10:34

## 2018-10-21 NOTE — PN
DATE:  10/21/2018



SUBJECTIVE:  She tells me that she is having back pain above both kidneys

and she wants her kidneys checked out.  She is on Lidoderm patch and she is

on ibuprofen.  We will do a blood test and ultrasound of the kidneys.  She

is on Ativan, Cozaar, Ecotrin, Effexor, Flonase, folic acid, insulin,

Imodium, Klonopin, Lidoderm, Lipitor, Motrin, Neurontin, Norvasc, Protonix,

Seroquel, Thera-Tabs, Tylenol, Ventolin, Zestril, and Zofran.



PHYSICAL EXAMINATION:

VITAL SIGNS:  Temperature 98.2, pulse 60, blood pressure 111/78,

respiratory rate 20.

HEENT:  Head is atraumatic, normocephalic.

HEART:  Regular rate.

LUNGS:  Clear to auscultation.

ABDOMEN:  Soft, obese, nontender.

EXTREMITIES:  No edema.



Last labs on the 10/13/2018 in which she did well.  We will recheck labs

tomorrow.  Also, get an ultrasound of her kidneys.  Continue with

aggressive treatment and care as per Psychiatry and hopefully, we will see

if there is anything going on with her kidneys or not.  We will follow up.







__________________________________________

Brian Stanley DO



DD:  10/21/2018 11:15:14

DT:  10/21/2018 12:24:17

Job # [id number]

## 2018-10-21 NOTE — PN
DATE:  10/21/2018



LOCATION:  The patient in room 514, bed 1.



REASON FOR CONSULTATION:  Followup status post pacemaker insertion,

hypertension, diabetes mellitus.



SUBJECTIVE:  The patient denies any chest pain, shortness of breath,

palpitation.  The patient is getting psychiatric treatment.  She is

ambulatory without any cardiac symptoms.



PHYSICAL EXAMINATION:

VITAL SIGNS:  Blood pressure 111/78, respirations 20, pulse 60, temperature

98.2.

HEENT:  Head is normocephalic.  Eyes:  Pupils normal.  Conjunctivae normal.

Nose and throat normal.

NECK:  JVP low.  Carotids equal.

THORAX:  AP diameter normal.

LUNGS:  Clear.

CARDIOVASCULAR:  S1 and S2.

ABDOMEN:  Soft.  No tenderness.  No organomegaly.  Bowel sounds normal.

EXTREMITIES:  No clubbing.  No cyanosis.



LABORATORY DATA:  Sugar 112.  Other laboratory data already reported on the

previous notes.



DIAGNOSES:  Status post permanent pacemaker insertion, depression,

hypertension, diabetes mellitus, history of gastric bypass surgery.  The

patient's pacemaker had been interrogated 2 months ago by [employer]

and it was functioning normal.  Echocardiogram on 05/02/2018 showed normal

left ventricular ejection fraction of 57%.



PLAN:  We will continue present therapy and along with continue psychiatric

therapy.  The patient is getting losartan 75 mg daily, aspirin 81 mg daily,

Effexor 150 mg p.o. daily, Imodium 4 mg p.o. daily, Klonopin 1 mg p.o.

b.i.d., Lipitor 40 daily, amlodipine 5 daily, Seroquel 50 mg at bedtime,

hand nebulizer therapy.  We will follow with you.





__________________________________________

Marcus Lawton MD



DD:  10/21/2018 12:36:10

DT:  10/21/2018 12:43:52

Job # [id number]

## 2018-10-21 NOTE — PCM.PYCHPN
Psychiatric Progress Note





- Psychiatric Progress Note


Patient seen today, length of contact: 30min


Problems Identified/Issues Discussed: 


I reviewed  recent notes and met with patient at bedside. She is familiar to me 

from our prior interview x5 days ago. She appears the same and indicates that 

she has improved "only a little". Indicates that she remains depressed, anxious 

and hopeless. Patient doesn't feel ready for discharge. She denies suicidal or 

homicidal thoughts. Grooming is fair and patient remains oriented x3. Her affect

is constricted and congruent to reported mood. 


Patient has been compliant with her medications and denies any new s/e. She 

reports some back discomfort starting yesterday which she fears may be related 

to kidney stones. She doesn't appear to be in any acute distress at this time.  


Staff have noted that she is more visible on the unit, attending groups. Req

uested Ativan and Zofran for anxiety and nausea, respectively on Friday and 

ativan prn on Saturday for anxiety. There were no behavioral issues over the 

weekend. 





  


Diagnostic Results: 


as per h/o:


MDD


GUY


panic disorder


PTSD





Medication Change: No ( )


Medical Record Reviewed: Yes





Mental Status Examination





- Cognitive Function


Orientation: Person, Place, Situation, Time


Memory: Intact


Attention: Poor (some improvement)


Concentration: Poor (ome improvements)


Association: Loose (some improvements)


Fund of Knowledge: Poor





- Mood


Mood: Depressed (I feel little better), Anxious (I still very anxious)





- Affect


Affect: Constricted, Flat





- Formal Thought Process


Formal Thought Process: No Impairment





- Suicidal Ideation


Suicidal Ideation: No





- Homicidal Ideation


Homicidal Ideation: No





Goal/Treatment Plan





- Goal/Treatment Plan


Need for Continued Stay: Remain at risks for inpatient hospitalization, Severe 

depression anxiety, Discharge may exacerbated symptoms, Severe functional 

impairment


Progress Toward Problem(s) and Goals/Treatment Plan: 





* c/w current tx and plan


* Appreciate f/u by Dr. Lawton on 10/20/18~clinically, cardiac status is stable


* Medicine to f/u complaints of lower back pain, starting 10/20/18


* No new weekend lab results  


* Vitals reviewed and noted below:


                                                                Selected Entries











  10/20/18 10/20/18





  07:00 19:51


 


Temperature 97.3 F L 


 


Pulse Rate 61 60


 


Respiratory 17 





Rate  


 


Blood Pressure 117/83 94/57 L








                                                                                


 


Estimated Date of D/C: 10/22/18

## 2018-10-22 LAB
ALBUMIN SERPL-MCNC: 3.3 G/DL (ref 3–4.8)
ALBUMIN/GLOB SERPL: 1.1 {RATIO} (ref 1.1–1.8)
ALT SERPL-CCNC: 24 U/L (ref 7–56)
AST SERPL-CCNC: 26 U/L (ref 14–36)
BUN SERPL-MCNC: 30 MG/DL (ref 7–21)
CALCIUM SERPL-MCNC: 8.7 MG/DL (ref 8.4–10.5)
ERYTHROCYTE [DISTWIDTH] IN BLOOD BY AUTOMATED COUNT: 12.6 % (ref 11.5–14.5)
GFR NON-AFRICAN AMERICAN: 57
HGB BLD-MCNC: 10.5 G/DL (ref 12–16)
MCH RBC QN AUTO: 30.7 PG (ref 25–35)
MCHC RBC AUTO-ENTMCNC: 32.2 G/DL (ref 31–37)
MCV RBC AUTO: 95.3 FL (ref 80–105)
PLATELET # BLD: 138 10^3/UL (ref 120–450)
PMV BLD AUTO: 10 FL (ref 7–11)
RBC # BLD AUTO: 3.42 10^6/UL (ref 3.5–6.1)
WBC # BLD AUTO: 4.5 10^3/UL (ref 4.5–11)

## 2018-10-22 RX ADMIN — PANTOPRAZOLE SODIUM SCH MG: 40 TABLET, DELAYED RELEASE ORAL at 06:30

## 2018-10-22 RX ADMIN — FLUTICASONE PROPIONATE SCH SPR: 50 SPRAY, METERED NASAL at 08:40

## 2018-10-22 RX ADMIN — INSULIN HUMAN SCH: 100 INJECTION, SOLUTION PARENTERAL at 12:53

## 2018-10-22 RX ADMIN — THERA TABS SCH TAB: TAB at 08:44

## 2018-10-22 RX ADMIN — INSULIN HUMAN SCH: 100 INJECTION, SOLUTION PARENTERAL at 11:39

## 2018-10-22 RX ADMIN — INSULIN HUMAN SCH: 100 INJECTION, SOLUTION PARENTERAL at 18:16

## 2018-10-22 RX ADMIN — INSULIN HUMAN SCH UNIT: 100 INJECTION, SOLUTION PARENTERAL at 21:18

## 2018-10-22 NOTE — PN
DATE:  10/22/2018



REASON FOR THE CONSULTATION:  Followup cardiac evaluation and followup,

history of pacemaker, diabetes, hypertension, obesity, admitted depression

on the Psych floor.



SUBJECTIVE:  The patient denies any chest pain, shortness of breath or any

palpitation.



PHYSICAL EXAMINATION:

VITAL SIGNS:  Temperature afebrile, heart rate 51, blood pressure 112/68.

HEENT:  PERRLA.  Extraocular muscles intact.

NECK:  Supple.  No carotid bruit.  No thyromegaly.

CHEST:  Clear to auscultation.

HEART:  S1 and S2 regular.

ABDOMEN:  Soft.

EXTREMITIES:  Clubbing and cyanosis negative.



LABORATORY DATA:  Blood workup as follows; WBC 4.5, hemoglobin 10.9,

hematocrit 32.6, platelet count 138.  Chemistry shows sodium 135, potassium

4.9, chloride 102, carbon dioxide 27, anion gap of 11, BUN 30, creatinine

1.



IMPRESSION:  A 58-year-old obese female, admitted with depression to the

Psychiatry floor.  History of diabetes; hypertension; hyperlipidemia;

obesity; history of gastric bypass surgery; history of pacemaker, [employer],

status post interrogation 2 months ago, normal functioning.  Recent

echocardiogram on 05/02/2018 shows normal left ventricular function,

ejection fraction 57%.



RECOMMENDATIONS:  Continue aggressive medical treatment.  [facility] status is

stable.  Continue antihypertensive medication.  Blood pressure is fairly

stable.  Yesterday, blood pressure one episode was 94/57 because according

to the nurse the patient was sleeping.  We will cut down the losartan to 50

mg from 75.  Continue atorvastatin and monitor the blood pressure.  We will

follow with you.  We will change to 50 mg with holding parameters, so hold

for systolic less than 120.



Thank you, Dr. Guerra for providing us the opportunity in taking care

of the patient, Shanna Gruber.





__________________________________________

Marcus Anderson MD





DD:  10/22/2018 10:26:24

DT:  10/22/2018 10:29:50

Job # [id number]

## 2018-10-22 NOTE — US
Date of service: 



10/22/2018



PROCEDURE:  Ultrasound of the Kidneys



HISTORY:

back pain



COMPARISON:

Comparison made with prior abdominal ultrasound 05/11/2018 the.



TECHNIQUE:

Sonogram of the kidneys.



FINDINGS:



RIGHT KIDNEY:

Measures approximately 9 x 5.4 x 5.2 cm. 



Normal in size, contour and echogenicity. 



No stone, solid mass lesion or hydronephrosis visualized. 



LEFT KIDNEY:

Measures: 11.5 x 6.2 x 5.6 cm. 



Normal in size, contour and echogenicity. 



No stone, solid mass lesion or hydronephrosis visualized. 



OTHER FINDINGS:

None.



IMPRESSION:

Unremarkable renal sonogram.

## 2018-10-22 NOTE — PN
DATE:  10/20/2018



SUBJECTIVE:  I saw her sitting out of bed to chair.  She is currently doing

a crossword puzzle.  She is feeling better but not 100% yet.  She still

needs a medication she tells me.



OBJECTIVE:

VITAL SIGNS:  She has 97.9 temperature, 60 pulse, 117/83 blood pressure, 18

respiratory rate.

HEENT:  Head is atraumatic, normocephalic.

HEART:  Regular rate.

LUNGS:  Decreased breath sounds but clear.

ABDOMEN:  Soft, obese.

EXTREMITIES:  No edema.



MEDICATIONS:  She is on Ativan, Cozaar, Ecotrin, Effexor, Flonase, folic

acid, Imodium, Klonopin, Lidoderm patch, Lipitor, Motrin, Neurontin,

Norvasc, Protonix, Seroquel, Thera-Tabs, Ventolin, Vistaril and Zofran.



ASSESSMENT AND PLAN:  She is overall doing well.  I encouraged to

participate with her psychiatric medications and her groups in the

psychiatrist to get better and hoping that she will improve with her

depression and anxiety.  She also has diabetes.  She had diarrhea,

allergies, back pain, high cholesterol, hypertension, gastroesophageal

reflux disease, and also some asthma.  I will continue aggressive treatment

and care for her.





__________________________________________

Brian Stanley DO



DD:  10/20/2018 11:46:06

DT:  10/20/2018 11:47:06

Job # [id number]

## 2018-10-22 NOTE — PN
DATE:  10/22/2018



SUBJECTIVE:   I saw her in her room in psychiatric floor.  She has

medication complaints.  She is trying to eat.  She is trying to get better,

but tells me she is not any near getting better.



PHYSICAL EXAMINATION:

VITAL SIGNS:  She has 97.5 temperature, 59 pulse, 110/71 blood pressure, 20

respiratory rate, 97% O2 sat on room air.  HEAD:  Atraumatic,

normocephalic.

HEART:  Regular rate.

LUNGS:  Decreased breath sounds, but clear.

ABDOMEN:  Soft, obese.

EXTREMITIES:  No edema.



She is currently on Ativan, Cozaar, Ecotrin, Effexor, Flonase, folic acid,

insulin coverage, Imodium, Klonopin, Lidoderm, Lipitor, Motrin, Neurontin,

Norvasc, Protonix, Seroquel, Thera-Tabs, Tylenol, Ventolin, Vistaril,

Zofran.



She had lab today.  Sodium 135, potassium 4.9, BUN 30, creatinine 1.  She

has to drink little more water.  GFR is 57.  Last blood sugar is 78,

calcium is 8.7, total bili is 0.4, AST is 26, ALT is 24, alk phos 87, total

protein 6.4.



She is being seen by Cardiology and Psychiatry.  Continue with aggressive

treatment and care as per Psychiatry and we will encourage her to drink

more water.  She has multiple issues, depression, anxiety, cholesterol,

gastroesophageal reflux disease, diabetes, hypertension, back pain. 

Hopefully she will continue to improve as per Psychiatry.  I went over the

labs, she did well.  Increase more water intake.







__________________________________________

Brian Stanley DO









DD:  10/22/2018 8:36:03

DT:  10/22/2018 8:37:23

Job # [id number]

## 2018-10-22 NOTE — PCM.PYCHPN
Psychiatric Progress Note





- Psychiatric Progress Note


Patient seen today, length of contact: 30min


Patient Chief Complaint: 


"I am very depressed doctor, I am not ready to be discharged yet"


Problems Identified/Issues Discussed: 


Suicide/ homicide prevention, past psychiatric h/o, current psychiatric 

symptoms, medical problems, risk/benefits and alternatives of medications, 

medications compliance, coping strategies, substance abuse h/o, relapse 

prevention, importance of follow up with psychiatrist and therapist, discharge 

plan. 





Medical Problems: 





see HPI


Diagnostic Results: 














                                 10/13/18 08:00 





                                 10/13/18 08:00 





                                   Lab Results





10/13/18 08:00: Sodium 139, Potassium 4.2, Chloride 105, Carbon Dioxide 27, 

Anion Gap 12, BUN 16, Creatinine 0.6 L, Est GFR ( Amer) > 60, Est GFR 

(Non-Af Amer) > 60, Random Glucose 99, Calcium 8.8, Total Bilirubin 0.3, AST 21,

ALT 24, Alkaline Phosphatase 130 H, Total Protein 6.6, Albumin 3.4, Globulin 

3.1, Albumin/Globulin Ratio 1.1


10/13/18 08:00: WBC 4.9, RBC 3.70, Hgb 11.2 L, Hct 35.1 L, MCV 94.9, MCH 30.3, 

MCHC 31.9, RDW 12.9, Plt Count 235, MPV 10.4


10/13/18 07:16: POC Glucose (mg/dL) 94


10/12/18 21:04: POC Glucose (mg/dL) 89


10/12/18 16:07: POC Glucose (mg/dL) 108


10/12/18 12:32: POC Glucose (mg/dL) 85


10/12/18 07:55: RPR Nonreactive


10/12/18 07:55: Free T4 1.05, TSH 3rd Generation 1.40


10/12/18 07:55: Fasting Glucose 94, Triglycerides 107, Cholesterol 120 L, LDL 

Cholesterol Direct 46, HDL Cholesterol 46


10/12/18 07:10: POC Glucose (mg/dL) 92


10/11/18 21:49: POC Glucose (mg/dL) 103


10/11/18 11:20: Alcohol, Quantitative < 10


10/11/18 11:20: Urine Opiates Screen Negative, Urine Methadone Screen Negative, 

Ur Barbiturates Screen Positive H, Ur Phencyclidine Scrn Negative, Ur 

Amphetamines Screen Negative, U Benzodiazepines Scrn Negative, U Oth Cocaine 

Metabols Negative, U Cannabinoids Screen Negative


10/11/18 11:20: Sodium 139, Potassium 3.9, Chloride 106, Carbon Dioxide 27, 

Anion Gap 10, BUN 12, Creatinine 0.6 L, Est GFR ( Amer) > 60, Est GFR 

(Non-Af Amer) > 60, Random Glucose 127 H, Calcium 8.4, Magnesium 1.7, Total 

Bilirubin 0.2, AST 25, ALT 34, Alkaline Phosphatase 127 H, Lactate Dehydrogenase

384, Total Creatine Kinase 39, Troponin I < 0.01, Total Protein 6.2, Albumin 

3.3, Globulin 2.9, Albumin/Globulin Ratio 1.1


10/11/18 11:20: Urine Color Yellow, Urine Appearance Clear, Urine pH 6.0, Ur 

Specific Gravity 1.010, Urine Protein Negative, Urine Glucose (UA) Negative, 

Urine Ketones Negative, Urine Blood Negative, Urine Nitrate Negative, Urine 

Bilirubin Negative, Urine Urobilinogen 0.2, Ur Leukocyte Esterase Trace H, Urine

RBC 0 - 2, Urine WBC 1 - 3, Ur Epithelial Cells 4 - 5, Urine Bacteria Few


10/11/18 11:20: PT 12.2, INR 1.06, APTT 29.2


10/11/18 11:20: WBC 5.1, RBC 3.39 L, Hgb 10.2 L, Hct 32.1 L, MCV 94.7, MCH 30.1,

MCHC 31.8, RDW 12.9, Plt Count 206, MPV 10.4, Gran % 68.3 H, Lymph % (Auto) 

23.5, Mono % (Auto) 3.5, Eos % (Auto) 4.5, Baso % (Auto) 0.2, Gran # 3.49, Lymph

# (Auto) 1.2, Mono # (Auto) 0.2, Eos # (Auto) 0.2, Baso # (Auto) 0.01








Vital Signs











  Temp Pulse Pulse Resp BP Pulse Ox


 


 10/13/18 09:13   59 L    122/80 


 


 10/13/18 07:24  98.3 F  59 L   20  122/80 


 


 10/12/18 15:59   72    142/79 


 


 10/12/18 07:13  97.8 F  61   20  155/93 H 


 


 10/12/18 07:00   61    155/93 H 


 


 10/11/18 17:33    65  18  


 


 10/11/18 15:09   98 H   18  142/78  97


 


 10/11/18 13:04   63   18  143/78  98


 


 10/11/18 09:38  99.3 F  64   18  149/84  98











                                        











Temp Pulse Resp BP Pulse Ox


 


 97.7 F   60   106 H  106/68   97 


 


 10/18/18 07:23  10/18/18 07:23  10/18/18 07:23  10/18/18 07:23  10/11/18 15:09








                              Abnormal Lab Results











  10/17/18 10/17/18 10/17/18





  07:15 11:00 16:36


 


POC Glucose (mg/dL)  73  87  101














  10/17/18





  21:13


 


POC Glucose (mg/dL)  88











DSM 5 Symptoms Update: 





shortly pt is shortly, patient is 58 year old  female, long history of 

depression as well as anxiety, 3 previous hospitalizations at age of 52, 54, 58,

denied history of suicidal attempts, recently moved to [place] from [place] to live 

with her daughter who has 5 kids, pt recently moved out from her daughter's 

apartment because of the conflict with her, pt currently lives independently in 

[place], pt has multiple medical issues including MS,Diabetes, hypertension, 

Pacemaker, CHF, asthma, CVA, pt brought herself to the hospital for evaluation 

and stabilization of depressive symptoms, feeling of hopelessness, passive wish 

to be dead, patient reported that she was noncompliant with the medications 

Effexor/Klonopin because she was not sure if she has insurance or not, patient 

requires further evaluation and stabilization and medication resumption and 

titration.





pt was seen at the day treatment area, as per staff pt is constantly asking for 

benzos and vistaril, almost fall, this writer address this fact with pt, pt 

agreed to d/c ativan, agreed to decreased vistaril, agreed to increase effexor. 





pt reported not feeling ready to leave the hospital because of depression and 

feeling of hopelessness. 





So far patient tolerates medications well, no side effects observed or reported,

aims 0, no EPS.





As per staff,no agitation, no aggression, patient is socially appropriate, 

started to attend groups.





Impression:


DSM 5 Diagnosis: 


as per h/o:


MDD


GUY


panic disorder


PTSD


Medication Change: Yes (ativan d/c, vistaril decreased, effexor increased)


Medical Record Reviewed: Yes


Consults ordered or reviewed: 





medical consult appreciated


Gastroenterology consultation appreciated


medical f/u appreciated over the weekend october 10/20-10/21/18





Mental Status Examination





- Cognitive Function


Orientation: Person, Place, Situation, Time


Memory: Intact


Attention: Poor (some improvement)


Concentration: Poor (ome improvements)


Association: Loose (some improvements)


Fund of Knowledge: Poor





- Mood


Mood: Depressed ("I am very depressed"), Anxious (I still very anxious)





- Affect


Affect: Constricted, Flat





- Formal Thought Process


Formal Thought Process: No Impairment





- Suicidal Ideation


Suicidal Ideation: No





- Homicidal Ideation


Homicidal Ideation: No





Goal/Treatment Plan





- Goal/Treatment Plan


Need for Continued Stay: Remain at risks for inpatient hospitalization, Severe 

depression anxiety, Discharge may exacerbated symptoms, Severe functional im

pairment


Progress Toward Problem(s) and Goals/Treatment Plan: 


Milieu/structure/supportive therapy 


Medical consult appreciated 


will call cardiology consult EKG changes, h/o chest pain and CVA


SW consultation for discharge plan and social issues 


Med list was confirmed by [facility] pharmacy


clonazepam 1mg po bid for anxiety


atorvastatin 40mg daily


ASA 81mg po daily


zofran 8mg po daily


Lyrica 100mg po daily filled June 20th, 2018


neurontin 300mg po tid


sonata d/c


seroquel 50mg po hs for mood stablization


effexor 150+37.5mg po daily for depression and anxiety


vistaril decreased 25mg bid prn for anxiety


ativan d/c


Family involvement 


Follow up on labs 


Will monitor closely 


Pt was educated about risk/benefits and alternatives of medications, coping 

strategies (safety plan, suicide prevention), relapse prevention, importance of 

follow up with psychiatrist and therapist, stay away from drugs/alcohol/smoking





Estimated Date of D/C: 10/25/18

## 2018-10-22 NOTE — PN
DATE:  10/19/2018



LOCATION:  The patient is in room 514, bed 1.



REASON FOR CONSULTATION AND FOLLOWUP:  Status post pacemaker insertion,

hypertension, diabetes mellitus.



SUBJECTIVE:  The patient denies any chest pain, shortness of breath,

palpitation.  The patient is walking around without any cardiac symptoms.



PHYSICAL EXAMINATION:

VITAL SIGNS:  Blood pressure 98/64, respirations 20, pulse 69, temperature

98.5.

HEENT:  Head is normocephalic.  Eyes:  Pupils normal.  Conjunctivae

slightly pale.

NECK:  JVP low.  Carotids equal.

THORAX:  AP diameter normal.

LUNGS:  Clear.

CARDIOVASCULAR:  S1 and S2.

ABDOMEN:  Soft.  No tenderness.  No organomegaly.  Bowel sounds normal.

EXTREMITIES:  No clubbing.  No cyanosis.



LABORATORY DATA:  WBC 4.9, hemoglobin 11.2, hematocrit 35.1, platelets 235.

Random sugar 94.



DIAGNOSES:  Status post permanent pacemaker insertion, depression,

hypertension, diabetes mellitus, history of gastric bypass surgery. 

Pacemaker interrogation was done 2 months ago by [employer] and the

function was normal.  Echocardiogram on 05/02/2018 showed normal left

ventricular function with ejection fraction of 57%.



PLAN:  Clinically, the patient's cardiac status is stable.  We will

continue aspirin 81 mg daily, losartan 100 daily, amlodipine 5 mg daily,

Protonix 40 daily, Seroquel 50 mg p.o. at bedtime, albuterol hand nebulizer

therapy, Protonix 40 daily.  Clinically, cardiac status is stable.  The

patient is ambulatory without any cardiac symptoms.





__________________________________________

Marcus Lawton MD





DD:  10/19/2018 17:16:30

DT:  10/19/2018 17:36:06

Job # [id number]

## 2018-10-23 RX ADMIN — FLUTICASONE PROPIONATE SCH SPR: 50 SPRAY, METERED NASAL at 09:27

## 2018-10-23 RX ADMIN — THERA TABS SCH TAB: TAB at 09:13

## 2018-10-23 RX ADMIN — INSULIN HUMAN SCH: 100 INJECTION, SOLUTION PARENTERAL at 11:59

## 2018-10-23 RX ADMIN — PANTOPRAZOLE SODIUM SCH MG: 40 TABLET, DELAYED RELEASE ORAL at 06:17

## 2018-10-23 RX ADMIN — INSULIN HUMAN SCH: 100 INJECTION, SOLUTION PARENTERAL at 08:09

## 2018-10-23 RX ADMIN — INSULIN HUMAN SCH: 100 INJECTION, SOLUTION PARENTERAL at 21:37

## 2018-10-23 RX ADMIN — INSULIN HUMAN SCH: 100 INJECTION, SOLUTION PARENTERAL at 16:11

## 2018-10-23 NOTE — PCM.PYCHPN
Psychiatric Progress Note





- Psychiatric Progress Note


Patient seen today, length of contact: 30min


Patient Chief Complaint: 


"I feel shaky"


Problems Identified/Issues Discussed: 


Suicide/ homicide prevention, past psychiatric h/o, current psychiatric 

symptoms, medical problems, risk/benefits and alternatives of medications, 

medications compliance, coping strategies, substance abuse h/o, relapse 

prevention, importance of follow up with psychiatrist and therapist, discharge 

plan. 





Medical Problems: 





see HPI


Diagnostic Results: 














                                 10/13/18 08:00 





                                 10/13/18 08:00 





                                   Lab Results





10/13/18 08:00: Sodium 139, Potassium 4.2, Chloride 105, Carbon Dioxide 27, 

Anion Gap 12, BUN 16, Creatinine 0.6 L, Est GFR ( Amer) > 60, Est GFR 

(Non-Af Amer) > 60, Random Glucose 99, Calcium 8.8, Total Bilirubin 0.3, AST 21,

ALT 24, Alkaline Phosphatase 130 H, Total Protein 6.6, Albumin 3.4, Globulin 

3.1, Albumin/Globulin Ratio 1.1


10/13/18 08:00: WBC 4.9, RBC 3.70, Hgb 11.2 L, Hct 35.1 L, MCV 94.9, MCH 30.3, 

MCHC 31.9, RDW 12.9, Plt Count 235, MPV 10.4


10/13/18 07:16: POC Glucose (mg/dL) 94


10/12/18 21:04: POC Glucose (mg/dL) 89


10/12/18 16:07: POC Glucose (mg/dL) 108


10/12/18 12:32: POC Glucose (mg/dL) 85


10/12/18 07:55: RPR Nonreactive


10/12/18 07:55: Free T4 1.05, TSH 3rd Generation 1.40


10/12/18 07:55: Fasting Glucose 94, Triglycerides 107, Cholesterol 120 L, LDL 

Cholesterol Direct 46, HDL Cholesterol 46


10/12/18 07:10: POC Glucose (mg/dL) 92


10/11/18 21:49: POC Glucose (mg/dL) 103


10/11/18 11:20: Alcohol, Quantitative < 10


10/11/18 11:20: Urine Opiates Screen Negative, Urine Methadone Screen Negative, 

Ur Barbiturates Screen Positive H, Ur Phencyclidine Scrn Negative, Ur 

Amphetamines Screen Negative, U Benzodiazepines Scrn Negative, U Oth Cocaine 

Metabols Negative, U Cannabinoids Screen Negative


10/11/18 11:20: Sodium 139, Potassium 3.9, Chloride 106, Carbon Dioxide 27, 

Anion Gap 10, BUN 12, Creatinine 0.6 L, Est GFR ( Amer) > 60, Est GFR 

(Non-Af Amer) > 60, Random Glucose 127 H, Calcium 8.4, Magnesium 1.7, Total 

Bilirubin 0.2, AST 25, ALT 34, Alkaline Phosphatase 127 H, Lactate Dehydrogenase

384, Total Creatine Kinase 39, Troponin I < 0.01, Total Protein 6.2, Albumin 

3.3, Globulin 2.9, Albumin/Globulin Ratio 1.1


10/11/18 11:20: Urine Color Yellow, Urine Appearance Clear, Urine pH 6.0, Ur 

Specific Gravity 1.010, Urine Protein Negative, Urine Glucose (UA) Negative, 

Urine Ketones Negative, Urine Blood Negative, Urine Nitrate Negative, Urine 

Bilirubin Negative, Urine Urobilinogen 0.2, Ur Leukocyte Esterase Trace H, Urine

RBC 0 - 2, Urine WBC 1 - 3, Ur Epithelial Cells 4 - 5, Urine Bacteria Few


10/11/18 11:20: PT 12.2, INR 1.06, APTT 29.2


10/11/18 11:20: WBC 5.1, RBC 3.39 L, Hgb 10.2 L, Hct 32.1 L, MCV 94.7, MCH 30.1,

MCHC 31.8, RDW 12.9, Plt Count 206, MPV 10.4, Gran % 68.3 H, Lymph % (Auto) 

23.5, Mono % (Auto) 3.5, Eos % (Auto) 4.5, Baso % (Auto) 0.2, Gran # 3.49, Lymph

# (Auto) 1.2, Mono # (Auto) 0.2, Eos # (Auto) 0.2, Baso # (Auto) 0.01








Vital Signs











  Temp Pulse Pulse Resp BP Pulse Ox


 


 10/13/18 09:13   59 L    122/80 


 


 10/13/18 07:24  98.3 F  59 L   20  122/80 


 


 10/12/18 15:59   72    142/79 


 


 10/12/18 07:13  97.8 F  61   20  155/93 H 


 


 10/12/18 07:00   61    155/93 H 


 


 10/11/18 17:33    65  18  


 


 10/11/18 15:09   98 H   18  142/78  97


 


 10/11/18 13:04   63   18  143/78  98


 


 10/11/18 09:38  99.3 F  64   18  149/84  98











                                        











Temp Pulse Resp BP Pulse Ox


 


 97.7 F   60   106 H  106/68   97 


 


 10/18/18 07:23  10/18/18 07:23  10/18/18 07:23  10/18/18 07:23  10/11/18 15:09








                              Abnormal Lab Results











  10/17/18 10/17/18 10/17/18





  07:15 11:00 16:36


 


POC Glucose (mg/dL)  73  87  101














  10/17/18





  21:13


 


POC Glucose (mg/dL)  88











DSM 5 Symptoms Update: 





shortly pt is shortly, patient is 58 year old  female, long history of 

depression as well as anxiety, 3 previous hospitalizations at age of 52, 54, 58,

denied history of suicidal attempts, recently moved to [place] from [place] to live 

with her daughter who has 5 kids, pt recently moved out from her daughter's 

apartment because of the conflict with her, pt currently lives independently in 

[place], pt has multiple medical issues including MS,Diabetes, hypertension, 

Pacemaker, CHF, asthma, CVA, pt brought herself to the hospital for evaluation 

and stabilization of depressive symptoms, feeling of hopelessness, passive wish 

to be dead, patient reported that she was noncompliant with the medications 

Effexor/Klonopin because she was not sure if she has insurance or not, patient 

requires further evaluation and stabilization and medication resumption and t

itration.





pt was seen today next to the Nursing station, patient complain of worsening of 

anxiety, patient reported that she feels "shaky", patient complain about 

decreased the doses of benzodiazepines, but agreed to increase dose of Vistaril 

to 50 mg twice a day, patient presented to be depressed, anxious,, but visible 

in the unit, denied any thoughts of harming herself or others





pt reported not feeling ready to leave the hospital because of depression and 

feeling of hopelessness. 





So far patient tolerates medications well, no side effects observed or reported,

aims 0, no EPS.





As per staff,no agitation, no aggression, patient is socially appropriate, 

started to attend groups.





Impression:


DSM 5 Diagnosis: 


as per h/o:


MDD


GUY


panic disorder


PTSD


Medication Change: Yes (ativan d/c, vistaril decreased, effexor increased)


Medical Record Reviewed: Yes





Mental Status Examination





- Cognitive Function


Orientation: Person, Place, Situation, Time


Memory: Intact


Attention: Poor (some improvement)


Concentration: Poor (ome improvements)


Association: Loose (some improvements)


Fund of Knowledge: Poor





- Mood


Mood: Depressed ("I am very depressed"), Anxious (I still very anxious)





- Affect


Affect: Constricted, Flat





- Formal Thought Process


Formal Thought Process: No Impairment





- Suicidal Ideation


Suicidal Ideation: No





- Homicidal Ideation


Homicidal Ideation: No





Goal/Treatment Plan





- Goal/Treatment Plan


Need for Continued Stay: Remain at risks for inpatient hospitalization, Severe 

depression anxiety, Discharge may exacerbated symptoms, Severe functional 

impairment


Progress Toward Problem(s) and Goals/Treatment Plan: 


Milieu/structure/supportive therapy 


Medical consult appreciated 


will call cardiology consult EKG changes, h/o chest pain and CVA


SW consultation for discharge plan and social issues 


Med list was confirmed by [facility] pharmacy


clonazepam 1mg po bid for anxiety


atorvastatin 40mg daily


ASA 81mg po daily


zofran 8mg po daily


Lyrica 100mg po daily filled June 20th, 2018


neurontin 300mg po tid


sonata d/c


seroquel 50mg po hs for mood stablization


effexor 150+37.5mg po daily for depression and anxiety


vistaril 50mg bid prn for anxiety


ativan d/c


Family involvement 


Follow up on labs 


Will monitor closely 


Pt was educated about risk/benefits and alternatives of medications, coping 

strategies (safety plan, suicide prevention), relapse prevention, importance of 

follow up with psychiatrist and therapist, stay away from drugs/alcohol/smoking





Estimated Date of D/C: 10/25/18

## 2018-10-23 NOTE — PN
DATE:  10/23/2018



SUBJECTIVE:  I saw her resting comfortably in bed this morning.  She slept

well.  Good spirits, feeling well.  She tells me she is being discharged

tomorrow , but she is eating well, participating, feeling better

mentally.  She is on Ativan, Cozaar, Ecotrin, Effexor, Flonase, folic acid,

Imodium, Klonopin, Lidoderm, Lipitor, Motrin, Neurontin, Norvasc, Protonix,

Seroquel, Thera-Tabs, Tylenol, Ventolin, Vistaril and Zofran.



PHYSICAL EXAMINATION:

VITAL SIGNS:  Temperature 97.8, 59 pulse, 99/59 blood pressure, 19

respiratory rate.

HEENT:  Head is atraumatic, normocephalic.

HEART:  Regular rate.

LUNGS:  Clear to auscultation.

ABDOMEN:  Soft, obese, nontender.  No abdominal pain this morning.

EXTREMITIES:  No edema.  No back pain.



LABORATORY DATA:  Last labs on 10/22/2018, she has a 4.5 white count, 10.5

hemoglobin, 32.6 hematocrit with 138 platelets.  She had a chemistry which

showed a 135 sodium, potassium 4.9, BUN 30, creatinine 1, GFR is 57, last

blood sugar is 131, calcium is 8.7, total bilirubin is 0.4, AST is 26, ALT

is 24, alkaline phosphatase 87, total protein 6.4.



She is doing better overall I feel as per Cardiology and Psychiatry.  I

encouraged her participate in groups, take the medications, eat the food,

go for walks and I hope she can be discharged soon as per Psychiatry.  We

will follow.







__________________________________________

Brian Stanley DO



DD:  10/23/2018 8:47:47

DT:  10/23/2018 8:48:54

Job # [id number]

JOH[place]ON

## 2018-10-23 NOTE — PN
DATE:  10/23/2018



REASON FOR CONSULTATION AND FOLLOWUP:  Cardiac evaluation and followup,

history of pacemaker, diabetes, hypertension, hyperlipidemia, obesity,

admitted with depression on the Psych floor.



SUBJECTIVE:  The patient denies any chest pain, shortness of breath or any

palpitations.



OBJECTIVE:

GENERAL:  Not in apparent distress, lying flat in the bed.

VITAL SIGNS:  Temperature afebrile, heart rate 66, blood pressure 113/71.

HEENT:  PERRLA.  Extraocular muscles intact.

NECK:  Supple.  No carotid bruits or thyromegaly.

CHEST:  Clear to auscultation.

HEART:  S1 and S2 regular.

ABDOMEN:  Soft.

EXTREMITIES:  Clubbing and cyanosis negative.



LABORATORY DATA:  Blood workup as follows; WBC 4.5, hemoglobin 10.5,

hematocrit 32.6 and platelet count 138.  Chemistry shows sodium 135,

potassium 4.9, chloride 102, carbon dioxide 27, anion gap of 11, BUN 30,

creatinine 1.



IMPRESSION:  A 58-year-old female with past medical history of obesity

admitted with depression on the Psychiatry floor, history of diabetes,

hypertension, hyperlipidemia, obesity, history of gastric bypass in the

past, history of pacemaker [facility] [facility], status post interrogation 2 months

ago, normal.  Recent echocardiogram on 05/02/2018 shows normal left

ventricular function, ejection fraction 57%.



RECOMMENDATIONS:  Continue aggressive medical treatment.  [facility] status is

stable.  Continue antihypertensive medication with holding parameters as

ordered yesterday and [facility] status is stable.  Continue psych medications. 

We will sign off and glad to follow p.r.n.



Thank you, Dr. Guerra, for providing us the opportunity in taking care

of the patient, Shanna Gruber.  We will glad to follow p.r.n.





__________________________________________

Marcus Anderson MD



DD:  10/23/2018 10:35:50

DT:  10/23/2018 11:16:32

Job # [id number] No complaints

## 2018-10-24 VITALS
RESPIRATION RATE: 20 BRPM | DIASTOLIC BLOOD PRESSURE: 64 MMHG | SYSTOLIC BLOOD PRESSURE: 104 MMHG | TEMPERATURE: 98.1 F | HEART RATE: 60 BPM

## 2018-10-24 RX ADMIN — PANTOPRAZOLE SODIUM SCH MG: 40 TABLET, DELAYED RELEASE ORAL at 06:01

## 2018-10-24 RX ADMIN — THERA TABS SCH TAB: TAB at 08:12

## 2018-10-24 RX ADMIN — FLUTICASONE PROPIONATE SCH SPR: 50 SPRAY, METERED NASAL at 09:11

## 2018-10-24 RX ADMIN — INSULIN HUMAN SCH: 100 INJECTION, SOLUTION PARENTERAL at 12:01

## 2018-10-24 RX ADMIN — INSULIN HUMAN SCH: 100 INJECTION, SOLUTION PARENTERAL at 08:12

## 2018-10-24 NOTE — PCM.PYCHDC
Mental Status Examination





- Mental Status Examination


Orientation: Person, Place, Situation, Time


Memory: Intact


Mood: Neutral


Affect: Broad


Speech: Soft


Attention: WNL


Concentration: WNL


Association: WNL


Fund of Knowledge: WNL


Formal Thought Process: No Impairment


Description of patient's judgement and insight: 





Pt has improved insight into mental and medical illness, pt was compliant with 

medications and unit rules and regulations, pt was going to groups, was calm, 

cooperative, socially appropriate, no behavioral incidents, no agitation, no 

aggression.





Psychotic Thoughts and Behaviors: 





Pt denied v/a/t hallucinations, denied paranoid ideations, pt does not appear to

be psychotic, and thought process is goal directed. 





Suicidal Ideation: No


Current Homicidal Ideation?: No


Plan: 





pt adamantly denied thoughts of harming self or others denied intent or plan.





Discharge Summary





- Discharge Note


Reason for Hospitalization: 





pt was admitted for evaluation and stabilization of depressive symptoms, 

hopelessness, inability to function.


Psychiatric History (includes Medical, Family, Personal Hx): see HPI


Laboratory Data: 





                              Abnormal Lab Results











  10/23/18 10/24/18 10/24/18





  21:16 07:34 11:16


 


POC Glucose (mg/dL)  125 H  79  79











Consultations:: List each consultation separately and include:  1. Reason for 

request.  2. Findings.  3. Follow-up


Consultations: 





medical consult appreciated


Gastroenterology consultation appreciated


medical f/u appreciated over the weekend october 10/20-10/21/18


Summary of Hospital Course include:: 1. Description of specific treatment plan 

utilized for patients during their course of treatmen.  2. Summarize the time-

course for resolution of acute symptoms and/or regressed behaviors.  3. Describe

issues identified and worked on during hospitalization.  4. Describe medication 

utilized.  5. Describe medical problems identified and treated.  6. Reassessment

of suicide risk


Summary of Hospital Course: 


shortly pt is shortly, patient is 58 year old  female, long history of 

depression as well as anxiety, 3 previous hospitalizations at age of 52, 54, 58,

denied history of suicidal attempts, recently moved to [place] from [place] to live 

with her daughter who has 5 kids, pt recently moved out from her daughter's 

apartment because of the conflict with her, pt currently lives independently in 

[place], pt has multiple medical issues including MS,Diabetes, hypertension, 

Pacemaker, CHF, asthma, CVA, pt brought herself to the hospital for evaluation 

and stabilization of depressive symptoms, feeling of hopelessness, passive wish 

to be dead, patient reported that she was noncompliant with the medications Effe

xor/Klonopin because she was not sure if she has insurance or not, patient 

requires further evaluation and stabilization and medication resumption and 

titration.





this writer is very familiar with this pt from the previous psych admission 

which took place here in [place] in July 2018. as per record pt was in the ED 

multiple times with multiple somatic complaints of chest pain, body pain, 

headaches, pt said that "I know that my chest pain was due to my anxiety", since

the last admission pt was in ED 10times, obviously pt was not doing well. 





please see admission notes for more detailed information.














                                 10/11/18 11:20 





                                 10/11/18 11:20 





                                   Lab Results





10/12/18 07:55: Free T4 1.05, TSH 3rd Generation 1.40


10/12/18 07:55: Fasting Glucose 94, Triglycerides 107, Cholesterol 120 L, LDL 

Cholesterol Direct 46, HDL Cholesterol 46


10/12/18 07:10: POC Glucose (mg/dL) 92


10/11/18 21:49: POC Glucose (mg/dL) 103


10/11/18 11:20: Alcohol, Quantitative < 10


10/11/18 11:20: Urine Opiates Screen Negative, Urine Methadone Screen Negative, 

Ur Barbiturates Screen Positive H, Ur Phencyclidine Scrn Negative, Ur 

Amphetamines Screen Negative, U Benzodiazepines Scrn Negative, U Oth Cocaine 

Metabols Negative, U Cannabinoids Screen Negative


10/11/18 11:20: Sodium 139, Potassium 3.9, Chloride 106, Carbon Dioxide 27, 

Anion Gap 10, BUN 12, Creatinine 0.6 L, Est GFR ( Amer) > 60, Est GFR 

(Non-Af Amer) > 60, Random Glucose 127 H, Calcium 8.4, Magnesium 1.7, Total 

Bilirubin 0.2, AST 25, ALT 34, Alkaline Phosphatase 127 H, Lactate Dehydrogenase

384, Total Creatine Kinase 39, Troponin I < 0.01, Total Protein 6.2, Albumin 

3.3, Globulin 2.9, Albumin/Globulin Ratio 1.1


10/11/18 11:20: Urine Color Yellow, Urine Appearance Clear, Urine pH 6.0, Ur 

Specific Gravity 1.010, Urine Protein Negative, Urine Glucose (UA) Negative, 

Urine Ketones Negative, Urine Blood Negative, Urine Nitrate Negative, Urine 

Bilirubin Negative, Urine Urobilinogen 0.2, Ur Leukocyte Esterase Trace H, Urine

RBC 0 - 2, Urine WBC 1 - 3, Ur Epithelial Cells 4 - 5, Urine Bacteria Few


10/11/18 11:20: PT 12.2, INR 1.06, APTT 29.2


10/11/18 11:20: WBC 5.1, RBC 3.39 L, Hgb 10.2 L, Hct 32.1 L, MCV 94.7, MCH 30.1,

MCHC 31.8, RDW 12.9, Plt Count 206, MPV 10.4, Gran % 68.3 H, Lymph % (Auto) 

23.5, Mono % (Auto) 3.5, Eos % (Auto) 4.5, Baso % (Auto) 0.2, Gran # 3.49, Lymph

# (Auto) 1.2, Mono # (Auto) 0.2, Eos # (Auto) 0.2, Baso # (Auto) 0.01








Vital Signs











  Temp Pulse Pulse Resp BP Pulse Ox


 


 10/12/18 07:13  97.8 F  61   20  155/93 H 


 


 10/12/18 07:00   61    155/93 H 


 


 10/11/18 17:33    65  18  


 


 10/11/18 15:09   98 H   18  142/78  97


 


 10/11/18 13:04   63   18  143/78  98


 


 10/11/18 09:38  99.3 F  64   18  149/84  98








patient was stabilized on the following medications:


clonazepam 1mg po bid for anxiety


atorvastatin 40mg daily


ASA 81mg po daily


zofran 8mg po daily


neurontin 300mg po tid


sonata d/c,ativan d/c


seroquel 50mg po hs for mood stablization


effexor 150+37.5mg po daily for depression and anxiety


vistaril 50mg bid prn for anxiety





patient tolerated medications well, no side effects observed or reported, but 

patient has tendency of asking for more benzodiazepines, vistaril, this writer 

discontinue Ativan as well as decrease dose of Vistaril due to patient 

somnolence.





aims 0, no EPS.





Over the course of this hospitalization pt was attending groups, pt also had 

medication management, had therapeutic milieu. 


Overall pt improved significantly, pt's affect became brighter, pt was less 

depressed, has realistic future oriented plans, pt also does not appear to be 

psychotic, or anxious, pt was socially appropriate, no behavioral issues, pts 

insight improved as well and soon pt deemed to be ready for discharge. 





At the time of the discharge pt denied been depressed, denied thoughts of 

harming self or others, denied psychotic symptoms, and pt does not appeared to 

be psychotic, denied been anxious, pt is not in  imminent danger to self or 

others, pt was referred for outpatient treatment, information about follow up 

appointment, time and address provided to the pt, it is patient responsibility 

to follow up with outpatient clinic, PMD as well as specialists (see  note for

more detailed information).


In case pt will need to obtain results of studies pending at discharge pt was 

provided with contact information of Psychiatric Inpatient unit [phone] as

well as Medical Record Department [phone].


Naltrexone treatment not indicated at this time. 


Counseling about smoking and alcohol cessation provided


AA meetings as well as smoking cessation treatment program information was 

provided by the 


pt was provided with prescriptions for all of medications (please see medication

reconciliation form)


Pt was educated about safety plan in case of worsening of  symptoms or in case 

of suicidal or homicidal ideation call 911 or go to the nearest ER, also was 

educated to take meds as prescribed and stay away from drugs, pt verbalized 

understanding.























- Diagnosis


(1) MDD (major depressive disorder)


Current Visit: Yes   Status: Chronic   Priority: High   





(2) Anxiety


Current Visit: No   Status: Acute   Priority: High   





- Final Diagnosis (DSM 5)


Condition upon Discharge: IMPROVED


Disposition: HOME/ ROUTINE


Follow-up Treatment Plan: 


At the time of the discharge pt denied been depressed, denied thoughts of 

harming self or others, denied psychotic symptoms, and pt does not appeared to 

be psychotic, denied been anxious, pt is not in  imminent danger to self or 

others, pt was referred for outpatient treatment, information about follow up 

appointment, time and address provided to the pt, it is patient responsibility 

to follow up with outpatient clinic, PMD as well as specialists (see  note for

more detailed information).


In case pt will need to obtain results of studies pending at discharge pt was 

provided with contact information of Psychiatric Inpatient unit [phone] as

well as Medical Record Department [phone].


Naltrexone treatment not indicated at this time. 


Counseling about smoking and alcohol cessation provided


AA meetings as well as smoking cessation treatment program information was 

provided by the 


pt was provided with prescriptions for all of medications (please see medication

reconciliation form)


Pt was educated about safety plan in case of worsening of  symptoms or in case 

of suicidal or homicidal ideation call 911 or go to the nearest ER, also was 

educated to take meds as prescribed and stay away from drugs, pt verbalized 

understanding.


Prescriptions/Medication Reconciliation: 


amLODIPine [Norvasc] 5 mg PO DAILY #7 tab


Aspirin [Ecotrin] 81 mg PO DAILY #7 tabec


Atorvastatin [Lipitor] 40 mg PO DIN #7 tab


clonazePAM [Klonopin] 1 mg PO BID #30 tab


Fluticasone Nasal [Flonase] 1 actuation NS DAILY #1 spr


Folic Acid 1 mg PO DAILY #14 tab


Gabapentin [Neurontin] 300 mg PO TID #45 cap


hydrOXYzine Pamoate [Vistaril] 50 mg PO BID PRN #30 cap


 PRN Reason: Anxiety


Ibuprofen [Motrin Tab] 800 mg PO Q6H PRN #30 tab


 PRN Reason: Pain, Moderate (4-7)


Lidocaine 5% [Lidoderm] 1 ea TD DAILY #7 patch


Loperamide [Imodium] 4 mg PO DAILY #7 cap


Losartan [Cozaar] 50 mg PO DAILY #7 tab


Multivitamin Therapeutic Tab [Thera Tab] 1 tab PO 0800 #14 tab


Pantoprazole [Protonix EC Tab] 40 mg PO 0600 #7 ect


Quetiapine Fumarate [Seroquel] 50 mg PO HS #14 tablet


Venlafaxine [Effexor] 150 mg PO DAILY #14 tab


Venlafaxine [Effexor] 37.5 mg PO DAILY #14 tab





- Smoking Cessation


Smoking Cessation Medication prescribed: No


Reason for not providing: denied smoking





- Antipsychotic Medications


Pt discharged on 2 or more routine antipsychotic medications: No

## 2018-10-24 NOTE — PN
DATE:  10/24/2018



SUBJECTIVE:  I saw her sitting out of bed to chair.  She is getting ready

for breakfast.  She is in good spirits.  No complaints today.  She is doing

well.  She tells us she is being discharged from the psychiatric floor. 

She got her medications here, the same as it was in the hospital and at

home.  Nothing is going to change medically.



PHYSICAL EXAMINATION:

VITAL SIGNS:  She has a 98.1 temperature, 60 pulse, 104/64 blood pressure,

20 respiratory rate.

HEENT:  Head is atraumatic, normocephalic.

HEART:  Regular rate.

LUNGS:  Clear to auscultation.

ABDOMEN:  Soft, obese, and nontender.

EXTREMITIES:  No edema.



LABORATORY DATA:  She had a 4.5 white count, 10.5 hemoglobin, 32.6

hematocrit with 138 platelets on 10/22/2018.  Last blood sugar was 100.



She will drink more water.  Follow up with Psychiatry on the outpatient. 

Take her medications .



Thank you for letting me participate .







__________________________________________

Brian Stanley DO



DD:  10/24/2018 9:10:46

DT:  10/24/2018 11:54:45

Job # [id number]

MTDFLORY

## 2018-11-01 ENCOUNTER — HOSPITAL ENCOUNTER (EMERGENCY)
Dept: HOSPITAL 14 - H.ER | Age: 58
LOS: 1 days | Discharge: HOME | End: 2018-11-02
Payer: MEDICARE

## 2018-11-01 VITALS — BODY MASS INDEX: 36.3 KG/M2

## 2018-11-01 DIAGNOSIS — G35: ICD-10-CM

## 2018-11-01 DIAGNOSIS — R41.82: Primary | ICD-10-CM

## 2018-11-01 LAB
ALBUMIN SERPL-MCNC: 3.3 G/DL (ref 3.5–5)
ALBUMIN/GLOB SERPL: 1 {RATIO} (ref 1–2.1)
ALT SERPL-CCNC: 27 U/L (ref 9–52)
APAP SERPL-MCNC: < 10 UG/ML (ref 10–30)
APTT BLD: 34 SECONDS (ref 25.6–37.1)
AST SERPL-CCNC: 24 U/L (ref 14–36)
BASE EXCESS BLDV CALC-SCNC: 1.7 MMOL/L (ref 0–2)
BASOPHILS # BLD AUTO: 0 K/UL (ref 0–0.2)
BASOPHILS NFR BLD: 0.5 % (ref 0–2)
BILIRUB UR-MCNC: NEGATIVE MG/DL
BNP SERPL-MCNC: 704 PG/ML (ref 0–900)
BUN SERPL-MCNC: 20 MG/DL (ref 7–17)
CALCIUM SERPL-MCNC: 8.6 MG/DL (ref 8.4–10.2)
COLOR UR: YELLOW
EOSINOPHIL # BLD AUTO: 0.1 K/UL (ref 0–0.7)
EOSINOPHIL NFR BLD: 5.3 % (ref 0–4)
ERYTHROCYTE [DISTWIDTH] IN BLOOD BY AUTOMATED COUNT: 13.3 % (ref 11.5–14.5)
GFR NON-AFRICAN AMERICAN: 57
GLUCOSE UR STRIP-MCNC: (no result) MG/DL
HDLC SERPL-MCNC: 52 MG/DL (ref 30–70)
HGB BLD-MCNC: 8.9 G/DL (ref 12–16)
INR PPP: 1.1
LDLC SERPL-MCNC: 68 MG/DL (ref 0–129)
LEUKOCYTE ESTERASE UR-ACNC: (no result) LEU/UL
LYMPHOCYTES # BLD AUTO: 0.8 K/UL (ref 1–4.3)
LYMPHOCYTES NFR BLD AUTO: 31.9 % (ref 20–40)
MCH RBC QN AUTO: 30.7 PG (ref 27–31)
MCHC RBC AUTO-ENTMCNC: 32.2 G/DL (ref 33–37)
MCV RBC AUTO: 95.3 FL (ref 81–99)
MONOCYTES # BLD: 0.2 K/UL (ref 0–0.8)
MONOCYTES NFR BLD: 8.6 % (ref 0–10)
NEUTROPHILS # BLD: 1.4 K/UL (ref 1.8–7)
NEUTROPHILS NFR BLD AUTO: 53.7 % (ref 50–75)
NRBC BLD AUTO-RTO: 0.1 % (ref 0–0)
PCO2 BLDV: 45 MMHG (ref 40–60)
PH BLDV: 7.39 [PH] (ref 7.32–7.43)
PH UR STRIP: 6 [PH] (ref 5–8)
PLATELET # BLD: 169 K/UL (ref 130–400)
PMV BLD AUTO: 8.8 FL (ref 7.2–11.7)
PROT UR STRIP-MCNC: NEGATIVE MG/DL
PROTHROMBIN TIME: 13 SECONDS (ref 9.8–13.1)
RBC # BLD AUTO: 2.89 MIL/UL (ref 3.8–5.2)
RBC # UR STRIP: NEGATIVE /UL
SALICYLATES SERPL-MCNC: < 1 MG/DL
SP GR UR STRIP: 1.01 (ref 1–1.03)
URINE CLARITY: (no result)
UROBILINOGEN UR-MCNC: (no result) MG/DL (ref 0.2–1)
VENOUS BLOOD FIO2: 21 %
VENOUS BLOOD GAS PO2: 40 MM/HG (ref 30–55)
WBC # BLD AUTO: 2.6 K/UL (ref 4.8–10.8)

## 2018-11-01 PROCEDURE — 93005 ELECTROCARDIOGRAM TRACING: CPT

## 2018-11-01 PROCEDURE — 80345 DRUG SCREENING BARBITURATES: CPT

## 2018-11-01 PROCEDURE — 80053 COMPREHEN METABOLIC PANEL: CPT

## 2018-11-01 PROCEDURE — 86900 BLOOD TYPING SEROLOGIC ABO: CPT

## 2018-11-01 PROCEDURE — 84484 ASSAY OF TROPONIN QUANT: CPT

## 2018-11-01 PROCEDURE — 80320 DRUG SCREEN QUANTALCOHOLS: CPT

## 2018-11-01 PROCEDURE — 80361 OPIATES 1 OR MORE: CPT

## 2018-11-01 PROCEDURE — 80324 DRUG SCREEN AMPHETAMINES 1/2: CPT

## 2018-11-01 PROCEDURE — 86850 RBC ANTIBODY SCREEN: CPT

## 2018-11-01 PROCEDURE — 80353 DRUG SCREENING COCAINE: CPT

## 2018-11-01 PROCEDURE — 82948 REAGENT STRIP/BLOOD GLUCOSE: CPT

## 2018-11-01 PROCEDURE — 85025 COMPLETE CBC W/AUTO DIFF WBC: CPT

## 2018-11-01 PROCEDURE — 82803 BLOOD GASES ANY COMBINATION: CPT

## 2018-11-01 PROCEDURE — 94640 AIRWAY INHALATION TREATMENT: CPT

## 2018-11-01 PROCEDURE — 80349 CANNABINOIDS NATURAL: CPT

## 2018-11-01 PROCEDURE — 85610 PROTHROMBIN TIME: CPT

## 2018-11-01 PROCEDURE — 70450 CT HEAD/BRAIN W/O DYE: CPT

## 2018-11-01 PROCEDURE — 81003 URINALYSIS AUTO W/O SCOPE: CPT

## 2018-11-01 PROCEDURE — 83992 ASSAY FOR PHENCYCLIDINE: CPT

## 2018-11-01 PROCEDURE — 71045 X-RAY EXAM CHEST 1 VIEW: CPT

## 2018-11-01 PROCEDURE — 80358 DRUG SCREENING METHADONE: CPT

## 2018-11-01 PROCEDURE — 83880 ASSAY OF NATRIURETIC PEPTIDE: CPT

## 2018-11-01 PROCEDURE — 80346 BENZODIAZEPINES1-12: CPT

## 2018-11-01 PROCEDURE — 80329 ANALGESICS NON-OPIOID 1 OR 2: CPT

## 2018-11-01 PROCEDURE — 85730 THROMBOPLASTIN TIME PARTIAL: CPT

## 2018-11-01 PROCEDURE — 83036 HEMOGLOBIN GLYCOSYLATED A1C: CPT

## 2018-11-01 PROCEDURE — 80061 LIPID PANEL: CPT

## 2018-11-01 PROCEDURE — 99285 EMERGENCY DEPT VISIT HI MDM: CPT

## 2018-11-01 NOTE — ED PDOC
- Laboratory Results


Result Diagrams: 


                                 11/01/18 14:50





                                 11/01/18 14:50





- ECG


O2 Sat by Pulse Oximetry: 92 (improved with oxygen)


Pulse Ox Interpretation: Normal





- Progress


Re-evaluation Time: 23:00


Condition: Improved (Evaluated by psych denies ingestion feels that sxs are 

secondary to MS exacerbation)





Medical Decision Making


Medical Decision Making: 


15:00 


--Patient endorsed to this provider by Dr. Phillips pending CT, labs and 

reevaluation. 

















Disposition





- Clinical Impression


Clinical Impression: 


 Altered mental status, Multiple sclerosis exacerbation








- POA


Present On Arrival: None





- Disposition


Referrals: 


Phani Juarez MD [Medical Doctor] - 


Disposition: Routine/Home


Disposition Time: 23:01


Condition: FAIR


Instructions:  Multiple Sclerosis in Adults


Forms:  [facility] (English)

## 2018-11-01 NOTE — RAD
Date of service: 



11/01/2018



HISTORY:

 Code Stroke 



COMPARISON:

09/20/2018. 



FINDINGS:

The right MediPort terminates at the cavoatrial junction. 



LUNGS:

The lungs are well inflated and clear.



PLEURA:

No pleural effusions or pneumothorax. 



CARDIOVASCULAR:

There is mild cardiomegaly.  There is stable position of left-sided 

permanent pacing device.  Atherosclerotic aortic arch calcifications 

are present. 



OSSEOUS STRUCTURES:

Within normal limits for the patient's age.



VISUALIZED UPPER ABDOMEN:

Normal.



OTHER FINDINGS:

None.



IMPRESSION:

No acute findings.

## 2018-11-01 NOTE — CT
Date of service: 



11/01/2018



PROCEDURE:  CT HEAD WITHOUT CONTRAST.



HISTORY:

mental status changes



COMPARISON:

None available.



TECHNIQUE:

Axial computed tomography images were obtained through the head/brain 

without intravenous contrast.  



Radiation dose:



Total exam DLP = 791.18 mGy-cm.



This CT exam was performed using one or more of the following dose 

reduction techniques: Automated exposure control, adjustment of the 

mA and/or kV according to patient size, and/or use of iterative 

reconstruction technique.



FINDINGS:



HEMORRHAGE:

No intracranial hemorrhage. 



BRAIN:

No mass effect or edema.  Atrophy.  Chronic microvascular ischemic 

changes.  Bilateral basal ganglia lacunar infarctions. 



VENTRICLES:

Unremarkable. No hydrocephalus. 



CALVARIUM:

Unremarkable.



PARANASAL SINUSES:

Mild right maxillary sinus mucosal thickening.



MASTOID AIR CELLS:

Unremarkable as visualized. No inflammatory changes.



OTHER FINDINGS:

None.



IMPRESSION:

No acute intracranial pathology.  Age-related changes.

## 2018-11-01 NOTE — ED PDOC
HPI:STROKE





- Time


Time: 14:31





- Historian


Historian: Patient, EMS





- Chief Complaint


Chief Complaint: Slurred speech





- Onset


Date: 10/31/18 (time and date unclear)





- TPA


Positive for Contraindication: No


Reason tPA is not being Administered: unclear time of onset





- Notes:


Notes:: 





Pt. states she was at a [place].  Bystanders saw her wobbling so EMS called.  She

states her baseline is slurring speech.  Is not new today.  Denies wobbling.  No

nausea, vomit, diarrhea, weakness, abd pain, chest pain, dyspnea, injury to head

or neck.  EMS states pt. was wobbling.  Pt. denies drugs or etoh.  Per EMS, pt. 

with hx of drug abuse and taking more of her meds then usual.  Not suicidal or 

homicidal.  Unclear when pt. symptoms of wobbling started.  Pt. denies wobbling.

 





NIHSS Stroke Scale





- Date/Time Evaluation Performed


Date Performed: 11/01/18


Time Performed: 14:43


When Was NIHSS Performed: Baseline





- How Severe is the Stroke


Level of Consciousness: 1=Drowsy


LOC to Questions: 0=Both comments correct


LOC to commands: 0=Obeys both correctly


Best Gaze: 0=Normal


Visual: 0=No visual loss


Facial: 0=Normal


Motor Arm - Left: 1=Drift noted before 10 sec


Motor Arm - Right: 1=Drift noted before 10 sec


Motor Leg - Left: 1=Drift before 5 sec


Motor Leg - Right: 1=Drift before 5 sec


Limb Ataxia: 1=Present Upper or Lower


Sensory: 0=Normal


Best Language: 0=No aphasia


Dysarthia: 1=Mild to moderate slurring


Extinction & Inattention (Neglect): 0=Normal, no object


Score: 7





rTPA Inclusion/Exclusion





- Refusal of Treatment


Patient Refused Treatment: No





- Inclusion Criteria for Altepase


Patient is 18 years or Older: Yes


The Clinical Diagnosis of Ischemic Stroke That is Causing a Potentially 

Disabling Neurological Deficit: No


Time of Onset is Well Established to be Less Than 270 Minute Before Treatment 

Would Begin: No


Risk/Benefit Discussed With Patient/Family Member Present: No





Past Medical History


Vital Signs: 





                                Last Vital Signs











Temp  98.1 F   11/01/18 14:21


 


Pulse  73   11/01/18 14:21


 


Resp  16   11/01/18 14:21


 


BP  131/52 L  11/01/18 14:21


 


Pulse Ox  92 L  11/01/18 14:21














- Medical History


PMH: Anemia, Anxiety, Arthritis, Asthma, Back Problems, Bipolar Disorder, Cardia

Arrhythmia, CHF, Depression, Fibromyalgia, Fractures (L RIB), HTN, 

Hypercholesterolemia, Kidney Stones, Migraine, Multiple Sclerosis, Osteoporosis


   Denies: Diabetes, Hepatitis, HIV, Chronic Kidney Disease, Seizures, Sexually 

Transmitted Disease





- Surgical History


Surgical History: Cholecystectomy, Pacemaker





- Family History


Family History: States: Unknown Family Hx





- Immunization History


Hx Tetanus Toxoid Vaccination: No





- Home Medications


Home Medications: 


                                Ambulatory Orders











 Medication  Instructions  Recorded


 


Aspirin [Ecotrin] 81 mg PO DAILY #7 tabec 10/24/18


 


Atorvastatin [Lipitor] 40 mg PO DIN #7 tab 10/24/18


 


Fluticasone Nasal [Flonase] 1 actuation NS DAILY #1 spr 10/24/18


 


Folic Acid 1 mg PO DAILY #14 tab 10/24/18


 


Gabapentin [Neurontin] 300 mg PO TID #45 cap 10/24/18


 


Ibuprofen [Motrin Tab] 800 mg PO Q6H PRN #30 tab 10/24/18


 


Lidocaine 5% [Lidoderm] 1 ea TD DAILY #7 patch 10/24/18


 


Loperamide [Imodium] 4 mg PO DAILY #7 cap 10/24/18


 


Losartan [Cozaar] 50 mg PO DAILY #7 tab 10/24/18


 


Multivitamin Therapeutic Tab 1 tab PO 0800 #14 tab 10/24/18





[Thera Tab]  


 


Pantoprazole [Protonix EC Tab] 40 mg PO 0600 #7 ect 10/24/18


 


Quetiapine Fumarate [Seroquel] 50 mg PO HS #14 tablet 10/24/18


 


Venlafaxine [Effexor] 37.5 mg PO DAILY #14 tab 10/24/18


 


Venlafaxine [Effexor] 150 mg PO DAILY #14 tab 10/24/18


 


amLODIPine [Norvasc] 5 mg PO DAILY #7 tab 10/24/18


 


clonazePAM [Klonopin] 1 mg PO BID #30 tab 10/24/18


 


hydrOXYzine Pamoate [Vistaril] 50 mg PO BID PRN #30 cap 10/24/18














- Allergies


Allergies/Adverse Reactions: 


                                    Allergies











Allergy/AdvReac Type Severity Reaction Status Date / Time


 


levofloxacin [From Levaquin] Allergy  RASH Verified 11/01/18 14:21














Review of Systems


ROS Statement: Except As Marked, All Systems Reviewed And Found Negative


Neurological: Positive for: Change in Speech





Physical Exam





- Reviewed


Nursing Documentation Reviewed: Yes


Vital Signs Reviewed: Yes





- Physical Exam


Appears: Positive for: Non-toxic, No Acute Distress


Head Exam: Positive for: ATRAUMATIC, NORMAL INSPECTION, NORMOCEPHALIC


Skin: Positive for: Normal Color, Warm, DRY


Eye Exam: Positive for: EOMI, Normal appearance, PERRL


ENT: Positive for: Normal ENT Inspection.  Negative for: Nasal Congestion, 

Pharyngeal Erythema


Neck: Positive for: Normal, Painless ROM, Supple


Cardiovascular/Chest: Positive for: Regular Rate, Rhythm


Respiratory: Positive for: CNT, Normal Breath Sounds


Gastrointestinal/Abdominal: Positive for: Normal Exam, Soft.  Negative for: 

Tenderness


Back: Positive for: Normal Inspection.  Negative for: L CVA Tenderness, R CVA 

Tenderness


Extremity: Positive for: Normal ROM.  Negative for: Tenderness, Pedal Edema


Neurologic/Psych: Positive for: Alert, CNs II-XII, Oriented, Motor/Sensory 

Deficits (4/5 strength all extremities), Other (dsyarthria mild)





- ECG


O2 Sat by Pulse Oximetry: 92 (improved with oxygen)


Pulse Ox Interpretation: Abnormal





- Progress


ED Course And Treament: 





1448:  Dr. Ulloa to fu on labs, imaging, ekg and dispo.   





Disposition





- Clinical Impression


Clinical Impression: 


 Altered mental status








- Patient ED Disposition


Is Patient to be Admitted: Transfer of Care





- Disposition


Disposition Time: 14:49


Condition: FAIR


Patient Signed Over To: Gaudencio Ulloa

## 2018-11-02 VITALS
RESPIRATION RATE: 18 BRPM | SYSTOLIC BLOOD PRESSURE: 123 MMHG | OXYGEN SATURATION: 97 % | DIASTOLIC BLOOD PRESSURE: 71 MMHG | TEMPERATURE: 98 F | HEART RATE: 66 BPM

## 2018-11-02 NOTE — CARD
--------------- APPROVED REPORT --------------





Date of service: 11/01/2018



EKG Measurement

Heart [id number]

MN [id number]

[id number]

[id number]

[id number]



<Conclusion>

Normal sinus rhythm

Prolonged QT

Abnormal ECG

## 2018-11-11 ENCOUNTER — HOSPITAL ENCOUNTER (INPATIENT)
Dept: HOSPITAL 42 - ED | Age: 58
LOS: 2 days | Discharge: HOME | DRG: 313 | End: 2018-11-13
Attending: INTERNAL MEDICINE | Admitting: INTERNAL MEDICINE
Payer: MEDICARE

## 2018-11-11 VITALS — BODY MASS INDEX: 38 KG/M2

## 2018-11-11 DIAGNOSIS — R68.83: ICD-10-CM

## 2018-11-11 DIAGNOSIS — Z82.49: ICD-10-CM

## 2018-11-11 DIAGNOSIS — J06.9: ICD-10-CM

## 2018-11-11 DIAGNOSIS — Z87.891: ICD-10-CM

## 2018-11-11 DIAGNOSIS — E11.22: ICD-10-CM

## 2018-11-11 DIAGNOSIS — Z90.49: ICD-10-CM

## 2018-11-11 DIAGNOSIS — E86.0: ICD-10-CM

## 2018-11-11 DIAGNOSIS — D72.819: ICD-10-CM

## 2018-11-11 DIAGNOSIS — D64.9: ICD-10-CM

## 2018-11-11 DIAGNOSIS — E78.00: ICD-10-CM

## 2018-11-11 DIAGNOSIS — E11.9: ICD-10-CM

## 2018-11-11 DIAGNOSIS — Z90.710: ICD-10-CM

## 2018-11-11 DIAGNOSIS — E66.01: ICD-10-CM

## 2018-11-11 DIAGNOSIS — M81.0: ICD-10-CM

## 2018-11-11 DIAGNOSIS — M79.7: ICD-10-CM

## 2018-11-11 DIAGNOSIS — K21.9: ICD-10-CM

## 2018-11-11 DIAGNOSIS — G35: ICD-10-CM

## 2018-11-11 DIAGNOSIS — N18.3: ICD-10-CM

## 2018-11-11 DIAGNOSIS — N17.9: ICD-10-CM

## 2018-11-11 DIAGNOSIS — Z83.3: ICD-10-CM

## 2018-11-11 DIAGNOSIS — I10: ICD-10-CM

## 2018-11-11 DIAGNOSIS — Z87.442: ICD-10-CM

## 2018-11-11 DIAGNOSIS — K76.0: ICD-10-CM

## 2018-11-11 DIAGNOSIS — I25.10: ICD-10-CM

## 2018-11-11 DIAGNOSIS — F43.10: ICD-10-CM

## 2018-11-11 DIAGNOSIS — F31.9: ICD-10-CM

## 2018-11-11 DIAGNOSIS — I51.7: ICD-10-CM

## 2018-11-11 DIAGNOSIS — R61: ICD-10-CM

## 2018-11-11 DIAGNOSIS — R07.2: Primary | ICD-10-CM

## 2018-11-11 DIAGNOSIS — Z98.84: ICD-10-CM

## 2018-11-11 DIAGNOSIS — Z87.440: ICD-10-CM

## 2018-11-11 DIAGNOSIS — E11.42: ICD-10-CM

## 2018-11-11 DIAGNOSIS — R11.0: ICD-10-CM

## 2018-11-11 DIAGNOSIS — M47.814: ICD-10-CM

## 2018-11-11 DIAGNOSIS — J98.11: ICD-10-CM

## 2018-11-11 DIAGNOSIS — R79.1: ICD-10-CM

## 2018-11-11 DIAGNOSIS — Z95.0: ICD-10-CM

## 2018-11-11 LAB
ALBUMIN SERPL-MCNC: 3.7 G/DL (ref 3–4.8)
ALBUMIN/GLOB SERPL: 1.1 {RATIO} (ref 1.1–1.8)
ALT SERPL-CCNC: 29 U/L (ref 7–56)
APTT BLD: 29.9 SECONDS (ref 25.1–36.5)
AST SERPL-CCNC: 38 U/L (ref 14–36)
BASOPHILS # BLD AUTO: 0.01 K/MM3 (ref 0–2)
BASOPHILS NFR BLD: 0.2 % (ref 0–3)
BUN SERPL-MCNC: 30 MG/DL (ref 7–21)
CALCIUM SERPL-MCNC: 8.9 MG/DL (ref 8.4–10.5)
CK MB SERPL-MCNC: 1.5 NG/ML (ref 0–3.6)
EOSINOPHIL # BLD: 0.1 10*3/UL (ref 0–0.7)
EOSINOPHIL NFR BLD: 1.6 % (ref 1.5–5)
ERYTHROCYTE [DISTWIDTH] IN BLOOD BY AUTOMATED COUNT: 12.7 % (ref 11.5–14.5)
GFR NON-AFRICAN AMERICAN: 31
GRANULOCYTES # BLD: 3.24 10*3/UL (ref 1.4–6.5)
GRANULOCYTES NFR BLD: 71.9 % (ref 50–68)
HGB BLD-MCNC: 9.6 G/DL (ref 12–16)
INR PPP: 1.13
LYMPHOCYTES # BLD: 0.9 10*3/UL (ref 1.2–3.4)
LYMPHOCYTES NFR BLD AUTO: 20.8 % (ref 22–35)
MCH RBC QN AUTO: 29.4 PG (ref 25–35)
MCHC RBC AUTO-ENTMCNC: 32.3 G/DL (ref 31–37)
MCV RBC AUTO: 91.1 FL (ref 80–105)
MONOCYTES # BLD AUTO: 0.3 10*3/UL (ref 0.1–0.6)
MONOCYTES NFR BLD: 5.5 % (ref 1–6)
PLATELET # BLD: 165 10^3/UL (ref 120–450)
PMV BLD AUTO: 9.5 FL (ref 7–11)
PROTHROMBIN TIME: 12.9 SECONDS (ref 9.4–12.5)
RBC # BLD AUTO: 3.26 10^6/UL (ref 3.5–6.1)
TROPONIN I SERPL-MCNC: < 0.01 NG/ML
WBC # BLD AUTO: 4.5 10^3/UL (ref 4.5–11)

## 2018-11-11 NOTE — ED PDOC
Arrival/HPI





- General


Chief Complaint: Chest Pain


Time Seen by Provider: 11/11/18 20:23


Historian: Patient





- History of Present Illness


Narrative History of Present Illness (Text): 


11/11/18 20:25


Shanna Gruber is a 58 year old female, whose past medical history includes 

hypertension, pacemaker, and diabetes, who presents to the Emergency department 

complaining of left-sided chest pain today. Patient denies any fever, chills, s

hortness of breath, nausea, vomiting, diarrhea, urinary symptoms, back pain, 

neck pain, headache, dizziness, or any other complaints.


Symptom Onset: Gradual


Symptom Course: Unchanged


Activities at Onset: Light


Context: Home





Past Medical History





- Provider Review


Nursing Documentation Reviewed: Yes





- Past History


Past History: Non-Contributing





- Infectious Disease


Hx of Infectious Diseases: None





- Tetanus Immunization


Tetanus Immunization: Unknown





- Cardiac


Hx Cardiac Disorders: No


Hx Hypertension: Yes





- Pulmonary


Hx Tuberculosis: No





- Neurological


HX Cerebrovascular Accident: No


Hx Seizures: No





- HEENT


Hx HEENT Disorder: No





- Renal


Hx Renal Disorder: No


Hx Kidney Stones: Yes





- Endocrine/Metabolic


Hx Diabetes Mellitus Type 2: Yes





- Hematological/Oncological


Hx Cancer: No





- Integumentary


Hx Dermatological Disorder: No





- Musculoskeletal/Rheumatological


Hx Arthritis: Yes


Hx Fractures: Yes (L RIB)


Hx Osteoporosis: Yes





- Gastrointestinal


Hx Gastrointestinal Disorders: Yes (COLITIS)





- Genitourinary/Gynecological


Hx Sexually Transmitted Diseases: No





- Psychiatric


Hx Anxiety: Yes


Hx Bipolar Disorder: Yes


Hx Depression: Yes


Hx Substance Use: Yes (see HPI)





- Surgical History


Hx Cholecystectomy: Yes





- Anesthesia


Hx Anesthesia: Yes


Hx Anesthesia Reactions: No


Hx Malignant Hyperthermia: No





Family/Social History





- Physician Review


Nursing Documentation Reviewed: Yes


Family/Social History: Unknown Family HX


Smoking Status: Former Smoker


Hx Alcohol Use: No


Hx Substance Use: Yes (see HPI)


Hx Substance Use Treatment: No





Allergies/Home Meds


Allergies/Adverse Reactions: 


Allergies





levofloxacin [From Levaquin] Allergy (Verified 11/01/18 14:21)


   RASH











Review of Systems





- Physician Review


All systems were reviewed & negative as marked: Yes





- Review of Systems


Constitutional: Normal.  absent: Fevers


Eyes: Normal


ENT: Normal


Respiratory: Normal.  absent: SOB, Cough


Cardiovascular: Chest Pain


Gastrointestinal: Normal.  absent: Abdominal Pain, Diarrhea, Nausea, Vomiting


Genitourinary Female: Normal.  absent: Dysuria, Frequency, Hematuria, Urine 

Output Changes


Musculoskeletal: Normal.  absent: Back Pain, Neck Pain


Skin: Normal.  absent: Rash


Neurological: Normal.  absent: Headache, Dizziness


Endocrine: Normal


Hemo/Lymphatic: Normal


Psychiatric: Normal





Physical Exam


Vital Signs Reviewed: Yes





Vital Signs











  Temp Pulse Resp BP Pulse Ox


 


 11/11/18 21:44  98.5 F  78  18  116/69  97


 


 11/11/18 20:30  98.1 F  74  18  125/87  96











Temperature: Afebrile


Blood Pressure: Normal


Pulse: Regular


Respiratory Rate: Normal


Appearance: Positive for: Well-Appearing, Non-Toxic, Comfortable


Pain Distress: None


Mental Status: Positive for: Alert and Oriented X 3





- Systems Exam


Head: Present: Atraumatic, Normocephalic


Pupils: Present: PERRL


Extroacular Muscles: Present: EOMI


Conjunctiva: Present: Normal


Mouth: Present: Moist Mucous Membranes


Neck: Present: Normal Range of Motion


Respiratory/Chest: Present: Clear to Auscultation, Good Air Exchange.  No: 

Respiratory Distress, Accessory Muscle Use


Cardiovascular: Present: Regular Rate and Rhythm, Normal S1, S2.  No: Murmurs


Abdomen: No: Tenderness, Distention, Peritoneal Signs


Back: Present: Normal Inspection


Upper Extremity: Present: Normal Inspection.  No: Cyanosis, Edema


Lower Extremity: Present: Normal Inspection.  No: Edema


Neurological: Present: GCS=15, CN II-XII Intact, Speech Normal


Skin: Present: Warm, Dry, Normal Color.  No: Rashes


Psychiatric: Present: Alert, Oriented x 3, Normal Insight, Normal Concentration





Medical Decision Making


ED Course and Treatment: 


11/11/18 20:25


Impression:


58 year old female complaining of left-sided chest pain.





Plan:


-- EKG


-- Chest X-ray


-- Labs, cardiac enzymes, D-dimer


-- Urinalysis 


-- Reassess and disposition





Progress Notes:


Reviewed EKG, NSR at 76 bpm. LVH. Non-specific ST/T wave changes. 





11/11/18 21:45


Chest X-ray reviewed, shows pacemaker and chest port-a-catheter, no acute 

processes. 





11/13/18 22:48


Call placed to Dr. Wall's service.





11/12/18 23:55


Multiple calls placed to Dr. Wall's service.





11/12/18 00:06


Case discussed with Dr. Sylvester, who is aware and agrees with plan. Accepts pt in 

to his service. Pt will be admitted to Telemetry for chest pain. Medical 

resident notified. 





- Lab Interpretations


Lab Results: 











                                 11/11/18 21:16 





                                 11/11/18 21:16 





                                   Lab Results





11/11/18 21:16: PT 12.9 H, INR 1.13, APTT 29.9


11/11/18 21:16: Sodium 136, Potassium 4.5, Chloride 105, Carbon Dioxide 23, 

Anion Gap 13, BUN 30 H, Creatinine 1.7 H, Est GFR ( Amer) 37, Est GFR 

(Non-Af Amer) 31, Random Glucose 113 H, Calcium 8.9, Magnesium 1.9, Total 

Bilirubin 0.4, AST 38 H D, ALT 29, Alkaline Phosphatase 127 H D, Lactate 

Dehydrogenase 494, Total Creatine Kinase 739 H, CK-MB (CK-2) Pending, CK-MB (CK-

2) % Pending, Troponin I Pending, Total Protein 7.1, Albumin 3.7, Globulin 3.4, 

Albumin/Globulin Ratio 1.1


11/11/18 21:16: WBC 4.5, RBC 3.26 L, Hgb 9.6 L, Hct 29.7 L, MCV 91.1  D, MCH 

29.4, MCHC 32.3, RDW 12.7, Plt Count 165, MPV 9.5, Gran % 71.9 H, Lymph % (Auto)

20.8 L, Mono % (Auto) 5.5, Eos % (Auto) 1.6, Baso % (Auto) 0.2, Gran # 3.24, 

Lymph # (Auto) 0.9 L, Mono # (Auto) 0.3, Eos # (Auto) 0.1, Baso # (Auto) 0.01








I have reviewed the lab results: Yes





- RAD Interpretation


Radiology Orders: 











11/11/18 20:27


CHEST PORTABLE [RAD] Stat 











: ED Physician





- EKG Interpretation


Interpreted by ED Physician: Yes


Type: 12 lead EKG





- Scribe Statement


The provider has reviewed the documentation as recorded by the Ivelisse Gannon





Provider Scribe Attestation:


All medical record entries made by the Scribe were at my direction and 

personally dictated by me. I have reviewed the chart and agree that the record 

accurately reflects my personal performance of the history, physical exam, 

medical decision making, and the department course for this patient. I have also

 personally directed, reviewed, and agree with the discharge instructions and 

disposition.








Disposition/Present on Arrival





- Present on Arrival


Any Indicators Present on Arrival: No


History of DVT/PE: No


History of Uncontrolled Diabetes: No


Urinary Catheter: No


History of Decub. Ulcer: No


History Surgical Site Infection Following: None





- Disposition


Have Diagnosis and Disposition been Completed?: Yes


Diagnosis: 


 Chest pain





Disposition: HOSPITALIZED


Disposition Time: 00:10


Condition: FAIR

## 2018-11-12 LAB
% IRON SATURATION: 22 % (ref 20–55)
ALBUMIN SERPL-MCNC: 3.4 G/DL (ref 3–4.8)
ALBUMIN/GLOB SERPL: 1.1 {RATIO} (ref 1.1–1.8)
ALT SERPL-CCNC: 32 U/L (ref 7–56)
AST SERPL-CCNC: 38 U/L (ref 14–36)
BASOPHILS # BLD AUTO: 0.01 K/MM3 (ref 0–2)
BASOPHILS NFR BLD: 0.3 % (ref 0–3)
BUN SERPL-MCNC: 28 MG/DL (ref 7–21)
CALCIUM SERPL-MCNC: 8.8 MG/DL (ref 8.4–10.5)
EOSINOPHIL # BLD: 0.1 10*3/UL (ref 0–0.7)
EOSINOPHIL NFR BLD: 2.5 % (ref 1.5–5)
ERYTHROCYTE [DISTWIDTH] IN BLOOD BY AUTOMATED COUNT: 12.9 % (ref 11.5–14.5)
GFR NON-AFRICAN AMERICAN: 33
GRANULOCYTES # BLD: 2.2 10*3/UL (ref 1.4–6.5)
GRANULOCYTES NFR BLD: 59.9 % (ref 50–68)
HDLC SERPL-MCNC: 49 MG/DL (ref 29–60)
HEPATITIS A IGM: NEGATIVE
HEPATITIS B CORE AB: NEGATIVE
HEPATITIS C ANTIBODY: NEGATIVE
HGB BLD-MCNC: 9.3 G/DL (ref 12–16)
IRON SERPL-MCNC: 59 UG/DL (ref 45–180)
LDLC SERPL-MCNC: 63 MG/DL (ref 0–129)
LYMPHOCYTES # BLD: 1.2 10*3/UL (ref 1.2–3.4)
LYMPHOCYTES NFR BLD AUTO: 31.3 % (ref 22–35)
MCH RBC QN AUTO: 29.6 PG (ref 25–35)
MCHC RBC AUTO-ENTMCNC: 32.2 G/DL (ref 31–37)
MCV RBC AUTO: 92 FL (ref 80–105)
MONOCYTES # BLD AUTO: 0.2 10*3/UL (ref 0.1–0.6)
MONOCYTES NFR BLD: 6 % (ref 1–6)
PLATELET # BLD: 149 10^3/UL (ref 120–450)
PMV BLD AUTO: 9.3 FL (ref 7–11)
RBC # BLD AUTO: 3.14 10^6/UL (ref 3.5–6.1)
TIBC SERPL-MCNC: 272 UG/DL (ref 265–497)
WBC # BLD AUTO: 3.7 10^3/UL (ref 4.5–11)

## 2018-11-12 RX ADMIN — INSULIN HUMAN SCH: 100 INJECTION, SOLUTION PARENTERAL at 11:53

## 2018-11-12 RX ADMIN — INSULIN HUMAN SCH: 100 INJECTION, SOLUTION PARENTERAL at 08:32

## 2018-11-12 RX ADMIN — INSULIN HUMAN SCH: 100 INJECTION, SOLUTION PARENTERAL at 17:24

## 2018-11-12 RX ADMIN — FLUTICASONE PROPIONATE SCH ACTUATION: 50 SPRAY, METERED NASAL at 09:33

## 2018-11-12 RX ADMIN — PANTOPRAZOLE SODIUM SCH MG: 40 TABLET, DELAYED RELEASE ORAL at 05:52

## 2018-11-12 RX ADMIN — INSULIN HUMAN SCH: 100 INJECTION, SOLUTION PARENTERAL at 21:56

## 2018-11-12 NOTE — RAD
Date of service: 



11/11/2018



HISTORY:

 cp 



COMPARISON:

10/11/2018 



FINDINGS:



LUNGS:

No active pulmonary disease.



PLEURA:

No significant pleural effusion identified, no pneumothorax apparent.



CARDIOVASCULAR:

Mild aortic calcification



Mild cardiomegaly no pulmonary vascular congestion. 



OSSEOUS STRUCTURES:

No significant abnormalities.



VISUALIZED UPPER ABDOMEN:

Normal.



OTHER FINDINGS:

Port-A-Cath and dual lead pacemaker present



IMPRESSION:

No active disease.

## 2018-11-12 NOTE — CON
DATE:  11/12/2018



SUBJECTIVE:  The patient is a 58-year-old  female who was admitted

on the medical side for evaluation of left-sided chest pain.  The patient

has history of depression, _____.  Discharge summary reviewed.  The patient

was discharged on the following medications:  The patient was on Klonopin 1

mg twice a day.  The patient was on Neurontin 300 mg three times a day,

Vistaril 50 mg twice a day.  The patient was on Seroquel 50 mg at nighttime

and Effexor 150 mg daily plus 37.5.  The patient reported that she was

feeling okay and her mood was fine.  At the same time, the patient reported

that she had verbal altercation with her younger daughter, who requested to

have access to her medical record, but the patient declined that request

and the patient was feeling very upset over that request from the daughter.

The patient reported that she feels depressed because of personal conflict

with the daughter.  The patient reported that over nighttime she is not

sure if she saw some girl or not.  The patient reported that her sleep is

off.  The patient reported that she feels very anxious.  The patient

reported that she fills her medication in [facility], but at present

moment [facility] is closed.  We will call them back and confirm

medication and when was the last time the patient filled that medication. 

The patient reported that she did not follow up with outpatient

psychiatrist for unknown reason, but the patient was referred to [facility].



VITAL SIGNS:  Reviewed.  Temperature 98, pulse is 64, blood pressure 97/62,

respirations 18, oxygen saturation is 96%.



MEDICATIONS:  Reviewed.  The patient is on Tylenol, aspirin, Lipitor,

Klonopin 1 mg twice a day, Plavix, Colace, Flonase, folic acid, gabapentin

300 mg three times a day, heparin, Vistaril 50 mg twice a day, insulin,

Lopressor, Zofran, Protonix, Seroquel 50 mg at nighttime, and sodium

chloride.



LABORATORY DATA:  Labs reviewed.  Toxicology was not done as that order

took place, reports reviewed.



MENTAL STATUS EXAM:  The patient appears to be somewhat confused,

intermittent eye contact.  Mood described I was doing fine, but now I am

not.  Affect was constricted.  Thought process is somewhat circumstantial,

sometimes the patient was answering inappropriate.  Thought content:  The

patient is not sure if she had visual hallucinations overnight.  The

patient reported feeling of hopelessness.  Denied any intent or plan to

kill herself, but the patient was making vague statements, I need to make

decisions.  Insight and judgment seem to be limited.  Impulses are well

controlled.



IMPRESSION:  As per history, the patient has major depressive disorder,

posttraumatic stress disorder, rule out bipolar disorder.  The patient has

history of misusing and abusing benzodiazepines and substance abuse in the

past.



PLAN:  Continue current management.  Continue current medications.  We will

call and confirm medication from [facility].  The patient did not follow

up with outpatient psychiatrist.  Klonopin as well as Neurontin, as well as

Vistaril were started by primary care team.  The patient was on Effexor. 

We will ask medical team if any contraindication for that medication and

that is why it was stopped.  If not, we will resume that medication.  So

far, the patient is not in any acute distress.  We will follow up and

advise accordingly.



Thank you very much for letting me participate in the care of your patient.





__________________________________________

Tamia Guerra MD





DD:  11/12/2018 9:47:04

DT:  11/12/2018 9:54:09

Job # [id number]

## 2018-11-12 NOTE — CP.PCM.HP
History of Present Illness





- History of Present Illness


History of Present Illness: 


Jerry Echevarria DO, PGY-1 Hospitalist Admission History and Physical for Dr. Sylvester's service


CC: chest pain


HPI: Ms. Gruber is a 58 year old female with PMH of HTN, DM2, and PPM placement 

who presents to ED with chest pain that has been worsening over the past day. 

She describes the pain as a sharp, intermittent, burning type pain that is mid-

sternal and does not radiate. She notices that the pain is worse with movement 

and the area is not tender to palpation. She denies any prior episodes of chest 

pain. She endorses feeling nauseated and having chills with diaphoresis since 

the pain started. She reports she was sick with a URI recently and she believes 

the chest pain may be from chest congestion. She denies fever, SOB, dyspnea on 

exertion, orthopnea, cough, and sore throat.





Past Medical Hx: HTN, DM2, PPM placement


Past Surgical Hx: gastric bypass 


Allergies: Levaquin


Home medications: reviewed, as per MAR


Family Hx: mother and father both had HTN and DM2


Social Hx: denies current or prior tobacco, alcohol, or drug use.








Present on Admission





- Present on Admission


Any Indicators Present on Admission: No


History of DVT/PE: No


History of Uncontrolled Diabetes: No


Urinary Catheter: No


Decubitus Ulcer Present: No





Review of Systems





- Constitutional


Constitutional: Chills.  absent: Fever, Headache, Night Sweats





- EENT


Eyes: absent: Blurred Vision





- Cardiovascular


Cardiovascular: Chest Pain, Chest Pain with Activity, Diaphoresis.  absent: 

Dyspnea, Dyspnea on Exertion, Edema, Palpitations





- Respiratory


Respiratory: absent: Cough, Dyspnea





- Gastrointestinal


Gastrointestinal: Abdominal Pain, Nausea.  absent: Diarrhea, Melena, Vomiting





- Genitourinary


Genitourinary: absent: Change in Urinary Stream, Difficulty Urinating





Past Patient History





- Infectious Disease


Hx of Infectious Diseases: None





- Tetanus Immunizations


Tetanus Immunization: Unknown





- Past Social History


Smoking Status: Former Smoker





- CARDIAC


Hx Cardiac Disorders: No


Hx Hypertension: Yes





- PULMONARY


Hx Tuberculosis: No





- NEUROLOGICAL


HX Cerebrovascular Accident: No


Hx Seizures: No





- HEENT


Hx HEENT Problems: No





- RENAL


Hx Chronic Kidney Disease: No


Hx Kidney Stones: Yes





- ENDOCRINE/METABOLIC


Hx Diabetes Mellitus Type 2: Yes





- HEMATOLOGICAL/ONCOLOGICAL


Hx Cancer: No





- INTEGUMENTARY


Hx Dermatological Problems: No





- MUSCULOSKELETAL/RHEUMATOLOGICAL


Hx Arthritis: Yes


Hx Fractures: Yes (L RIB)


Hx Osteoporosis: Yes





- GASTROINTESTINAL


Hx Gastrointestinal Disorders: Yes (COLITIS)





- GENITOURINARY/GYNECOLOGICAL


Hx Sexually Transmitted Disorders: No





- PSYCHIATRIC


Hx Anxiety: Yes


Hx Bipolar Disorder: Yes


Hx Depression: Yes


Hx Substance Use: Yes (see HPI)





- SURGICAL HISTORY


Hx Cholecystectomy: Yes





- ANESTHESIA


Hx Anesthesia: Yes


Hx Anesthesia Reactions: No


Hx Malignant Hyperthermia: No





Meds


Allergies/Adverse Reactions: 


                                    Allergies











Allergy/AdvReac Type Severity Reaction Status Date / Time


 


levofloxacin [From Levaquin] Allergy  RASH Verified 11/01/18 14:21














Physical Exam





- Constitutional


Appears: Non-toxic, No Acute Distress





- Head Exam


Head Exam: ATRAUMATIC, NORMOCEPHALIC





- Eye Exam


Eye Exam: EOMI, Normal appearance, PERRL





- ENT Exam


ENT Exam: Mucous Membranes Moist





- Neck Exam


Neck exam: Positive for: Full Rom, Normal Inspection





- Respiratory Exam


Respiratory Exam: Clear to Auscultation Bilateral, NORMAL BREATHING PATTERN.  

absent: Rales, Rhonchi, Wheezes





- Cardiovascular Exam


Cardiovascular Exam: REGULAR RHYTHM, RRR, +S1, +S2.  absent: Diastolic murmur, 

Gallop, Rubs, Systolic Murmur


Additional comments: 





no chest wall tendereness to palpation





- GI/Abdominal Exam


GI & Abdominal Exam: Normal Bowel Sounds, Soft.  absent: Guarding, Rebound, 

Tenderness





- Extremities Exam


Extremities exam: Positive for: normal inspection.  Negative for: pedal edema





- Back Exam


Back exam: FULL ROM, NORMAL INSPECTION





- Neurological Exam


Neurological exam: Alert, Oriented x3





- Psychiatric Exam


Psychiatric exam: Normal Affect, Normal Mood





- Skin


Skin Exam: Dry, Intact, Warm





Results





- Vital Signs


Recent Vital Signs: 





                                Last Vital Signs











Temp  97.8 F   11/12/18 00:42


 


Pulse  81   11/12/18 00:42


 


Resp  15   11/12/18 00:42


 


BP  128/62   11/12/18 00:42


 


Pulse Ox  100   11/12/18 00:42














- Labs


Result Diagrams: 


                                 11/11/18 21:16





                                 11/11/18 21:16


Labs: 





                         Laboratory Results - last 24 hr











  11/11/18 11/11/18 11/11/18





  21:16 21:16 21:16


 


WBC  4.5  


 


RBC  3.26 L  


 


Hgb  9.6 L  


 


Hct  29.7 L  


 


MCV  91.1  D  


 


MCH  29.4  


 


MCHC  32.3  


 


RDW  12.7  


 


Plt Count  165  


 


MPV  9.5  


 


Gran %  71.9 H  


 


Lymph % (Auto)  20.8 L  


 


Mono % (Auto)  5.5  


 


Eos % (Auto)  1.6  


 


Baso % (Auto)  0.2  


 


Gran #  3.24  


 


Lymph # (Auto)  0.9 L  


 


Mono # (Auto)  0.3  


 


Eos # (Auto)  0.1  


 


Baso # (Auto)  0.01  


 


PT    12.9 H


 


INR    1.13


 


APTT    29.9


 


D-Dimer, Quantitative   


 


Sodium   136 


 


Potassium   4.5 


 


Chloride   105 


 


Carbon Dioxide   23 


 


Anion Gap   13 


 


BUN   30 H 


 


Creatinine   1.7 H 


 


Est GFR ( Amer)   37 


 


Est GFR (Non-Af Amer)   31 


 


Random Glucose   113 H 


 


Calcium   8.9 


 


Magnesium   1.9 


 


Total Bilirubin   0.4 


 


AST   38 H D 


 


ALT   29 


 


Alkaline Phosphatase   127 H D 


 


Lactate Dehydrogenase   494 


 


Total Creatine Kinase   739 H 


 


CK-MB (CK-2)   1.5 


 


CK-MB (CK-2) %   Cancelled 


 


Troponin I   < 0.01 


 


Total Protein   7.1 


 


Albumin   3.7 


 


Globulin   3.4 


 


Albumin/Globulin Ratio   1.1 














  11/11/18





  21:44


 


WBC 


 


RBC 


 


Hgb 


 


Hct 


 


MCV 


 


MCH 


 


MCHC 


 


RDW 


 


Plt Count 


 


MPV 


 


Gran % 


 


Lymph % (Auto) 


 


Mono % (Auto) 


 


Eos % (Auto) 


 


Baso % (Auto) 


 


Gran # 


 


Lymph # (Auto) 


 


Mono # (Auto) 


 


Eos # (Auto) 


 


Baso # (Auto) 


 


PT 


 


INR 


 


APTT 


 


D-Dimer, Quantitative  419 H


 


Sodium 


 


Potassium 


 


Chloride 


 


Carbon Dioxide 


 


Anion Gap 


 


BUN 


 


Creatinine 


 


Est GFR ( Amer) 


 


Est GFR (Non-Af Amer) 


 


Random Glucose 


 


Calcium 


 


Magnesium 


 


Total Bilirubin 


 


AST 


 


ALT 


 


Alkaline Phosphatase 


 


Lactate Dehydrogenase 


 


Total Creatine Kinase 


 


CK-MB (CK-2) 


 


CK-MB (CK-2) % 


 


Troponin I 


 


Total Protein 


 


Albumin 


 


Globulin 


 


Albumin/Globulin Ratio 














Assessment & Plan





- Assessment and Plan (Free Text)


Assessment: 





57 yo F with PMH of HTN, DM2, PPM placement is admitted for chest pain and 

subsequent ACS r/o.


Plan: 





1. Chest pain


May be 2/2 GERD vs stable angina 


Low suspicion for ACS


D-dimer elevated, will get VQ scan in AM


Trend troponin q6h x 2


Repeat EKG in AM


Continue home ASA, lipitor


Will start plavix





2. REID


Likely 2/2 dehydration


Patient admits to poor oral intake today as she has felt nauseated


CK also elevated


Will start NS at 125 cc/hr for replacement


Continue to monitor BUN/Cr





3. Hx HTN


D/c cozaar for now given REID


Will start lopressor 25 mg BID





4. Hx DM2


ISS while admitted


Point of care fingerstick glucose ACHS





DVT/GI PPX: SC heparin, protonix


Full Code 


HH, consistent CHO diet 


Monitor on telemetry 





Case and plan reviewed and discussed with my attending Dr. Nga Echevarria, DO 


IM Resident PGY-1

## 2018-11-12 NOTE — NM
Date of service: 



11/12/2018



COMPARISON:

November 11, 2018.  Single-view chest



TECHNIQUE:

31.3 mCi technetium 99-m  DTPA aerosol. 



3.4 mCI technetium 99-m MAA administered intravenously.



FINDINGS:



VENTILATION COMPONENT:

Heterogeneous ventilation. Retention of radionuclide in the 

tracheobronchial tree and ingestion of radionuclide in the stomach,  

incidental findings 



PERFUSION COMPONENT:

Heterogeneous distribution of radionuclide. No geographic, segmental, 

lobar abnormalities apparent on the present examination.



Incidental finding(s): Photon deficient area corresponds to the 

[id number] in the right eliceo thorax



IMPRESSION:

Low probability ventilation perfusion scan for pulmonary embolism.

## 2018-11-12 NOTE — CON
DATE:  11/12/2018



CARDIOLOGY FOLLOWUP



HISTORY OF PRESENT ILLNESS:  The patient is a 58-year-old woman who

presented with chest pain.



The patient cannot give adequate history right now.  The patient is

currently sedated on her antidepressant medications.  She does suffer from

hypertension, diabetes mellitus and hypercholesterolemia.



REVIEW OF SYSTEMS:  Not available.



PHYSICAL EXAMINATION:

VITAL SIGNS:  Blood pressure 138/75 and heart rates in the 80s.

NECK:  Negative JVD.

LUNGS:  Without rales.

HEART:  S1 and S2.

EXTREMITIES:  Without edema.



LABORATORY DATA:  EKG shows no acute changes.



Hemoglobin is 9.3.  Troponins are negative x3.



IMPRESSION:

1.  Reported chest pain.

2.  No evidence for acute coronary syndrome.

3.  Hypertension.

4.  Hypercholesterolemia.

5.  Diabetes mellitus.

6.  Depression.

7.  Lethargy secondary to her medications.



PLAN:  Given these findings, there is no evidence for acute coronary

syndrome.  I have discussed with the nurses who will contact Psychiatry

about altering her medications to avoid the heavy sedation _____

medications currently causing her.







__________________________________________

Moe Wan MD





DD:  11/12/2018 13:32:47

DT:  11/12/2018 13:36:12

Job # [id number]

## 2018-11-12 NOTE — CARD
--------------- APPROVED REPORT --------------





Date of service: 11/12/2018



EKG Measurement

Heart [id number]

[place] [id number]

[id number]

[id number]

[id number]



<Conclusion>

Normal sinus rhythm with sinus arrhythmia

Nonspecific T wave abnormality

Abnormal ECG

## 2018-11-12 NOTE — CARD
--------------- APPROVED REPORT --------------





Date of service: 11/11/2018



EKG Measurement

Heart [id number]

KS [id number]

[id number]

[id number]

[id number]



<Conclusion>

Normal sinus rhythm

Minimal voltage criteria for LVH, may be normal variant

Nonspecific ST and T wave abnormality

Abnormal ECG

## 2018-11-12 NOTE — HP
HISTORY OF PRESENT ILLNESS:  The patient is a 58-year-old obese female

presented to the [facility] emergency room complaining of chest

pain, left axillary pain, left arm pain.  The patient came to the emergency

room by the [facility] ambulance.  The patient was examined

and seen.  The patient's vital signs, diagnostic data reviewed.  Please

refer to the detailed history and physical examination by the medical

resident for further details.  The patient was seen and examined with the

medical resident.



IMPRESSION AND PLAN:

1.  Left-sided chest wall pain which is palpable and reproducible.

2.  History of multiple sclerosis.

3.  History of hypertension.

4.  Leukopenia, anemia.

5.  Elevated D-dimer of 419.

6.  Acute kidney injury with underlying chronic kidney disease stage 3.

7.  Mild transaminitis.

8.  Cardiomegaly.

9.  Questionable left ventricular hypertrophy.

10.  Morbid obesity.

11.  History of iron-deficiency anemia.

12.  History of depression.

13.  History of vitamin B12 deficiency.

14.  History of positive MARYANN homogeneous type.

15.  History of Escherichia coli urinary tract infection.

16.  History of Klebsiella pneumoniae urinary tract infection.

17.  History of positive MARYANN, homogeneous titer.

18.  History of cholecystectomy.

19.  History of fatty liver and hepatic steatosis.

20.  Status post permanent pacemaker implant and right upper chest

Port-A-Cath placement.

21.  Bibasilar atelectasis.

22.  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

23.  Mild splenomegaly.

24.  History of neutropenia.

25.  History of left ventricular ejection fraction of 57%.

26.  History of depression and anxiety.

27.  History of multiple sclerosis exacerbation.

28.  History of extended-spectrum beta-lactamase urinary tract infection.

29.  History of atypical musculoskeletal chest pain.

30.  History of questionable congestive heart failure but normal ejection

fraction on echocardiogram.

31.  History of type 2 diabetes mellitus.

32.  History of colitis.

33.  History of gastric bypass surgery.

34.  History of gastroesophageal reflux.

35.  History of recurrent urinary tract infection.

36.  History of left rib fracture.

37.  History of osteoporosis.

38.  History of fibromyalgia.

39.  History of hysterectomy.

40.  History of former smoker.



PLAN:  At this time, the patient is to be admitted to Telemetry.  The

patient has been consulted with Cardiology, Neurology, Psychiatry.  The

patient has been ordered hepatitis panel because of multiple tattoos on the

skin.  Drug screen ordered.  Vitamin D, iron, ferritin, hemoglobin A1c. 

Repeat CMP, CBC, erythropoietin, hepatitis C titers ordered.  Hepatitis A,

B, C serologies ordered.  The patient has been ordered Colace 100 three

times a day, aspirin 81 daily, Flonase 1 puff daily, folic acid 1 mg daily,

heparin 5000 subcutaneously every 8, regular insulin, medium dose sliding

scale coverage, Klonopin 1 mg twice a day, Lipitor 40 mg daily, Lopressor

25 twice a day, Lovenox 80 mg subcutaneously one dose was given, Neurontin

300 three times a day, Plavix 75 mg daily, Protonix 40 mg daily, Seroquel

50 mg at bedtime, IV fluid 0.9 normal saline at 125 mL an hour, Tylenol

p.o. and suppository p.r.n., Vistaril 50 mg b.i.d. p.r.n., and Zofran 4 mg

IV every 4 p.r.n.  The patient has been ordered V/Q scan, which is pending,

which is ordered for elevated D-dimer of 419.  The patient has been ordered

Neurology, Psychiatry, Cardiology consultation, oxygen 2 liters nasal

cannula has been ordered,  Incentive spirometry, CHRISTIE stockings, SCDs,

physical therapy, occupational therapy, out of bed ordered.  At present,

the patient's further management will be dependent upon the patient's

clinical condition, hemodynamic status and as per patient's response to

therapeutic intervention and as per patient's diagnostic test results and

recommendation by all the physicians involved in the care of the patient.







__________________________________________

Orestes Sylvester MD





DD:  11/12/2018 11:32:47

DT:  11/12/2018 11:36:56

Job # [id number]

## 2018-11-13 VITALS — SYSTOLIC BLOOD PRESSURE: 158 MMHG | HEART RATE: 60 BPM | DIASTOLIC BLOOD PRESSURE: 97 MMHG

## 2018-11-13 VITALS — OXYGEN SATURATION: 97 % | RESPIRATION RATE: 20 BRPM

## 2018-11-13 VITALS — TEMPERATURE: 99.9 F

## 2018-11-13 LAB
ALBUMIN SERPL-MCNC: 3.3 G/DL (ref 3–4.8)
ALBUMIN/GLOB SERPL: 1 {RATIO} (ref 1.1–1.8)
ALT SERPL-CCNC: 24 U/L (ref 7–56)
APPEARANCE UR: CLEAR
AST SERPL-CCNC: 34 U/L (ref 14–36)
BASOPHILS # BLD AUTO: 0 K/MM3 (ref 0–2)
BASOPHILS NFR BLD: 0 % (ref 0–3)
BILIRUB DIRECT SERPL-MCNC: 0.3 MG/DL (ref 0–0.4)
BILIRUB UR-MCNC: NEGATIVE MG/DL
BUN SERPL-MCNC: 20 MG/DL (ref 7–21)
CALCIUM SERPL-MCNC: 8.4 MG/DL (ref 8.4–10.5)
COLOR UR: YELLOW
EOSINOPHIL # BLD: 0.1 10*3/UL (ref 0–0.7)
EOSINOPHIL NFR BLD: 3.5 % (ref 1.5–5)
ERYTHROCYTE [DISTWIDTH] IN BLOOD BY AUTOMATED COUNT: 12.7 % (ref 11.5–14.5)
GFR NON-AFRICAN AMERICAN: > 60
GLUCOSE UR STRIP-MCNC: NEGATIVE MG/DL
GRANULOCYTES # BLD: 1.47 10*3/UL (ref 1.4–6.5)
GRANULOCYTES NFR BLD: 50.8 % (ref 50–68)
HGB BLD-MCNC: 9.3 G/DL (ref 12–16)
LEUKOCYTE ESTERASE UR-ACNC: NEGATIVE LEU/UL
LYMPHOCYTES # BLD: 1.1 10*3/UL (ref 1.2–3.4)
LYMPHOCYTES NFR BLD AUTO: 39.1 % (ref 22–35)
MCH RBC QN AUTO: 29.2 PG (ref 25–35)
MCHC RBC AUTO-ENTMCNC: 31.5 G/DL (ref 31–37)
MCV RBC AUTO: 92.8 FL (ref 80–105)
MONOCYTES # BLD AUTO: 0.2 10*3/UL (ref 0.1–0.6)
MONOCYTES NFR BLD: 6.6 % (ref 1–6)
PH UR STRIP: 6 [PH] (ref 4.7–8)
PLATELET # BLD: 145 10^3/UL (ref 120–450)
PMV BLD AUTO: 9.5 FL (ref 7–11)
PROT UR STRIP-MCNC: NEGATIVE MG/DL
RBC # BLD AUTO: 3.18 10^6/UL (ref 3.5–6.1)
RBC # UR STRIP: NEGATIVE /UL
SP GR UR STRIP: 1.02 (ref 1–1.03)
UROBILINOGEN UR STRIP-ACNC: 0.2 E.U./DL
WBC # BLD AUTO: 2.9 10^3/UL (ref 4.5–11)

## 2018-11-13 RX ADMIN — MAGNESIUM SULFATE IN WATER SCH MLS/HR: 40 INJECTION, SOLUTION INTRAVENOUS at 10:38

## 2018-11-13 RX ADMIN — PANTOPRAZOLE SODIUM SCH MG: 40 TABLET, DELAYED RELEASE ORAL at 06:15

## 2018-11-13 RX ADMIN — MAGNESIUM SULFATE IN WATER SCH MLS/HR: 40 INJECTION, SOLUTION INTRAVENOUS at 13:20

## 2018-11-13 RX ADMIN — INSULIN HUMAN SCH: 100 INJECTION, SOLUTION PARENTERAL at 08:25

## 2018-11-13 RX ADMIN — FLUTICASONE PROPIONATE SCH ACTUATION: 50 SPRAY, METERED NASAL at 10:39

## 2018-11-13 RX ADMIN — INSULIN HUMAN SCH: 100 INJECTION, SOLUTION PARENTERAL at 12:00

## 2018-11-13 NOTE — RAD
Date of service: 



11/12/2018



PROCEDURE:  Radiographs of the chest and bilateral ribs



HISTORY:

pain (ribs)



COMPARISON:

None available. 



TECHNIQUE:

Frontal radiograph of the chest and multiple oblique radiographs of 

the bilateral ribs were obtained.



FINDINGS:



RIGHT RIBS:

No fracture or focal lesion visualized.



LEFT RIBS:

No fracture or focal lesion visualized.



LUNGS:

Clear.



PLEURA:

No pneumothorax or pleural fluid.



CARDIOVASCULAR:

Normal cardiac size. No pulmonary vascular congestion. 



Aortic calcification and tortuosity



OTHER FINDINGS:

None.



IMPRESSION:

Unremarkable radiographs of the chest and bilateral ribs. No rib 

fracture.

## 2018-11-13 NOTE — DS
HISTORY OF PRESENT ILLNESS:  The patient's overnight nurse's notes were

reviewed..  The patient is in room 275 bed two.



Telemetry monitoring shows normal sinus rhythm.



PHYSICAL EXAMINATION:

VITAL SIGNS:  T-max 98.3, heart rate 59-65, blood pressure 155/87,

respirations 20, O2 sat 97%.

GENERAL:  The patient is seen lying in the bed.

HEENT:  Head:  Normocephalic, atraumatic.  HEENT examination shows pinkish

pale conjunctivae.  Anicteric sclerae.  No oropharyngeal lesion.  No neck

rigidity.

CHEST:  Kyphosis.  Positive left upper chest pacemaker.  Positive right

upper chest Port-A-Cath.

LUNGS:  Shows no audible crackles, rales or wheezing.  Questionable

decreased breath sound at the bases, left more than the right.

CARDIOVASCULAR:  S1, S2, regular rhythm.  No audible murmur, gallop or rub

at this time.  Questionable soft systolic murmur left sternal border, right

second intercostal space, left second intercostal space.

ABDOMEN:  Soft, protuberant.  Positive bowel sounds.  Obese abdomen.  No

palpable hepatosplenomegaly.

GENITALIA:  Female.

RECTAL:  Deferred.

EXTREMITIES:  Shows no pitting edema of the lower extremity.  No calf

tenderness.  No Homans' sign.

NEUROLOGIC:  The patient is alert, awake, oriented x3, is able to move

upper and lower extremities without assistance.

GAIT:  Not tested.

MUSCULOSKELETAL:  Shows elevated body mass index.



The patient overnight in the last 24 hours complained of right knee pain,

leg pain, rib cage pain for which the patient underwent x-rays of the ribs,

knee x-rays and venous Doppler of the both lower extremities, the results

of which are reviewed.



DIAGNOSTICS:  From 11/13, WBC 2.9, hemoglobin/hematocrit 9.3 and 29.5,

platelet 145,000.  CMP, LFTs are within normal limit except magnesium is

1.6.  Vitamin D 25-hydroxy 17.1.  Hepatitis A, B, C serologies are

negative.



The patient was seen by cardiologist and psychiatrist.  Their

recommendations were noted.  The patient was cleared for discharge by

Cardiology and Psychiatry.



Venous Doppler of the both lower extremities negative for DVT.  X-ray of

the right knee and the rib series and chest x-ray was negative for any

acute pathology.



FINAL IMPRESSION PLAN AND DISCHARGE DIAGNOSES:

1.  Chest pain, etiology undetermined versus musculoskeletal chest pain

versus rib cage and chest wall pain with palpable chest wall tenderness.

2.  Leukopenia.

3.  Anemia.

4.  Hypovitaminosis D.

5.  Hypokalemia.

6.  Hypomagnesemia.

7.  History of permanent pacemaker implant and right upper chest

Port-A-Cath.

8.  History of multiple sclerosis.

9.  Acute kidney injury.

10.  Chronic kidney disease stage III.

11.  Major depression.

12.  History of post-traumatic stress disorder.

13.  Questionable bipolar disorder.

14.  History of benzodiazepine abuse and misuse.

15.  History of substance abuse.

16.  Hypertensive cardiovascular disease.

17  Hypertension.

18  Morbid obesity.

19.  History of _____.



PLAN:  At this time, the patient is seen and cleared by Cardiology and

Psychiatry.  The patient will be considered for discharge after cleared by

Cardiology, Neurology, Psychiatry.  The patient will be discharged home.



DISCHARGE MEDICATIONS:  The patient's discharge medications are as per the

updated ambulatory orders plus new prescriptions.  The patient will be

given magnesium sulfate rider prior to discharge.  The patient will be

discharged home after cleared by Cardiology, Neurology, Psychiatry.



DISCHARGE FOLLOWUP:  With Dr. Sylvester within 1 week.  Discharge followup with

Neurology within 1 week.  Discharge by medic follow up with Dr. Sylvester

within 1 week.



During this hospitalization, the patient was extensively explained about

the details of her medical condition, diagnostic test results,

recommendation by all the physicians involved in the care of the patient

was extensively explained to the patient in layman's language.  All

questions concerned answered.



Time spent in the discharge process 45 minutes.



Dictated and electronically signed, note read.







__________________________________________

Orestes Sylvester MD





DD:  11/13/2018 10:08:13

DT:  11/13/2018 10:15:59

Job # [id number]

## 2018-11-13 NOTE — US
HISTORY:

Leg pain and swelling. Evaluate for DVT



PHYSICIAN(S):  Moe Domínguez MD.



TECHNIQUE:

Duplex sonography and color-flow Doppler with graded compression were 

used to evaluate the deep venous systems of both lower extremities. 

The exam is somewhat limited by edema and body habitus



FINDINGS:

The visualized deep venous systems of both lower extremities are 

sonographically normal and compressible. Normal wave forms and 

augmentation are seen. There is no sonographic evidence for deep 

venous thrombosis in the visualized segments of both lower 

extremities.



IMPRESSION:

No sonographic evidence for deep venous thrombosis in the visualized 

segments of both lower extremities.

## 2018-11-13 NOTE — RAD
Date of service: 



11/12/2018



PROCEDURE:  Right Knee Radiographs.



HISTORY:

R knee pain



COMPARISON:

None.



FINDINGS:



BONES:

Normal. No fracture. 



JOINTS:

Normal. No osteoarthritis. 



JOINT EFFUSION:

None. 



OTHER FINDINGS:

None.



IMPRESSION:

Normal radiographs of the right knee.

## 2018-11-13 NOTE — CON
DATE OF CONSULTATION:  11/12/2018



NEUROLOGY CONSULTATION



CHIEF COMPLAINT:  History of MS.



HISTORY OF PRESENT ILLNESS:  This is a 58-year-old woman, whom I recently

saw on 10/06/2018 for questionable MS exacerbation and generalized weakness

and fatigue.  She supposedly has history of chronic multiple sclerosis, was

on Copaxone, was not on any disease modifying therapy and has not seen

Neurology for many years, has an appointment with neurologist in [place], has history of fibromyalgia, diabetic peripheral neuropathy,

diabetes, coronary artery disease, bradycardia, status post pacemaker.  At

this time, she comes in for intermittent chest pain over the past week,

sharp in nature, burning type, in the midsternal area, does not radiate and

undergoing evaluation by Cardiology.  She was recently sick, had upper

respiratory tract infection.  Currently, she is seen up in bed, following

commands, moving all extremities.  She does have features of neuropathy. 

On neuro examination, her attention span and thought process are mildly

slow, but otherwise has very flat affect.  We recommend that she goes to

see her neurologist, as she has an appointment in [place] for her to be

placed on disease modifying therapy for questionable multiple sclerosis and

needs an EMG and nerve condition as an outpatient to assess the degree of

diabetic peripheral neuropathy.  Continue with cardiology workup.





PAST MEDICAL HISTORY:  As above.



ALLERGIES:  SHE IS ALLERGIC TO LEVOFLOXACIN.



SOCIAL HISTORY:  No illicit drug use, smoking, or EtOH abuse.



REVIEW OF SYSTEMS:  A 14-point review of system is negative except as per

the HPI.



MEDICATIONS:  Reviewed by nurse per reconciliation sheet.



LABORATORY DATA:  Sodium is 138, potassium 4.3, chloride 106, carbon

dioxide 26.  BUN of 28, creatinine 1.6.  Random glucose of 85.



PHYSICAL EXAMINATION

VITAL SIGNS:  Temperature 98.3, pulse rate 78, blood pressure 138/75,

respiratory rate _____.

GENERAL:  The patient is sitting up in bed, in no acute distress.

HEENT:  Atraumatic and normocephalic.  PERRLA.  Extraocular muscles intact.

NECK:  Supple.  No JVD.  No adenopathy noted.

LUNGS:  Clear to auscultation. No adventitious sounds.

HEART:  S1 and S2.  Normal rate and rhythm.  No murmur, rubs, or gallops.

ABDOMEN:  Soft, nontender, and nondistended.  Bowel sounds present.

EXTREMITIES:  No clubbing.  No cyanosis.  Peripheral pulses 2+ felt

bilaterally.

NEUROLOGIC:  The patient is alert and oriented to person, place, month, and

year.  Recall after 5 minutes is 2/3.  Poor attention span and slow thought

process.  Flat affect.  Cranial nerves II through XII intact.  Motor exam: 

Moves all extremities equally.  No pronator drift seen.  Sensory exam: 

Decreased light touch and pinprick up to the calves bilaterally.  Decreased

vibration of the toes.  DTRs are 2+ throughout, 1 at both knees and ankles.

Coordination:  Finger-to-nose intact.  No dysmetria noted.  Gait is

deferred for now.



IMPRESSION:  This is a 58-year-old woman with past medical history of

supposed chronic multiple sclerosis, was on Copaxone in the past, not on

any disease modifying therapy for now, has not seen Neurology for many

years, has an appointment with a neurologist in [place], history of

fibromyalgia, history of type 2 diabetes mellitus, coronary artery disease,

bradycardia, status post pacemaker, who came in for chest pain.  I was

called to consult since history of multiple sclerosis.  It is unlikely to

assess the degree of multiple sclerosis since she cannot have an MRI due to

her pacemaker, possibly not MRI compatible, though she has features of

diabetic sensorimotor peripheral neuropathy, multifactorial indicating some

fatigue and some possible underlying multiple sclerosis given her poor

balance.  At this time, we will recommend:

1.  Continue with her Cardiology workup for intermittent chest pain.

2.  Keep her blood sugar between 140 to 180 and needs a diabetic diet. 

Recommend weight reduction.

3.  PT/OT as an outpatient for muscle strengthening, coordination, and gait

exercises and follow up with neurologist, since she has an appointment in

[place] for disease modifying therapy.  Her CAT scan of the head did

show some chronic T2 _____hyperintensities making multiple sclerosis

questionable diagnosis, which was seen on her last CAT scan on her last

admission, but was told that she does.  At this time, continue with current

workup.



Thank you for this consult.





__________________________________________

Ildefonso Daniel MD







DD:  11/12/2018 13:30:16

DT:  11/12/2018 14:24:25

Job # [id number]
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
## 2018-11-13 NOTE — PN
DATE:  11/13/2018



CARDIOLOGY FOLLOWUP



SUBJECTIVE:  The patient is free of chest pain.



PHYSICAL EXAMINATION:

VITAL SIGNS:  Blood pressure is 146/95, heart rate is in the 60s.

NECK:  Negative JVD.

LUNGS:  Without rales.

HEART:  Reveals S1, S2.

EXTREMITIES:  Without edema.



LABORATORY DATA:  Magnesium is 1.6, the potassium is 4.3, the hemoglobin is

9.3.



IMPRESSION:

1.  No evidence for acute coronary syndrome.

2.  Hypertension.

3.  Diabetes mellitus.

4.  History of depression.



PLAN:  Given her findings, the patient's cardiac status is stable.  She

states she had a stress test done within the past 6 months that was

unremarkable.



Given these findings, there is no further cardiac workup is necessary at

this time.





__________________________________________

Moe Wan MD





DD:  11/13/2018 13:31:01

DT:  11/13/2018 13:35:49

Job # [id number]

## 2019-03-07 ENCOUNTER — HOSPITAL ENCOUNTER (INPATIENT)
Dept: HOSPITAL 31 - C.ER | Age: 59
LOS: 12 days | Discharge: HOME | DRG: 313 | End: 2019-03-19
Attending: INTERNAL MEDICINE | Admitting: INTERNAL MEDICINE
Payer: MEDICARE

## 2019-03-07 VITALS — BODY MASS INDEX: 36.3 KG/M2

## 2019-03-07 DIAGNOSIS — I25.10: ICD-10-CM

## 2019-03-07 DIAGNOSIS — G35: ICD-10-CM

## 2019-03-07 DIAGNOSIS — R07.89: Primary | ICD-10-CM

## 2019-03-07 DIAGNOSIS — K76.0: ICD-10-CM

## 2019-03-07 DIAGNOSIS — E11.22: ICD-10-CM

## 2019-03-07 DIAGNOSIS — Z86.73: ICD-10-CM

## 2019-03-07 DIAGNOSIS — J45.909: ICD-10-CM

## 2019-03-07 DIAGNOSIS — M79.7: ICD-10-CM

## 2019-03-07 DIAGNOSIS — Z87.891: ICD-10-CM

## 2019-03-07 DIAGNOSIS — I50.9: ICD-10-CM

## 2019-03-07 DIAGNOSIS — N18.9: ICD-10-CM

## 2019-03-07 DIAGNOSIS — E53.8: ICD-10-CM

## 2019-03-07 DIAGNOSIS — Z95.0: ICD-10-CM

## 2019-03-07 DIAGNOSIS — F41.0: ICD-10-CM

## 2019-03-07 DIAGNOSIS — F31.9: ICD-10-CM

## 2019-03-07 DIAGNOSIS — F33.2: ICD-10-CM

## 2019-03-07 DIAGNOSIS — M81.0: ICD-10-CM

## 2019-03-07 DIAGNOSIS — N39.0: ICD-10-CM

## 2019-03-07 DIAGNOSIS — D61.818: ICD-10-CM

## 2019-03-07 DIAGNOSIS — J18.9: ICD-10-CM

## 2019-03-07 DIAGNOSIS — Z98.84: ICD-10-CM

## 2019-03-07 DIAGNOSIS — I13.0: ICD-10-CM

## 2019-03-07 DIAGNOSIS — R45.851: ICD-10-CM

## 2019-03-07 LAB
ALBUMIN SERPL-MCNC: 3.8 G/DL (ref 3.5–5)
ALBUMIN/GLOB SERPL: 1.4 {RATIO} (ref 1–2.1)
ALT SERPL-CCNC: 43 U/L (ref 9–52)
APTT BLD: 32 SECONDS (ref 21–34)
AST SERPL-CCNC: 31 U/L (ref 14–36)
BASOPHILS # BLD AUTO: 0 K/UL (ref 0–0.2)
BASOPHILS NFR BLD: 0.5 % (ref 0–2)
BNP SERPL-MCNC: 138 PG/ML (ref 0–900)
BUN SERPL-MCNC: 20 MG/DL (ref 7–17)
CALCIUM SERPL-MCNC: 8.5 MG/DL (ref 8.6–10.4)
D DIMER: 264 NG/MLDDU (ref 0–243)
EOSINOPHIL # BLD AUTO: 0 K/UL (ref 0–0.7)
EOSINOPHIL NFR BLD: 0.9 % (ref 0–4)
ERYTHROCYTE [DISTWIDTH] IN BLOOD BY AUTOMATED COUNT: 13.1 % (ref 11.5–14.5)
GFR NON-AFRICAN AMERICAN: > 60
HGB BLD-MCNC: 9.7 G/DL (ref 11–16)
INR PPP: 1.3
LYMPHOCYTES # BLD AUTO: 1.3 K/UL (ref 1–4.3)
LYMPHOCYTES NFR BLD AUTO: 34.6 % (ref 20–40)
MCH RBC QN AUTO: 29 PG (ref 27–31)
MCHC RBC AUTO-ENTMCNC: 31.6 G/DL (ref 33–37)
MCV RBC AUTO: 91.7 FL (ref 81–99)
MONOCYTES # BLD: 0.3 K/UL (ref 0–0.8)
MONOCYTES NFR BLD: 7.1 % (ref 0–10)
NEUTROPHILS # BLD: 2.2 K/UL (ref 1.8–7)
NEUTROPHILS NFR BLD AUTO: 56.9 % (ref 50–75)
NRBC BLD AUTO-RTO: 0 % (ref 0–2)
PLATELET # BLD: 146 K/UL (ref 130–400)
PMV BLD AUTO: 9.4 FL (ref 7.2–11.7)
PROTHROMBIN TIME: 14.5 SECONDS (ref 9.7–12.2)
RBC # BLD AUTO: 3.34 MIL/UL (ref 3.8–5.2)
WBC # BLD AUTO: 3.9 K/UL (ref 4.8–10.8)

## 2019-03-07 NOTE — C.PDOC
History Of Present Illness





58 year old female presents with anterior chest pain intermittently for the past

3 days. Patient has Hx of cardiac problem with pace maker and HTN. She now 

reports some SOB, nausea, and vomiting. Denies fever or chills.





Chief Complaint (Nursing): Chest Pain


History Per: Patient


History/Exam Limitations: no limitations


Onset/Duration Of Symptoms: Days, Intermittent Episodes


Current Symptoms Are (Timing): Still Present


Associated Symptoms: Nausea, Dyspnea, Other (Vomiting)


Modifying Factors: None


Exacerbating Factors: None


Alleviating Factors: None


Recent travel outside of the [place]: No





Past Medical History


Reviewed: Historical Data, Nursing Documentation, Vital Signs


Vital Signs: 





                                Last Vital Signs











Temp  100.2 F H  19 19:16


 


Pulse  66   19 19:16


 


Resp  16   19 19:16


 


BP  121/80   19 19:16


 


Pulse Ox  100   19 19:16














- Medical History


PMH: Anemia, Anxiety, Arthritis, Asthma, Back Problems, Bipolar Disorder, Cardia

Arrhythmia, CHF, Depression, Fibromyalgia, Fractures (L RIB), HTN, Hypercholest

erolemia, Kidney Stones, Migraine, Multiple Sclerosis, Osteoporosis, Chronic 

Kidney Disease


   Denies: Diabetes, Hepatitis, HIV, Seizures, Sexually Transmitted Disease


Surgical History: Cholecystectomy, Pacemaker





- [facility] Procedures











BATHING/SHOWERING TECHNIQUES TREATMENT (18)


DRESSING TECHNIQUES TREATMENT (18)


GAIT TRAINING/AMBULAT TREATMENT USING ASSIST EQUIPMENT (18)


GROOMING/PERSONAL HYGIENE TREATMENT (18)


HOME MANAGEMENT TREATMENT (18)


INJECT/INFUSE NEC (14)


THERAPEUTIC EXERCISE TREATMENT OF MUSCULOSK WHOLE (18)








Family History: States: Unknown Family Hx





- Social History


Hx Alcohol Use: No


Hx Substance Use: No (see HPI)





- Immunization History


Hx Tetanus Toxoid Vaccination: No





Review Of Systems


Constitutional: Negative for: Fever, Chills


Cardiovascular: Positive for: Chest Pain


Respiratory: Positive for: Shortness of Breath


Gastrointestinal: Positive for: Nausea, Vomiting


Musculoskeletal: Negative for: Back Pain


Skin: Negative for: Rash


Neurological: Negative for: Weakness, Numbness





Physical Exam





- Physical Exam


Appears: Non-toxic, No Acute Distress


Skin: Normal Color, Warm, Dry


Head: Atraumatic, Normacephalic


Eye(s): bilateral: Normal Inspection


Oral Mucosa: Moist


Neck: Normal, Supple


Chest: Symmetrical, No Tenderness


Cardiovascular: Rhythm Regular


Respiratory: Normal Breath Sounds, No Rales, No Rhonchi, No Wheezing


Gastrointestinal/Abdominal: Soft, No Tenderness


Extremity: Other (Bilateral lower extremity 2+ edema)


Pulses: Left Dorsalis Pedis: Normal, Right Dorsalis Pedis: Normal


Neurological/Psych: Oriented x3, Normal Speech





ED Course And Treatment





- Laboratory Results


Result Diagrams: 


                                 19 19:59





                                 19 19:59


ECG: Interpreted By Me, Viewed By Me


ECG Rhythm: Sinus Rhythm, Nonspecific Changes


ECG Interpretation: No Acute Changes, Abnormal


Interpretation Of ECG: NSR, non-spc T changes, no acute changes, abnormal 

tracings.


Rate From EC


O2 Sat by Pulse Oximetry: 100 (Room air)


Pulse Ox Interpretation: Normal


Progress Note: Blood work, EKG, and CXR ordered. IV fluids, toradol, zofran 

administered, nitro-bid applied.





Disposition


Discussed With Dr.: Janay Guzmán


Doctor Will See Patient In The: Hospital


Counseled Patient/Family Regarding: Diagnosis





- Disposition


Disposition: HOSPITALIZED


Disposition Time: 00:39


Condition: STABLE


Forms:  [facility] (English)





- POA


Present On Arrival: None





- Clinical Impression


Clinical Impression: 


 Chest pain








- Scribe Statement


The provider has reviewed the documentation as recorded by the Scribluisito Mccallum





All medical record entries made by the Scribe were at my direction and 

personally dictated by me. I have reviewed the chart and agree that the record 

accurately reflects my personal performance of the history, physical exam, 

medical decision making, and the department course for this patient. I have also

personally directed, reviewed, and agree with the discharge instructions and 

disposition.

## 2019-03-08 LAB — CK MB SERPL-MCNC: 0.62 NG/ML (ref 0–3.38)

## 2019-03-08 RX ADMIN — NITROGLYCERIN SCH: 20 OINTMENT TOPICAL at 18:30

## 2019-03-08 RX ADMIN — NITROGLYCERIN SCH: 20 OINTMENT TOPICAL at 13:34

## 2019-03-08 RX ADMIN — ENOXAPARIN SODIUM SCH MG: 30 INJECTION SUBCUTANEOUS at 09:03

## 2019-03-08 RX ADMIN — PANTOPRAZOLE SODIUM SCH MG: 40 TABLET, DELAYED RELEASE ORAL at 06:50

## 2019-03-08 RX ADMIN — NITROGLYCERIN SCH EA: 20 OINTMENT TOPICAL at 06:50

## 2019-03-08 RX ADMIN — FLUTICASONE PROPIONATE SCH SPR: 50 SPRAY, METERED NASAL at 11:52

## 2019-03-08 NOTE — RAD
Date of service: 



03/07/2019



PROCEDURE:  CHEST RADIOGRAPH, 1 VIEW



HISTORY:

chest pain



COMPARISON:

09/14/2018.



FINDINGS:

Right-sided MediPort terminates at the cavoatrial junction 



LUNGS:





The lungs are well inflated and clear.



PLEURA:

No pneumothorax or pleural effusion.



CARDIOVASCULAR:

The heart is normal in size.  There is stable position of left-sided 

dual lead permanent pacing device.  There are aortic atherosclerotic 

calcifications present. 



OSSEOUS STRUCTURES:

Within normal limits for the patient's age.



VISUALIZED UPPER ABDOMEN:

Normal.



OTHER FINDINGS:

None. 



IMPRESSION:

No active pulmonary disease.

## 2019-03-08 NOTE — CP.PCM.HP
History of Present Illness





- History of Present Illness


History of Present Illness: 





58 year old female presents with anterior chest pain intermittently for the past

3 days. Patient has Hx of cardiac problem with pace maker and HTN.


PT. HAS BEEN ADMITTED MULTIPLE TIMES FOR CP WITH NEG CARDIAC W/U


ALSO HAS H/O MS WITH EXACERBATION 


H/O HTN/DM/HYPERCHOLESTEROL 





Present on Admission





- Present on Admission


Any Indicators Present on Admission: No





Review of Systems





- Review of Systems


All systems: reviewed and no additional remarkable complaints except (CP)





Past Patient History





- Infectious Disease


Hx of Infectious Diseases: None





- Tetanus Immunizations


Tetanus Immunization: Unknown





- Past Social History


Smoking Status: Former Smoker





- CARDIAC


Hx Cardia Arrhythmia: Yes


Hx Congestive Heart Failure: Yes


Hx Hypercholesterolemia: Yes


Hx Hypertension: Yes


Hx Pacemaker: Yes





- PULMONARY


Hx Asthma: Yes





- NEUROLOGICAL


Hx Migraine: Yes


Hx Multiple Sclerosis: Yes


Hx Seizures: No





- HEENT


Hx HEENT Problems: No





- RENAL


Hx Chronic Kidney Disease: Yes


Hx Kidney Stones: Yes





- ENDOCRINE/METABOLIC


Hx Endocrine Disorders: Yes


Hx Diabetes Mellitus Type 2: Yes





- HEMATOLOGICAL/ONCOLOGICAL


Hx Anemia: Yes


Hx Human Immunodeficiency Virus (HIV): No





- INTEGUMENTARY


Hx Dermatological Problems: No





- MUSCULOSKELETAL/RHEUMATOLOGICAL


Hx Arthritis: Yes


Hx Fractures: Yes (L RIB)


Hx Osteoporosis: Yes





- GASTROINTESTINAL


Hx Gastrointestinal Disorders: Yes


Hx Colitis: Yes





- GENITOURINARY/GYNECOLOGICAL


Hx Sexually Transmitted Disorders: No





- PSYCHIATRIC


Hx Anxiety: Yes


Hx Bipolar Disorder: Yes


Hx Depression: Yes


Hx Substance Use: No (see HPI)





- SURGICAL HISTORY


Hx Cholecystectomy: Yes





- ANESTHESIA


Hx Anesthesia: Yes


Hx Anesthesia Reactions: No


Hx Malignant Hyperthermia: No





Meds


Allergies/Adverse Reactions: 


                                    Allergies











Allergy/AdvReac Type Severity Reaction Status Date / Time


 


levofloxacin [From Levaquin] Allergy  RASH Verified 03/07/19 19:28














Physical Exam





- Head Exam


Head Exam: ATRAUMATIC, NORMAL INSPECTION, NORMOCEPHALIC





- Eye Exam


Eye Exam: EOMI, Normal appearance, PERRL





- ENT Exam


ENT Exam: Mucous Membranes Moist, Normal Exam





- Neck Exam


Neck exam: Positive for: Normal Inspection





- Respiratory Exam


Respiratory Exam: Clear to Auscultation Bilateral, NORMAL BREATHING PATTERN





- Cardiovascular Exam


Cardiovascular Exam: REGULAR RHYTHM





- GI/Abdominal Exam


GI & Abdominal Exam: Normal Bowel Sounds, Soft.  absent: Tenderness





- Extremities Exam


Extremities exam: Positive for: normal inspection





- Back Exam


Back exam: NORMAL INSPECTION





- Neurological Exam


Neurological exam: Alert, CN II-XII Intact, Normal Gait, Oriented x3, Reflexes 

Normal





Results





- Vital Signs


Recent Vital Signs: 





                                Last Vital Signs











Temp  98.1 F   03/08/19 07:00


 


Pulse  60   03/08/19 08:18


 


Resp  18   03/08/19 07:00


 


BP  101/66   03/08/19 07:00


 


Pulse Ox  99   03/08/19 07:15














- Labs


Result Diagrams: 


                                 03/07/19 19:59





                                 03/07/19 19:59


Labs: 





                         Laboratory Results - last 24 hr











  03/07/19 03/07/19 03/07/19





  19:59 19:59 19:59


 


WBC  3.9 L  


 


RBC  3.34 L  


 


Hgb  9.7 L  


 


Hct  30.6 L  


 


MCV  91.7  D  


 


MCH  29.0  


 


MCHC  31.6 L  


 


RDW  13.1  


 


Plt Count  146  


 


MPV  9.4  


 


Neut % (Auto)  56.9  


 


Lymph % (Auto)  34.6  


 


Mono % (Auto)  7.1  


 


Eos % (Auto)  0.9  


 


Baso % (Auto)  0.5  


 


Neut # (Auto)  2.2  


 


Lymph # (Auto)  1.3  


 


Mono # (Auto)  0.3  


 


Eos # (Auto)  0.0  


 


Baso # (Auto)  0.0  


 


PT   14.5 H 


 


INR   1.3 


 


APTT   32 


 


D-Dimer, Quantitative   264 H 


 


Sodium    139


 


Potassium    4.1


 


Chloride    108 H


 


Carbon Dioxide    24


 


Anion Gap    11


 


BUN    20 H


 


Creatinine    0.8


 


Est GFR ( Amer)    > 60


 


Est GFR (Non-Af Amer)    > 60


 


Random Glucose    89


 


Calcium    8.5 L


 


Total Bilirubin    0.7


 


AST    31


 


ALT    43


 


Alkaline Phosphatase    141 H D


 


Total Creatine Kinase   


 


CK-MB (Mass)   


 


Troponin I    < 0.0120


 


NT-Pro-B Natriuret Pep    138


 


Total Protein    6.5


 


Albumin    3.8


 


Globulin    2.7


 


Albumin/Globulin Ratio    1.4


 


Blood Type   


 


Antibody Screen   














  03/07/19 03/08/19





  19:59 06:48


 


WBC  


 


RBC  


 


Hgb  


 


Hct  


 


MCV  


 


MCH  


 


MCHC  


 


RDW  


 


Plt Count  


 


MPV  


 


Neut % (Auto)  


 


Lymph % (Auto)  


 


Mono % (Auto)  


 


Eos % (Auto)  


 


Baso % (Auto)  


 


Neut # (Auto)  


 


Lymph # (Auto)  


 


Mono # (Auto)  


 


Eos # (Auto)  


 


Baso # (Auto)  


 


PT  


 


INR  


 


APTT  


 


D-Dimer, Quantitative  


 


Sodium  


 


Potassium  


 


Chloride  


 


Carbon Dioxide  


 


Anion Gap  


 


BUN  


 


Creatinine  


 


Est GFR ( Amer)  


 


Est GFR (Non-Af Amer)  


 


Random Glucose  


 


Calcium  


 


Total Bilirubin  


 


AST  


 


ALT  


 


Alkaline Phosphatase  


 


Total Creatine Kinase   53


 


CK-MB (Mass)   0.62


 


Troponin I   < 0.0120


 


NT-Pro-B Natriuret Pep  


 


Total Protein  


 


Albumin  


 


Globulin  


 


Albumin/Globulin Ratio  


 


Blood Type  A POSITIVE 


 


Antibody Screen  Negative 














Assessment & Plan


(1) Chest pain


Status: Acute   


Comment: PRELIMINARY CARDIAC W/U   





(2) Multiple sclerosis


Status: Acute   





(3) Panic disorder


Status: Chronic   Priority: Medium

## 2019-03-09 RX ADMIN — Medication SCH TAB: at 09:11

## 2019-03-09 RX ADMIN — NITROGLYCERIN SCH: 20 OINTMENT TOPICAL at 18:12

## 2019-03-09 RX ADMIN — NITROGLYCERIN SCH: 20 OINTMENT TOPICAL at 00:20

## 2019-03-09 RX ADMIN — PANTOPRAZOLE SODIUM SCH MG: 40 TABLET, DELAYED RELEASE ORAL at 06:30

## 2019-03-09 RX ADMIN — NITROGLYCERIN SCH: 20 OINTMENT TOPICAL at 23:43

## 2019-03-09 RX ADMIN — FLUTICASONE PROPIONATE SCH SPR: 50 SPRAY, METERED NASAL at 09:09

## 2019-03-09 RX ADMIN — NITROGLYCERIN SCH: 20 OINTMENT TOPICAL at 06:28

## 2019-03-09 RX ADMIN — ENOXAPARIN SODIUM SCH MG: 30 INJECTION SUBCUTANEOUS at 09:12

## 2019-03-09 RX ADMIN — NITROGLYCERIN SCH: 20 OINTMENT TOPICAL at 11:46

## 2019-03-09 NOTE — CARD
--------------- APPROVED REPORT --------------





Date of service: 03/08/2019



EKG Measurement

Heart [id number]

TN [id number]

[id number]

[id number]

[id number]



<Conclusion>

Atrial-paced rhythm

Low voltage QRS

Prolonged QT

Abnormal ECG

## 2019-03-09 NOTE — CP.PCM.PN
Subjective





- Date & Time of Evaluation


Date of Evaluation: 03/09/19


Time of Evaluation: 13:01





- Subjective


Subjective: 





ATYPICAL CP 


ECHO NORMAL 


TNI 2 SETS NEG 


CONT MONITOR 





Objective





- Vital Signs/Intake and Output


Vital Signs (last 24 hours): 


                                        











Temp Pulse Resp BP Pulse Ox


 


 98.1 F   61   20   118/72   98 


 


 03/09/19 08:47  03/09/19 11:33  03/09/19 08:47  03/09/19 09:11  03/09/19 08:47











- Medications


Medications: 


                               Current Medications





Aspirin (Ecotrin)  81 mg PO DAILY [facility]


   Last Admin: 03/09/19 09:13 Dose:  81 mg


Clonazepam (Klonopin)  1 mg PO BID [facility]


   Last Admin: 03/09/19 09:15 Dose:  1 mg


Diphenhydramine HCl (Benadryl)  25 mg PO ONCE PRN


   PRN Reason: Insomnia


Enoxaparin Sodium (Lovenox)  30 mg SC DAILY [facility]


   Last Admin: 03/09/19 09:12 Dose:  30 mg


Ergocalciferol (Drisdol 50,000 Intl Units Cap)  1 cap PO Q7D [facility]


   Last Admin: 03/08/19 10:57 Dose:  1 cap


Fluticasone Propionate (Flonase)  1 spr NS DAILY [facility]


   Last Admin: 03/09/19 09:09 Dose:  1 spr


Folic Acid (Folic Acid)  1 mg PO DAILY [facility]


   Last Admin: 03/09/19 09:13 Dose:  1 mg


Lidocaine (Lidoderm)  1 ea TD DAILY [facility]


   Last Admin: 03/09/19 09:11 Dose:  1 ea


Metoprolol Tartrate (Lopressor)  25 mg PO BID [facility]


   Last Admin: 03/09/19 09:11 Dose:  25 mg


Multivitamins (Hexavitamin)  1 tab PO DAILY [facility]


   Last Admin: 03/09/19 09:11 Dose:  1 tab


Nitroglycerin (Nitro-Bid 2% Oint)  1 ea TOP Q6 [facility]


   Last Admin: 03/09/19 11:46 Dose:  Not Given


Ondansetron HCl (Zofran Inj)  4 mg IVP Q8 PRN


   PRN Reason: Nausea/Vomiting


   Last Admin: 03/09/19 11:07 Dose:  4 mg


Pantoprazole Sodium (Protonix Ec Tab)  40 mg PO 0600 [facility]


   Last Admin: 03/09/19 06:30 Dose:  40 mg


Quetiapine Fumarate (Seroquel)  50 mg PO HS [facility]


   Last Admin: 03/08/19 22:41 Dose:  50 mg


Rosuvastatin Calcium (Crestor)  20 mg PO [facility]


   Last Admin: 03/08/19 22:41 Dose:  20 mg


Venlafaxine HCl (Effexor)  37.5 mg PO DAILY [facility]


   Last Admin: 03/09/19 09:12 Dose:  37.5 mg


Venlafaxine HCl (Effexor)  150 mg PO DAILY [facility]


   Last Admin: 03/09/19 09:13 Dose:  150 mg











- Labs


Labs: 


                                        





                                 03/07/19 19:59 





                                 03/07/19 19:59 





                                        











PT  14.5 SECONDS (9.7-12.2)  H  03/07/19  19:59    


 


INR  1.3   03/07/19  19:59    


 


APTT  32 SECONDS (21-34)   03/07/19  19:59    














Assessment and Plan


(1) Chest pain


Status: Acute   





(2) Multiple sclerosis


Status: Acute   





(3) Panic disorder


Status: Chronic

## 2019-03-09 NOTE — CARD
--------------- APPROVED REPORT --------------





Date of service: 03/08/2019



EXAM: Two-dimensional and M-mode echocardiogram with Doppler and 

color Doppler.



Other Information 

Quality : GoodRhythm : 



INDICATION

Chest Pain Congenital Heart Disease 



RISK FACTORS

Hypertension 

Hyperlipidemia



2D DIMENSIONS 

IVSd0.7   (0.7-1.1cm)LVDd4.4   (3.9-5.9cm)

PWd1.0   (0.7-1.1cm)LA Rhszsc84   (18-58mL)

LVDs3.0   (2.5-4.0cm)FS (%) 31.6   %

LVEF (%)59.9   (>50%)LVEF (Black[facility]'s)69.87 %

IVC0.00 cm



M-Mode DIMENSIONS 

RVDd1.87   (2.1-3.2cm)Left Atrium (MM)4.10   (2.5-4.0cm)

IVSd1.08   (0.7-1.1cm)Aortic Root2.96   (2.2-3.7cm)

LVDd4.37   (4.0-5.6cm)Aortic Cusp Exc.1.90   (1.5-2.0cm)

PWd1.28   (0.7-1.1cm)FS (%) 25   %

LVDs3.26   (2.0-3.8cm)TAPSE21.87 cm

LVEF (%)65   (>50%)



Mitral Valve

MV E [place]56.8cm/sMV A Wmnsangu71.4cm/sE/A ratio1.3



TDI

Lateral E' Peak V14.06cm/sMedial E' Peak V8.29cm/sE/Lateral E'6.0

E/Medial E'10.1



Tricuspid Valve

TR Peak Fnozfncy160fk/sTR Peak Gr.92yrUzRDNZ07maTp



<Conclusion>

Left ventricle: thickness: normal; size: normal; overall ejection 

fraction: 65%: 

diastolic filling pressures: normal



Mitral valve: annulus: normal: leaflets: normal: excursion: normal; 

no significant trans-mitral gradient: mild incompetence: left atrium: 

dilated

Aortic valve: leaflets: normal: excursion: normal; no significant 

trans-aortic gradient: No significant incompetence: aortic root: 

normal

Right sided Structures: pacing lead; Pulmonary valve: normal; no 

significant incompetence; Tricuspid valve: normal; mild incompetence:

Intra-cardiac hemodynamics: pulmonary systolic pressures: 31 mmHg; 

central venous pressures: normal

No pericardial effusion

## 2019-03-09 NOTE — CARD
--------------- APPROVED REPORT --------------





Date of service: 03/07/2019



EKG Measurement

Heart [id number]

MI [id number]

[id number]

[id number]

[id number]



<Conclusion>

Normal sinus rhythm with sinus arrhythmia

Nonspecific T wave abnormality

Abnormal ECG

## 2019-03-10 LAB
ALBUMIN SERPL-MCNC: 3.6 G/DL (ref 3.5–5)
ALBUMIN/GLOB SERPL: 1.3 {RATIO} (ref 1–2.1)
ALT SERPL-CCNC: 30 U/L (ref 9–52)
AST SERPL-CCNC: 26 U/L (ref 14–36)
BASOPHILS # BLD AUTO: 0 K/UL (ref 0–0.2)
BASOPHILS NFR BLD: 0.4 % (ref 0–2)
BUN SERPL-MCNC: 12 MG/DL (ref 7–17)
CALCIUM SERPL-MCNC: 8.5 MG/DL (ref 8.6–10.4)
EOSINOPHIL # BLD AUTO: 0.1 K/UL (ref 0–0.7)
EOSINOPHIL NFR BLD: 2.8 % (ref 0–4)
ERYTHROCYTE [DISTWIDTH] IN BLOOD BY AUTOMATED COUNT: 12.8 % (ref 11.5–14.5)
GFR NON-AFRICAN AMERICAN: > 60
HGB BLD-MCNC: 9.8 G/DL (ref 11–16)
LYMPHOCYTES # BLD AUTO: 0.8 K/UL (ref 1–4.3)
LYMPHOCYTES NFR BLD AUTO: 44.5 % (ref 20–40)
MCH RBC QN AUTO: 29.3 PG (ref 27–31)
MCHC RBC AUTO-ENTMCNC: 31.9 G/DL (ref 33–37)
MCV RBC AUTO: 92.1 FL (ref 81–99)
MONOCYTES # BLD: 0.2 K/UL (ref 0–0.8)
MONOCYTES NFR BLD: 9.3 % (ref 0–10)
NEUTROPHILS # BLD: 0.8 K/UL (ref 1.8–7)
NEUTROPHILS NFR BLD AUTO: 43 % (ref 50–75)
NRBC BLD AUTO-RTO: 0.2 % (ref 0–2)
PLATELET # BLD: 126 K/UL (ref 130–400)
PMV BLD AUTO: 10 FL (ref 7.2–11.7)
RBC # BLD AUTO: 3.34 MIL/UL (ref 3.8–5.2)
WBC # BLD AUTO: 1.9 K/UL (ref 4.8–10.8)

## 2019-03-10 RX ADMIN — NITROGLYCERIN SCH: 20 OINTMENT TOPICAL at 12:53

## 2019-03-10 RX ADMIN — HUMAN INSULIN SCH: 100 INJECTION, SOLUTION SUBCUTANEOUS at 21:19

## 2019-03-10 RX ADMIN — NITROGLYCERIN SCH: 20 OINTMENT TOPICAL at 05:26

## 2019-03-10 RX ADMIN — NITROGLYCERIN SCH EA: 20 OINTMENT TOPICAL at 23:47

## 2019-03-10 RX ADMIN — ENOXAPARIN SODIUM SCH MG: 30 INJECTION SUBCUTANEOUS at 09:23

## 2019-03-10 RX ADMIN — PANTOPRAZOLE SODIUM SCH MG: 40 TABLET, DELAYED RELEASE ORAL at 05:55

## 2019-03-10 RX ADMIN — Medication SCH TAB: at 09:24

## 2019-03-10 RX ADMIN — VENLAFAXINE HYDROCHLORIDE SCH: 37.5 CAPSULE, EXTENDED RELEASE ORAL at 11:29

## 2019-03-10 RX ADMIN — VENLAFAXINE HYDROCHLORIDE SCH MG: 150 CAPSULE, EXTENDED RELEASE ORAL at 11:27

## 2019-03-10 RX ADMIN — FLUTICASONE PROPIONATE SCH SPR: 50 SPRAY, METERED NASAL at 09:22

## 2019-03-10 RX ADMIN — NITROGLYCERIN SCH: 20 OINTMENT TOPICAL at 17:03

## 2019-03-10 NOTE — CP.PCM.PN
Subjective





- Date & Time of Evaluation


Date of Evaluation: 03/10/19


Time of Evaluation: 13:16





- Subjective


Subjective: 





WBC IS DOWN TO 1.9K


URINE IS POS 


NO CP 


ID/HEM EVAL 





Objective





- Vital Signs/Intake and Output


Vital Signs (last 24 hours): 


                                        











Temp Pulse Resp BP Pulse Ox


 


 98.4 F   60   20   120/64   95 


 


 03/10/19 08:30  03/10/19 12:12  03/10/19 08:30  03/10/19 09:25  03/10/19 08:30








Intake and Output: 


                                        











 03/10/19 03/10/19





 11:59 23:59


 


Intake Total  


 


Balance  














- Medications


Medications: 


                               Current Medications





Acetaminophen (Tylenol 325mg Tab)  650 mg PO Q6 PRN


   PRN Reason: pain


   Last Admin: 03/09/19 19:21 Dose:  650 mg


Aspirin (Ecotrin)  81 mg PO DAILY [facility]


   Last Admin: 03/10/19 09:24 Dose:  81 mg


Clonazepam (Klonopin)  1 mg PO BID [facility]


   Last Admin: 03/10/19 09:23 Dose:  1 mg


Diphenhydramine HCl (Benadryl)  25 mg PO ONCE PRN


   PRN Reason: Insomnia


Enoxaparin Sodium (Lovenox)  30 mg SC DAILY [facility]


   Last Admin: 03/10/19 09:23 Dose:  30 mg


Ergocalciferol (Drisdol 50,000 Intl Units Cap)  1 cap PO Q7D [facility]


   Last Admin: 03/08/19 10:57 Dose:  1 cap


Fluticasone Propionate (Flonase)  1 spr NS DAILY [facility]


   Last Admin: 03/10/19 09:22 Dose:  1 spr


Folic Acid (Folic Acid)  1 mg PO DAILY [facility]


   Last Admin: 03/10/19 09:24 Dose:  1 mg


Lidocaine (Lidoderm)  1 ea TD DAILY [facility]


   Last Admin: 03/10/19 09:22 Dose:  1 ea


Metoprolol Tartrate (Lopressor)  25 mg PO BID [facility]


   Last Admin: 03/10/19 09:25 Dose:  25 mg


Multivitamins (Hexavitamin)  1 tab PO DAILY [facility]


   Last Admin: 03/10/19 09:24 Dose:  1 tab


Nitroglycerin (Nitro-Bid 2% Oint)  1 ea TOP Q6 [facility]


   Last Admin: 03/10/19 12:53 Dose:  Not Given


Ondansetron HCl (Zofran Inj)  4 mg IVP Q8 PRN


   PRN Reason: Nausea/Vomiting


   Last Admin: 03/09/19 11:07 Dose:  4 mg


Pantoprazole Sodium (Protonix Ec Tab)  40 mg PO 0600 [facility]


   Last Admin: 03/10/19 05:55 Dose:  40 mg


Quetiapine Fumarate (Seroquel)  50 mg PO HS [facility]


   Last Admin: 03/09/19 21:40 Dose:  50 mg


Rosuvastatin Calcium (Crestor)  20 mg PO HS [facility]


   Last Admin: 03/09/19 21:40 Dose:  20 mg


Venlafaxine HCl (Effexor Xr)  150 mg PO DAILY [facility]


   Last Admin: 03/10/19 11:27 Dose:  150 mg


Venlafaxine HCl (Effexor Xr)  37.5 mg PO DAILY [facility]


   Last Admin: 03/10/19 11:29 Dose:  Not Given











- Labs


Labs: 


                                        





                                 03/10/19 08:27 





                                 03/10/19 08:27 





                                        











PT  14.5 SECONDS (9.7-12.2)  H  03/07/19  19:59    


 


INR  1.3   03/07/19  19:59    


 


APTT  32 SECONDS (21-34)   03/07/19  19:59    














Assessment and Plan


(1) Chest pain


Status: Acute   





(2) Multiple sclerosis


Status: Acute   





(3) Panic disorder


Status: Chronic

## 2019-03-10 NOTE — CP.PCM.CON
History of Present Illness





- History of Present Illness


History of Present Illness: 





INFECTIOUS DISEASE CONSULT;


HPI;





58-year-old female with history of CAD, hypertension, DM, gastric bypass, de

pression, MS admitted with chest pains and pancytopenia.


Patient states he recently moved from [place] to [place] as her daughters 

live here.


Patient also has history of anemia which she developed after gastric bypass.


Patient not compliant with her vitamins as was recommended with her gastric by

pass.


Patient states that she previously also had UTI with ESBL Escherichia coli.





Infectious disease consult requested by PMD as urine cultures positive for gram-

negative rods.


Patient does complain of frequency and bilateral flank pains.  Denies history of

hematuria or kidney stones.  Patient denies any abnormal bleeding or bruising at

present.PATIENT DENIES ANY FEVER OR CHILLS.


CXR ON ADMISSION RIGHT-SIDED mEDIpORT/NO ACTIVE DISEASE.


ct CHEST ANGIOGRAM 3/7/19 - PE.  pRIOR GASTRIC BYPASS/HEPATOMEGALY/HEPATIC 

STEATOSIS/CHOLECYSTECTOMY/CBD DILATED/PANCREATIC ATROPHY.





Past medical history:  CAD, HTN, DM, depression, MS





Past surgical history:  Gastric bypass 1994.





Family history:  Father had lung cancer (smoker).





Social history:  Denies tobacco, alcohol, and illicit drug use.





Allergies:  Levofloxacin





Review of Systems





- Constitutional


Constitutional: Fatigue, Fever (SUBJECTIVE FEVERS), Weight Loss (AFTER BYPASS.  

sHE STATES SHE WAS ABOUT MORE THAN 400 POUNDS.)





- EENT


Eyes: absent: Photophobia


Nose/Mouth/Throat: absent: Mouth Lesions





- Cardiovascular


Cardiovascular: Chest Pain, Leg Edema





- Respiratory


Respiratory: absent: Dyspnea, Hemoptysis





- Gastrointestinal


Gastrointestinal: absent: Abdominal Pain (BILATERAL FLANK PAINS.), Change in 

Bowel Habits, Diarrhea, Melena, Nausea, Odynophagia, Vomiting





- Genitourinary


Genitourinary: Change in Urinary Stream, Dysuria, Urinary Frequency, Freq UTI.  

absent: Hematuria, Hx Renal/Bladder Calculi, Hx /Renal Surgery





- Neurological


Neurological: absent: Headaches





- Psychiatric


Psychiatric: Depression





- Hematologic/Lymphatic


Hematologic: absent: Easy Bleeding, Easy Bruising





Past Patient History





- Infectious Disease


Hx of Infectious Diseases: None





- Tetanus Immunizations


Tetanus Immunization: Unknown





- Past Social History


Smoking Status: Former Smoker





- CARDIAC


Hx Congestive Heart Failure: Yes


Hx Hypercholesterolemia: Yes


Hx Hypertension: Yes





- PULMONARY


Hx Asthma: Yes





- NEUROLOGICAL


Hx Migraine: Yes


Hx Multiple Sclerosis: Yes


Hx Seizures: No





- HEENT


Hx HEENT Problems: No





- RENAL


Hx Chronic Kidney Disease: Yes


Hx Kidney Stones: Yes





- ENDOCRINE/METABOLIC


Hx Diabetes Mellitus Type 2: Yes





- HEMATOLOGICAL/ONCOLOGICAL


Hx Anemia: Yes


Hx Human Immunodeficiency Virus (HIV): No





- INTEGUMENTARY


Hx Dermatological Problems: No





- MUSCULOSKELETAL/RHEUMATOLOGICAL


Hx Arthritis: Yes





- GASTROINTESTINAL


Hx Gastrointestinal Disorders: Yes


Hx Colitis: Yes





- GENITOURINARY/GYNECOLOGICAL


Hx Sexually Transmitted Disorders: No





- PSYCHIATRIC


Hx Anxiety: Yes


Hx Bipolar Disorder: Yes


Hx Depression: Yes


Hx Substance Use: No (see HPI)





- SURGICAL HISTORY


Hx Cholecystectomy: Yes





- ANESTHESIA


Hx Anesthesia: Yes


Hx Anesthesia Reactions: No


Hx Malignant Hyperthermia: No





Meds


Allergies/Adverse Reactions: 


                                    Allergies











Allergy/AdvReac Type Severity Reaction Status Date / Time


 


levofloxacin [From Levaquin] Allergy  RASH Verified 03/07/19 19:28














- Medications


Medications: 


                               Current Medications





Acetaminophen (Tylenol 325mg Tab)  650 mg PO Q6 PRN


   PRN Reason: pain


   Last Admin: 03/09/19 19:21 Dose:  650 mg


Aspirin (Ecotrin)  81 mg PO DAILY [facility]


   Last Admin: 03/10/19 09:24 Dose:  81 mg


Clonazepam (Klonopin)  1 mg PO BID [facility] includes [facility]


   Last Admin: 03/10/19 09:23 Dose:  1 mg


Diphenhydramine HCl (Benadryl)  25 mg PO ONCE PRN


   PRN Reason: Insomnia


Enoxaparin Sodium (Lovenox)  30 mg SC DAILY [facility]


   Last Admin: 03/10/19 09:23 Dose:  30 mg


Ergocalciferol (Drisdol 50,000 Intl Units Cap)  1 cap PO Q7D [facility] includes [facility]


   Last Admin: 03/08/19 10:57 Dose:  1 cap


Fluticasone Propionate (Flonase)  1 spr NS DAILY [facility]


   Last Admin: 03/10/19 09:22 Dose:  1 spr


Folic Acid (Folic Acid)  1 mg PO DAILY [facility]


   Last Admin: 03/10/19 09:24 Dose:  1 mg


Meropenem 1 gm/ Sodium (Chloride)  100 mls @ 100 mls/hr IVPB ONCE ONE; Protocol


   Stop: 03/10/19 17:09


Meropenem 500 mg/ Sodium (Chloride)  100 mls @ 100 mls/hr IVPB Q8H [facility] includes the [facility]; Protocol


Lidocaine (Lidoderm)  1 ea TD DAILY [facility]


   Last Admin: 03/10/19 09:22 Dose:  1 ea


Metoprolol Tartrate (Lopressor)  25 mg PO BID [facility]


   Last Admin: 03/10/19 09:25 Dose:  25 mg


Multivitamins (Hexavitamin)  1 tab PO DAILY [facility]


   Last Admin: 03/10/19 09:24 Dose:  1 tab


Nitroglycerin (Nitro-Bid 2% Oint)  1 ea TOP Q6 [facility]


   Last Admin: 03/10/19 12:53 Dose:  Not Given


Ondansetron HCl (Zofran Inj)  4 mg IVP Q8 PRN


   PRN Reason: Nausea/Vomiting


   Last Admin: 03/09/19 11:07 Dose:  4 mg


Pantoprazole Sodium (Protonix Ec Tab)  40 mg PO 0600 [facility]


   Last Admin: 03/10/19 05:55 Dose:  40 mg


Quetiapine Fumarate (Seroquel)  50 mg PO HS [facility] includes [facility]


   Last Admin: 03/09/19 21:40 Dose:  50 mg


Rosuvastatin Calcium (Crestor)  20 mg PO HS [facility] includes [facility]


   Last Admin: 03/09/19 21:40 Dose:  20 mg


Venlafaxine HCl (Effexor Xr)  150 mg PO DAILY [facility]


   Last Admin: 03/10/19 11:27 Dose:  150 mg


Venlafaxine HCl (Effexor Xr)  37.5 mg PO DAILY [facility] includes the [facility]


   Last Admin: 03/10/19 11:29 Dose:  Not Given











Physical Exam





- Constitutional


Appears: No Acute Distress





- Head Exam


Head Exam: NORMAL INSPECTION





- Eye Exam


Eye Exam: EOMI, PERRL





- ENT Exam


ENT Exam: Normal Oropharynx





- Neck Exam


Neck exam: Positive for: Normal Inspection





- Respiratory Exam


Respiratory Exam: Clear to Auscultation Bilateral, NORMAL BREATHING PATTERN





- Cardiovascular Exam


Cardiovascular Exam: REGULAR RHYTHM, +S1, +S2





- GI/Abdominal Exam


GI & Abdominal Exam: Normal Bowel Sounds, Soft.  absent: Tenderness





- Extremities Exam


Extremities exam: Positive for: normal capillary refill (TATTOOS LOWER 

EXTREMITY.), pedal edema (1+), pedal pulses present.  Negative for: calf 

tenderness





- Back Exam


Back exam: absent: CVA tenderness (L), CVA tenderness (R)





- Neurological Exam


Neurological exam: Alert, CN II-XII Intact





- Psychiatric Exam


Psychiatric exam: Normal Mood





- Skin


Skin Exam: Normal Color, Warm





Results





- Vital Signs


Recent Vital Signs: 


                                Last Vital Signs











Temp  98.7 F   03/10/19 15:00


 


Pulse  62   03/10/19 15:00


 


Resp  20   03/10/19 15:00


 


BP  145/87   03/10/19 15:00


 


Pulse Ox  96   03/10/19 15:00














- Labs


Result Diagrams: 


                                 03/11/19 08:17





                                 03/11/19 08:17


Labs: 


                         Laboratory Results - last 24 hr











  03/09/19 03/10/19 03/10/19





  21:42 08:27 08:27


 


WBC   1.9 L* D 


 


RBC   3.34 L 


 


Hgb   9.8 L 


 


Hct   30.7 L 


 


MCV   92.1 


 


MCH   29.3 


 


MCHC   31.9 L 


 


RDW   12.8 


 


Plt Count   126 L D 


 


MPV   10.0 


 


Neut % (Auto)   43.0 L 


 


Lymph % (Auto)   44.5 H 


 


Mono % (Auto)   9.3 


 


Eos % (Auto)   2.8 


 


Baso % (Auto)   0.4 


 


Neut # (Auto)   0.8 L 


 


Lymph # (Auto)   0.8 L 


 


Mono # (Auto)   0.2 


 


Eos # (Auto)   0.1 


 


Baso # (Auto)   0.0 


 


Differential Comment    


 


Sodium    136


 


Potassium    3.9


 


Chloride    102


 


Carbon Dioxide    29


 


Anion Gap    10


 


BUN    12


 


Creatinine    0.8


 


Est GFR ( Amer)    > 60


 


Est GFR (Non-Af Amer)    > 60


 


POC Glucose (mg/dL)  95  


 


Random Glucose    84


 


Calcium    8.5 L


 


Total Bilirubin    0.6


 


AST    26


 


ALT    30


 


Alkaline Phosphatase    133 H


 


Total Protein    6.3


 


Albumin    3.6


 


Globulin    2.8


 


Albumin/Globulin Ratio    1.3














  03/10/19





  12:51


 


WBC 


 


RBC 


 


Hgb 


 


Hct 


 


MCV 


 


MCH 


 


MCHC 


 


RDW 


 


Plt Count 


 


MPV 


 


Neut % (Auto) 


 


Lymph % (Auto) 


 


Mono % (Auto) 


 


Eos % (Auto) 


 


Baso % (Auto) 


 


Neut # (Auto) 


 


Lymph # (Auto) 


 


Mono # (Auto) 


 


Eos # (Auto) 


 


Baso # (Auto) 


 


Differential Comment 


 


Sodium 


 


Potassium 


 


Chloride 


 


Carbon Dioxide 


 


Anion Gap 


 


BUN 


 


Creatinine 


 


Est GFR ( Amer) 


 


Est GFR (Non-Af Amer) 


 


POC Glucose (mg/dL)  107


 


Random Glucose 


 


Calcium 


 


Total Bilirubin 


 


AST 


 


ALT 


 


Alkaline Phosphatase 


 


Total Protein 


 


Albumin 


 


Globulin 


 


Albumin/Globulin Ratio 














- Imaging and Cardiology


  ** Chest x-ray


Status: Report reviewed by me (SEE REPORT)





Assessment & Plan


(1) UTI (urinary tract infection)


Status: Acute   Priority: Medium   





(2) Chest pain


Status: Acute   





(3) Pancytopenia


Status: Acute   





(4) MDD (major depressive disorder)


Status: Chronic   Priority: High   





(5) Multiple sclerosis


Status: Acute   





(6) Diabetes mellitus


Status: Acute   





- Assessment and Plan (Free Text)


Plan: 





PLAN.


PANCULTURES


uRINE CULTURE PENDING IDENTIFICATION.


START iv mERREM 1 G LOADING DOSE FOLLOWED  MG EVERY 8 HOURLY 3/10/19.





FOLLOW-UP CBC WITH DIFFERENTIAL


CMP.


LIVER PROFILE IN A.M.


WILL ADJUST ANTIBIOTICS AFTER CULTURES ARE OBTAINED.


WILL FOLLOW ALONG WITH YOU AND MAKE FURTHER RECOMMENDATIONS AS NEEDED.

## 2019-03-11 LAB
ALBUMIN SERPL-MCNC: 3.5 G/DL (ref 3.5–5)
ALBUMIN/GLOB SERPL: 1.3 {RATIO} (ref 1–2.1)
ALT SERPL-CCNC: 21 U/L (ref 9–52)
AST SERPL-CCNC: 25 U/L (ref 14–36)
BASOPHILS # BLD AUTO: 0 K/UL (ref 0–0.2)
BASOPHILS NFR BLD: 0.3 % (ref 0–2)
BUN SERPL-MCNC: 11 MG/DL (ref 7–17)
CALCIUM SERPL-MCNC: 8.5 MG/DL (ref 8.6–10.4)
EOSINOPHIL # BLD AUTO: 0.1 K/UL (ref 0–0.7)
EOSINOPHIL NFR BLD: 2.2 % (ref 0–4)
ERYTHROCYTE [DISTWIDTH] IN BLOOD BY AUTOMATED COUNT: 12.8 % (ref 11.5–14.5)
GFR NON-AFRICAN AMERICAN: > 60
HGB BLD-MCNC: 10 G/DL (ref 11–16)
LYMPHOCYTES # BLD AUTO: 0.9 K/UL (ref 1–4.3)
LYMPHOCYTES NFR BLD AUTO: 36.1 % (ref 20–40)
MCH RBC QN AUTO: 29.1 PG (ref 27–31)
MCHC RBC AUTO-ENTMCNC: 31.7 G/DL (ref 33–37)
MCV RBC AUTO: 91.7 FL (ref 81–99)
MONOCYTES # BLD: 0.3 K/UL (ref 0–0.8)
MONOCYTES NFR BLD: 11.6 % (ref 0–10)
NEUTROPHILS # BLD: 1.2 K/UL (ref 1.8–7)
NEUTROPHILS NFR BLD AUTO: 49.8 % (ref 50–75)
NRBC BLD AUTO-RTO: 0.1 % (ref 0–2)
PLATELET # BLD: 138 K/UL (ref 130–400)
PMV BLD AUTO: 9.7 FL (ref 7.2–11.7)
RBC # BLD AUTO: 3.44 MIL/UL (ref 3.8–5.2)
WBC # BLD AUTO: 2.4 K/UL (ref 4.8–10.8)

## 2019-03-11 RX ADMIN — PANTOPRAZOLE SODIUM SCH MG: 40 TABLET, DELAYED RELEASE ORAL at 06:16

## 2019-03-11 RX ADMIN — HUMAN INSULIN SCH: 100 INJECTION, SOLUTION SUBCUTANEOUS at 17:15

## 2019-03-11 RX ADMIN — HUMAN INSULIN SCH: 100 INJECTION, SOLUTION SUBCUTANEOUS at 21:32

## 2019-03-11 RX ADMIN — Medication SCH TAB: at 09:33

## 2019-03-11 RX ADMIN — VENLAFAXINE HYDROCHLORIDE SCH MG: 150 CAPSULE, EXTENDED RELEASE ORAL at 09:33

## 2019-03-11 RX ADMIN — HUMAN INSULIN SCH: 100 INJECTION, SOLUTION SUBCUTANEOUS at 07:30

## 2019-03-11 RX ADMIN — HUMAN INSULIN SCH UNITS: 100 INJECTION, SOLUTION SUBCUTANEOUS at 12:18

## 2019-03-11 RX ADMIN — ENOXAPARIN SODIUM SCH MG: 30 INJECTION SUBCUTANEOUS at 09:32

## 2019-03-11 RX ADMIN — NITROGLYCERIN SCH: 20 OINTMENT TOPICAL at 17:27

## 2019-03-11 RX ADMIN — NITROGLYCERIN SCH EA: 20 OINTMENT TOPICAL at 06:16

## 2019-03-11 RX ADMIN — VENLAFAXINE HYDROCHLORIDE SCH MG: 37.5 CAPSULE, EXTENDED RELEASE ORAL at 09:33

## 2019-03-11 RX ADMIN — NITROGLYCERIN SCH: 20 OINTMENT TOPICAL at 12:00

## 2019-03-11 RX ADMIN — FLUTICASONE PROPIONATE SCH SPR: 50 SPRAY, METERED NASAL at 09:32

## 2019-03-11 NOTE — PCM.PSYCH
Initial Psychiatric Evaluation





- Initial Psychiatric Evaluation


Type of Admission: Voluntary


Legal Status: Capacity


Chief Complaint (in patient's own words): 





I am feeling very depressed.'


History of Present Illness and Precipitating Events: 





Pt is a 58 year old HF who presented to the ED with chest pain.  Patient has Hx 

of cardiac problem with pace maker and HTN.  Patient appeared very depressed.  

Today patient was consulted by psychiatry.





Patient admits she is feeling depressed, having trouble sleeping, has no 

appetite, lack of interest in previously enjoyable activities, lack of energy, 

trouble concentrating, hopelessness, and trouble functioning. She states that 

these symptoms all began 7 years ago when her   from diabetes 

complications and intensified 4 years ago after her daughter age 19 was killed 

in an MVA. Patient states she recently moved to [place] from [place] to live with her

daughter but issues between them caused her to move out. Patient admits to 

constantly worrying about her living situation, family issues, and medical 

issues.





Patient has been previously admitted for similar psychiatric symptoms, most 

recently in in 2018. She currently takes Klonopin for her anxiety. 

Patient  admits of having suicidal ideations without any plan.  However she 

denies any past history of any suicide attempt. Patient denies auditory or 

visual hallucinations or paranoia.  Patient denies drug use. No manic symptoms 

were reported or elicited.





 


PsychHx: 


suicidal ideation after daughters death, MDD, KEIKO, panic disorder, PTSD





MedHx: 


MS, diabetes, hypertension, pacemaker, CAD, CHF, asthma, CVA





Allergies: 


levofloxacin


Current Medications: 





Active Medications











Generic Name Dose Route Start Last Admin





  Trade Name Freq  PRN Reason Stop Dose Admin


 


Acetaminophen  650 mg  19 19:05  19 09:34





  Tylenol 325mg Tab  PO   650 mg





  Q6 PRN   Administration





  pain   





     





     





     


 


Aspirin  81 mg  19 10:00  19 09:34





  Ecotrin  PO   81 mg





  DAILY DOMITILA   Administration





     





     





     





     


 


Clonazepam  1 mg  19 10:00  19 09:34





  Klonopin  PO   1 mg





  BID DOMITILA   Administration





     





     





     





     


 


Dextrose  0 ml  03/10/19 19:22  





  Dextrose 50% Inj  IV   





  STAT PRN   





  Hypoglycemia Protocol   





     





  Protocol   





     


 


Dextrose  0 gm  03/10/19 19:22  





  Glutose 15  PO   





  ONCE PRN   





  Hypoglycemia Protocol   





     





  Protocol   





     


 


Diphenhydramine HCl  25 mg  19 22:49  





  Benadryl  PO   





  ONCE PRN   





  Insomnia   





     





     





     


 


Enoxaparin Sodium  30 mg  19 10:00  19 09:32





  Lovenox  SC   30 mg





  DAILY DOMITILA   Administration





     





     





     





     


 


Ergocalciferol  1 cap  19 10:30  19 10:57





  Drisdol 50,000 Intl Units Cap  PO   1 cap





  Q7D DOMITILA   Administration





     





     





     





     


 


Fluticasone Propionate  1 spr  19 10:00  19 09:32





  Flonase  NS   1 spr





  DAILY DOMITILA   Administration





     





     





     





     


 


Folic Acid  1 mg  19 10:00  19 09:33





  Folic Acid  PO   1 mg





  DAILY DOMITILA   Administration





     





     





     





     


 


Glucagon  0 mg  03/10/19 19:22  





  Glucagen Diagnostic Kit  IM   





  STAT PRN   





  Hypoglycemia Protocol   





     





  Protocol   





     


 


Meropenem 500 mg/ Sodium  100 mls @ 100 mls/hr  19 00:15  19 08:13





  Chloride  IVPB   100 mls/hr





  Q8H DOMITILA   Administration





     





     





  Protocol   





     


 


Dextrose  1,000 mls @ 0 mls/hr  03/10/19 19:22  





  Dextrose 5% In Water 1000 Ml  IV   





  .Q0M PRN   





  Hypoglycemia Protocol   





     





  Protocol   





  Per Protocol   


 


Insulin Human Regular  0 unit  03/10/19 22:00  19 07:30





  Novolin R  SC   Not Given





  [facility] DOMITILA   





     





     





  Protocol   





     


 


Lidocaine  1 ea  19 10:00  19 09:32





  Lidoderm  TD   1 ea





  DAILY DOMITILA   Administration





     





     





     





     


 


Metoprolol Tartrate  25 mg  19 10:00  19 09:33





  Lopressor  PO   25 mg





  BID DOMITILA   Administration





     





     





     





     


 


Multivitamins  1 tab  19 10:00  19 09:33





  Hexavitamin  PO   1 tab





  DAILY DOMITILA   Administration





     





     





     





     


 


Nitroglycerin  1 ea  19 06:00  19 06:16





  Nitro-Bid 2% Oint  TOP   1 ea





  Q6 DOMITILA   Administration





     





     





     





     


 


Ondansetron HCl  4 mg  19 14:00  19 11:07





  Zofran Inj  IVP   4 mg





  Q8 PRN   Administration





  Nausea/Vomiting   





     





     





     


 


Pantoprazole Sodium  40 mg  19 06:00  19 06:16





  Protonix Ec Tab  PO   40 mg





  0600 DOMITILA   Administration





     





     





     





     


 


Quetiapine Fumarate  50 mg  19 22:00  03/10/19 21:16





  Seroquel  PO   50 mg





  HS DOMITILA   Administration





     





     





     





     


 


Rosuvastatin Calcium  20 mg  19 22:00  03/10/19 21:16





  Crestor  PO   20 mg





  HS DOMITILA   Administration





     





     





     





     


 


Venlafaxine HCl  150 mg  03/10/19 10:45  19 09:33





  Effexor Xr  PO   150 mg





  DAILY DOMITILA   Administration





     





     





     





     


 


Venlafaxine HCl  37.5 mg  03/10/19 10:45  19 09:33





  Effexor Xr  PO   37.5 mg





  DAILY DOMITILA   Administration





     





     





     





     














Past Psychiatric History





- Past Psychiatric History


Previous Treatment History: None


Pertinent Medical Hx (Current Medical&Sleep Prob, Allergies): 





                                    Allergies











Allergy/AdvReac Type Severity Reaction Status Date / Time


 


levofloxacin [From Levaquin] Allergy  RASH Verified 19 19:28








                                        





Aspirin [Ecotrin] 81 mg PO DAILY #7 tabec 10/24/18 


Atorvastatin [Lipitor] 40 mg PO DIN #7 tab 10/24/18 


Fluticasone Nasal [Flonase] 1 actuation NS DAILY #1 spr 10/24/18 


Folic Acid 1 mg PO DAILY #14 tab 10/24/18 


Gabapentin [Neurontin] 300 mg PO TID #45 cap 10/24/18 


Lidocaine 5% [Lidoderm] 1 ea TD DAILY #7 patch 10/24/18 


Multivitamin Therapeutic Tab [Thera Tab] 1 tab PO 0800 #14 tab 10/24/18 


Pantoprazole [Protonix EC Tab] 40 mg PO 0600 #7 ect 10/24/18 


Quetiapine Fumarate [Seroquel] 50 mg PO HS #14 tablet 10/24/18 


Venlafaxine [Effexor] 37.5 mg PO DAILY #14 tab 10/24/18 


Venlafaxine [Effexor] 150 mg PO DAILY #14 tab 10/24/18 


clonazePAM [Klonopin] 1 mg PO BID #30 tab 10/24/18 


hydrOXYzine Pamoate [Vistaril] 50 mg PO BID PRN #30 cap 10/24/18 


Ergocalciferol [Drisdol 50,000 Intl Units Cap] 1 cap PO Q7D #12 cap 18 


Lidocaine 5% [Lidoderm] 1 ea TD DAILY #30 patch 18 


Metoprolol Tartrate [Lopressor] 25 mg PO BID #60 tab 18 











Review of Systems





- Review of Systems


All systems: reviewed and no additional remarkable complaints except





- Psychiatric


Psychiatric: Anxiety, Depression, Hopelessness, Suicidal Ideation





Mental Status Examination





- Personal Presentation


Personal Presentation: Looks stated age





- Affect


Affect: Constricted, Depressed





- Motor Activity


Motor Activity: Calm





- Reliability in Providing Information


Reliability in Providing Information: Poor, due to altered mood





- Speech


Speech: Organized





- Mood


Mood: Depressed, Anxious





- Formal Thought Process


Formal Thought Process: No Impairment





- Obsessions/Compulsions


Obsessions: No


Compulsions: No





- Cognitive Functions


Orientation: Person, Place, Situation, Time


Sensorium: Alert


Attention/Concentration: Attentive


Abstract Thinking: [place]


Estimate of Intelligence: Below average


Judgement: Imparied, as evidence by: Poor judgement, Imparied, as evidence by: 

Lack of insight into illness





- Risk


Risk: Suicidal, Diminished functioning





- Limitations


Limitations: Living alone





DSM 5 DX





- DSM 5


DSM 5 Diagnosis: 





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





- Recommended/Plan of Treatment


Treatment Recommendations and Plan of Treatment: 








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








CBT 


Psychoeducation 


Effexor for depression


Remron for depression 


Seroquel for insomnia 


DC Klonopin for anxiety


As needed for anxiety Start Ativan 0.5 mg every 6 hours








Pt to be admitted at the psych unit after medical clearance





- Smoking Cessation


Smoking Cessation Initiated: No

## 2019-03-11 NOTE — CP.PCM.CON
History of Present Illness





- History of Present Illness


History of Present Illness: 





58 year old female with a history of CAD, HTN, DM, gastric bypass, depression, 

MS, admitted with chest pain, with pancytopenia.  The patient notes to being 

anemic in the past after her gastric bypass.  She has not be compliant with her 

vitamins as was recommended with her gastric bypass.  She denies abnormal 

bleeding and bruising.





Past medical history:  CAD, HTN, DM, depression, MS





Past surgical history:  Gastric bypass





Family history:  Father had lung cancer (smoker).





Social history:  Denies tobacco, alcohol, and illicit drug use.





Allergies:  Levofloxacin





Review of systems:  All remaining review of systems including HEENT, 

cardiovascular, respiratory, gastrointestinal, genitourinary, musculoskeletal, 

dermatologic, neurologic, and psychiatric are negative unless mentioned in the 

HPI.





Past Patient History





- Infectious Disease


Hx of Infectious Diseases: None





- Tetanus Immunizations


Tetanus Immunization: Unknown





- Past Social History


Smoking Status: Former Smoker





- CARDIAC


Hx Congestive Heart Failure: Yes


Hx Hypercholesterolemia: Yes


Hx Hypertension: Yes





- PULMONARY


Hx Asthma: Yes





- NEUROLOGICAL


Hx Migraine: Yes


Hx Multiple Sclerosis: Yes


Hx Seizures: No





- HEENT


Hx HEENT Problems: No





- RENAL


Hx Chronic Kidney Disease: Yes


Hx Kidney Stones: Yes





- ENDOCRINE/METABOLIC


Hx Diabetes Mellitus Type 2: Yes





- HEMATOLOGICAL/ONCOLOGICAL


Hx Anemia: Yes


Hx Human Immunodeficiency Virus (HIV): No





- INTEGUMENTARY


Hx Dermatological Problems: No





- MUSCULOSKELETAL/RHEUMATOLOGICAL


Hx Arthritis: Yes





- GASTROINTESTINAL


Hx Gastrointestinal Disorders: Yes


Hx Colitis: Yes





- GENITOURINARY/GYNECOLOGICAL


Hx Sexually Transmitted Disorders: No





- PSYCHIATRIC


Hx Anxiety: Yes


Hx Bipolar Disorder: Yes


Hx Depression: Yes


Hx Substance Use: No (see HPI)





- SURGICAL HISTORY


Hx Cholecystectomy: Yes





- ANESTHESIA


Hx Anesthesia: Yes


Hx Anesthesia Reactions: No


Hx Malignant Hyperthermia: No





Meds


Allergies/Adverse Reactions: 


                                    Allergies











Allergy/AdvReac Type Severity Reaction Status Date / Time


 


levofloxacin [From Levaquin] Allergy  RASH Verified 03/07/19 19:28














- Medications


Medications: 


                               Current Medications





Acetaminophen (Tylenol 325mg Tab)  650 mg PO Q6 PRN


   PRN Reason: pain


   Last Admin: 03/11/19 15:46 Dose:  650 mg


Aspirin (Ecotrin)  81 mg PO DAILY [facility]


   Last Admin: 03/11/19 09:34 Dose:  81 mg


Clonazepam (Klonopin)  1 mg PO BID [facility]


   Last Admin: 03/11/19 17:25 Dose:  1 mg


Dextrose (Dextrose 50% Inj)  0 ml IV STAT PRN; Protocol


   PRN Reason: Hypoglycemia Protocol


Dextrose (Glutose 15)  0 gm PO ONCE PRN; Protocol


   PRN Reason: Hypoglycemia Protocol


Diphenhydramine HCl (Benadryl)  25 mg PO ONCE PRN


   PRN Reason: Insomnia


Enoxaparin Sodium (Lovenox)  30 mg SC DAILY [facility]


   Last Admin: 03/11/19 09:32 Dose:  30 mg


Ergocalciferol (Drisdol 50,000 Intl Units Cap)  1 cap PO Q7D [facility]


   Last Admin: 03/08/19 10:57 Dose:  1 cap


Fluticasone Propionate (Flonase)  1 spr NS DAILY [facility]


   Last Admin: 03/11/19 09:32 Dose:  1 spr


Folic Acid (Folic Acid)  1 mg PO DAILY [facility]


   Last Admin: 03/11/19 09:33 Dose:  1 mg


Glucagon (Glucagen Diagnostic Kit)  0 mg IM STAT PRN; Protocol


   PRN Reason: Hypoglycemia Protocol


Meropenem 500 mg/ Sodium (Chloride)  100 mls @ 100 mls/hr IVPB Q8H [facility]; Protocol


   Last Admin: 03/11/19 15:48 Dose:  100 mls/hr


Dextrose (Dextrose 5% In Water 1000 Ml)  1,000 mls @ 0 mls/hr IV .Q0M PRN; 

Protocol


   PRN Reason: Hypoglycemia Protocol


Insulin Human Regular (Novolin R)  0 unit SC ACHS [facility]; Protocol


   Last Admin: 03/11/19 17:15 Dose:  Not Given


Lidocaine (Lidoderm)  1 ea TD DAILY [facility]


   Last Admin: 03/11/19 09:32 Dose:  1 ea


Metoprolol Tartrate (Lopressor)  25 mg PO BID [facility]


   Last Admin: 03/11/19 17:27 Dose:  Not Given


Mirtazapine (Remeron)  15 mg PO HS [facility]


Multivitamins (Hexavitamin)  1 tab PO DAILY [facility]


   Last Admin: 03/11/19 09:33 Dose:  1 tab


Nitroglycerin (Nitro-Bid 2% Oint)  1 ea TOP Q6 [facility]


   Last Admin: 03/11/19 17:27 Dose:  Not Given


Ondansetron HCl (Zofran Inj)  4 mg IVP Q8 PRN


   PRN Reason: Nausea/Vomiting


   Last Admin: 03/09/19 11:07 Dose:  4 mg


Pantoprazole Sodium (Protonix Ec Tab)  40 mg PO 0600 [facility]


   Last Admin: 03/11/19 06:16 Dose:  40 mg


Quetiapine Fumarate (Seroquel)  50 mg PO [facility]


   Last Admin: 03/10/19 21:16 Dose:  50 mg


Rosuvastatin Calcium (Crestor)  20 mg PO [facility]


   Last Admin: 03/10/19 21:16 Dose:  20 mg


Venlafaxine HCl (Effexor Xr)  150 mg PO DAILY [facility]


   Last Admin: 03/11/19 09:33 Dose:  150 mg


Venlafaxine HCl (Effexor Xr)  37.5 mg PO DAILY [facility]


   Last Admin: 03/11/19 09:33 Dose:  37.5 mg











Physical Exam





- Head Exam


Head Exam: ATRAUMATIC





- Eye Exam


Eye Exam: Normal appearance





- ENT Exam


ENT Exam: Mucous Membranes Dry





- Respiratory Exam


Respiratory Exam: NORMAL BREATHING PATTERN





- Cardiovascular Exam


Cardiovascular Exam: +S1, +S2





- GI/Abdominal Exam


GI & Abdominal Exam: Normal Bowel Sounds





- Extremities Exam


Extremities exam: Positive for: normal inspection





Results





- Vital Signs


Recent Vital Signs: 


                                Last Vital Signs











Temp  98.6 F   03/11/19 15:49


 


Pulse  60   03/11/19 15:49


 


Resp  20   03/11/19 15:49


 


BP  104/64   03/11/19 15:49


 


Pulse Ox  100   03/11/19 15:49














- Labs


Result Diagrams: 


                                 03/11/19 08:17





                                 03/11/19 08:17


Labs: 


                         Laboratory Results - last 24 hr











  03/10/19 03/11/19 03/11/19





  08:27 08:17 08:17


 


WBC   2.4 L 


 


RBC   3.44 L 


 


Hgb   10.0 L 


 


Hct   31.6 L 


 


MCV   91.7 


 


MCH   29.1 


 


MCHC   31.7 L 


 


RDW   12.8 


 


Plt Count   138 


 


MPV   9.7 


 


Neut % (Auto)   49.8 L 


 


Lymph % (Auto)   36.1 


 


Mono % (Auto)   11.6 H 


 


Eos % (Auto)   2.2 


 


Baso % (Auto)   0.3 


 


Neut # (Auto)   1.2 L 


 


Lymph # (Auto)   0.9 L 


 


Mono # (Auto)   0.3 


 


Eos # (Auto)   0.1 


 


Baso # (Auto)   0.0 


 


Smear Path Review    


 


Sodium    136


 


Potassium    3.8


 


Chloride    98


 


Carbon Dioxide    32 H


 


Anion Gap    10


 


BUN    11


 


Creatinine    0.9


 


Est GFR ( Amer)    > 60


 


Est GFR (Non-Af Amer)    > 60


 


Random Glucose    93


 


Calcium    8.5 L


 


Total Bilirubin    0.2


 


AST    25


 


ALT    21


 


Alkaline Phosphatase    122


 


Total Protein    6.2 L


 


Albumin    3.5


 


Globulin    2.7


 


Albumin/Globulin Ratio    1.3














Assessment & Plan


(1) Pancytopenia


Assessment and Plan: 


rule out nutritional deficiency given gastric bypass


retic count, b12, folate, ferritin, FOBT, zinc, copper


HIV and hepatitis panel





Thank you for this interesting consult.





Status: Acute

## 2019-03-11 NOTE — CP.PCM.PN
Subjective





- Date & Time of Evaluation


Date of Evaluation: 03/11/19


Time of Evaluation: 11:21





- Subjective


Subjective: 





ON IV AB FOR UTI 


NEEDS TO ADJUST PSYCH MEDS 


CARDIAC STATUS STABLE 


P/E SAME 


SEE ORDERS 





Objective





- Vital Signs/Intake and Output


Vital Signs (last 24 hours): 


                                        











Temp Pulse Resp BP Pulse Ox


 


 98.2 F   62   18   110/64   95 


 


 03/11/19 07:00  03/11/19 07:00  03/11/19 07:00  03/11/19 09:33  03/11/19 07:00








Intake and Output: 


                                        











 03/10/19 03/11/19





 23:59 11:59


 


Intake Total 380 110


 


Balance 380 110














- Medications


Medications: 


                               Current Medications





Acetaminophen (Tylenol 325mg Tab)  650 mg PO Q6 PRN


   PRN Reason: pain


   Last Admin: 03/11/19 09:34 Dose:  650 mg


Aspirin (Ecotrin)  81 mg PO DAILY [facility]


   Last Admin: 03/11/19 09:34 Dose:  81 mg


Clonazepam (Klonopin)  1 mg PO BID [facility]


   Last Admin: 03/11/19 09:34 Dose:  1 mg


Dextrose (Dextrose 50% Inj)  0 ml IV STAT PRN; Protocol


   PRN Reason: Hypoglycemia Protocol


Dextrose (Glutose 15)  0 gm PO ONCE PRN; Protocol


   PRN Reason: Hypoglycemia Protocol


Diphenhydramine HCl (Benadryl)  25 mg PO ONCE PRN


   PRN Reason: Insomnia


Enoxaparin Sodium (Lovenox)  30 mg SC DAILY [facility]


   Last Admin: 03/11/19 09:32 Dose:  30 mg


Ergocalciferol (Drisdol 50,000 Intl Units Cap)  1 cap PO Q7D [facility]


   Last Admin: 03/08/19 10:57 Dose:  1 cap


Fluticasone Propionate (Flonase)  1 spr NS DAILY [facility]


   Last Admin: 03/11/19 09:32 Dose:  1 spr


Folic Acid (Folic Acid)  1 mg PO DAILY [facility]


   Last Admin: 03/11/19 09:33 Dose:  1 mg


Glucagon (Glucagen Diagnostic Kit)  0 mg IM STAT PRN; Protocol


   PRN Reason: Hypoglycemia Protocol


Meropenem 500 mg/ Sodium (Chloride)  100 mls @ 100 mls/hr IVPB Q8H [facility]; Protocol


   Last Admin: 03/11/19 08:13 Dose:  100 mls/hr


Dextrose (Dextrose 5% In Water 1000 Ml)  1,000 mls @ 0 mls/hr IV .Q0M PRN; 

Protocol


   PRN Reason: Hypoglycemia Protocol


Insulin Human Regular (Novolin R)  0 unit SC ACHS [facility]; Protocol


   Last Admin: 03/11/19 07:30 Dose:  Not Given


Lidocaine (Lidoderm)  1 ea TD DAILY [facility]


   Last Admin: 03/11/19 09:32 Dose:  1 ea


Metoprolol Tartrate (Lopressor)  25 mg PO BID [facility]


   Last Admin: 03/11/19 09:33 Dose:  25 mg


Multivitamins (Hexavitamin)  1 tab PO DAILY [facility]


   Last Admin: 03/11/19 09:33 Dose:  1 tab


Nitroglycerin (Nitro-Bid 2% Oint)  1 ea TOP Q6 [facility]


   Last Admin: 03/11/19 06:16 Dose:  1 ea


Ondansetron HCl (Zofran Inj)  4 mg IVP Q8 PRN


   PRN Reason: Nausea/Vomiting


   Last Admin: 03/09/19 11:07 Dose:  4 mg


Pantoprazole Sodium (Protonix Ec Tab)  40 mg PO 0600 [facility]


   Last Admin: 03/11/19 06:16 Dose:  40 mg


Quetiapine Fumarate (Seroquel)  50 mg PO HS [facility]


   Last Admin: 03/10/19 21:16 Dose:  50 mg


Rosuvastatin Calcium (Crestor)  20 mg PO HS [facility]


   Last Admin: 03/10/19 21:16 Dose:  20 mg


Venlafaxine HCl (Effexor Xr)  150 mg PO DAILY [facility]


   Last Admin: 03/11/19 09:33 Dose:  150 mg


Venlafaxine HCl (Effexor Xr)  37.5 mg PO DAILY [facility]


   Last Admin: 03/11/19 09:33 Dose:  37.5 mg











- Labs


Labs: 


                                        





                                 03/11/19 08:17 





                                 03/11/19 08:17 





                                        











PT  14.5 SECONDS (9.7-12.2)  H  03/07/19  19:59    


 


INR  1.3   03/07/19  19:59    


 


APTT  32 SECONDS (21-34)   03/07/19  19:59    














Assessment and Plan


(1) Chest pain


Status: Acute   





(2) Multiple sclerosis


Status: Acute   





(3) Panic disorder


Status: Chronic

## 2019-03-12 LAB
ALBUMIN SERPL-MCNC: 3.4 G/DL (ref 3.5–5)
ALBUMIN/GLOB SERPL: 1.3 {RATIO} (ref 1–2.1)
ALT SERPL-CCNC: 28 U/L (ref 9–52)
AST SERPL-CCNC: 25 U/L (ref 14–36)
BASOPHILS # BLD AUTO: 0 K/UL (ref 0–0.2)
BASOPHILS NFR BLD: 0.5 % (ref 0–2)
BUN SERPL-MCNC: 11 MG/DL (ref 7–17)
CALCIUM SERPL-MCNC: 8.4 MG/DL (ref 8.6–10.4)
EOSINOPHIL # BLD AUTO: 0.1 K/UL (ref 0–0.7)
EOSINOPHIL NFR BLD: 2.8 % (ref 0–4)
ERYTHROCYTE [DISTWIDTH] IN BLOOD BY AUTOMATED COUNT: 13 % (ref 11.5–14.5)
FERRITIN SERPL-MCNC: 19.2 NG/ML
FOLATE SERPL-MCNC: > 20 NG/ML
GFR NON-AFRICAN AMERICAN: > 60
HEPATITIS A IGM: NEGATIVE
HEPATITIS B CORE AB: NEGATIVE
HEPATITIS C ANTIBODY: NEGATIVE
HGB BLD-MCNC: 10.4 G/DL (ref 11–16)
LYMPHOCYTES # BLD AUTO: 0.9 K/UL (ref 1–4.3)
LYMPHOCYTES NFR BLD AUTO: 40.6 % (ref 20–40)
MCH RBC QN AUTO: 29.5 PG (ref 27–31)
MCHC RBC AUTO-ENTMCNC: 32 G/DL (ref 33–37)
MCV RBC AUTO: 92.2 FL (ref 81–99)
MONOCYTES # BLD: 0.2 K/UL (ref 0–0.8)
MONOCYTES NFR BLD: 9.6 % (ref 0–10)
NEUTROPHILS # BLD: 1 K/UL (ref 1.8–7)
NEUTROPHILS NFR BLD AUTO: 46.5 % (ref 50–75)
NRBC BLD AUTO-RTO: 0.2 % (ref 0–2)
PLATELET # BLD: 140 K/UL (ref 130–400)
PMV BLD AUTO: 10.2 FL (ref 7.2–11.7)
RBC # BLD AUTO: 3.52 MIL/UL (ref 3.8–5.2)
VIT B12 SERPL-MCNC: 409 PG/ML (ref 239–931)
WBC # BLD AUTO: 2.3 K/UL (ref 4.8–10.8)

## 2019-03-12 RX ADMIN — NITROGLYCERIN SCH EA: 20 OINTMENT TOPICAL at 11:20

## 2019-03-12 RX ADMIN — VENLAFAXINE HYDROCHLORIDE SCH MG: 150 CAPSULE, EXTENDED RELEASE ORAL at 10:17

## 2019-03-12 RX ADMIN — VENLAFAXINE HYDROCHLORIDE SCH MG: 37.5 CAPSULE, EXTENDED RELEASE ORAL at 10:17

## 2019-03-12 RX ADMIN — FLUTICASONE PROPIONATE SCH SPR: 50 SPRAY, METERED NASAL at 10:17

## 2019-03-12 RX ADMIN — NITROGLYCERIN SCH EA: 20 OINTMENT TOPICAL at 06:07

## 2019-03-12 RX ADMIN — SODIUM FERRIC GLUCONATE COMPLEX SCH MG: 12.5 INJECTION INTRAVENOUS at 13:11

## 2019-03-12 RX ADMIN — NITROGLYCERIN SCH: 20 OINTMENT TOPICAL at 23:58

## 2019-03-12 RX ADMIN — PANTOPRAZOLE SODIUM SCH MG: 40 TABLET, DELAYED RELEASE ORAL at 06:07

## 2019-03-12 RX ADMIN — Medication SCH TAB: at 10:17

## 2019-03-12 RX ADMIN — HUMAN INSULIN SCH: 100 INJECTION, SOLUTION SUBCUTANEOUS at 21:42

## 2019-03-12 RX ADMIN — NITROGLYCERIN SCH EA: 20 OINTMENT TOPICAL at 00:03

## 2019-03-12 RX ADMIN — HUMAN INSULIN SCH: 100 INJECTION, SOLUTION SUBCUTANEOUS at 08:05

## 2019-03-12 RX ADMIN — NITROGLYCERIN SCH EA: 20 OINTMENT TOPICAL at 17:06

## 2019-03-12 RX ADMIN — HUMAN INSULIN SCH: 100 INJECTION, SOLUTION SUBCUTANEOUS at 16:26

## 2019-03-12 RX ADMIN — HUMAN INSULIN SCH: 100 INJECTION, SOLUTION SUBCUTANEOUS at 11:17

## 2019-03-12 RX ADMIN — ENOXAPARIN SODIUM SCH MG: 30 INJECTION SUBCUTANEOUS at 10:16

## 2019-03-12 NOTE — CP.PCM.PN
Subjective





- Date & Time of Evaluation


Date of Evaluation: 03/12/19


Time of Evaluation: 23:45





- Subjective


Subjective: 





AFEBRILE,


VSS,


FEELING BETTER


REMAINS LEUKOPENIC





TOLERATING iv ANTIBIOTICS.








LABS REVIEWED.





BLOOD CULTURES  3/10 /19 -VE GROWTH TO DATE


URINE CULTURES  3/9/19 +VE kLEBSIELLA PNEUMONIAE - PANSENSITIVE














Objective





- Vital Signs/Intake and Output


Vital Signs (last 24 hours): 


                                        











Temp Pulse Resp BP Pulse Ox


 


 98.7 F   62   18   158/101 H  98 


 


 03/12/19 15:37  03/12/19 15:37  03/12/19 15:37  03/12/19 17:06  03/12/19 15:37








Intake and Output: 


                                        











 03/12/19 03/13/19





 18:59 06:59


 


Intake Total 550 580


 


Balance 550 580














- Medications


Medications: 


                               Current Medications





Acetaminophen (Tylenol 325mg Tab)  650 mg PO Q6 PRN


   PRN Reason: pain


   Last Admin: 03/12/19 16:01 Dose:  650 mg


Aspirin (Ecotrin)  81 mg PO DAILY [facility]


   Last Admin: 03/12/19 10:17 Dose:  81 mg


Clonazepam (Klonopin)  0.5 mg PO TID [facility]


   Last Admin: 03/12/19 18:47 Dose:  0.5 mg


Dextrose (Dextrose 50% Inj)  0 ml IV STAT PRN; Protocol


   PRN Reason: Hypoglycemia Protocol


Dextrose (Glutose 15)  0 gm PO ONCE PRN; Protocol


   PRN Reason: Hypoglycemia Protocol


Diphenhydramine HCl (Benadryl)  25 mg PO ONCE PRN


   PRN Reason: Insomnia


Enoxaparin Sodium (Lovenox)  30 mg SC DAILY [facility]


   Last Admin: 03/12/19 10:16 Dose:  30 mg


Ergocalciferol (Drisdol 50,000 Intl Units Cap)  1 cap PO Q7D [facility]


   Last Admin: 03/08/19 10:57 Dose:  1 cap


Ferric Sodium Gluconate Complex (Ferrlecit)  125 mg IVPB DAILY [facility]


   Stop: 03/20/19 12:46


   Last Admin: 03/12/19 13:11 Dose:  125 mg


Fluticasone Propionate (Flonase)  1 spr NS DAILY [facility]


   Last Admin: 03/12/19 10:17 Dose:  1 spr


Folic Acid (Folic Acid)  1 mg PO DAILY [facility]


   Last Admin: 03/12/19 10:17 Dose:  1 mg


Glucagon (Glucagen Diagnostic Kit)  0 mg IM STAT PRN; Protocol


   PRN Reason: Hypoglycemia Protocol


Meropenem 500 mg/ Sodium (Chloride)  100 mls @ 100 mls/hr IVPB Q8H [facility]; Protocol


   Last Admin: 03/12/19 16:02 Dose:  100 mls/hr


Dextrose (Dextrose 5% In Water 1000 Ml)  1,000 mls @ 0 mls/hr IV .Q0M PRN; 

Protocol


   PRN Reason: Hypoglycemia Protocol


Insulin Human Regular (Novolin R)  0 unit SC ACHS [facility]; Protocol


   Last Admin: 03/12/19 21:42 Dose:  Not Given


Lidocaine (Lidoderm)  1 ea TD DAILY [facility]


   Last Admin: 03/12/19 10:18 Dose:  1 ea


Lorazepam (Ativan)  0.5 mg PO Q6 PRN


   PRN Reason: Agitation


   Last Admin: 03/12/19 16:01 Dose:  0.5 mg


Metoprolol Tartrate (Lopressor)  25 mg PO BID [facility]


   Last Admin: 03/12/19 17:06 Dose:  25 mg


Mirtazapine (Remeron)  15 mg PO HS [facility]


   Last Admin: 03/12/19 21:42 Dose:  15 mg


Multivitamins (Hexavitamin)  1 tab PO DAILY [facility]


   Last Admin: 03/12/19 10:17 Dose:  1 tab


Nitroglycerin (Nitro-Bid 2% Oint)  1 ea TOP Q6 [facility]


   Last Admin: 03/12/19 17:06 Dose:  1 ea


Ondansetron HCl (Zofran Inj)  4 mg IVP Q8 PRN


   PRN Reason: Nausea/Vomiting


   Last Admin: 03/12/19 18:48 Dose:  4 mg


Pantoprazole Sodium (Protonix Ec Tab)  40 mg PO 0600 [facility]


   Last Admin: 03/12/19 06:07 Dose:  40 mg


Quetiapine Fumarate (Seroquel)  50 mg PO HS [facility]


   Last Admin: 03/12/19 21:41 Dose:  50 mg


Rosuvastatin Calcium (Crestor)  20 mg PO HS [facility]


   Last Admin: 03/12/19 21:41 Dose:  20 mg


Venlafaxine HCl (Effexor Xr)  150 mg PO DAILY [facility]


   Last Admin: 03/12/19 10:17 Dose:  150 mg


Venlafaxine HCl (Effexor Xr)  37.5 mg PO DAILY [facility]


   Last Admin: 03/12/19 10:17 Dose:  37.5 mg











- Labs


Labs: 


                                        





                                 03/12/19 08:43 





                                 03/12/19 08:43 





                                        











PT  14.5 SECONDS (9.7-12.2)  H  03/07/19  19:59    


 


INR  1.3   03/07/19  19:59    


 


APTT  32 SECONDS (21-34)   03/07/19  19:59    














- Constitutional


Appears: No Acute Distress





- Head Exam


Head Exam: NORMAL INSPECTION





- Eye Exam


Eye Exam: EOMI, PERRL





- ENT Exam


ENT Exam: Normal Oropharynx





- Neck Exam


Neck Exam: Normal Inspection





- Respiratory Exam


Respiratory Exam: Clear to Ausculation Bilateral, NORMAL BREATHING PATTERN





- Cardiovascular Exam


Cardiovascular Exam: REGULAR RHYTHM, +S1, +S2





- GI/Abdominal Exam


GI & Abdominal Exam: Soft, Normal Bowel Sounds.  absent: Tenderness





- Back Exam


Back Exam: CVA tenderness (L), CVA tenderness (R)





- Neurological Exam


Neurological Exam: Awake, CN II-XII Intact, Oriented x3, Reflexes Normal





- Psychiatric Exam


Psychiatric exam: Normal Mood





- Skin


Skin Exam: Normal Color, Warm





Assessment and Plan


(1) UTI (urinary tract infection)


Status: Acute   





(2) Pancytopenia


Status: Acute   





(3) Chest pain


Status: Acute   





(4) Multiple sclerosis


Status: Acute   





(5) Diabetes mellitus


Status: Acute   





(6) Depression


Status: Chronic   





- Assessment and Plan (Free Text)


Plan: 





CONTINUE iv MERREM 500 MG EVERY 8 HOURLY 3/10/19.








 F/U RENAL US R/O KIDNEY STONES/HYDRONEPHROSIS.





AS PER CONSULTANTS/PMD.





CASE DISCUSSED WITH THE STAFF.

## 2019-03-12 NOTE — CP.PCM.PN
Subjective





- Date & Time of Evaluation


Date of Evaluation: 03/12/19


Time of Evaluation: 12:00





- Subjective


Subjective: 





No complaints.





Objective





- Vital Signs/Intake and Output


Vital Signs (last 24 hours): 


                                        











Temp Pulse Resp BP Pulse Ox


 


 98.7 F   62   18   158/101 H  98 


 


 03/12/19 15:37  03/12/19 15:37  03/12/19 15:37  03/12/19 17:06  03/12/19 15:37








Intake and Output: 


                                        











 03/12/19 03/13/19





 18:59 06:59


 


Intake Total 550 580


 


Balance 550 580














- Medications


Medications: 


                               Current Medications





Acetaminophen (Tylenol 325mg Tab)  650 mg PO Q6 PRN


   PRN Reason: pain


   Last Admin: 03/12/19 16:01 Dose:  650 mg


Aspirin (Ecotrin)  81 mg PO DAILY [facility]


   Last Admin: 03/12/19 10:17 Dose:  81 mg


Clonazepam (Klonopin)  0.5 mg PO TID [facility]


   Last Admin: 03/12/19 18:47 Dose:  0.5 mg


Dextrose (Dextrose 50% Inj)  0 ml IV STAT PRN; Protocol


   PRN Reason: Hypoglycemia Protocol


Dextrose (Glutose 15)  0 gm PO ONCE PRN; Protocol


   PRN Reason: Hypoglycemia Protocol


Diphenhydramine HCl (Benadryl)  25 mg PO ONCE PRN


   PRN Reason: Insomnia


Enoxaparin Sodium (Lovenox)  30 mg SC DAILY [facility]


   Last Admin: 03/12/19 10:16 Dose:  30 mg


Ergocalciferol (Drisdol 50,000 Intl Units Cap)  1 cap PO Q7D [facility]


   Last Admin: 03/08/19 10:57 Dose:  1 cap


Ferric Sodium Gluconate Complex (Ferrlecit)  125 mg IVPB DAILY [facility]


   Stop: 03/20/19 12:46


   Last Admin: 03/12/19 13:11 Dose:  125 mg


Fluticasone Propionate (Flonase)  1 spr NS DAILY [facility]


   Last Admin: 03/12/19 10:17 Dose:  1 spr


Folic Acid (Folic Acid)  1 mg PO DAILY [facility]


   Last Admin: 03/12/19 10:17 Dose:  1 mg


Glucagon (Glucagen Diagnostic Kit)  0 mg IM STAT PRN; Protocol


   PRN Reason: Hypoglycemia Protocol


Meropenem 500 mg/ Sodium (Chloride)  100 mls @ 100 mls/hr IVPB Q8H [facility]; Protocol


   Last Admin: 03/12/19 16:02 Dose:  100 mls/hr


Dextrose (Dextrose 5% In Water 1000 Ml)  1,000 mls @ 0 mls/hr IV .Q0M PRN; 

Protocol


   PRN Reason: Hypoglycemia Protocol


Insulin Human Regular (Novolin R)  0 unit SC ACHS [facility]; Protocol


   Last Admin: 03/12/19 21:42 Dose:  Not Given


Lidocaine (Lidoderm)  1 ea TD DAILY [facility]


   Last Admin: 03/12/19 10:18 Dose:  1 ea


Lorazepam (Ativan)  0.5 mg PO Q6 PRN


   PRN Reason: Agitation


   Last Admin: 03/12/19 16:01 Dose:  0.5 mg


Metoprolol Tartrate (Lopressor)  25 mg PO BID [facility]


   Last Admin: 03/12/19 17:06 Dose:  25 mg


Mirtazapine (Remeron)  15 mg PO HS [facility]


   Last Admin: 03/12/19 21:42 Dose:  15 mg


Multivitamins (Hexavitamin)  1 tab PO DAILY [facility]


   Last Admin: 03/12/19 10:17 Dose:  1 tab


Nitroglycerin (Nitro-Bid 2% Oint)  1 ea TOP Q6 [facility]


   Last Admin: 03/12/19 17:06 Dose:  1 ea


Ondansetron HCl (Zofran Inj)  4 mg IVP Q8 PRN


   PRN Reason: Nausea/Vomiting


   Last Admin: 03/12/19 18:48 Dose:  4 mg


Pantoprazole Sodium (Protonix Ec Tab)  40 mg PO 0600 [facility]


   Last Admin: 03/12/19 06:07 Dose:  40 mg


Quetiapine Fumarate (Seroquel)  50 mg PO HS [facility]


   Last Admin: 03/12/19 21:41 Dose:  50 mg


Rosuvastatin Calcium (Crestor)  20 mg PO HS [facility]


   Last Admin: 03/12/19 21:41 Dose:  20 mg


Venlafaxine HCl (Effexor Xr)  150 mg PO DAILY [facility]


   Last Admin: 03/12/19 10:17 Dose:  150 mg


Venlafaxine HCl (Effexor Xr)  37.5 mg PO DAILY [facility]


   Last Admin: 03/12/19 10:17 Dose:  37.5 mg











- Labs


Labs: 


                                        





                                 03/12/19 08:43 





                                 03/12/19 08:43 





                                        











PT  14.5 SECONDS (9.7-12.2)  H  03/07/19  19:59    


 


INR  1.3   03/07/19  19:59    


 


APTT  32 SECONDS (21-34)   03/07/19  19:59    














- Head Exam


Head Exam: ATRAUMATIC





- Eye Exam


Eye Exam: Normal appearance





- ENT Exam


ENT Exam: Mucous Membranes Dry





- Respiratory Exam


Respiratory Exam: NORMAL BREATHING PATTERN





- Cardiovascular Exam


Cardiovascular Exam: +S1, +S2





- GI/Abdominal Exam


GI & Abdominal Exam: Normal Bowel Sounds





Assessment and Plan


(1) Pancytopenia


Assessment & Plan: 


iron deficiency and borderline b12


will supplement


f/u zinc and copper


Status: Acute

## 2019-03-12 NOTE — CP.PCM.PN
Subjective





- Date & Time of Evaluation


Date of Evaluation: 03/12/19


Time of Evaluation: 12:23





- Subjective


Subjective: 





WBC IS 2.3 


ID/HEM EVALUATION NOTED 


NO CP 


URINE KLEB  SPP. PROT  ON IV AB 








Objective





- Vital Signs/Intake and Output


Vital Signs (last 24 hours): 


                                        











Temp Pulse Resp BP Pulse Ox


 


 98.4 F   61   20   121/74   95 


 


 03/12/19 07:05  03/12/19 12:19  03/12/19 07:05  03/12/19 11:21  03/12/19 07:05








Intake and Output: 


                                        











 03/12/19 03/12/19





 11:59 23:59


 


Intake Total 210 


 


Balance 210 














- Medications


Medications: 


                               Current Medications





Acetaminophen (Tylenol 325mg Tab)  650 mg PO Q6 PRN


   PRN Reason: pain


   Last Admin: 03/11/19 22:04 Dose:  650 mg


Aspirin (Ecotrin)  81 mg PO DAILY [facility]


   Last Admin: 03/12/19 10:17 Dose:  81 mg


Dextrose (Dextrose 50% Inj)  0 ml IV STAT PRN; Protocol


   PRN Reason: Hypoglycemia Protocol


Dextrose (Glutose 15)  0 gm PO ONCE PRN; Protocol


   PRN Reason: Hypoglycemia Protocol


Diphenhydramine HCl (Benadryl)  25 mg PO ONCE PRN


   PRN Reason: Insomnia


Enoxaparin Sodium (Lovenox)  30 mg SC DAILY [facility]


   Last Admin: 03/12/19 10:16 Dose:  30 mg


Ergocalciferol (Drisdol 50,000 Intl Units Cap)  1 cap PO Q7D [facility]


   Last Admin: 03/08/19 10:57 Dose:  1 cap


Fluticasone Propionate (Flonase)  1 spr NS DAILY [facility]


   Last Admin: 03/12/19 10:17 Dose:  1 spr


Folic Acid (Folic Acid)  1 mg PO DAILY [facility]


   Last Admin: 03/12/19 10:17 Dose:  1 mg


Glucagon (Glucagen Diagnostic Kit)  0 mg IM STAT PRN; Protocol


   PRN Reason: Hypoglycemia Protocol


Meropenem 500 mg/ Sodium (Chloride)  100 mls @ 100 mls/hr IVPB Q8H [facility]; Protocol


   Last Admin: 03/12/19 08:19 Dose:  100 mls/hr


Dextrose (Dextrose 5% In Water 1000 Ml)  1,000 mls @ 0 mls/hr IV .Q0M PRN; 

Protocol


   PRN Reason: Hypoglycemia Protocol


Insulin Human Regular (Novolin R)  0 unit SC ACHS [facility]; Protocol


   Last Admin: 03/12/19 11:17 Dose:  Not Given


Lidocaine (Lidoderm)  1 ea TD DAILY [facility]


   Last Admin: 03/12/19 10:18 Dose:  1 ea


Lorazepam (Ativan)  0.5 mg PO Q6 PRN


   PRN Reason: Agitation


   Last Admin: 03/12/19 10:29 Dose:  0.5 mg


Metoprolol Tartrate (Lopressor)  25 mg PO BID [facility]


   Last Admin: 03/12/19 10:17 Dose:  25 mg


Mirtazapine (Remeron)  15 mg PO HS [facility]


   Last Admin: 03/11/19 21:32 Dose:  15 mg


Multivitamins (Hexavitamin)  1 tab PO DAILY [facility]


   Last Admin: 03/12/19 10:17 Dose:  1 tab


Nitroglycerin (Nitro-Bid 2% Oint)  1 ea TOP Q6 [facility]


   Last Admin: 03/12/19 11:20 Dose:  1 ea


Ondansetron HCl (Zofran Inj)  4 mg IVP Q8 PRN


   PRN Reason: Nausea/Vomiting


   Last Admin: 03/09/19 11:07 Dose:  4 mg


Pantoprazole Sodium (Protonix Ec Tab)  40 mg PO 0600 [facility]


   Last Admin: 03/12/19 06:07 Dose:  40 mg


Quetiapine Fumarate (Seroquel)  50 mg PO HS [facility]


   Last Admin: 03/11/19 21:32 Dose:  50 mg


Rosuvastatin Calcium (Crestor)  20 mg PO HS [facility]


   Last Admin: 03/11/19 21:32 Dose:  20 mg


Venlafaxine HCl (Effexor Xr)  150 mg PO DAILY [facility]


   Last Admin: 03/12/19 10:17 Dose:  150 mg


Venlafaxine HCl (Effexor Xr)  37.5 mg PO DAILY [facility]


   Last Admin: 03/12/19 10:17 Dose:  37.5 mg











- Labs


Labs: 


                                        





                                 03/12/19 08:43 





                                 03/12/19 08:43 





                                        











PT  14.5 SECONDS (9.7-12.2)  H  03/07/19  19:59    


 


INR  1.3   03/07/19  19:59    


 


APTT  32 SECONDS (21-34)   03/07/19  19:59    














Assessment and Plan


(1) Chest pain


Status: Acute   





(2) Multiple sclerosis


Status: Acute   





(3) Panic disorder


Status: Chronic

## 2019-03-12 NOTE — US
Date of service: 



03/12/2019



PROCEDURE:  Ultrasound of the Kidneys



HISTORY:

RECURRENT UTI



COMPARISON:

None available.



TECHNIQUE:

Sonogram of the kidneys.



FINDINGS:



RIGHT KIDNEY:

Measures: 12.6 x 7.8 x 9.8 cm. 



Normal in size, contour and echogenicity. 



No stone, solid mass lesion or hydronephrosis visualized. 



LEFT KIDNEY:

Measures: 5.4 x 4.0 by 5.6 cm. 



Normal in size, contour and echogenicity. 



No stone, solid mass lesion or hydronephrosis visualized. 



OTHER FINDINGS:

No bladder wall thickening.  No intraluminal masses.  Right and left 

ureteral jets are noted of the technologist.  The left is more 

clearly visualized in the right per images 



Prevoid urine bladder volume 504.40 mL.  No postvoid residual urine 

volume.  No postvoid residual urine volume noted. 



IMPRESSION:

Unremarkable renal sonogram.

## 2019-03-13 LAB
ALBUMIN SERPL-MCNC: 3.4 G/DL (ref 3.5–5)
ALBUMIN/GLOB SERPL: 1.3 {RATIO} (ref 1–2.1)
ALT SERPL-CCNC: 21 U/L (ref 9–52)
AST SERPL-CCNC: 26 U/L (ref 14–36)
BASOPHILS # BLD AUTO: 0 K/UL (ref 0–0.2)
BASOPHILS NFR BLD: 0.6 % (ref 0–2)
BUN SERPL-MCNC: 12 MG/DL (ref 7–17)
CALCIUM SERPL-MCNC: 8.7 MG/DL (ref 8.6–10.4)
EOSINOPHIL # BLD AUTO: 0.1 K/UL (ref 0–0.7)
EOSINOPHIL NFR BLD: 3.7 % (ref 0–4)
ERYTHROCYTE [DISTWIDTH] IN BLOOD BY AUTOMATED COUNT: 13.1 % (ref 11.5–14.5)
GFR NON-AFRICAN AMERICAN: > 60
HGB BLD-MCNC: 10.9 G/DL (ref 11–16)
LYMPHOCYTES # BLD AUTO: 0.9 K/UL (ref 1–4.3)
LYMPHOCYTES NFR BLD AUTO: 39.9 % (ref 20–40)
MCH RBC QN AUTO: 28.8 PG (ref 27–31)
MCHC RBC AUTO-ENTMCNC: 31.3 G/DL (ref 33–37)
MCV RBC AUTO: 92.3 FL (ref 81–99)
MONOCYTES # BLD: 0.2 K/UL (ref 0–0.8)
MONOCYTES NFR BLD: 7.8 % (ref 0–10)
NEUTROPHILS # BLD: 1.1 K/UL (ref 1.8–7)
NEUTROPHILS NFR BLD AUTO: 48 % (ref 50–75)
NRBC BLD AUTO-RTO: 0.1 % (ref 0–2)
PLATELET # BLD: 155 K/UL (ref 130–400)
PMV BLD AUTO: 9.7 FL (ref 7.2–11.7)
RBC # BLD AUTO: 3.79 MIL/UL (ref 3.8–5.2)
WBC # BLD AUTO: 2.3 K/UL (ref 4.8–10.8)

## 2019-03-13 RX ADMIN — ENOXAPARIN SODIUM SCH MG: 30 INJECTION SUBCUTANEOUS at 09:22

## 2019-03-13 RX ADMIN — Medication SCH TAB: at 09:21

## 2019-03-13 RX ADMIN — NITROGLYCERIN SCH EA: 20 OINTMENT TOPICAL at 18:29

## 2019-03-13 RX ADMIN — VENLAFAXINE HYDROCHLORIDE SCH MG: 150 CAPSULE, EXTENDED RELEASE ORAL at 09:23

## 2019-03-13 RX ADMIN — HUMAN INSULIN SCH: 100 INJECTION, SOLUTION SUBCUTANEOUS at 07:43

## 2019-03-13 RX ADMIN — HUMAN INSULIN SCH: 100 INJECTION, SOLUTION SUBCUTANEOUS at 16:33

## 2019-03-13 RX ADMIN — VENLAFAXINE HYDROCHLORIDE SCH MG: 37.5 CAPSULE, EXTENDED RELEASE ORAL at 09:23

## 2019-03-13 RX ADMIN — HUMAN INSULIN SCH: 100 INJECTION, SOLUTION SUBCUTANEOUS at 11:30

## 2019-03-13 RX ADMIN — NITROGLYCERIN SCH EA: 20 OINTMENT TOPICAL at 06:11

## 2019-03-13 RX ADMIN — PANTOPRAZOLE SODIUM SCH MG: 40 TABLET, DELAYED RELEASE ORAL at 06:14

## 2019-03-13 RX ADMIN — SODIUM FERRIC GLUCONATE COMPLEX SCH MG: 12.5 INJECTION INTRAVENOUS at 09:23

## 2019-03-13 RX ADMIN — NITROGLYCERIN SCH: 20 OINTMENT TOPICAL at 11:22

## 2019-03-13 RX ADMIN — HUMAN INSULIN SCH: 100 INJECTION, SOLUTION SUBCUTANEOUS at 21:43

## 2019-03-13 RX ADMIN — FLUTICASONE PROPIONATE SCH SPR: 50 SPRAY, METERED NASAL at 09:24

## 2019-03-13 NOTE — CP.PCM.PN
Subjective





- Date & Time of Evaluation


Date of Evaluation: 03/13/19


Time of Evaluation: 14:42





- Subjective


Subjective: 


AFEBRILE,





VSS.


 C/O BACK PAIN





ON IV ABX.





LABS/ RADIOLOGY 





RENAL US - NOTED UNREMARKABLE.





Objective





- Vital Signs/Intake and Output


Vital Signs (last 24 hours): 


                                        











Temp Pulse Resp BP Pulse Ox


 


 98.0 F   66   20   115/72   93 L


 


 03/13/19 07:00  03/13/19 07:00  03/13/19 07:00  03/13/19 09:23  03/13/19 07:00








Intake and Output: 


                                        











 03/13/19 03/13/19





 06:59 18:59


 


Intake Total 580 600


 


Balance 580 600














- Medications


Medications: 


                               Current Medications





Acetaminophen (Tylenol 325mg Tab)  650 mg PO Q6 PRN


   PRN Reason: pain


   Last Admin: 03/13/19 09:22 Dose:  650 mg


Aspirin (Ecotrin)  81 mg PO DAILY [facility]


   Last Admin: 03/13/19 09:21 Dose:  81 mg


Clonazepam (Klonopin)  0.5 mg PO TID [facility]


   Last Admin: 03/13/19 13:01 Dose:  0.5 mg


Dextrose (Dextrose 50% Inj)  0 ml IV STAT PRN; Protocol


   PRN Reason: Hypoglycemia Protocol


Dextrose (Glutose 15)  0 gm PO ONCE PRN; Protocol


   PRN Reason: Hypoglycemia Protocol


Diphenhydramine HCl (Benadryl)  25 mg PO ONCE PRN


   PRN Reason: Insomnia


Enoxaparin Sodium (Lovenox)  30 mg SC DAILY [facility]


   Last Admin: 03/13/19 09:22 Dose:  30 mg


Ergocalciferol (Drisdol 50,000 Intl Units Cap)  1 cap PO Q7D [facility]


   Last Admin: 03/08/19 10:57 Dose:  1 cap


Ferric Sodium Gluconate Complex (Ferrlecit)  125 mg IVPB DAILY [facility]


   Stop: 03/20/19 12:46


   Last Admin: 03/13/19 09:23 Dose:  125 mg


Fluticasone Propionate (Flonase)  1 spr NS DAILY [facility]


   Last Admin: 03/13/19 09:24 Dose:  1 spr


Folic Acid (Folic Acid)  1 mg PO DAILY [facility]


   Last Admin: 03/13/19 09:24 Dose:  1 mg


Glucagon (Glucagen Diagnostic Kit)  0 mg IM STAT PRN; Protocol


   PRN Reason: Hypoglycemia Protocol


Meropenem 500 mg/ Sodium (Chloride)  100 mls @ 100 mls/hr IVPB Q8H [facility]; Protocol


   Last Admin: 03/13/19 08:19 Dose:  100 mls/hr


Dextrose (Dextrose 5% In Water 1000 Ml)  1,000 mls @ 0 mls/hr IV .Q0M PRN; 

Protocol


   PRN Reason: Hypoglycemia Protocol


Insulin Human Regular (Novolin R)  0 unit SC ACHS [facility]; Protocol


   Last Admin: 03/13/19 11:30 Dose:  Not Given


Lidocaine (Lidoderm)  1 ea TD DAILY [facility]


   Last Admin: 03/13/19 09:24 Dose:  1 ea


Lorazepam (Ativan)  0.5 mg PO Q6 PRN


   PRN Reason: Agitation


   Last Admin: 03/12/19 16:01 Dose:  0.5 mg


Metoprolol Tartrate (Lopressor)  25 mg PO BID [facility]


   Last Admin: 03/13/19 09:23 Dose:  25 mg


Mirtazapine (Remeron)  15 mg PO HS [facility]


   Last Admin: 03/12/19 21:42 Dose:  15 mg


Multivitamins (Hexavitamin)  1 tab PO DAILY [facility]


   Last Admin: 03/13/19 09:21 Dose:  1 tab


Nitroglycerin (Nitro-Bid 2% Oint)  1 ea TOP Q6 [facility]


   Last Admin: 03/13/19 11:22 Dose:  Not Given


Ondansetron HCl (Zofran Inj)  4 mg IVP Q8 PRN


   PRN Reason: Nausea/Vomiting


   Last Admin: 03/12/19 18:48 Dose:  4 mg


Pantoprazole Sodium (Protonix Ec Tab)  40 mg PO 0600 [facility]


   Last Admin: 03/13/19 06:14 Dose:  40 mg


Quetiapine Fumarate (Seroquel)  50 mg PO HS [facility]


   Last Admin: 03/12/19 21:41 Dose:  50 mg


Rosuvastatin Calcium (Crestor)  20 mg PO HS [facility]


   Last Admin: 03/12/19 21:41 Dose:  20 mg


Venlafaxine HCl (Effexor Xr)  150 mg PO DAILY [facility]


   Last Admin: 03/13/19 09:23 Dose:  150 mg


Venlafaxine HCl (Effexor Xr)  37.5 mg PO DAILY [facility]


   Last Admin: 03/13/19 09:23 Dose:  37.5 mg











- Labs


Labs: 


                                        





                                 03/13/19 09:02 





                                 03/13/19 09:02 





                                        











PT  14.5 SECONDS (9.7-12.2)  H  03/07/19  19:59    


 


INR  1.3   03/07/19  19:59    


 


APTT  32 SECONDS (21-34)   03/07/19  19:59    














- Constitutional


Appears: No Acute Distress





- Head Exam


Head Exam: NORMAL INSPECTION





- Eye Exam


Eye Exam: EOMI, PERRL





- ENT Exam


ENT Exam: Normal Oropharynx





- Neck Exam


Neck Exam: Normal Inspection





- Respiratory Exam


Respiratory Exam: Clear to Ausculation Bilateral, NORMAL BREATHING PATTERN





- Cardiovascular Exam


Cardiovascular Exam: REGULAR RHYTHM, +S1, +S2





- GI/Abdominal Exam


GI & Abdominal Exam: Soft, Tenderness (RT FLANK AND BACK.), Normal Bowel Sounds





- Extremities Exam


Extremities Exam: Normal Capillary Refill, Pedal Edema.  absent: Calf Tenderness





- Neurological Exam


Neurological Exam: Alert, Awake, CN II-XII Intact, Oriented x3





- Psychiatric Exam


Psychiatric exam: Normal Mood





- Skin


Skin Exam: Normal Color, Warm





Assessment and Plan


(1) UTI (urinary tract infection)


Status: Acute   





(2) Pancytopenia


Status: Acute   





(3) Chest pain


Status: Acute   





(4) Multiple sclerosis


Status: Acute   





(5) Diabetes mellitus


Status: Acute   





(6) Depression


Status: Chronic   





- Assessment and Plan (Free Text)


Plan: 





CONTINUE iv MERREM 500 MG EVERY 8 HOURLY 3/10/19.





RENAL US - NOTED UNREMARKABLE.


F/U REPEAT UA, URINE CULTURE -CLEAN CATCH. 3/14/19





AS PER CONSULTANTS/PMD.





CASE DISCUSSED WITH THE STAFF.

## 2019-03-13 NOTE — CP.PCM.PN
Subjective





- Date & Time of Evaluation


Date of Evaluation: 03/13/19


Time of Evaluation: 12:06





- Subjective


Subjective: 





NO FURTHER CP 


AFEBRILE 


ON IV AB 


ON IV IRON 


P/E REMAINS SAME 





Objective





- Vital Signs/Intake and Output


Vital Signs (last 24 hours): 


                                        











Temp Pulse Resp BP Pulse Ox


 


 98.0 F   66   20   115/72   93 L


 


 03/13/19 07:00  03/13/19 07:00  03/13/19 07:00  03/13/19 09:23  03/13/19 07:00











- Medications


Medications: 


                               Current Medications





Acetaminophen (Tylenol 325mg Tab)  650 mg PO Q6 PRN


   PRN Reason: pain


   Last Admin: 03/13/19 09:22 Dose:  650 mg


Aspirin (Ecotrin)  81 mg PO DAILY [facility]


   Last Admin: 03/13/19 09:21 Dose:  81 mg


Clonazepam (Klonopin)  0.5 mg PO TID [facility]


   Last Admin: 03/13/19 09:21 Dose:  0.5 mg


Dextrose (Dextrose 50% Inj)  0 ml IV STAT PRN; Protocol


   PRN Reason: Hypoglycemia Protocol


Dextrose (Glutose 15)  0 gm PO ONCE PRN; Protocol


   PRN Reason: Hypoglycemia Protocol


Diphenhydramine HCl (Benadryl)  25 mg PO ONCE PRN


   PRN Reason: Insomnia


Enoxaparin Sodium (Lovenox)  30 mg SC DAILY [facility]


   Last Admin: 03/13/19 09:22 Dose:  30 mg


Ergocalciferol (Drisdol 50,000 Intl Units Cap)  1 cap PO Q7D [facility]


   Last Admin: 03/08/19 10:57 Dose:  1 cap


Ferric Sodium Gluconate Complex (Ferrlecit)  125 mg IVPB DAILY [facility]


   Stop: 03/20/19 12:46


   Last Admin: 03/13/19 09:23 Dose:  125 mg


Fluticasone Propionate (Flonase)  1 spr NS DAILY [facility]


   Last Admin: 03/13/19 09:24 Dose:  1 spr


Folic Acid (Folic Acid)  1 mg PO DAILY [facility]


   Last Admin: 03/13/19 09:24 Dose:  1 mg


Glucagon (Glucagen Diagnostic Kit)  0 mg IM STAT PRN; Protocol


   PRN Reason: Hypoglycemia Protocol


Meropenem 500 mg/ Sodium (Chloride)  100 mls @ 100 mls/hr IVPB Q8H [facility]; Protocol


   Last Admin: 03/13/19 08:19 Dose:  100 mls/hr


Dextrose (Dextrose 5% In Water 1000 Ml)  1,000 mls @ 0 mls/hr IV .Q0M PRN; 

Protocol


   PRN Reason: Hypoglycemia Protocol


Insulin Human Regular (Novolin R)  0 unit SC ACHS [facility]; Protocol


   Last Admin: 03/13/19 11:30 Dose:  Not Given


Lidocaine (Lidoderm)  1 ea TD DAILY [facility]


   Last Admin: 03/13/19 09:24 Dose:  1 ea


Lorazepam (Ativan)  0.5 mg PO Q6 PRN


   PRN Reason: Agitation


   Last Admin: 03/12/19 16:01 Dose:  0.5 mg


Metoprolol Tartrate (Lopressor)  25 mg PO BID [facility]


   Last Admin: 03/13/19 09:23 Dose:  25 mg


Mirtazapine (Remeron)  15 mg PO HS [facility]


   Last Admin: 03/12/19 21:42 Dose:  15 mg


Multivitamins (Hexavitamin)  1 tab PO DAILY [facility]


   Last Admin: 03/13/19 09:21 Dose:  1 tab


Nitroglycerin (Nitro-Bid 2% Oint)  1 ea TOP Q6 [facility]


   Last Admin: 03/13/19 11:22 Dose:  Not Given


Ondansetron HCl (Zofran Inj)  4 mg IVP Q8 PRN


   PRN Reason: Nausea/Vomiting


   Last Admin: 03/12/19 18:48 Dose:  4 mg


Pantoprazole Sodium (Protonix Ec Tab)  40 mg PO 0600 [facility]


   Last Admin: 03/13/19 06:14 Dose:  40 mg


Quetiapine Fumarate (Seroquel)  50 mg PO HS [facility]


   Last Admin: 03/12/19 21:41 Dose:  50 mg


Rosuvastatin Calcium (Crestor)  20 mg PO HS [facility]


   Last Admin: 03/12/19 21:41 Dose:  20 mg


Venlafaxine HCl (Effexor Xr)  150 mg PO DAILY [facility]


   Last Admin: 03/13/19 09:23 Dose:  150 mg


Venlafaxine HCl (Effexor Xr)  37.5 mg PO DAILY [facility]


   Last Admin: 03/13/19 09:23 Dose:  37.5 mg











- Labs


Labs: 


                                        





                                 03/13/19 09:02 





                                 03/13/19 09:02 





                                        











PT  14.5 SECONDS (9.7-12.2)  H  03/07/19  19:59    


 


INR  1.3   03/07/19  19:59    


 


APTT  32 SECONDS (21-34)   03/07/19  19:59    














Assessment and Plan


(1) Chest pain


Status: Acute   





(2) Multiple sclerosis


Status: Acute   





(3) Panic disorder


Status: Chronic

## 2019-03-13 NOTE — CP.PCM.PN
Subjective





- Date & Time of Evaluation


Date of Evaluation: 03/13/19


Time of Evaluation: 19:00





- Subjective


Subjective: 





Has back pain.





Objective





- Vital Signs/Intake and Output


Vital Signs (last 24 hours): 


                                        











Temp Pulse Resp BP Pulse Ox


 


 98.2 F   60   20   107/67   97 


 


 03/13/19 15:30  03/13/19 15:30  03/13/19 15:30  03/13/19 18:29  03/13/19 15:30








Intake and Output: 


                                        











 03/13/19 03/14/19





 18:59 06:59


 


Intake Total 600 


 


Balance 600 














- Medications


Medications: 


                               Current Medications





Acetaminophen (Tylenol 325mg Tab)  650 mg PO Q6 PRN


   PRN Reason: pain


   Last Admin: 03/13/19 21:44 Dose:  650 mg


Aspirin (Ecotrin)  81 mg PO DAILY [facility]


   Last Admin: 03/13/19 09:21 Dose:  81 mg


Clonazepam (Klonopin)  0.5 mg PO TID [facility]


   Last Admin: 03/13/19 18:29 Dose:  0.5 mg


Dextrose (Dextrose 50% Inj)  0 ml IV STAT PRN; Protocol


   PRN Reason: Hypoglycemia Protocol


Dextrose (Glutose 15)  0 gm PO ONCE PRN; Protocol


   PRN Reason: Hypoglycemia Protocol


Diphenhydramine HCl (Benadryl)  25 mg PO ONCE PRN


   PRN Reason: Insomnia


Enoxaparin Sodium (Lovenox)  30 mg SC DAILY [facility]


   Last Admin: 03/13/19 09:22 Dose:  30 mg


Ergocalciferol (Drisdol 50,000 Intl Units Cap)  1 cap PO Q7D [facility]


   Last Admin: 03/08/19 10:57 Dose:  1 cap


Ferric Sodium Gluconate Complex (Ferrlecit)  125 mg IVPB DAILY [facility]


   Stop: 03/20/19 12:46


   Last Admin: 03/13/19 09:23 Dose:  125 mg


Fluticasone Propionate (Flonase)  1 spr NS DAILY [facility]


   Last Admin: 03/13/19 09:24 Dose:  1 spr


Folic Acid (Folic Acid)  1 mg PO DAILY [facility]


   Last Admin: 03/13/19 09:24 Dose:  1 mg


Glucagon (Glucagen Diagnostic Kit)  0 mg IM STAT PRN; Protocol


   PRN Reason: Hypoglycemia Protocol


Meropenem 500 mg/ Sodium (Chloride)  100 mls @ 100 mls/hr IVPB Q8H [facility]; Protocol


   Last Admin: 03/13/19 16:24 Dose:  100 mls/hr


Insulin Human Regular (Novolin R)  0 unit SC ACHS [facility]; Protocol


   Last Admin: 03/13/19 21:43 Dose:  Not Given


Lidocaine (Lidoderm)  1 ea TD DAILY [facility]


   Last Admin: 03/13/19 09:24 Dose:  1 ea


Lorazepam (Ativan)  0.5 mg PO Q6 PRN


   PRN Reason: Agitation


   Last Admin: 03/12/19 16:01 Dose:  0.5 mg


Metoprolol Tartrate (Lopressor)  25 mg PO BID [facility]


   Last Admin: 03/13/19 18:29 Dose:  25 mg


Mirtazapine (Remeron)  15 mg PO HS [facility]


   Last Admin: 03/13/19 21:44 Dose:  15 mg


Multivitamins (Hexavitamin)  1 tab PO DAILY [facility]


   Last Admin: 03/13/19 09:21 Dose:  1 tab


Nitroglycerin (Nitro-Bid 2% Oint)  1 ea TOP Q6 [facility]


   Last Admin: 03/13/19 18:29 Dose:  1 ea


Ondansetron HCl (Zofran Inj)  4 mg IVP Q8 PRN


   PRN Reason: Nausea/Vomiting


   Last Admin: 03/13/19 18:32 Dose:  4 mg


Pantoprazole Sodium (Protonix Ec Tab)  40 mg PO 0600 [facility]


   Last Admin: 03/13/19 06:14 Dose:  40 mg


Quetiapine Fumarate (Seroquel)  50 mg PO HS [facility]


   Last Admin: 03/13/19 21:44 Dose:  50 mg


Rosuvastatin Calcium (Crestor)  20 mg PO HS [facility]


   Last Admin: 03/12/19 21:41 Dose:  20 mg


Tramadol HCl (Ultram)  50 mg PO Q6 PRN


   PRN Reason: Pain, moderate (4-7)


   Last Admin: 03/13/19 19:50 Dose:  50 mg


Venlafaxine HCl (Effexor Xr)  150 mg PO DAILY [facility]


   Last Admin: 03/13/19 09:23 Dose:  150 mg


Venlafaxine HCl (Effexor Xr)  37.5 mg PO DAILY [facility]


   Last Admin: 03/13/19 09:23 Dose:  37.5 mg











- Labs


Labs: 


                                        





                                 03/13/19 09:02 





                                 03/13/19 09:02 





                                        











PT  14.5 SECONDS (9.7-12.2)  H  03/07/19  19:59    


 


INR  1.3   03/07/19  19:59    


 


APTT  32 SECONDS (21-34)   03/07/19  19:59    














- Head Exam


Head Exam: ATRAUMATIC





- Eye Exam


Eye Exam: Normal appearance





- ENT Exam


ENT Exam: Mucous Membranes Dry





- Respiratory Exam


Respiratory Exam: NORMAL BREATHING PATTERN





- Cardiovascular Exam


Cardiovascular Exam: +S1, +S2





- GI/Abdominal Exam


GI & Abdominal Exam: Normal Bowel Sounds





Assessment and Plan


(1) Pancytopenia


Assessment & Plan: 


iron deficiency and borderline b12


on supplementation


f/u zinc and copper


Status: Acute

## 2019-03-14 LAB
ALBUMIN SERPL-MCNC: 3.4 G/DL (ref 3.5–5)
ALBUMIN/GLOB SERPL: 1.3 {RATIO} (ref 1–2.1)
ALT SERPL-CCNC: 25 U/L (ref 9–52)
AST SERPL-CCNC: 33 U/L (ref 14–36)
BACTERIA #/AREA URNS HPF: (no result) /[HPF]
BASOPHILS # BLD AUTO: 0 K/UL (ref 0–0.2)
BASOPHILS NFR BLD: 0.5 % (ref 0–2)
BILIRUB UR-MCNC: NEGATIVE MG/DL
BUN SERPL-MCNC: 13 MG/DL (ref 7–17)
CALCIUM SERPL-MCNC: 8.5 MG/DL (ref 8.6–10.4)
EOSINOPHIL # BLD AUTO: 0.1 K/UL (ref 0–0.7)
EOSINOPHIL NFR BLD: 4 % (ref 0–4)
ERYTHROCYTE [DISTWIDTH] IN BLOOD BY AUTOMATED COUNT: 13.1 % (ref 11.5–14.5)
GFR NON-AFRICAN AMERICAN: > 60
GLUCOSE UR STRIP-MCNC: NORMAL MG/DL
HGB BLD-MCNC: 10.5 G/DL (ref 11–16)
LEUKOCYTE ESTERASE UR-ACNC: (no result) LEU/UL
LYMPHOCYTES # BLD AUTO: 0.9 K/UL (ref 1–4.3)
LYMPHOCYTES NFR BLD AUTO: 33.4 % (ref 20–40)
MCH RBC QN AUTO: 28.9 PG (ref 27–31)
MCHC RBC AUTO-ENTMCNC: 30.9 G/DL (ref 33–37)
MCV RBC AUTO: 93.5 FL (ref 81–99)
MONOCYTES # BLD: 0.2 K/UL (ref 0–0.8)
MONOCYTES NFR BLD: 8.7 % (ref 0–10)
NEUTROPHILS # BLD: 1.4 K/UL (ref 1.8–7)
NEUTROPHILS NFR BLD AUTO: 53.4 % (ref 50–75)
NRBC BLD AUTO-RTO: 0 % (ref 0–2)
PH UR STRIP: 5 [PH] (ref 5–8)
PLATELET # BLD: 150 K/UL (ref 130–400)
PMV BLD AUTO: 10.2 FL (ref 7.2–11.7)
PROT UR STRIP-MCNC: NEGATIVE MG/DL
RBC # BLD AUTO: 3.63 MIL/UL (ref 3.8–5.2)
RBC # UR STRIP: NEGATIVE /UL
SP GR UR STRIP: 1.02 (ref 1–1.03)
SQUAMOUS EPITHIAL: 1 /HPF (ref 0–5)
UROBILINOGEN UR-MCNC: NORMAL MG/DL (ref 0.2–1)
WBC # BLD AUTO: 2.6 K/UL (ref 4.8–10.8)

## 2019-03-14 RX ADMIN — FLUTICASONE PROPIONATE SCH SPR: 50 SPRAY, METERED NASAL at 09:37

## 2019-03-14 RX ADMIN — SODIUM FERRIC GLUCONATE COMPLEX SCH MG: 12.5 INJECTION INTRAVENOUS at 09:38

## 2019-03-14 RX ADMIN — HUMAN INSULIN SCH: 100 INJECTION, SOLUTION SUBCUTANEOUS at 08:03

## 2019-03-14 RX ADMIN — NITROGLYCERIN SCH EA: 20 OINTMENT TOPICAL at 17:13

## 2019-03-14 RX ADMIN — ENOXAPARIN SODIUM SCH MG: 30 INJECTION SUBCUTANEOUS at 09:37

## 2019-03-14 RX ADMIN — NITROGLYCERIN SCH: 20 OINTMENT TOPICAL at 00:24

## 2019-03-14 RX ADMIN — HUMAN INSULIN SCH: 100 INJECTION, SOLUTION SUBCUTANEOUS at 17:09

## 2019-03-14 RX ADMIN — NITROGLYCERIN SCH EA: 20 OINTMENT TOPICAL at 05:51

## 2019-03-14 RX ADMIN — HUMAN INSULIN SCH: 100 INJECTION, SOLUTION SUBCUTANEOUS at 21:35

## 2019-03-14 RX ADMIN — VENLAFAXINE HYDROCHLORIDE SCH MG: 37.5 CAPSULE, EXTENDED RELEASE ORAL at 09:37

## 2019-03-14 RX ADMIN — NITROGLYCERIN SCH EA: 20 OINTMENT TOPICAL at 11:53

## 2019-03-14 RX ADMIN — PANTOPRAZOLE SODIUM SCH MG: 40 TABLET, DELAYED RELEASE ORAL at 05:51

## 2019-03-14 RX ADMIN — Medication SCH TAB: at 09:38

## 2019-03-14 RX ADMIN — HUMAN INSULIN SCH: 100 INJECTION, SOLUTION SUBCUTANEOUS at 11:28

## 2019-03-14 RX ADMIN — NITROGLYCERIN SCH EA: 20 OINTMENT TOPICAL at 23:44

## 2019-03-14 RX ADMIN — VENLAFAXINE HYDROCHLORIDE SCH MG: 150 CAPSULE, EXTENDED RELEASE ORAL at 09:37

## 2019-03-14 NOTE — CP.PCM.PN
Subjective





- Date & Time of Evaluation


Date of Evaluation: 03/14/19


Time of Evaluation: 14:00





- Subjective


Subjective: 





Back pain improved





Objective





- Vital Signs/Intake and Output


Vital Signs (last 24 hours): 


                                        











Temp Pulse Resp BP Pulse Ox


 


 98.8 F   68   18   137/89   99 


 


 03/14/19 15:47  03/14/19 15:47  03/14/19 15:47  03/14/19 17:13  03/14/19 15:47








Intake and Output: 


                                        











 03/14/19 03/14/19





 06:59 18:59


 


Intake Total 500 520


 


Balance 500 520














- Medications


Medications: 


                               Current Medications





Acetaminophen (Tylenol 325mg Tab)  650 mg PO Q6 PRN


   PRN Reason: pain


   Last Admin: 03/14/19 14:12 Dose:  650 mg


Aspirin (Ecotrin)  81 mg PO [facility]ILY [facility]


   Last Admin: 03/14/19 09:38 Dose:  81 mg


Clonazepam (Klonopin)  0.5 mg PO TID [facility]


   Last Admin: 03/14/19 17:13 Dose:  0.5 mg


Dextrose (Dextrose 50% Inj)  0 ml IV STAT PRN; Protocol


   PRN Reason: Hypoglycemia Protocol


Dextrose (Glutose 15)  0 gm PO ONCE PRN; Protocol


   PRN Reason: Hypoglycemia Protocol


Diphenhydramine HCl (Benadryl)  25 mg PO ONCE PRN


   PRN Reason: Insomnia


Enoxaparin Sodium (Lovenox)  30 mg SC DAILY [facility]


   Last Admin: 03/14/19 09:37 Dose:  30 mg


Ergocalciferol (Drisdol 50,000 Intl Units Cap)  1 cap PO Q7D [facility]


   Last Admin: 03/08/19 10:57 Dose:  1 cap


Ferric Sodium Gluconate Complex (Ferrlecit)  125 mg IVPB DAILY [facility]


   Stop: 03/20/19 12:46


   Last Admin: 03/14/19 09:38 Dose:  125 mg


Fluticasone Propionate (Flonase)  1 spr NS DAILY [facility]


   Last Admin: 03/14/19 09:37 Dose:  1 spr


Folic Acid (Folic Acid)  1 mg PO DAILY [facility]


   Last Admin: 03/14/19 09:38 Dose:  1 mg


Glucagon (Glucagen Diagnostic Kit)  0 mg IM STAT PRN; Protocol


   PRN Reason: Hypoglycemia Protocol


Meropenem 500 mg/ Sodium (Chloride)  100 mls @ 100 mls/hr IVPB Q8H [facility]; Protocol


   Last Admin: 03/14/19 16:57 Dose:  100 mls/hr


Insulin Human Regular (Novolin R)  0 unit SC ACHS [facility]; Protocol


   Last Admin: 03/14/19 17:09 Dose:  Not Given


Lidocaine (Lidoderm)  1 ea TD DAILY [facility]


   Last Admin: 03/14/19 09:37 Dose:  1 ea


Lorazepam (Ativan)  0.5 mg PO Q6 PRN


   PRN Reason: Agitation


   Last Admin: 03/12/19 16:01 Dose:  0.5 mg


Metoprolol Tartrate (Lopressor)  25 mg PO BID [facility]


   Last Admin: 03/14/19 17:13 Dose:  25 mg


Mirtazapine (Remeron)  15 mg PO HS [facility]


   Last Admin: 03/13/19 21:44 Dose:  15 mg


Multivitamins (Hexavitamin)  1 tab PO DAILY [facility]


   Last Admin: 03/14/19 09:38 Dose:  1 tab


Nitroglycerin (Nitro-Bid 2% Oint)  1 ea TOP Q6 [facility]


   Last Admin: 03/14/19 17:13 Dose:  1 ea


Ondansetron HCl (Zofran Inj)  4 mg IVP Q8 PRN


   PRN Reason: Nausea/Vomiting


   Last Admin: 03/14/19 16:57 Dose:  4 mg


Pantoprazole Sodium (Protonix Ec Tab)  40 mg PO 0600 [facility]


   Last Admin: 03/14/19 05:51 Dose:  40 mg


Quetiapine Fumarate (Seroquel)  50 mg PO HS [facility]


   Last Admin: 03/13/19 21:44 Dose:  50 mg


Rosuvastatin Calcium (Crestor)  20 mg PO HS [facility]


   Last Admin: 03/13/19 22:54 Dose:  Not Given


Tramadol HCl (Ultram)  50 mg PO Q6 PRN


   PRN Reason: Pain, moderate (4-7)


   Last Admin: 03/14/19 05:55 Dose:  50 mg


Venlafaxine HCl (Effexor Xr)  150 mg PO DAILY [facility]


   Last Admin: 03/14/19 09:37 Dose:  150 mg


Venlafaxine HCl (Effexor Xr)  37.5 mg PO DAILY [facility]


   Last Admin: 03/14/19 09:37 Dose:  37.5 mg











- Labs


Labs: 


                                        





                                 03/14/19 11:42 





                                 03/14/19 11:42 





                                        











PT  14.5 SECONDS (9.7-12.2)  H  03/07/19  19:59    


 


INR  1.3   03/07/19  19:59    


 


APTT  32 SECONDS (21-34)   03/07/19  19:59    














- Head Exam


Head Exam: ATRAUMATIC





- Eye Exam


Eye Exam: Normal appearance





- ENT Exam


ENT Exam: Mucous Membranes Dry





- Respiratory Exam


Respiratory Exam: NORMAL BREATHING PATTERN





- Cardiovascular Exam


Cardiovascular Exam: +S1, +S2





- GI/Abdominal Exam


GI & Abdominal Exam: Normal Bowel Sounds





Assessment and Plan


(1) Pancytopenia


Assessment & Plan: 


borderline b12 and low iron


on supplementation





Status: Acute

## 2019-03-14 NOTE — CP.PCM.PN
Subjective





- Date & Time of Evaluation


Date of Evaluation: 03/14/19


Time of Evaluation: 20:43





- Subjective


Subjective: 





AFEBRILE,


VSS.


STILL C/O BACK PAIN/RT FLANK PAIN





SEEN BY HEMATOLOGY


ON IV ABX.








LABS ;


URINE CULTURE +VE KLEIBSIELLA PN.-PANSENSITIVE.


BLOOD CULTURES -VE TO DATE





Objective





- Vital Signs/Intake and Output


Vital Signs (last 24 hours): 


                                        











Temp Pulse Resp BP Pulse Ox


 


 98.8 F   68   18   137/89   99 


 


 03/14/19 15:47  03/14/19 15:47  03/14/19 15:47  03/14/19 17:13  03/14/19 15:47








Intake and Output: 


                                        











 03/14/19 03/15/19





 18:59 06:59


 


Intake Total 520 


 


Balance 520 














- Medications


Medications: 


                               Current Medications





Acetaminophen (Tylenol 325mg Tab)  650 mg PO Q6 PRN


   PRN Reason: pain


   Last Admin: 03/14/19 14:12 Dose:  650 mg


Aspirin (Ecotrin)  81 mg PO DAILY [facility]


   Last Admin: 03/14/19 09:38 Dose:  81 mg


Clonazepam (Klonopin)  0.5 mg PO TID [facility]


   Last Admin: 03/14/19 17:13 Dose:  0.5 mg


Dextrose (Dextrose 50% Inj)  0 ml IV STAT PRN; Protocol


   PRN Reason: Hypoglycemia Protocol


Dextrose (Glutose 15)  0 gm PO ONCE PRN; Protocol


   PRN Reason: Hypoglycemia Protocol


Diphenhydramine HCl (Benadryl)  25 mg PO ONCE PRN


   PRN Reason: Insomnia


Enoxaparin Sodium (Lovenox)  30 mg SC DAILY [facility]


   Last Admin: 03/14/19 09:37 Dose:  30 mg


Ergocalciferol (Drisdol 50,000 Intl Units Cap)  1 cap PO Q7D [facility]


   Last Admin: 03/08/19 10:57 Dose:  1 cap


Ferric Sodium Gluconate Complex (Ferrlecit)  125 mg IVPB DAILY [facility]


   Stop: 03/20/19 12:46


   Last Admin: 03/14/19 09:38 Dose:  125 mg


Fluticasone Propionate (Flonase)  1 spr NS DAILY [facility]


   Last Admin: 03/14/19 09:37 Dose:  1 spr


Folic Acid (Folic Acid)  1 mg PO DAILY [facility]


   Last Admin: 03/14/19 09:38 Dose:  1 mg


Glucagon (Glucagen Diagnostic Kit)  0 mg IM STAT PRN; Protocol


   PRN Reason: Hypoglycemia Protocol


Meropenem 500 mg/ Sodium (Chloride)  100 mls @ 100 mls/hr IVPB Q8H [facility]; Protocol


   Last Admin: 03/14/19 16:57 Dose:  100 mls/hr


Insulin Human Regular (Novolin R)  0 unit SC ACHS [facility]; Protocol


   Last Admin: 03/14/19 17:09 Dose:  Not Given


Lidocaine (Lidoderm)  1 ea TD DAILY [facility]


   Last Admin: 03/14/19 09:37 Dose:  1 ea


Lorazepam (Ativan)  0.5 mg PO Q6 PRN


   PRN Reason: Agitation


   Last Admin: 03/12/19 16:01 Dose:  0.5 mg


Metoprolol Tartrate (Lopressor)  25 mg PO BID [facility]


   Last Admin: 03/14/19 17:13 Dose:  25 mg


Mirtazapine (Remeron)  15 mg PO HS [facility]


   Last Admin: 03/13/19 21:44 Dose:  15 mg


Multivitamins (Hexavitamin)  1 tab PO DAILY [facility]


   Last Admin: 03/14/19 09:38 Dose:  1 tab


Nitroglycerin (Nitro-Bid 2% Oint)  1 ea TOP Q6 [facility]


   Last Admin: 03/14/19 17:13 Dose:  1 ea


Ondansetron HCl (Zofran Inj)  4 mg IVP Q8 PRN


   PRN Reason: Nausea/Vomiting


   Last Admin: 03/14/19 16:57 Dose:  4 mg


Pantoprazole Sodium (Protonix Ec Tab)  40 mg PO 0600 [facility]


   Last Admin: 03/14/19 05:51 Dose:  40 mg


Quetiapine Fumarate (Seroquel)  50 mg PO HS [facility]


   Last Admin: 03/13/19 21:44 Dose:  50 mg


Rosuvastatin Calcium (Crestor)  20 mg PO HS [facility]


   Last Admin: 03/13/19 22:54 Dose:  Not Given


Tramadol HCl (Ultram)  50 mg PO Q6 PRN


   PRN Reason: Pain, moderate (4-7)


   Last Admin: 03/14/19 05:55 Dose:  50 mg


Venlafaxine HCl (Effexor Xr)  150 mg PO DAILY [facility]


   Last Admin: 03/14/19 09:37 Dose:  150 mg


Venlafaxine HCl (Effexor Xr)  37.5 mg PO DAILY [facility]


   Last Admin: 03/14/19 09:37 Dose:  37.5 mg











- Labs


Labs: 


                                        





                                 03/14/19 11:42 





                                 03/14/19 11:42 





                                        











PT  14.5 SECONDS (9.7-12.2)  H  03/07/19  19:59    


 


INR  1.3   03/07/19  19:59    


 


APTT  32 SECONDS (21-34)   03/07/19  19:59    














- Constitutional


Appears: No Acute Distress





- Head Exam


Head Exam: NORMAL INSPECTION





- Eye Exam


Eye Exam: EOMI, PERRL





- ENT Exam


ENT Exam: Normal Oropharynx





- Neck Exam


Neck Exam: Normal Inspection





- Respiratory Exam


Respiratory Exam: Decreased Breath Sounds, NORMAL BREATHING PATTERN





- Cardiovascular Exam


Cardiovascular Exam: REGULAR RHYTHM, +S1, +S2





- GI/Abdominal Exam


GI & Abdominal Exam: Soft, Tenderness (RT CVA/FLANK), Normal Bowel Sounds.  

absent: Distended





- Extremities Exam


Extremities Exam: Normal Capillary Refill, Pedal Edema.  absent: Calf Tenderness





- Neurological Exam


Neurological Exam: Alert, Awake, CN II-XII Intact, Oriented x3





- Psychiatric Exam


Psychiatric exam: Normal Mood





- Skin


Skin Exam: Normal Color, Pallor





Assessment and Plan


(1) UTI (urinary tract infection)


Status: Acute   





(2) Pancytopenia


Status: Acute   





(3) Chest pain


Status: Acute   





(4) Multiple sclerosis


Status: Acute   





(5) Diabetes mellitus


Status: Acute   





(6) Depression


Status: Chronic   





- Assessment and Plan (Free Text)


Plan: 





CONTINUE iv MERREM 500 MG EVERY 8 HOURLY 3/10/19.





RENAL US - NOTED UNREMARKABLE.


F/U REPEAT UA, URINE CULTURE -CLEAN CATCH. 3/14/19





AS PER CONSULTANTS/PMD.


ANAEMIA RX AS PER DR AMEZQUITA.





CASE DISCUSSED WITH THE STAFF.

## 2019-03-14 NOTE — CP.PCM.PN
Subjective





- Date & Time of Evaluation


Date of Evaluation: 03/14/19


Time of Evaluation: 12:26





- Subjective


Subjective: 





NO FURTHER CP 


AFEBRILE 


ON IV AB 


ON IV IRON 


P/E REMAINS SAME 








Objective





- Vital Signs/Intake and Output


Vital Signs (last 24 hours): 


                                        











Temp Pulse Resp BP Pulse Ox


 


 98.3 F   73   20   130/83   98 


 


 03/14/19 07:20  03/14/19 11:45  03/14/19 07:20  03/14/19 11:45  03/14/19 07:20











- Medications


Medications: 


                               Current Medications





Acetaminophen (Tylenol 325mg Tab)  650 mg PO Q6 PRN


   PRN Reason: pain


   Last Admin: 03/13/19 21:44 Dose:  650 mg


Aspirin (Ecotrin)  81 mg PO DAILY [facility]


   Last Admin: 03/14/19 09:38 Dose:  81 mg


Clonazepam (Klonopin)  0.5 mg PO TID [facility]


   Last Admin: 03/14/19 09:38 Dose:  0.5 mg


Dextrose (Dextrose 50% Inj)  0 ml IV STAT PRN; Protocol


   PRN Reason: Hypoglycemia Protocol


Dextrose (Glutose 15)  0 gm PO ONCE PRN; Protocol


   PRN Reason: Hypoglycemia Protocol


Diphenhydramine HCl (Benadryl)  25 mg PO ONCE PRN


   PRN Reason: Insomnia


Enoxaparin Sodium (Lovenox)  30 mg SC DAILY [facility]


   Last Admin: 03/14/19 09:37 Dose:  30 mg


Ergocalciferol (Drisdol 50,000 Intl Units Cap)  1 cap PO Q7D [facility]


   Last Admin: 03/08/19 10:57 Dose:  1 cap


Ferric Sodium Gluconate Complex (Ferrlecit)  125 mg IVPB DAILY [facility]


   Stop: 03/20/19 12:46


   Last Admin: 03/14/19 09:38 Dose:  125 mg


Fluticasone Propionate (Flonase)  1 spr NS DAILY [facility]


   Last Admin: 03/14/19 09:37 Dose:  1 spr


Folic Acid (Folic Acid)  1 mg PO DAILY [facility]


   Last Admin: 03/14/19 09:38 Dose:  1 mg


Glucagon (Glucagen Diagnostic Kit)  0 mg IM STAT PRN; Protocol


   PRN Reason: Hypoglycemia Protocol


Meropenem 500 mg/ Sodium (Chloride)  100 mls @ 100 mls/hr IVPB Q8H [facility]; Protocol


   Last Admin: 03/14/19 08:15 Dose:  100 mls/hr


Insulin Human Regular (Novolin R)  0 unit SC ACHS [facility]; Protocol


   Last Admin: 03/14/19 11:28 Dose:  Not Given


Lidocaine (Lidoderm)  1 ea TD DAILY [facility]


   Last Admin: 03/14/19 09:37 Dose:  1 ea


Lorazepam (Ativan)  0.5 mg PO Q6 PRN


   PRN Reason: Agitation


   Last Admin: 03/12/19 16:01 Dose:  0.5 mg


Metoprolol Tartrate (Lopressor)  25 mg PO BID [facility]


   Last Admin: 03/14/19 09:39 Dose:  25 mg


Mirtazapine (Remeron)  15 mg PO HS [facility]


   Last Admin: 03/13/19 21:44 Dose:  15 mg


Multivitamins (Hexavitamin)  1 tab PO DAILY [facility]


   Last Admin: 03/14/19 09:38 Dose:  1 tab


Nitroglycerin (Nitro-Bid 2% Oint)  1 ea TOP Q6 [facility]


   Last Admin: 03/14/19 11:53 Dose:  1 ea


Ondansetron HCl (Zofran Inj)  4 mg IVP Q8 PRN


   PRN Reason: Nausea/Vomiting


   Last Admin: 03/13/19 18:32 Dose:  4 mg


Pantoprazole Sodium (Protonix Ec Tab)  40 mg PO 0600 [facility]


   Last Admin: 03/14/19 05:51 Dose:  40 mg


Quetiapine Fumarate (Seroquel)  50 mg PO HS [facility]


   Last Admin: 03/13/19 21:44 Dose:  50 mg


Rosuvastatin Calcium (Crestor)  20 mg PO HS [facility]


   Last Admin: 03/13/19 22:54 Dose:  Not Given


Tramadol HCl (Ultram)  50 mg PO Q6 PRN


   PRN Reason: Pain, moderate (4-7)


   Last Admin: 03/14/19 05:55 Dose:  50 mg


Venlafaxine HCl (Effexor Xr)  150 mg PO DAILY [facility]


   Last Admin: 03/14/19 09:37 Dose:  150 mg


Venlafaxine HCl (Effexor Xr)  37.5 mg PO DAILY [facility]


   Last Admin: 03/14/19 09:37 Dose:  37.5 mg











- Labs


Labs: 


                                        





                                 03/14/19 11:42 





                                 03/14/19 11:42 





                                        











PT  14.5 SECONDS (9.7-12.2)  H  03/07/19  19:59    


 


INR  1.3   03/07/19  19:59    


 


APTT  32 SECONDS (21-34)   03/07/19  19:59    














Assessment and Plan


(1) Chest pain


Status: Acute   





(2) Multiple sclerosis


Status: Acute   





(3) Panic disorder


Status: Chronic

## 2019-03-15 LAB
ALBUMIN SERPL-MCNC: 3.2 G/DL (ref 3.5–5)
ALBUMIN/GLOB SERPL: 1.2 {RATIO} (ref 1–2.1)
ALT SERPL-CCNC: 23 U/L (ref 9–52)
AST SERPL-CCNC: 34 U/L (ref 14–36)
BASOPHILS # BLD AUTO: 0 K/UL (ref 0–0.2)
BASOPHILS NFR BLD: 1 % (ref 0–2)
BUN SERPL-MCNC: 13 MG/DL (ref 7–17)
CALCIUM SERPL-MCNC: 8.6 MG/DL (ref 8.6–10.4)
EOSINOPHIL # BLD AUTO: 0.1 K/UL (ref 0–0.7)
EOSINOPHIL NFR BLD: 3.2 % (ref 0–4)
ERYTHROCYTE [DISTWIDTH] IN BLOOD BY AUTOMATED COUNT: 13.2 % (ref 11.5–14.5)
GFR NON-AFRICAN AMERICAN: > 60
HGB BLD-MCNC: 10.3 G/DL (ref 11–16)
LYMPHOCYTES # BLD AUTO: 1.1 K/UL (ref 1–4.3)
LYMPHOCYTES NFR BLD AUTO: 45.7 % (ref 20–40)
MCH RBC QN AUTO: 28.4 PG (ref 27–31)
MCHC RBC AUTO-ENTMCNC: 30.8 G/DL (ref 33–37)
MCV RBC AUTO: 92.1 FL (ref 81–99)
MONOCYTES # BLD: 0.2 K/UL (ref 0–0.8)
MONOCYTES NFR BLD: 9.7 % (ref 0–10)
NEUTROPHILS # BLD: 1 K/UL (ref 1.8–7)
NEUTROPHILS NFR BLD AUTO: 40.4 % (ref 50–75)
NRBC BLD AUTO-RTO: 0 % (ref 0–2)
PLATELET # BLD: 144 K/UL (ref 130–400)
PMV BLD AUTO: 9.7 FL (ref 7.2–11.7)
RBC # BLD AUTO: 3.63 MIL/UL (ref 3.8–5.2)
WBC # BLD AUTO: 2.5 K/UL (ref 4.8–10.8)

## 2019-03-15 RX ADMIN — HUMAN INSULIN SCH: 100 INJECTION, SOLUTION SUBCUTANEOUS at 18:07

## 2019-03-15 RX ADMIN — NITROGLYCERIN SCH EA: 20 OINTMENT TOPICAL at 06:15

## 2019-03-15 RX ADMIN — NITROGLYCERIN SCH EA: 20 OINTMENT TOPICAL at 18:01

## 2019-03-15 RX ADMIN — PANTOPRAZOLE SODIUM SCH MG: 40 TABLET, DELAYED RELEASE ORAL at 06:15

## 2019-03-15 RX ADMIN — VENLAFAXINE HYDROCHLORIDE SCH MG: 37.5 CAPSULE, EXTENDED RELEASE ORAL at 10:41

## 2019-03-15 RX ADMIN — SODIUM FERRIC GLUCONATE COMPLEX SCH MG: 12.5 INJECTION INTRAVENOUS at 10:41

## 2019-03-15 RX ADMIN — ENOXAPARIN SODIUM SCH MG: 30 INJECTION SUBCUTANEOUS at 10:41

## 2019-03-15 RX ADMIN — VENLAFAXINE HYDROCHLORIDE SCH MG: 150 CAPSULE, EXTENDED RELEASE ORAL at 10:41

## 2019-03-15 RX ADMIN — HUMAN INSULIN SCH: 100 INJECTION, SOLUTION SUBCUTANEOUS at 07:37

## 2019-03-15 RX ADMIN — NITROGLYCERIN SCH EA: 20 OINTMENT TOPICAL at 13:00

## 2019-03-15 RX ADMIN — Medication SCH TAB: at 10:42

## 2019-03-15 RX ADMIN — FLUTICASONE PROPIONATE SCH SPR: 50 SPRAY, METERED NASAL at 10:42

## 2019-03-15 NOTE — CP.PCM.PN
Subjective





- Date & Time of Evaluation


Date of Evaluation: 03/15/19


Time of Evaluation: 14:00





- Subjective


Subjective: 





GEN WEAKNESS AND BODY PAIN 


FEBRILE 


P/E SAME 


CONT IV AB  





Objective





- Vital Signs/Intake and Output


Vital Signs (last 24 hours): 


                                        











Temp Pulse Resp BP Pulse Ox


 


 98 F   63   20   116/73   94 L


 


 03/15/19 07:00  03/15/19 07:00  03/15/19 07:00  03/15/19 10:43  03/15/19 07:00











- Medications


Medications: 


                               Current Medications





Acetaminophen (Tylenol 325mg Tab)  650 mg PO Q6 PRN


   PRN Reason: pain


   Last Admin: 03/14/19 14:12 Dose:  650 mg


Aspirin (Ecotrin)  81 mg PO DAILY [facility]


   Last Admin: 03/15/19 10:42 Dose:  81 mg


Clonazepam (Klonopin)  0.5 mg PO TID [facility]


   Last Admin: 03/15/19 13:41 Dose:  0.5 mg


Dextrose (Dextrose 50% Inj)  0 ml IV STAT PRN; Protocol


   PRN Reason: Hypoglycemia Protocol


Dextrose (Glutose 15)  0 gm PO ONCE PRN; Protocol


   PRN Reason: Hypoglycemia Protocol


Diphenhydramine HCl (Benadryl)  25 mg PO ONCE PRN


   PRN Reason: Insomnia


Ergocalciferol (Drisdol 50,000 Intl Units Cap)  1 cap PO Q7D [facility]


   Last Admin: 03/08/19 10:57 Dose:  1 cap


Ferric Sodium Gluconate Complex (Ferrlecit)  125 mg IVPB DAILY [facility]


   Stop: 03/20/19 12:46


   Last Admin: 03/15/19 10:41 Dose:  125 mg


Fluticasone Propionate (Flonase)  1 spr NS DAILY [facility]


   Last Admin: 03/15/19 10:42 Dose:  1 spr


Folic Acid (Folic Acid)  1 mg PO DAILY [facility]


   Last Admin: 03/15/19 10:43 Dose:  1 mg


Glucagon (Glucagen Diagnostic Kit)  0 mg IM STAT PRN; Protocol


   PRN Reason: Hypoglycemia Protocol


Meropenem 500 mg/ Sodium (Chloride)  100 mls @ 100 mls/hr IVPB Q8H [facility]; Protocol


   Last Admin: 03/15/19 07:55 Dose:  100 mls/hr


Insulin Human Regular (Novolin R)  0 unit SC ACHS [facility]; Protocol


   Last Admin: 03/15/19 07:37 Dose:  Not Given


Lidocaine (Lidoderm)  1 ea TD DAILY [facility]


   Last Admin: 03/15/19 10:41 Dose:  1 ea


Lorazepam (Ativan)  0.5 mg PO Q6 PRN


   PRN Reason: Agitation


   Last Admin: 03/12/19 16:01 Dose:  0.5 mg


Metoprolol Tartrate (Lopressor)  25 mg PO BID [facility]


   Last Admin: 03/15/19 10:43 Dose:  25 mg


Mirtazapine (Remeron)  15 mg PO HS [facility]


   Last Admin: 03/14/19 21:22 Dose:  15 mg


Multivitamins (Hexavitamin)  1 tab PO DAILY [facility]


   Last Admin: 03/15/19 10:42 Dose:  1 tab


Nitroglycerin (Nitro-Bid 2% Oint)  1 ea TOP Q6 [facility]


   Last Admin: 03/15/19 13:00 Dose:  1 ea


Ondansetron HCl (Zofran Inj)  4 mg IVP Q8 PRN


   PRN Reason: Nausea/Vomiting


   Last Admin: 03/14/19 16:57 Dose:  4 mg


Pantoprazole Sodium (Protonix Ec Tab)  40 mg PO 0600 [facility]


   Last Admin: 03/15/19 06:15 Dose:  40 mg


Quetiapine Fumarate (Seroquel)  50 mg PO HS [facility]


   Last Admin: 03/14/19 21:22 Dose:  50 mg


Rosuvastatin Calcium (Crestor)  20 mg PO HS [facility]


   Last Admin: 03/14/19 21:22 Dose:  20 mg


Tramadol HCl (Ultram)  50 mg PO Q6 PRN


   PRN Reason: Pain, moderate (4-7)


   Last Admin: 03/14/19 05:55 Dose:  50 mg


Venlafaxine HCl (Effexor Xr)  150 mg PO DAILY [facility]


   Last Admin: 03/15/19 10:41 Dose:  150 mg


Venlafaxine HCl (Effexor Xr)  37.5 mg PO DAILY [facility]


   Last Admin: 03/15/19 10:41 Dose:  37.5 mg











- Labs


Labs: 


                                        





                                 03/15/19 07:29 





                                 03/15/19 07:29 





                                        











PT  14.5 SECONDS (9.7-12.2)  H  03/07/19  19:59    


 


INR  1.3   03/07/19  19:59    


 


APTT  32 SECONDS (21-34)   03/07/19  19:59    














Assessment and Plan


(1) Chest pain


Status: Acute   





(2) Multiple sclerosis


Status: Acute   





(3) Panic disorder


Status: Chronic

## 2019-03-15 NOTE — CP.PCM.PN
Subjective





- Date & Time of Evaluation


Date of Evaluation: 03/15/19


Time of Evaluation: 20:42





- Subjective


Subjective: 





AFEBRILE,


VSS.


C/O SUBJECTIVE FEVERS


 BACK PAIN/RT FLANK PAIN IMPROVING





 HEMATOLOGY ON BOARD


ON IV ABX.








LABS ;


URINE CULTURE +VE KLEIBSIELLA PN.-PANSENSITIVE.


3/10/19 BLOOD CULTURES -VE TO DATE





Objective





- Vital Signs/Intake and Output


Vital Signs (last 24 hours): 


                                        











Temp Pulse Resp BP Pulse Ox


 


 98 F   63   20   147/80   94 L


 


 03/15/19 07:00  03/15/19 07:00  03/15/19 07:00  03/15/19 18:02  03/15/19 07:00








Intake and Output: 


                                        











 03/15/19 03/16/19





 18:59 06:59


 


Intake Total 450 


 


Balance 450 














- Medications


Medications: 


                               Current Medications





Acetaminophen (Tylenol 325mg Tab)  650 mg PO Q6 PRN


   PRN Reason: pain


   Last Admin: 03/14/19 14:12 Dose:  650 mg


Aspirin (Ecotrin)  81 mg PO DAILY [facility]


   Last Admin: 03/15/19 10:42 Dose:  81 mg


Clonazepam (Klonopin)  0.5 mg PO TID [facility]


   Last Admin: 03/15/19 18:01 Dose:  0.5 mg


Dextrose (Dextrose 50% Inj)  0 ml IV STAT PRN; Protocol


   PRN Reason: Hypoglycemia Protocol


Dextrose (Glutose 15)  0 gm PO ONCE PRN; Protocol


   PRN Reason: Hypoglycemia Protocol


Diphenhydramine HCl (Benadryl)  25 mg PO ONCE PRN


   PRN Reason: Insomnia


Ergocalciferol (Drisdol 50,000 Intl Units Cap)  1 cap PO Q7D [facility]


   Last Admin: 03/08/19 10:57 Dose:  1 cap


Ferric Sodium Gluconate Complex (Ferrlecit)  125 mg IVPB DAILY [facility]


   Stop: 03/20/19 12:46


   Last Admin: 03/15/19 10:41 Dose:  125 mg


Fluticasone Propionate (Flonase)  1 spr NS DAILY [facility]


   Last Admin: 03/15/19 10:42 Dose:  1 spr


Folic Acid (Folic Acid)  1 mg PO DAILY [facility]


   Last Admin: 03/15/19 10:43 Dose:  1 mg


Glucagon (Glucagen Diagnostic Kit)  0 mg IM STAT PRN; Protocol


   PRN Reason: Hypoglycemia Protocol


Meropenem 500 mg/ Sodium (Chloride)  100 mls @ 100 mls/hr IVPB Q8H [facility]; Protocol


   Last Admin: 03/15/19 17:05 Dose:  100 mls/hr


Insulin Human Regular (Novolin R)  0 unit SC ACHS [facility]; Protocol


   Last Admin: 03/15/19 18:07 Dose:  Not Given


Lidocaine (Lidoderm)  1 ea TD DAILY [facility]


   Last Admin: 03/15/19 10:41 Dose:  1 ea


Lorazepam (Ativan)  0.5 mg PO Q6 PRN


   PRN Reason: Agitation


   Last Admin: 03/12/19 16:01 Dose:  0.5 mg


Metoprolol Tartrate (Lopressor)  25 mg PO BID [facility]


   Last Admin: 03/15/19 18:02 Dose:  25 mg


Mirtazapine (Remeron)  15 mg PO HS [facility]


   Last Admin: 03/14/19 21:22 Dose:  15 mg


Multivitamins (Hexavitamin)  1 tab PO DAILY [facility]


   Last Admin: 03/15/19 10:42 Dose:  1 tab


Nitroglycerin (Nitro-Bid 2% Oint)  1 ea TOP Q6 [facility]


   Last Admin: 03/15/19 18:01 Dose:  1 ea


Ondansetron HCl (Zofran Inj)  4 mg IVP Q8 PRN


   PRN Reason: Nausea/Vomiting


   Last Admin: 03/14/19 16:57 Dose:  4 mg


Pantoprazole Sodium (Protonix Ec Tab)  40 mg PO 0600 [facility]


   Last Admin: 03/15/19 06:15 Dose:  40 mg


Quetiapine Fumarate (Seroquel)  50 mg PO HS [facility]


   Last Admin: 03/14/19 21:22 Dose:  50 mg


Rosuvastatin Calcium (Crestor)  20 mg PO HS [facility]


   Last Admin: 03/14/19 21:22 Dose:  20 mg


Tramadol HCl (Ultram)  50 mg PO Q6 PRN


   PRN Reason: Pain, moderate (4-7)


   Last Admin: 03/15/19 18:01 Dose:  50 mg


Venlafaxine HCl (Effexor Xr)  150 mg PO DAILY [facility]


   Last Admin: 03/15/19 10:41 Dose:  150 mg


Venlafaxine HCl (Effexor Xr)  37.5 mg PO DAILY [facility]


   Last Admin: 03/15/19 10:41 Dose:  37.5 mg











- Labs


Labs: 


                                        





                                 03/15/19 07:29 





                                 03/15/19 07:29 





                                        











PT  14.5 SECONDS (9.7-12.2)  H  03/07/19  19:59    


 


INR  1.3   03/07/19  19:59    


 


APTT  32 SECONDS (21-34)   03/07/19  19:59    














- Constitutional


Appears: No Acute Distress





- Eye Exam


Eye Exam: EOMI, PERRL





- ENT Exam


ENT Exam: Normal Oropharynx





- Neck Exam


Neck Exam: Normal Inspection





- Respiratory Exam


Respiratory Exam: Clear to Ausculation Bilateral





- Cardiovascular Exam


Cardiovascular Exam: REGULAR RHYTHM, +S1, +S2





- GI/Abdominal Exam


GI & Abdominal Exam: Soft, Tenderness (RT FLANK AND BACK.), Normal Bowel Sounds





- Extremities Exam


Extremities Exam: Normal Capillary Refill.  absent: Calf Tenderness, Pedal Edema





- Back Exam


Back Exam: CVA tenderness (R).  absent: vertebral tenderness





- Neurological Exam


Neurological Exam: Alert, Awake, CN II-XII Intact, Oriented x3, Reflexes Normal





- Psychiatric Exam


Psychiatric exam: Normal Mood





- Skin


Skin Exam: Normal Color, Warm





Assessment and Plan


(1) UTI (urinary tract infection)


Status: Acute   





(2) Pancytopenia


Status: Acute   





(3) Chest pain


Status: Acute   





(4) Multiple sclerosis


Status: Acute   





(5) Diabetes mellitus


Status: Acute   





(6) Depression


Status: Chronic   





- Assessment and Plan (Free Text)


Plan: 





CONTINUE iv MERREM 500 MG EVERY 8 HOURLY 3/10/19.





RENAL US - NOTED UNREMARKABLE.


F/U REPEAT UA, URINE CULTURE -CLEAN CATCH. 3/14/19





AS PER CONSULTANTS/PMD.


ANAEMIA RX AS PER DR AMEZQUITA.





CASE DISCUSSED WITH THE STAFF.

## 2019-03-16 LAB
ALBUMIN SERPL-MCNC: 3.4 G/DL (ref 3.5–5)
ALBUMIN/GLOB SERPL: 1.2 {RATIO} (ref 1–2.1)
ALT SERPL-CCNC: 24 U/L (ref 9–52)
AST SERPL-CCNC: 43 U/L (ref 14–36)
BASOPHILS # BLD AUTO: 0 K/UL (ref 0–0.2)
BASOPHILS NFR BLD: 0.3 % (ref 0–2)
BUN SERPL-MCNC: 15 MG/DL (ref 7–17)
CALCIUM SERPL-MCNC: 9 MG/DL (ref 8.6–10.4)
EOSINOPHIL # BLD AUTO: 0.1 K/UL (ref 0–0.7)
EOSINOPHIL NFR BLD: 2.9 % (ref 0–4)
ERYTHROCYTE [DISTWIDTH] IN BLOOD BY AUTOMATED COUNT: 13.6 % (ref 11.5–14.5)
GFR NON-AFRICAN AMERICAN: > 60
HGB BLD-MCNC: 10.4 G/DL (ref 11–16)
LYMPHOCYTES # BLD AUTO: 1.1 K/UL (ref 1–4.3)
LYMPHOCYTES NFR BLD AUTO: 42.2 % (ref 20–40)
MCH RBC QN AUTO: 30.1 PG (ref 27–31)
MCHC RBC AUTO-ENTMCNC: 32.4 G/DL (ref 33–37)
MCV RBC AUTO: 92.9 FL (ref 81–99)
MONOCYTES # BLD: 0.3 K/UL (ref 0–0.8)
MONOCYTES NFR BLD: 9.7 % (ref 0–10)
NEUTROPHILS # BLD: 1.2 K/UL (ref 1.8–7)
NEUTROPHILS NFR BLD AUTO: 44.9 % (ref 50–75)
NRBC BLD AUTO-RTO: 0.1 % (ref 0–2)
PLATELET # BLD: 146 K/UL (ref 130–400)
PMV BLD AUTO: 10 FL (ref 7.2–11.7)
RBC # BLD AUTO: 3.45 MIL/UL (ref 3.8–5.2)
WBC # BLD AUTO: 2.6 K/UL (ref 4.8–10.8)

## 2019-03-16 RX ADMIN — PANTOPRAZOLE SODIUM SCH MG: 40 TABLET, DELAYED RELEASE ORAL at 05:17

## 2019-03-16 RX ADMIN — HUMAN INSULIN SCH: 100 INJECTION, SOLUTION SUBCUTANEOUS at 12:09

## 2019-03-16 RX ADMIN — Medication SCH TAB: at 10:12

## 2019-03-16 RX ADMIN — FLUTICASONE PROPIONATE SCH: 50 SPRAY, METERED NASAL at 10:29

## 2019-03-16 RX ADMIN — NITROGLYCERIN SCH EA: 20 OINTMENT TOPICAL at 05:30

## 2019-03-16 RX ADMIN — SODIUM FERRIC GLUCONATE COMPLEX SCH MG: 12.5 INJECTION INTRAVENOUS at 10:13

## 2019-03-16 RX ADMIN — OXYCODONE HYDROCHLORIDE AND ACETAMINOPHEN PRN TAB: 5; 325 TABLET ORAL at 21:24

## 2019-03-16 RX ADMIN — HUMAN INSULIN SCH: 100 INJECTION, SOLUTION SUBCUTANEOUS at 21:25

## 2019-03-16 RX ADMIN — NITROGLYCERIN SCH EA: 20 OINTMENT TOPICAL at 23:55

## 2019-03-16 RX ADMIN — HUMAN INSULIN SCH: 100 INJECTION, SOLUTION SUBCUTANEOUS at 07:46

## 2019-03-16 RX ADMIN — VENLAFAXINE HYDROCHLORIDE SCH MG: 37.5 CAPSULE, EXTENDED RELEASE ORAL at 10:13

## 2019-03-16 RX ADMIN — HUMAN INSULIN SCH UNITS: 100 INJECTION, SOLUTION SUBCUTANEOUS at 17:21

## 2019-03-16 RX ADMIN — NITROGLYCERIN SCH EA: 20 OINTMENT TOPICAL at 00:51

## 2019-03-16 RX ADMIN — NITROGLYCERIN SCH EA: 20 OINTMENT TOPICAL at 12:27

## 2019-03-16 RX ADMIN — NITROGLYCERIN SCH EA: 20 OINTMENT TOPICAL at 17:20

## 2019-03-16 RX ADMIN — VENLAFAXINE HYDROCHLORIDE SCH MG: 150 CAPSULE, EXTENDED RELEASE ORAL at 10:12

## 2019-03-16 NOTE — CP.PCM.PN
Subjective





- Date & Time of Evaluation


Date of Evaluation: 03/16/19


Time of Evaluation: 23:11





- Subjective


Subjective: 





AFEBRILE,


VSS.








STATES BACK / AND FLANK PAIN IMPROVING





LABS;


REVIEWED





REPEAT URINE CULTURE 3/14/19-NEGATIVE GROWTH.








Objective





- Vital Signs/Intake and Output


Vital Signs (last 24 hours): 


                                        











Temp Pulse Resp BP Pulse Ox


 


 98.7 F   59 L  20   160/78 H  100 


 


 03/16/19 18:20  03/16/19 16:00  03/16/19 16:00  03/16/19 17:20  03/16/19 16:00











- Medications


Medications: 


                               Current Medications





Acetaminophen (Tylenol 325mg Tab)  650 mg PO Q6 PRN


   PRN Reason: pain


   Last Admin: 03/16/19 17:20 Dose:  650 mg


Aspirin (Ecotrin)  81 mg PO DAILY Counts include 234 beds at the [facility]


   Last Admin: 03/16/19 10:12 Dose:  81 mg


Clonazepam (Klonopin)  0.5 mg PO TID Counts include 234 beds at the [facility]


   Last Admin: 03/16/19 17:19 Dose:  0.5 mg


Dextrose (Dextrose 50% Inj)  0 ml IV STAT PRN; Protocol


   PRN Reason: Hypoglycemia Protocol


Dextrose (Glutose 15)  0 gm PO ONCE PRN; Protocol


   PRN Reason: Hypoglycemia Protocol


Diphenhydramine HCl (Benadryl)  25 mg PO ONCE PRN


   PRN Reason: Insomnia


   Last Admin: 03/15/19 21:20 Dose:  25 mg


Ergocalciferol (Drisdol 50,000 Intl Units Cap)  1 cap PO Q7D Counts include 234 beds at the [facility]


   Last Admin: 03/08/19 10:57 Dose:  1 cap


Ferric Sodium Gluconate Complex (Ferrlecit)  125 mg IVPB DAILY Counts include 234 beds at the [facility]


   Stop: 03/20/19 12:46


   Last Admin: 03/16/19 10:13 Dose:  125 mg


Fluticasone Propionate (Flonase)  1 spr NS DAILY Counts include 234 beds at the [facility]


   Last Admin: 03/16/19 10:29 Dose:  Not Given


Folic Acid (Folic Acid)  1 mg PO DAILY Counts include 234 beds at the [facility]


   Last Admin: 03/16/19 10:12 Dose:  1 mg


Glucagon (Glucagen Diagnostic Kit)  0 mg IM STAT PRN; Protocol


   PRN Reason: Hypoglycemia Protocol


Meropenem 500 mg/ Sodium (Chloride)  100 mls @ 100 mls/hr IVPB Q8H [facility]; Protocol


   Last Admin: 03/16/19 17:15 Dose:  100 mls/hr


Insulin Human Regular ([facility] R)  [facility]; Protocol


   Last Admin: 03/16/19 21:25 Dose:  Not Given


Lidocaine (Lidoderm)  1 ea TD DAILY Counts include 234 beds at the [facility]


   Last Admin: 03/16/19 10:12 Dose:  1 ea


Lorazepam (Ativan)  0.5 mg PO Q6 PRN


   PRN Reason: Agitation


   Last Admin: 03/15/19 22:52 Dose:  0.5 mg


Metoprolol Tartrate (Lopressor)  25 mg PO BID Counts include 234 beds at the [facility]


   Last Admin: 03/16/19 17:20 Dose:  25 mg


Mirtazapine (Remeron)  15 mg PO HS Counts include 234 beds at the [facility]


   Last Admin: 03/16/19 21:24 Dose:  15 mg


Multivitamins (Hexavitamin)  1 tab PO DAILY Counts include 234 beds at the [facility]


   Last Admin: 03/16/19 10:12 Dose:  1 tab


Nitroglycerin (Nitro-Bid 2% Oint)  1 ea TOP Q6 Counts include 234 beds at the [facility]


   Last Admin: 03/16/19 17:20 Dose:  1 ea


Ondansetron HCl (Zofran Inj)  4 mg IVP Q8 PRN


   PRN Reason: Nausea/Vomiting


   Last Admin: 03/15/19 21:19 Dose:  4 mg


Oxycodone/Acetaminophen (Percocet 5/325 Mg Tab)  1 tab PO Q8H PRN


   PRN Reason: Pain, severe (8-10)


   Stop: 03/19/19 20:42


   Last Admin: 03/16/19 21:24 Dose:  1 tab


Pantoprazole Sodium (Protonix Ec Tab)  40 mg PO 0600 Counts include 234 beds at the [facility]


   Last Admin: 03/16/19 05:17 Dose:  40 mg


Quetiapine Fumarate (Seroquel)  50 mg PO [facility]


   Last Admin: 03/16/19 21:24 Dose:  50 mg


Rosuvastatin Calcium (Crestor)  20 mg PO HS Counts include 234 beds at the [facility]


   Last Admin: 03/16/19 21:24 Dose:  20 mg


Tramadol HCl (Ultram)  50 mg PO Q6 PRN


   PRN Reason: Pain, moderate (4-7)


   Last Admin: 03/16/19 17:19 Dose:  50 mg


Venlafaxine HCl (Effexor Xr)  150 mg PO DAILY Counts include 234 beds at the [facility]


   Last Admin: 03/16/19 10:12 Dose:  150 mg


Venlafaxine HCl (Effexor Xr)  37.5 mg PO DAILY Counts include 234 beds at the [facility]


   Last Admin: 03/16/19 10:13 Dose:  37.5 mg











- Labs


Labs: 


                                        





                                 03/16/19 08:26 





                                 03/16/19 08:26 





                                        











PT  14.5 SECONDS (9.7-12.2)  H  03/07/19  19:59    


 


INR  1.3   03/07/19  19:59    


 


APTT  32 SECONDS (21-34)   03/07/19  19:59    














- Constitutional


Appears: No Acute Distress





- Head Exam


Head Exam: NORMAL INSPECTION





- Eye Exam


Eye Exam: EOMI, PERRL





- ENT Exam


ENT Exam: Normal Oropharynx





- Neck Exam


Neck Exam: Normal Inspection





- Respiratory Exam


Respiratory Exam: Clear to Ausculation Bilateral, NORMAL BREATHING PATTERN





- Cardiovascular Exam


Cardiovascular Exam: REGULAR RHYTHM, +S1, +S2





- GI/Abdominal Exam


GI & Abdominal Exam: Soft, Normal Bowel Sounds.  absent: Tenderness





- Extremities Exam


Extremities Exam: Normal Capillary Refill.  absent: Calf Tenderness, Pedal Edema





- Back Exam


Back Exam: absent: CVA tenderness (L), CVA tenderness (R)





- Neurological Exam


Neurological Exam: Alert, Awake, CN II-XII Intact, Oriented x3, Reflexes Normal





- Psychiatric Exam


Psychiatric exam: Normal Mood





- Skin


Skin Exam: Normal Color, Warm





Assessment and Plan


(1) UTI (urinary tract infection)


Status: Acute   





(2) Pancytopenia


Status: Acute   





(3) Chest pain


Status: Acute   





(4) Multiple sclerosis


Status: Acute   





(5) Diabetes mellitus


Status: Acute   





(6) Depression


Status: Chronic   





- Assessment and Plan (Free Text)


Plan: 





CONTINUE iv MERREM 500 MG EVERY 8 HOURLY 3/10/19.





Repeat urine culture negative so far.


AS PER CONSULTANTS/PMD.


ANAEMIA RX AS PER DR [email or web address].REMAINS PANCYTOPENIC





CASE DISCUSSED WITH THE STAFF/ PMD.

## 2019-03-16 NOTE — CP.PCM.PN
Subjective





- Date & Time of Evaluation


Date of Evaluation: 03/16/19


Time of Evaluation: 12:44





- Subjective


Subjective: 








GEN WEAKNESS AND BODY PAIN 


FEBRILE 


P/E SAME 


CONT IV AB  





Objective





- Vital Signs/Intake and Output


Vital Signs (last 24 hours): 


                                        











Temp Pulse Resp BP Pulse Ox


 


 98.3 F   63   20   145/92 H  96 


 


 03/16/19 07:00  03/16/19 07:00  03/16/19 07:00  03/16/19 12:30  03/16/19 07:00








Intake and Output: 


                                        











 03/16/19 03/16/19





 11:59 23:59


 


Intake Total 100 


 


Balance 100 














- Medications


Medications: 


                               Current Medications





Acetaminophen (Tylenol 325mg Tab)  650 mg PO Q6 PRN


   PRN Reason: pain


   Last Admin: 03/14/19 14:12 Dose:  650 mg


Aspirin (Ecotrin)  81 mg PO DAILY [facility]


   Last Admin: 03/16/19 10:12 Dose:  81 mg


Clonazepam (Klonopin)  0.5 mg PO TID [facility]


   Last Admin: 03/16/19 10:12 Dose:  0.5 mg


Dextrose (Dextrose 50% Inj)  0 ml IV STAT PRN; Protocol


   PRN Reason: Hypoglycemia Protocol


Dextrose (Glutose 15)  0 gm PO ONCE PRN; Protocol


   PRN Reason: Hypoglycemia Protocol


Diphenhydramine HCl (Benadryl)  25 mg PO ONCE PRN


   PRN Reason: Insomnia


   Last Admin: 03/15/19 21:20 Dose:  25 mg


Ergocalciferol (Drisdol 50,000 Intl Units Cap)  1 cap PO Q7D [facility]


   Last Admin: 03/08/19 10:57 Dose:  1 cap


Ferric Sodium Gluconate Complex (Ferrlecit)  125 mg IVPB DAILY [facility]


   Stop: 03/20/19 12:46


   Last Admin: 03/16/19 10:13 Dose:  125 mg


Fluticasone Propionate (Flonase)  1 spr NS DAILY [facility]


   Last Admin: 03/16/19 10:29 Dose:  Not Given


Folic Acid (Folic Acid)  1 mg PO DAILY [facility]


   Last Admin: 03/16/19 10:12 Dose:  1 mg


Glucagon (Glucagen Diagnostic Kit)  0 mg IM STAT PRN; Protocol


   PRN Reason: Hypoglycemia Protocol


Meropenem 500 mg/ Sodium (Chloride)  100 mls @ 100 mls/hr IVPB Q8H [facility]; Protocol


   Last Admin: 03/16/19 08:43 Dose:  100 mls/hr


Insulin Human Regular (Novolin R)  0 unit SC [facility]; Protocol


   Last Admin: 03/16/19 12:09 Dose:  Not Given


Lidocaine (Lidoderm)  1 ea TD DAILY [facility]


   Last Admin: 03/16/19 10:12 Dose:  1 ea


Lorazepam (Ativan)  0.5 mg PO Q6 PRN


   PRN Reason: Agitation


   Last Admin: 03/15/19 22:52 Dose:  0.5 mg


Metoprolol Tartrate (Lopressor)  25 mg PO BID [facility]


   Last Admin: 03/16/19 10:12 Dose:  25 mg


Mirtazapine (Remeron)  15 mg PO HS [facility]


   Last Admin: 03/15/19 21:20 Dose:  15 mg


Multivitamins (Hexavitamin)  1 tab PO DAILY [facility]


   Last Admin: 03/16/19 10:12 Dose:  1 tab


Nitroglycerin (Nitro-Bid 2% Oint)  1 ea TOP Q6 [facility]


   Last Admin: 03/16/19 12:27 Dose:  1 ea


Ondansetron HCl (Zofran Inj)  4 mg IVP Q8 PRN


   PRN Reason: Nausea/Vomiting


   Last Admin: 03/15/19 21:19 Dose:  4 mg


Pantoprazole Sodium (Protonix Ec Tab)  40 mg PO 0600 [facility]


   Last Admin: 03/16/19 05:17 Dose:  40 mg


Quetiapine Fumarate (Seroquel)  50 mg PO HS [facility]


   Last Admin: 03/15/19 21:20 Dose:  50 mg


Rosuvastatin Calcium (Crestor)  20 mg PO HS [facility]


   Last Admin: 03/15/19 21:20 Dose:  20 mg


Tramadol HCl (Ultram)  50 mg PO Q6 PRN


   PRN Reason: Pain, moderate (4-7)


   Last Admin: 03/15/19 18:01 Dose:  50 mg


Venlafaxine HCl (Effexor Xr)  150 mg PO DAILY [facility]


   Last Admin: 03/16/19 10:12 Dose:  150 mg


Venlafaxine HCl (Effexor Xr)  37.5 mg PO DAILY [facility]


   Last Admin: 03/16/19 10:13 Dose:  37.5 mg











- Labs


Labs: 


                                        





                                 03/16/19 08:26 





                                 03/16/19 08:26 





                                        











PT  14.5 SECONDS (9.7-12.2)  H  03/07/19  19:59    


 


INR  1.3   03/07/19  19:59    


 


APTT  32 SECONDS (21-34)   03/07/19  19:59    














Assessment and Plan


(1) Chest pain


Status: Acute   





(2) Multiple sclerosis


Status: Acute   





(3) Panic disorder


Status: Chronic

## 2019-03-16 NOTE — CP.PCM.PN
Subjective





- Date & Time of Evaluation


Date of Evaluation: 03/16/19


Time of Evaluation: 19:00





- Subjective


Subjective: 





Has some back pain.





Objective





- Vital Signs/Intake and Output


Vital Signs (last 24 hours): 


                                        











Temp Pulse Resp BP Pulse Ox


 


 98.7 F   59 L  20   160/78 H  100 


 


 03/16/19 18:20  03/16/19 16:00  03/16/19 16:00  03/16/19 17:20  03/16/19 16:00











- Medications


Medications: 


                               Current Medications





Acetaminophen (Tylenol 325mg Tab)  650 mg PO Q6 PRN


   PRN Reason: pain


   Last Admin: 03/16/19 17:20 Dose:  650 mg


Aspirin (Ecotrin)  81 mg PO DAILY [facility]


   Last Admin: 03/16/19 10:12 Dose:  81 mg


Clonazepam (Klonopin)  0.5 mg PO TID [facility]


   Last Admin: 03/16/19 17:19 Dose:  0.5 mg


Dextrose (Dextrose 50% Inj)  0 ml IV STAT PRN; Protocol


   PRN Reason: Hypoglycemia Protocol


Dextrose (Glutose 15)  0 gm PO ONCE PRN; Protocol


   PRN Reason: Hypoglycemia Protocol


Diphenhydramine HCl (Benadryl)  25 mg PO ONCE PRN


   PRN Reason: Insomnia


   Last Admin: 03/15/19 21:20 Dose:  25 mg


Ergocalciferol (Drisdol 50,000 Intl Units Cap)  1 cap PO Q7D [facility]


   Last Admin: 03/08/19 10:57 Dose:  1 cap


Ferric Sodium Gluconate Complex (Ferrlecit)  125 mg IVPB DAILY [facility]


   Stop: 03/20/19 12:46


   Last Admin: 03/16/19 10:13 Dose:  125 mg


Fluticasone Propionate (Flonase)  1 spr NS DAILY [facility]


   Last Admin: 03/16/19 10:29 Dose:  Not Given


Folic Acid (Folic Acid)  1 mg PO DAILY [facility]


   Last Admin: 03/16/19 10:12 Dose:  1 mg


Glucagon (Glucagen Diagnostic Kit)  0 mg IM STAT PRN; Protocol


   PRN Reason: Hypoglycemia Protocol


Meropenem 500 mg/ Sodium (Chloride)  100 mls @ 100 mls/hr IVPB Q8H [facility]; Protocol


   Last Admin: 03/16/19 17:15 Dose:  100 mls/hr


Insulin Human Regular (Novolin R)  0 unit SC ACHS [facility]; Protocol


   Last Admin: 03/16/19 21:25 Dose:  Not Given


Lidocaine (Lidoderm)  1 ea TD DAILY [facility]


   Last Admin: 03/16/19 10:12 Dose:  1 ea


Lorazepam (Ativan)  0.5 mg PO Q6 PRN


   PRN Reason: Agitation


   Last Admin: 03/15/19 22:52 Dose:  0.5 mg


Metoprolol Tartrate (Lopressor)  25 mg PO BID [facility]


   Last Admin: 03/16/19 17:20 Dose:  25 mg


Mirtazapine (Remeron)  15 mg PO HS [facility]


   Last Admin: 03/16/19 21:24 Dose:  15 mg


Multivitamins (Hexavitamin)  1 tab PO DAILY [facility]


   Last Admin: 03/16/19 10:12 Dose:  1 tab


Nitroglycerin (Nitro-Bid 2% Oint)  1 ea TOP Q6 [facility]


   Last Admin: 03/16/19 17:20 Dose:  1 ea


Ondansetron HCl (Zofran Inj)  4 mg IVP Q8 PRN


   PRN Reason: Nausea/Vomiting


   Last Admin: 03/15/19 21:19 Dose:  4 mg


Oxycodone/Acetaminophen (Percocet 5/325 Mg Tab)  1 tab PO Q8H PRN


   PRN Reason: Pain, severe (8-10)


   Stop: 03/19/19 20:42


   Last Admin: 03/16/19 21:24 Dose:  1 tab


Pantoprazole Sodium (Protonix Ec Tab)  40 mg PO 0600 [facility]


   Last Admin: 03/16/19 05:17 Dose:  40 mg


Quetiapine Fumarate (Seroquel)  50 mg PO HS [facility]


   Last Admin: 03/16/19 21:24 Dose:  50 mg


Rosuvastatin Calcium (Crestor)  20 mg PO HS [facility]


   Last Admin: 03/16/19 21:24 Dose:  20 mg


Tramadol HCl (Ultram)  50 mg PO Q6 PRN


   PRN Reason: Pain, moderate (4-7)


   Last Admin: 03/16/19 17:19 Dose:  50 mg


Venlafaxine HCl (Effexor Xr)  150 mg PO DAILY [facility]


   Last Admin: 03/16/19 10:12 Dose:  150 mg


Venlafaxine HCl (Effexor Xr)  37.5 mg PO DAILY [facility]


   Last Admin: 03/16/19 10:13 Dose:  37.5 mg











- Labs


Labs: 


                                        





                                 03/16/19 08:26 





                                 03/16/19 08:26 





                                        











PT  14.5 SECONDS (9.7-12.2)  H  03/07/19  19:59    


 


INR  1.3   03/07/19  19:59    


 


APTT  32 SECONDS (21-34)   03/07/19  19:59    














- Head Exam


Head Exam: ATRAUMATIC





- Eye Exam


Eye Exam: Normal appearance





- ENT Exam


ENT Exam: Mucous Membranes Dry





- Respiratory Exam


Respiratory Exam: NORMAL BREATHING PATTERN





- Cardiovascular Exam


Cardiovascular Exam: +S1, +S2





- GI/Abdominal Exam


GI & Abdominal Exam: Normal Bowel Sounds





Assessment and Plan


(1) Pancytopenia


Assessment & Plan: 


borderline iron and b12 deficiency; on supplementation


f/u zing and copper levels


counts stable


Status: Acute

## 2019-03-17 LAB
ALBUMIN SERPL-MCNC: 3.4 G/DL (ref 3.5–5)
ALBUMIN/GLOB SERPL: 1.3 {RATIO} (ref 1–2.1)
ALT SERPL-CCNC: 24 U/L (ref 9–52)
AST SERPL-CCNC: 43 U/L (ref 14–36)
BASOPHILS # BLD AUTO: 0 K/UL (ref 0–0.2)
BASOPHILS NFR BLD: 0.4 % (ref 0–2)
BUN SERPL-MCNC: 20 MG/DL (ref 7–17)
CALCIUM SERPL-MCNC: 8.8 MG/DL (ref 8.6–10.4)
EOSINOPHIL # BLD AUTO: 0.1 K/UL (ref 0–0.7)
EOSINOPHIL NFR BLD: 3.4 % (ref 0–4)
ERYTHROCYTE [DISTWIDTH] IN BLOOD BY AUTOMATED COUNT: 13.4 % (ref 11.5–14.5)
GFR NON-AFRICAN AMERICAN: > 60
HGB BLD-MCNC: 10.4 G/DL (ref 11–16)
LYMPHOCYTES # BLD AUTO: 1.3 K/UL (ref 1–4.3)
LYMPHOCYTES NFR BLD AUTO: 38.9 % (ref 20–40)
MCH RBC QN AUTO: 29.6 PG (ref 27–31)
MCHC RBC AUTO-ENTMCNC: 32 G/DL (ref 33–37)
MCV RBC AUTO: 92.3 FL (ref 81–99)
MONOCYTES # BLD: 0.3 K/UL (ref 0–0.8)
MONOCYTES NFR BLD: 8.6 % (ref 0–10)
NEUTROPHILS # BLD: 1.6 K/UL (ref 1.8–7)
NEUTROPHILS NFR BLD AUTO: 48.7 % (ref 50–75)
PLATELET # BLD: 153 K/UL (ref 130–400)
PMV BLD AUTO: 9.7 FL (ref 7.2–11.7)
RBC # BLD AUTO: 3.53 MIL/UL (ref 3.8–5.2)
WBC # BLD AUTO: 3.4 K/UL (ref 4.8–10.8)

## 2019-03-17 RX ADMIN — HUMAN INSULIN SCH: 100 INJECTION, SOLUTION SUBCUTANEOUS at 11:49

## 2019-03-17 RX ADMIN — HUMAN INSULIN SCH: 100 INJECTION, SOLUTION SUBCUTANEOUS at 21:16

## 2019-03-17 RX ADMIN — OXYCODONE HYDROCHLORIDE AND ACETAMINOPHEN PRN TAB: 5; 325 TABLET ORAL at 11:48

## 2019-03-17 RX ADMIN — PANTOPRAZOLE SODIUM SCH MG: 40 TABLET, DELAYED RELEASE ORAL at 05:29

## 2019-03-17 RX ADMIN — VENLAFAXINE HYDROCHLORIDE SCH MG: 150 CAPSULE, EXTENDED RELEASE ORAL at 09:30

## 2019-03-17 RX ADMIN — SODIUM FERRIC GLUCONATE COMPLEX SCH MG: 12.5 INJECTION INTRAVENOUS at 09:30

## 2019-03-17 RX ADMIN — HUMAN INSULIN SCH: 100 INJECTION, SOLUTION SUBCUTANEOUS at 17:29

## 2019-03-17 RX ADMIN — NITROGLYCERIN SCH EA: 20 OINTMENT TOPICAL at 17:29

## 2019-03-17 RX ADMIN — FLUTICASONE PROPIONATE SCH: 50 SPRAY, METERED NASAL at 10:01

## 2019-03-17 RX ADMIN — NITROGLYCERIN SCH: 20 OINTMENT TOPICAL at 12:38

## 2019-03-17 RX ADMIN — OXYCODONE HYDROCHLORIDE AND ACETAMINOPHEN PRN TAB: 5; 325 TABLET ORAL at 20:00

## 2019-03-17 RX ADMIN — HUMAN INSULIN SCH: 100 INJECTION, SOLUTION SUBCUTANEOUS at 07:35

## 2019-03-17 RX ADMIN — VENLAFAXINE HYDROCHLORIDE SCH MG: 37.5 CAPSULE, EXTENDED RELEASE ORAL at 09:30

## 2019-03-17 RX ADMIN — Medication SCH TAB: at 09:30

## 2019-03-17 RX ADMIN — NITROGLYCERIN SCH EA: 20 OINTMENT TOPICAL at 05:24

## 2019-03-17 NOTE — CP.PCM.PN
Subjective





- Date & Time of Evaluation


Date of Evaluation: 03/17/19


Time of Evaluation: 12:29





- Subjective


Subjective: 





LOW BACK PAIN ON PERCOCET 


VS STABLE 


P/E TENDERNESS IN L/S , SLE 40 DEG. 


CONT IV AB  





Objective





- Vital Signs/Intake and Output


Vital Signs (last 24 hours): 


                                        











Temp Pulse Resp BP Pulse Ox


 


 98.7 F   77   20   108/64   97 


 


 03/17/19 07:00  03/17/19 07:00  03/17/19 07:00  03/17/19 09:31  03/17/19 07:00











- Medications


Medications: 


                               Current Medications





Acetaminophen (Tylenol 325mg Tab)  650 mg PO Q6 PRN


   PRN Reason: pain


   Last Admin: 03/16/19 17:20 Dose:  650 mg


Aspirin (Ecotrin)  81 mg PO DAILY Replaced by [facility]


   Last Admin: 03/17/19 09:31 Dose:  81 mg


Clonazepam (Klonopin)  0.5 mg PO TID Replaced by [facility]


   Last Admin: 03/17/19 09:31 Dose:  0.5 mg


Dextrose (Dextrose 50% Inj)  0 ml IV STAT PRN; Protocol


   PRN Reason: Hypoglycemia Protocol


Dextrose (Glutose 15)  0 gm PO ONCE PRN; Protocol


   PRN Reason: Hypoglycemia Protocol


Diphenhydramine HCl (Benadryl)  25 mg PO ONCE PRN


   PRN Reason: Insomnia


   Last Admin: 03/15/19 21:20 Dose:  25 mg


Ergocalciferol (Drisdol 50,000 Intl Units Cap)  1 cap PO Q7D Replaced by [facility]


   Last Admin: 03/08/19 10:57 Dose:  1 cap


Ferric Sodium Gluconate Complex (Ferrlecit)  125 mg IVPB DAILY Replaced by [facility]


   Stop: 03/20/19 12:46


   Last Admin: 03/17/19 09:30 Dose:  125 mg


Fluticasone Propionate (Flonase)  1 spr NS DAILY Replaced by [facility]


   Last Admin: 03/17/19 10:01 Dose:  Not Given


Folic Acid (Folic Acid)  1 mg PO DAILY Replaced by [facility]


   Last Admin: 03/17/19 09:31 Dose:  1 mg


Glucagon (Glucagen Diagnostic Kit)  0 mg IM STAT PRN; Protocol


   PRN Reason: Hypoglycemia Protocol


Meropenem 500 mg/ Sodium (Chloride)  100 mls @ 100 mls/hr IVPB Q8H Replaced by [facility]; Protocol


   Last Admin: 03/17/19 08:00 Dose:  Not Given


[facility]  [facility]; [facility]


   Last Admin: 03/17/19 11:49 Dose:  Not Given


Lidocaine (Lidoderm)  1 ea TD DAILY Replaced by [facility]


   Last Admin: 03/17/19 10:02 Dose:  Not Given


Lorazepam (Ativan)  0.5 mg PO Q6 PRN


   PRN Reason: Agitation


   Last Admin: 03/15/19 22:52 Dose:  0.5 mg


Metoprolol Tartrate (Lopressor)  25 mg PO BID Replaced by [facility]


   Last Admin: 03/17/19 09:31 Dose:  25 mg


Mirtazapine (Remeron)  15 mg PO HS Replaced by [facility]


   Last Admin: 03/16/19 21:24 Dose:  15 mg


Multivitamins (Hexavitamin)  1 tab PO DAILY Replaced by [facility]


   Last Admin: 03/17/19 09:30 Dose:  1 tab


Nitroglycerin (Nitro-Bid 2% Oint)  1 ea TOP Q6 Replaced by [facility]


   Last Admin: 03/17/19 05:24 Dose:  1 ea


Ondansetron HCl (Zofran Inj)  4 mg IVP Q8 PRN


   PRN Reason: Nausea/Vomiting


   Last Admin: 03/15/19 21:19 Dose:  4 mg


Oxycodone/Acetaminophen (Percocet 5/325 Mg Tab)  1 tab PO Q8H PRN


   PRN Reason: Pain, severe (8-10)


   Stop: 03/19/19 20:42


   Last Admin: 03/17/19 11:48 Dose:  1 tab


Pantoprazole Sodium (Protonix Ec Tab)  40 mg PO 0600 Replaced by [facility]


   Last Admin: 03/17/19 05:29 Dose:  40 mg


Quetiapine Fumarate (Seroquel)  50 mg PO [facility]


   Last Admin: 03/16/19 21:24 Dose:  50 mg


Rosuvastatin Calcium (Crestor)  20 mg PO HS Replaced by [facility]


   Last Admin: 03/16/19 21:24 Dose:  20 mg


Tramadol HCl (Ultram)  50 mg PO Q6 PRN


   PRN Reason: Pain, moderate (4-7)


   Last Admin: 03/16/19 17:19 Dose:  50 mg


Venlafaxine HCl (Effexor Xr)  150 mg PO DAILY Replaced by [facility]


   Last Admin: 03/17/19 09:30 Dose:  150 mg


Venlafaxine HCl (Effexor Xr)  37.5 mg PO DAILY Replaced by [facility]


   Last Admin: 03/17/19 09:30 Dose:  37.5 mg











- Labs


Labs: 


                                        





                                 03/17/19 09:08 





                                 03/17/19 09:08 





                                        











PT  14.5 SECONDS (9.7-12.2)  H  03/07/19  19:59    


 


INR  1.3   03/07/19  19:59    


 


APTT  32 SECONDS (21-34)   03/07/19  19:59    














Assessment and Plan


(1) Chest pain


Status: Acute   





(2) Multiple sclerosis


Status: Acute   





(3) Panic disorder


Status: Chronic

## 2019-03-18 LAB
ALBUMIN SERPL-MCNC: 3.6 G/DL (ref 3.5–5)
ALBUMIN/GLOB SERPL: 1.3 {RATIO} (ref 1–2.1)
ALT SERPL-CCNC: 22 U/L (ref 9–52)
AST SERPL-CCNC: 48 U/L (ref 14–36)
BASOPHILS # BLD AUTO: 0 K/UL (ref 0–0.2)
BASOPHILS NFR BLD: 0.4 % (ref 0–2)
BUN SERPL-MCNC: 15 MG/DL (ref 7–17)
CALCIUM SERPL-MCNC: 9.1 MG/DL (ref 8.6–10.4)
EOSINOPHIL # BLD AUTO: 0.1 K/UL (ref 0–0.7)
EOSINOPHIL NFR BLD: 3.4 % (ref 0–4)
ERYTHROCYTE [DISTWIDTH] IN BLOOD BY AUTOMATED COUNT: 13.6 % (ref 11.5–14.5)
GFR NON-AFRICAN AMERICAN: > 60
HGB BLD-MCNC: 10.7 G/DL (ref 11–16)
LYMPHOCYTES # BLD AUTO: 1.2 K/UL (ref 1–4.3)
LYMPHOCYTES NFR BLD AUTO: 36.6 % (ref 20–40)
MCH RBC QN AUTO: 30.4 PG (ref 27–31)
MCHC RBC AUTO-ENTMCNC: 33.1 G/DL (ref 33–37)
MCV RBC AUTO: 91.6 FL (ref 81–99)
MONOCYTES # BLD: 0.2 K/UL (ref 0–0.8)
MONOCYTES NFR BLD: 7.4 % (ref 0–10)
NEUTROPHILS # BLD: 1.7 K/UL (ref 1.8–7)
NEUTROPHILS NFR BLD AUTO: 52.2 % (ref 50–75)
NRBC BLD AUTO-RTO: 0 % (ref 0–2)
PLATELET # BLD: 151 K/UL (ref 130–400)
PMV BLD AUTO: 9.6 FL (ref 7.2–11.7)
RBC # BLD AUTO: 3.53 MIL/UL (ref 3.8–5.2)
WBC # BLD AUTO: 3.2 K/UL (ref 4.8–10.8)

## 2019-03-18 RX ADMIN — HUMAN INSULIN SCH: 100 INJECTION, SOLUTION SUBCUTANEOUS at 16:55

## 2019-03-18 RX ADMIN — SODIUM FERRIC GLUCONATE COMPLEX SCH MG: 12.5 INJECTION INTRAVENOUS at 09:26

## 2019-03-18 RX ADMIN — VENLAFAXINE HYDROCHLORIDE SCH MG: 37.5 CAPSULE, EXTENDED RELEASE ORAL at 09:25

## 2019-03-18 RX ADMIN — Medication SCH TAB: at 09:26

## 2019-03-18 RX ADMIN — OXYCODONE HYDROCHLORIDE AND ACETAMINOPHEN PRN TAB: 5; 325 TABLET ORAL at 15:57

## 2019-03-18 RX ADMIN — NITROGLYCERIN SCH EA: 20 OINTMENT TOPICAL at 11:16

## 2019-03-18 RX ADMIN — NITROGLYCERIN SCH: 20 OINTMENT TOPICAL at 11:26

## 2019-03-18 RX ADMIN — HUMAN INSULIN SCH: 100 INJECTION, SOLUTION SUBCUTANEOUS at 11:55

## 2019-03-18 RX ADMIN — PANTOPRAZOLE SODIUM SCH MG: 40 TABLET, DELAYED RELEASE ORAL at 05:35

## 2019-03-18 RX ADMIN — NITROGLYCERIN SCH EA: 20 OINTMENT TOPICAL at 05:28

## 2019-03-18 RX ADMIN — NITROGLYCERIN SCH EA: 20 OINTMENT TOPICAL at 17:04

## 2019-03-18 RX ADMIN — OXYCODONE HYDROCHLORIDE AND ACETAMINOPHEN PRN TAB: 5; 325 TABLET ORAL at 07:46

## 2019-03-18 RX ADMIN — FLUTICASONE PROPIONATE SCH SPR: 50 SPRAY, METERED NASAL at 09:27

## 2019-03-18 RX ADMIN — HUMAN INSULIN SCH: 100 INJECTION, SOLUTION SUBCUTANEOUS at 21:14

## 2019-03-18 RX ADMIN — HUMAN INSULIN SCH: 100 INJECTION, SOLUTION SUBCUTANEOUS at 07:36

## 2019-03-18 RX ADMIN — NITROGLYCERIN SCH EA: 20 OINTMENT TOPICAL at 23:57

## 2019-03-18 RX ADMIN — VENLAFAXINE HYDROCHLORIDE SCH MG: 150 CAPSULE, EXTENDED RELEASE ORAL at 09:25

## 2019-03-18 RX ADMIN — NITROGLYCERIN SCH EA: 20 OINTMENT TOPICAL at 00:06

## 2019-03-18 NOTE — CP.PCM.PN
Subjective





- Date & Time of Evaluation


Date of Evaluation: 03/18/19


Time of Evaluation: 22:20





- Subjective


Subjective: 





AFEBRILE,


VSS.





c/o some backpain





LABS;


REVIEWED





REPEAT URINE CULTURE 3/14/19-NEGATIVE GROWTH.





Objective





- Vital Signs/Intake and Output


Vital Signs (last 24 hours): 


                                        











Temp Pulse Resp BP Pulse Ox


 


 98.3 F   60   18   132/76   97 


 


 03/18/19 16:00  03/18/19 17:04  03/18/19 16:00  03/18/19 17:04  03/18/19 17:04








Intake and Output: 


                                        











 03/18/19 03/19/19





 18:59 06:59


 


Intake Total 520 


 


Balance 520 














- Medications


Medications: 


                               Current Medications





Acetaminophen (Tylenol 325mg Tab)  650 mg PO Q6 PRN


   PRN Reason: pain


   Last Admin: 03/16/19 17:20 Dose:  650 mg


Aspirin (Ecotrin)  81 mg PO DAILY [facility]


   Last Admin: 03/18/19 09:26 Dose:  81 mg


Clonazepam (Klonopin)  0.5 mg PO TID [facility]


   Last Admin: 03/18/19 17:04 Dose:  0.5 mg


Dextrose (Dextrose 50% Inj)  0 ml IV STAT PRN; Protocol


   PRN Reason: Hypoglycemia Protocol


Dextrose (Glutose 15)  0 gm PO ONCE PRN; Protocol


   PRN Reason: Hypoglycemia Protocol


Diphenhydramine HCl (Benadryl)  25 mg PO ONCE PRN


   PRN Reason: Insomnia


   Last Admin: 03/15/19 21:20 Dose:  25 mg


Ergocalciferol (Drisdol 50,000 Intl Units Cap)  1 cap PO Q7D [facility]


   Last Admin: 03/08/19 10:57 Dose:  1 cap


Ferric Sodium Gluconate Complex (Ferrlecit)  125 mg IVPB DAILY [facility]


   Stop: 03/20/19 12:46


   Last Admin: 03/18/19 09:26 Dose:  125 mg


Fluticasone Propionate (Flonase)  1 spr NS DAILY [facility]


   Last Admin: 03/18/19 09:27 Dose:  1 spr


Folic Acid (Folic Acid)  1 mg PO DAILY [facility]


   Last Admin: 03/18/19 09:26 Dose:  1 mg


Glucagon (Glucagen Diagnostic Kit)  0 mg IM STAT PRN; Protocol


   PRN Reason: Hypoglycemia Protocol


Meropenem 500 mg/ Sodium (Chloride)  100 mls @ 100 mls/hr IVPB Q8H [facility]; Protocol


   Last Admin: 03/18/19 15:50 Dose:  100 mls/hr


Insulin Human Regular (Novolin R)  0 unit SC ACHS [facility]; Protocol


   Last Admin: 03/18/19 21:14 Dose:  Not Given


Lidocaine (Lidoderm)  1 ea TD DAILY [facility]


   Last Admin: 03/18/19 09:27 Dose:  1 ea


Lorazepam (Ativan)  0.5 mg PO Q6 PRN


   PRN Reason: Agitation


   Last Admin: 03/18/19 21:15 Dose:  0.5 mg


Metoprolol Tartrate (Lopressor)  25 mg PO BID [facility]


   Last Admin: 03/18/19 17:04 Dose:  25 mg


Mirtazapine (Remeron)  15 mg PO HS [facility]


   Last Admin: 03/18/19 21:03 Dose:  15 mg


Multivitamins (Hexavitamin)  1 tab PO DAILY [facility]


   Last Admin: 03/18/19 09:26 Dose:  1 tab


Nitroglycerin (Nitro-Bid 2% Oint)  1 ea TOP Q6 [facility]


   Last Admin: 03/18/19 17:04 Dose:  1 ea


Ondansetron HCl (Zofran Inj)  4 mg IVP Q8 PRN


   PRN Reason: Nausea/Vomiting


   Last Admin: 03/18/19 11:16 Dose:  4 mg


Oxycodone/Acetaminophen (Percocet 5/325 Mg Tab)  1 tab PO Q8H PRN


   PRN Reason: Pain, severe (8-10)


   Stop: 03/19/19 20:42


   Last Admin: 03/18/19 15:57 Dose:  1 tab


Pantoprazole Sodium (Protonix Ec Tab)  40 mg PO 0600 [facility]


   Last Admin: 03/18/19 05:35 Dose:  40 mg


Quetiapine Fumarate (Seroquel)  50 mg PO HS [facility]


   Last Admin: 03/18/19 21:03 Dose:  50 mg


Rosuvastatin Calcium (Crestor)  20 mg PO HS [facility]


   Last Admin: 03/18/19 21:03 Dose:  20 mg


Tramadol HCl (Ultram)  50 mg PO Q6 PRN


   PRN Reason: Pain, moderate (4-7)


   Last Admin: 03/16/19 17:19 Dose:  50 mg


Venlafaxine HCl (Effexor Xr)  150 mg PO DAILY [facility]


   Last Admin: 03/18/19 09:25 Dose:  150 mg


Venlafaxine HCl (Effexor Xr)  37.5 mg PO DAILY [facility]


   Last Admin: 03/18/19 09:25 Dose:  37.5 mg











- Labs


Labs: 


                                        





                                 03/18/19 07:26 





                                 03/18/19 07:26 





                                        











PT  14.5 SECONDS (9.7-12.2)  H  03/07/19  19:59    


 


INR  1.3   03/07/19  19:59    


 


APTT  32 SECONDS (21-34)   03/07/19  19:59    














- Constitutional


Appears: No Acute Distress





- Head Exam


Head Exam: NORMAL INSPECTION





- Eye Exam


Eye Exam: EOMI, PERRL





- ENT Exam


ENT Exam: Normal Oropharynx





- Respiratory Exam


Respiratory Exam: Clear to Ausculation Bilateral, NORMAL BREATHING PATTERN





- Cardiovascular Exam


Cardiovascular Exam: REGULAR RHYTHM, +S1, +S2





- GI/Abdominal Exam


GI & Abdominal Exam: Soft, Normal Bowel Sounds.  absent: Tenderness





- Extremities Exam


Extremities Exam: Normal Capillary Refill.  absent: Calf Tenderness, Pedal Edema





- Neurological Exam


Neurological Exam: Alert, Awake, CN II-XII Intact, Normal Gait, Oriented x3, 

Reflexes Normal





- Skin


Skin Exam: Normal Color, Warm





Assessment and Plan


(1) UTI (urinary tract infection)


Status: Acute   





(2) Pancytopenia


Status: Acute   





(3) Chest pain


Status: Acute   





(4) Multiple sclerosis


Status: Acute   





(5) Diabetes mellitus


Status: Acute   





(6) Depression


Status: Chronic   





- Assessment and Plan (Free Text)


Plan: 





CONTINUE iv MERREM 500 MG EVERY 8 HOURLY 3/10/19.-day 8


can dc ABX ON DISCHARGE.


Repeat urine culture negative so far.


AS PER CONSULTANTS/PMD.


ANAEMIA RX AS PER DR AMEZQUITA.REMAINS PANCYTOPENIC





CASE DISCUSSED WITH THE STAFF/ PMD.

## 2019-03-18 NOTE — CP.PCM.PN
Subjective





- Date & Time of Evaluation


Date of Evaluation: 03/18/19


Time of Evaluation: 11:57





- Subjective


Subjective: 





LESS PAIN 


ON IV AB 


LABS OK 


FOR REHAB  





Objective





- Vital Signs/Intake and Output


Vital Signs (last 24 hours): 


                                        











Temp Pulse Resp BP Pulse Ox


 


 98.2 F   60   20   122/80   96 


 


 03/18/19 07:00  03/18/19 11:28  03/18/19 07:00  03/18/19 11:28  03/18/19 07:00








Intake and Output: 


                                        











 03/17/19 03/18/19





 23:59 11:59


 


Intake Total 600 


 


Balance 600 














- Medications


Medications: 


                               Current Medications





Acetaminophen (Tylenol 325mg Tab)  650 mg PO Q6 PRN


   PRN Reason: pain


   Last Admin: 03/16/19 17:20 Dose:  650 mg


Aspirin (Ecotrin)  81 mg PO DAILY [facility]


   Last Admin: 03/18/19 09:26 Dose:  81 mg


Clonazepam (Klonopin)  0.5 mg PO TID [facility]


   Last Admin: 03/18/19 09:26 Dose:  0.5 mg


Dextrose (Dextrose 50% Inj)  0 ml IV STAT PRN; Protocol


   PRN Reason: Hypoglycemia Protocol


Dextrose (Glutose 15)  0 gm PO ONCE PRN; Protocol


   PRN Reason: Hypoglycemia Protocol


Diphenhydramine HCl (Benadryl)  25 mg PO ONCE PRN


   PRN Reason: Insomnia


   Last Admin: 03/15/19 21:20 Dose:  25 mg


Ergocalciferol (Drisdol 50,000 Intl Units Cap)  1 cap PO Q7D [facility]


   Last Admin: 03/08/19 10:57 Dose:  1 cap


Ferric Sodium Gluconate Complex (Ferrlecit)  125 mg IVPB DAILY [facility]


   Stop: 03/20/19 12:46


   Last Admin: 03/18/19 09:26 Dose:  125 mg


Fluticasone Propionate (Flonase)  1 spr NS DAILY [facility]


   Last Admin: 03/18/19 09:27 Dose:  1 spr


Folic Acid (Folic Acid)  1 mg PO DAILY [facility]


   Last Admin: 03/18/19 09:26 Dose:  1 mg


Glucagon (Glucagen Diagnostic Kit)  0 mg IM STAT PRN; Protocol


   PRN Reason: Hypoglycemia Protocol


Meropenem 500 mg/ Sodium (Chloride)  100 mls @ 100 mls/hr IVPB Q8H [facility]; Protocol


   Last Admin: 03/18/19 07:45 Dose:  100 mls/hr


Insulin Human Regular (Novolin R)  [facility]; Protocol


   Last Admin: 03/18/19 11:55 Dose:  Not Given


Lidocaine (Lidoderm)  1 ea TD DAILY [facility]


   Last Admin: 03/18/19 09:27 Dose:  1 ea


Lorazepam (Ativan)  0.5 mg PO Q6 PRN


   PRN Reason: Agitation


   Last Admin: 03/17/19 21:22 Dose:  0.5 mg


Metoprolol Tartrate (Lopressor)  25 mg PO BID [facility]


   Last Admin: 03/18/19 09:27 Dose:  25 mg


Mirtazapine (Remeron)  15 mg PO [facility]


   Last Admin: 03/17/19 21:22 Dose:  15 mg


Multivitamins (Hexavitamin)  1 tab PO DAILY [facility]


   Last Admin: 03/18/19 09:26 Dose:  1 tab


Nitroglycerin (Nitro-Bid 2% Oint)  1 ea TOP Q6 [facility]


   Last Admin: 03/18/19 11:26 Dose:  Not Given


Ondansetron HCl (Zofran Inj)  4 mg IVP Q8 PRN


   PRN Reason: Nausea/Vomiting


   Last Admin: 03/18/19 11:16 Dose:  4 mg


Oxycodone/Acetaminophen (Percocet 5/325 Mg Tab)  1 tab PO Q8H PRN


   PRN Reason: Pain, severe (8-10)


   Stop: 03/19/19 20:42


   Last Admin: 03/18/19 07:46 Dose:  1 tab


Pantoprazole Sodium (Protonix Ec Tab)  40 mg PO 0600 [facility]


   Last Admin: 03/18/19 05:35 Dose:  40 mg


Quetiapine Fumarate (Seroquel)  50 mg PO [facility]


   Last Admin: 03/17/19 21:22 Dose:  50 mg


Rosuvastatin Calcium (Crestor)  20 mg PO [facility]


   Last Admin: 03/17/19 21:22 Dose:  20 mg


Tramadol HCl (Ultram)  50 mg PO Q6 PRN


   PRN Reason: Pain, moderate (4-7)


   Last Admin: 03/16/19 17:19 Dose:  50 mg


Venlafaxine HCl (Effexor Xr)  150 mg PO DAILY [facility]


   Last Admin: 03/18/19 09:25 Dose:  150 mg


Venlafaxine HCl (Effexor Xr)  37.5 mg PO DAILY [facility]


   Last Admin: 03/18/19 09:25 Dose:  37.5 mg











- Labs


Labs: 


                                        





                                 03/18/19 07:26 





                                 03/18/19 07:26 





                                        











PT  14.5 SECONDS (9.7-12.2)  H  03/07/19  19:59    


 


INR  1.3   03/07/19  19:59    


 


APTT  32 SECONDS (21-34)   03/07/19  19:59    














Assessment and Plan


(1) Chest pain


Status: Acute   





(2) Multiple sclerosis


Status: Acute   





(3) Panic disorder


Status: Chronic

## 2019-03-19 VITALS
SYSTOLIC BLOOD PRESSURE: 120 MMHG | TEMPERATURE: 98.2 F | HEART RATE: 61 BPM | RESPIRATION RATE: 18 BRPM | OXYGEN SATURATION: 99 % | DIASTOLIC BLOOD PRESSURE: 75 MMHG

## 2019-03-19 LAB
ALBUMIN SERPL-MCNC: 3.3 G/DL (ref 3.5–5)
ALBUMIN/GLOB SERPL: 1.2 {RATIO} (ref 1–2.1)
ALT SERPL-CCNC: 24 U/L (ref 9–52)
AST SERPL-CCNC: 42 U/L (ref 14–36)
BASOPHILS # BLD AUTO: 0 K/UL (ref 0–0.2)
BASOPHILS NFR BLD: 0.7 % (ref 0–2)
BUN SERPL-MCNC: 15 MG/DL (ref 7–17)
CALCIUM SERPL-MCNC: 9.1 MG/DL (ref 8.6–10.4)
EOSINOPHIL # BLD AUTO: 0.1 K/UL (ref 0–0.7)
EOSINOPHIL NFR BLD: 4 % (ref 0–4)
ERYTHROCYTE [DISTWIDTH] IN BLOOD BY AUTOMATED COUNT: 13.7 % (ref 11.5–14.5)
GFR NON-AFRICAN AMERICAN: > 60
HGB BLD-MCNC: 10.3 G/DL (ref 11–16)
LYMPHOCYTES # BLD AUTO: 1.2 K/UL (ref 1–4.3)
LYMPHOCYTES NFR BLD AUTO: 40 % (ref 20–40)
MCH RBC QN AUTO: 29.8 PG (ref 27–31)
MCHC RBC AUTO-ENTMCNC: 32.4 G/DL (ref 33–37)
MCV RBC AUTO: 92.2 FL (ref 81–99)
MONOCYTES # BLD: 0.3 K/UL (ref 0–0.8)
MONOCYTES NFR BLD: 9.3 % (ref 0–10)
NEUTROPHILS # BLD: 1.4 K/UL (ref 1.8–7)
NEUTROPHILS NFR BLD AUTO: 46 % (ref 50–75)
NRBC BLD AUTO-RTO: 0.1 % (ref 0–2)
PLATELET # BLD: 134 K/UL (ref 130–400)
PMV BLD AUTO: 9.4 FL (ref 7.2–11.7)
RBC # BLD AUTO: 3.45 MIL/UL (ref 3.8–5.2)
WBC # BLD AUTO: 3 K/UL (ref 4.8–10.8)

## 2019-03-19 RX ADMIN — OXYCODONE HYDROCHLORIDE AND ACETAMINOPHEN PRN TAB: 5; 325 TABLET ORAL at 00:09

## 2019-03-19 RX ADMIN — HUMAN INSULIN SCH: 100 INJECTION, SOLUTION SUBCUTANEOUS at 08:04

## 2019-03-19 RX ADMIN — SODIUM FERRIC GLUCONATE COMPLEX SCH MG: 12.5 INJECTION INTRAVENOUS at 10:34

## 2019-03-19 RX ADMIN — NITROGLYCERIN SCH EA: 20 OINTMENT TOPICAL at 12:14

## 2019-03-19 RX ADMIN — FLUTICASONE PROPIONATE SCH SPR: 50 SPRAY, METERED NASAL at 10:35

## 2019-03-19 RX ADMIN — NITROGLYCERIN SCH EA: 20 OINTMENT TOPICAL at 17:34

## 2019-03-19 RX ADMIN — VENLAFAXINE HYDROCHLORIDE SCH MG: 150 CAPSULE, EXTENDED RELEASE ORAL at 10:33

## 2019-03-19 RX ADMIN — Medication SCH TAB: at 10:32

## 2019-03-19 RX ADMIN — HUMAN INSULIN SCH UNITS: 100 INJECTION, SOLUTION SUBCUTANEOUS at 17:33

## 2019-03-19 RX ADMIN — HUMAN INSULIN SCH UNITS: 100 INJECTION, SOLUTION SUBCUTANEOUS at 17:32

## 2019-03-19 RX ADMIN — PANTOPRAZOLE SODIUM SCH MG: 40 TABLET, DELAYED RELEASE ORAL at 06:02

## 2019-03-19 RX ADMIN — HUMAN INSULIN SCH: 100 INJECTION, SOLUTION SUBCUTANEOUS at 12:16

## 2019-03-19 RX ADMIN — NITROGLYCERIN SCH EA: 20 OINTMENT TOPICAL at 06:02

## 2019-03-19 RX ADMIN — VENLAFAXINE HYDROCHLORIDE SCH MG: 37.5 CAPSULE, EXTENDED RELEASE ORAL at 10:31

## 2019-03-19 NOTE — CP.PCM.DIS
Provider





- Provider


Date of Admission: 


03/12/19 12:25





Attending physician: 


Janay Guzmán MD





Consults: 








03/10/19 13:15


Hematology Oncology Consult Routine 


   Comment: 


   Consulting Provider: Stone Moss


   Consulting Physician: Stone Moss


   Reason for Consult: LEUCOPENIA





03/10/19 13:18


Infectious Disease Consult Routine 


   Comment: 


   Consulting Provider: Dylon Mixon


   Consulting Physician: Dylon Mixon


   Reason for Consult: UTI/LOW WBC





03/10/19 19:25


Psychiatry Consult Routine 


   Comment: depression and hx of bipolar


   Consulting Provider: Brittany Hobbs


   Consulting Physician: Brittany Hobbs


   Reason for Consult: dpression and hx of bipolar





03/16/19 11:29


Case Management Referral Routine 


   Comment: 


   Physician Instructions: 


   Reason For Exam: [facility] - [facility] prefereed


   Reason for Referral: Discharge Planning











Time Spent in preparation of Discharge (in minutes): 35





Diagnosis





- Discharge Diagnosis


(1) Chest pain


Status: Acute   





(2) Multiple sclerosis


Status: Acute   





(3) Panic disorder


Status: Chronic   Priority: Medium   





Hospital Course





- Lab Results


Lab Results: 


                                  Micro Results





03/14/19 20:19   Urine Random   Urine Culture - Final


                            No Growth (<1,000 CFU/ML)


03/10/19 17:42   Blood-Venous   Blood Culture - Final


                            NO GROWTH AFTER 5 DAYS


03/10/19 17:42   Blood-Venous   Gram Stain - Final


                            TEST NOT PERFORMED


03/10/19 16:40   Blood-Venous   Blood Culture - Final


                            NO GROWTH AFTER 5 DAYS


03/10/19 16:40   Blood-Venous   Gram Stain - Final


                            TEST NOT PERFORMED


03/09/19 07:14   Urine,Clean Catch   Urine Culture - Final


                            Klebsiella Pneumoniae Ssp Pneu





                             Most Recent Lab Values











WBC  3.0 K/uL (4.8-10.8)  L  03/19/19  07:06    


 


RBC  3.45 Mil/uL (3.80-5.20)  L  03/19/19  07:06    


 


Hgb  10.3 g/dL (11.0-16.0)  L  03/19/19  07:06    


 


Hct  31.8 % (34.0-47.0)  L  03/19/19  07:06    


 


MCV  92.2 fL (81.0-99.0)   03/19/19  07:06    


 


MCH  29.8 pg (27.0-31.0)   03/19/19  07:06    


 


MCHC  32.4 g/dL (33.0-37.0)  L  03/19/19  07:06    


 


RDW  13.7 % (11.5-14.5)   03/19/19  07:06    


 


Plt Count  134 K/uL (130-400)   03/19/19  07:06    


 


MPV  9.4 fL (7.2-11.7)   03/19/19  07:06    


 


Neut % (Auto)  46.0 % (50.0-75.0)  L  03/19/19  07:06    


 


Lymph % (Auto)  40.0 % (20.0-40.0)   03/19/19  07:06    


 


Mono % (Auto)  9.3 % (0.0-10.0)   03/19/19  07:06    


 


Eos % (Auto)  4.0 % (0.0-4.0)   03/19/19  07:06    


 


Baso % (Auto)  0.7 % (0.0-2.0)   03/19/19  07:06    


 


Neut # (Auto)  1.4 K/uL (1.8-7.0)  L  03/19/19  07:06    


 


Lymph # (Auto)  1.2 K/uL (1.0-4.3)   03/19/19  07:06    


 


Mono # (Auto)  0.3 K/uL (0.0-0.8)   03/19/19  07:06    


 


Eos # (Auto)  0.1 K/uL (0.0-0.7)   03/19/19  07:06    


 


Baso # (Auto)  0.0 K/uL (0.0-0.2)   03/19/19  07:06    


 


Differential Comment     03/10/19  08:27    


 


Smear Path Review     03/10/19  08:27    


 


Retic Count  1.0 % (0.5-1.5)   03/12/19  08:43    


 


PT  14.5 SECONDS (9.7-12.2)  H  03/07/19  19:59    


 


INR  1.3   03/07/19  19:59    


 


APTT  32 SECONDS (21-34)   03/07/19  19:59    


 


D-Dimer, Quantitative  264 ng/mlDDU (0-243)  H  03/07/19  19:59    


 


Sodium  137 mmol/L (132-148)   03/19/19  07:06    


 


Potassium  4.4 mmol/L (3.6-5.2)   03/19/19  07:06    


 


Chloride  102 mmol/L ()   03/19/19  07:06    


 


Carbon Dioxide  29 mmol/L (22-30)   03/19/19  07:06    


 


Anion Gap  10  (10-20)   03/19/19  07:06    


 


BUN  15 mg/dL (7-17)   03/19/19  07:06    


 


Creatinine  0.9 mg/dL (0.7-1.2)   03/19/19  07:06    


 


Est GFR ( Amer)  > 60   03/19/19  07:06    


 


Est GFR (Non-Af Amer)  > 60   03/19/19  07:06    


 


POC Glucose (mg/dL)  135 mg/dL ()  H  03/19/19  11:03    


 


Random Glucose  81 mg/dL ()   03/19/19  07:06    


 


Calcium  9.1 mg/dl (8.6-10.4)   03/19/19  07:06    


 


Ferritin  19.2 ng/mL  03/12/19  08:43    


 


Total Bilirubin  0.3 mg/dL (0.2-1.3)   03/19/19  07:06    


 


AST  42 U/L (14-36)  H  03/19/19  07:06    


 


ALT  24 U/L (9-52)   03/19/19  07:06    


 


Alkaline Phosphatase  91 U/L ()   03/19/19  07:06    


 


Total Creatine Kinase  53 U/L ()   03/08/19  06:48    


 


CK-MB (Mass)  0.62 ng/mL (0.0-3.38)   03/08/19  06:48    


 


Troponin I  < 0.0120 ng/mL (0.00-0.120)   03/08/19  06:48    


 


NT-Pro-B Natriuret Pep  138 pg/mL (0-900)   03/07/19  19:59    


 


Total Protein  6.1 g/dL (6.3-8.3)  L  03/19/19  07:06    


 


Albumin  3.3 g/dL (3.5-5.0)  L  03/19/19  07:06    


 


Globulin  2.8 gm/dL (2.2-3.9)   03/19/19  07:06    


 


Albumin/Globulin Ratio  1.2  (1.0-2.1)   03/19/19  07:06    


 


Vitamin B12  409 pg/mL (239-931)   03/12/19  08:43    


 


Folate  > 20.0 ng/mL  03/12/19  08:43    


 


Urine Color  Yellow  (YELLOW)   03/14/19  20:19    


 


Urine Clarity  Hazy  (Clear)   03/14/19  20:19    


 


Urine pH  5.0  (5.0-8.0)   03/14/19  20:19    


 


Ur Specific Gravity  1.020  (1.003-1.030)   03/14/19  20:19    


 


Urine Protein  Negative mg/dL (NEGATIVE)   03/14/19  20:19    


 


Urine Glucose (UA)  Normal mg/dL (Normal)   03/14/19  20:19    


 


Urine Ketones  Negative mg/dL (NEGATIVE)   03/14/19  20:19    


 


Urine Blood  Negative  (NEGATIVE)   03/14/19  20:19    


 


Urine Nitrate  Negative  (NEGATIVE)   03/14/19  20:19    


 


Urine Bilirubin  Negative  (NEGATIVE)   03/14/19  20:19    


 


Urine Urobilinogen  Normal mg/dL (0.2-1.0)   03/14/19  20:19    


 


Ur Leukocyte Esterase  Trace Anca/uL (Negative)   03/14/19  20:19    


 


Urine WBC (Auto)  4 /hpf (0-5)   03/14/19  20:19    


 


Urine RBC (Auto)  < 1 /hpf (0-3)   03/14/19  20:19    


 


Ur Squamous Epith Cells  1 /hpf (0-5)   03/14/19  20:19    


 


Urine Bacteria  Occ  (<OCC)  H  03/14/19  20:19    


 


Stool Occult Blood  Negative  (NEGATIVE)   03/12/19  17:15    


 


Copper  94 mcg/dL ()   03/12/19  08:43    


 


Zinc  72 mcg/dL ()   03/12/19  08:43    


 


C. difficile Ag & Toxin  Negative  (NEGATIVE)   03/11/19  22:57    


 


Hepatitis A IgM Ab  Negative  (NEGATIVE)   03/12/19  08:43    


 


Hep Bs Antigen  Negative  (NEGATIVE)   03/12/19  08:43    


 


Hep B Core IgM Ab  Negative  (NEGATIVE)   03/12/19  08:43    


 


Hepatitis C Antibody  Negative  (NEGATIVE)   03/12/19  08:43    


 


HIV 1&2 Antibody Screen  Negative  (NEGATIVE)   03/12/19  08:43    


 


Blood Type  A POSITIVE   03/07/19  19:59    


 


Antibody Screen  Negative   03/07/19  19:59    














- Hospital Course


Hospital Course: 








58 year old female presents with anterior chest pain intermittently for the past

3 days. Patient has Hx of cardiac problem with pace maker and HTN.


PT. HAS BEEN ADMITTED MULTIPLE TIMES FOR CP WITH NEG CARDIAC W/U


ALSO HAS H/O MS WITH EXACERBATION 


H/O HTN/DM/HYPERCHOLESTEROL





ID EVALUATION , IV AB FOR PNEUMONIA 


PT IMPROVED 


ALSO HAD BACK PAIN ON PERCOCET 


PSYCH EVALUATED AND ADJUSTED MEDS 


CARDIAC W/U WAS NEG 


PT D/C F/U IN OFFICE 





Discharge Exam





- Head Exam


Head Exam: NORMAL INSPECTION





Discharge Plan





- Discharge Medications


Prescriptions: 


Cane 1 each MC DAILY #1 each





- Follow Up Plan


Condition: STABLE


Disposition: HOME/ ROUTINE

## 2019-03-20 NOTE — CP.PCM.PN
Subjective





- Date & Time of Evaluation


Date of Evaluation: 03/18/19


Time of Evaluation: 19:00





- Subjective


Subjective: 





No complaints.





Objective





- Vital Signs/Intake and Output


Vital Signs (last 24 hours): 


                                        











Temp Pulse Resp BP Pulse Ox


 


 98.2 F   61   18   120/75   99 


 


 03/19/19 16:00  03/19/19 16:00  03/19/19 16:00  03/19/19 16:00  03/19/19 16:00











- Labs


Labs: 


                                        





                                 03/19/19 07:06 





                                 03/19/19 07:06 





                                        











PT  14.5 SECONDS (9.7-12.2)  H  03/07/19  19:59    


 


INR  1.3   03/07/19  19:59    


 


APTT  32 SECONDS (21-34)   03/07/19  19:59    














- Head Exam


Head Exam: ATRAUMATIC





- Eye Exam


Eye Exam: Normal appearance





- ENT Exam


ENT Exam: Mucous Membranes Dry





- Respiratory Exam


Respiratory Exam: NORMAL BREATHING PATTERN





- Cardiovascular Exam


Cardiovascular Exam: +S1, +S2





- GI/Abdominal Exam


GI & Abdominal Exam: Normal Bowel Sounds





Assessment and Plan


(1) Pancytopenia


Assessment & Plan: 


borderline iron and b12; on supplementation


zinc and copper normal


Status: Acute

## 2019-06-04 NOTE — CP.PCM.PN
Subjective





- Date & Time of Evaluation


Date of Evaluation: 03/19/19


Time of Evaluation: 11:20





- Subjective


Subjective: 





pateint seen today denies any chest pain, sob, dizziness , back pain improved 

with pain medications 


oob ambulating the hallway


vss and labs reviewed - stable 


d/w Dr. nieves cleared for discharge home today and f/u with Dr. Arielle roberts 

in 1 week 


As per Dr. Nieves can give 30 days supply of all her medications 


rx given upon discharge 





Objective





- Vital Signs/Intake and Output


Vital Signs (last 24 hours): 


                                        











Temp Pulse Resp BP Pulse Ox


 


 98.6 F   60   20   132/76   94 L


 


 03/19/19 07:20  03/19/19 07:20  03/19/19 07:20  03/19/19 10:31  03/19/19 07:20








Intake and Output: 


                                        











 03/19/19 03/19/19





 06:59 18:59


 


Intake Total 400 


 


Balance 400 














- Medications


Medications: 


                               Current Medications





Acetaminophen (Tylenol 325mg Tab)  650 mg PO Q6 PRN


   PRN Reason: pain


   Last Admin: 03/16/19 17:20 Dose:  650 mg


Aspirin (Ecotrin)  81 mg PO DAILY [facility]


   Last Admin: 03/19/19 10:34 Dose:  81 mg


Clonazepam (Klonopin)  0.5 mg PO TID [facility]


   Last Admin: 03/19/19 10:31 Dose:  0.5 mg


Dextrose (Dextrose 50% Inj)  0 ml IV STAT PRN; Protocol


   PRN Reason: Hypoglycemia Protocol


Dextrose (Glutose 15)  0 gm PO ONCE PRN; Protocol


   PRN Reason: Hypoglycemia Protocol


Diphenhydramine HCl (Benadryl)  25 mg PO ONCE PRN


   PRN Reason: Insomnia


   Last Admin: 03/15/19 21:20 Dose:  25 mg


Ergocalciferol (Drisdol 50,000 Intl Units Cap)  1 cap PO Q7D [facility]


   Last Admin: 03/08/19 10:57 Dose:  1 cap


Ferric Sodium Gluconate Complex (Ferrlecit)  125 mg IVPB DAILY [facility]


   Stop: 03/20/19 12:46


   Last Admin: 03/19/19 10:34 Dose:  125 mg


Fluticasone Propionate (Flonase)  1 spr NS DAILY [facility]


   Last Admin: 03/19/19 10:35 Dose:  1 spr


Folic Acid (Folic Acid)  1 mg PO DAILY [facility]


   Last Admin: 03/19/19 10:33 Dose:  1 mg


Glucagon (Glucagen Diagnostic Kit)  0 mg IM STAT PRN; Protocol


   PRN Reason: Hypoglycemia Protocol


Heparin Sodium (Porcine) (Heparin Lock Flush)  300 units IVF ONCE ONE


   Stop: 03/19/19 12:46


Meropenem 500 mg/ Sodium (Chloride)  100 mls @ 100 mls/hr IVPB Q8H [facility]; Protocol


   Last Admin: 03/19/19 08:05 Dose:  100 mls/hr


Insulin Human Regular (Novolin R)  0 unit SC ACHS [facility]; Protocol


   Last Admin: 03/19/19 12:16 Dose:  Not Given


Lidocaine (Lidoderm)  1 ea TD DAILY [facility]


   Last Admin: 03/19/19 10:36 Dose:  Not Given


Lorazepam (Ativan)  0.5 mg PO Q6 PRN


   PRN Reason: Agitation


   Last Admin: 03/18/19 21:15 Dose:  0.5 mg


Metoprolol Tartrate (Lopressor)  25 mg PO BID [facility]


   Last Admin: 03/19/19 10:31 Dose:  25 mg


Mirtazapine (Remeron)  15 mg PO HS [facility]


   Last Admin: 03/18/19 21:03 Dose:  15 mg


Multivitamins (Hexavitamin)  1 tab PO DAILY [facility]


   Last Admin: 03/19/19 10:32 Dose:  1 tab


Nitroglycerin (Nitro-Bid 2% Oint)  1 ea TOP Q6 [facility]


   Last Admin: 03/19/19 12:14 Dose:  1 ea


Ondansetron HCl (Zofran Inj)  4 mg IVP Q8 PRN


   PRN Reason: Nausea/Vomiting


   Last Admin: 03/18/19 11:16 Dose:  4 mg


Oxycodone/Acetaminophen (Percocet 5/325 Mg Tab)  1 tab PO Q8H PRN


   PRN Reason: Pain, severe (8-10)


   Stop: 03/19/19 20:42


   Last Admin: 03/19/19 00:09 Dose:  1 tab


Pantoprazole Sodium (Protonix Ec Tab)  40 mg PO 0600 [facility]


   Last Admin: 03/19/19 06:02 Dose:  40 mg


Quetiapine Fumarate (Seroquel)  50 mg PO HS [facility]


   Last Admin: 03/18/19 21:03 Dose:  50 mg


Rosuvastatin Calcium (Crestor)  20 mg PO HS [facility]


   Last Admin: 03/18/19 21:03 Dose:  20 mg


Tramadol HCl (Ultram)  50 mg PO Q6 PRN


   PRN Reason: Pain, moderate (4-7)


   Last Admin: 03/16/19 17:19 Dose:  50 mg


Venlafaxine HCl (Effexor Xr)  150 mg PO DAILY [facility]


   Last Admin: 03/19/19 10:33 Dose:  150 mg


Venlafaxine HCl (Effexor Xr)  37.5 mg PO DAILY [facility]


   Last Admin: 03/19/19 10:31 Dose:  37.5 mg











- Labs


Labs: 


                                        





                                 03/19/19 07:06 





                                 03/19/19 07:06 





                                        











PT  14.5 SECONDS (9.7-12.2)  H  03/07/19  19:59    


 


INR  1.3   03/07/19  19:59    


 


APTT  32 SECONDS (21-34)   03/07/19  19:59 Family vehicle

## 2019-10-21 NOTE — CT
PROCEDURE:  CT HEAD WITHOUT CONTRAST.



HISTORY:

headache



COMPARISON:

None available. 



TECHNIQUE:

Axial computed tomography images were obtained through the head/brain 

without intravenous contrast.  



Radiation dose:



Total exam DLP = 843.77 mGy-cm.



This CT exam was performed using one or more of the following dose 

reduction techniques: Automated exposure control, adjustment of the 

mA and/or kV according to patient size, and/or use of iterative 

reconstruction technique.



FINDINGS:



HEMORRHAGE:

No acute parenchymal, subarachnoid or extra-axial hemorrhage. 



BRAIN:

Significant diffuse/confluent chronic periventricular white matter 

ischemic changes seen extending peripherally into the deep and 

subcortical white matter both cerebral hemispheres. There is 

extension of these changes into white matter tracts of both basal 

nuclei. Additionally, there are chronic appearing bilateral basal 

nuclei lacunar type infarcts.



Moderate volume loss. 



Minor vascular calcifications both carotid siphons 



VENTRICLES:

Unremarkable. No hydrocephalus. 



CALVARIUM:

Unremarkable.



PARANASAL SINUSES:

Visualized paranasal sinuses well-developed.  No fluid levels seen to 

suggest acute sinusitis.  Moderate mucosal thickening right maxillary 

antrum. Minimal mucosal thickening also seen within a few ethmoid air 

cells.



MASTOID AIR CELLS:

Unremarkable as visualized. No inflammatory changes.



OTHER FINDINGS:

None.



IMPRESSION:

Significant diffuse/ confluent chronic white matter ischemic changes 

which extend into the white matter tracts of both basal nuclei.  

There are also scattered bilateral basal nuclei lacunar type 

infarcts. 



Moderate volume loss. 100

## 2021-04-13 NOTE — CP.PCM.PN
"CLINIC NOTE - CONSULT  4/13/2021    Patient:Karen Lanier    Referring Physician: Kelsey Parra    Reason for Referral: Mass on scalp    This is a 37 year old female with a mass on the scalp.  The mass is getting larger.  The mass has not been infected in the past.  The mass has not drained.  The patient does desire excision.     Past Medical History:  History reviewed. No pertinent past medical history.    Past Surgical History:  History reviewed. No pertinent surgical history.    Family History History:  Family History   Problem Relation Age of Onset     Throat cancer Father      Melanoma Maternal Grandfather      Coronary Artery Disease Paternal Grandfather      Heart Disease Paternal Grandfather      Brain Cancer Brother        History of Tobacco Use:  History   Smoking Status     Never Smoker   Smokeless Tobacco     Never Used       Current Medications:  No current outpatient medications on file.       Allergies:  No Known Allergies    ROS:  Pertinent items are noted in HPI.  All other systems are negative.    PHYSICAL EXAM:     Vital signs: /72 (BP Location: Right arm, Cuff Size: Adult Regular)   Pulse 78   Temp 97.8  F (36.6  C) (Tympanic)   Resp 16   Ht 1.854 m (6' 1\")   Wt 83.9 kg (185 lb)   SpO2 98%   BMI 24.41 kg/m     Weight: [unfilled]   BMI: Body mass index is 24.41 kg/m .   General: Normal, healthy, cooperative   Skin: no jaundice   HEENT: PERRLA and EOMI   Neck: supple   Lungs: clear to auscultation   CV: Regular rate and rhythm without murmer   Abdominal: abdomen is soft without significant tenderness, masses, organomegaly or guarding   Extremities: No cyanosis, clubbing or edema noted bilaterally in Upper and Lower Extremities   Neurological: without deficit     On the anterior scalp there is a 5 x 5 cm soft mobile lesion consistent with a pilar cyst.    ASSESSMENT: 37 year old female with a mass on the scalp.    PLAN: Discussed options with the patient.  Will plan on " Subjective





- Date & Time of Evaluation


Date of Evaluation: 03/11/19


Time of Evaluation: 13:04





- Subjective


Subjective: 





AFEBRILE,


VSS,





C/O BACKPAIN


GENERALIZED WEAKNESS.


FREQUENCY OF URINATION.








LABS REVIEWED.





BLOOD CULTURES  3/10 /19 -VE GROWTH FOR 24 HOURS.


URINE CULTURES  3/9/19 +VE kLEBSIELLA PNEUMONIAE - PANSENSITIVE





Objective





- Vital Signs/Intake and Output


Vital Signs (last 24 hours): 


                                        











Temp Pulse Resp BP Pulse Ox


 


 98.2 F   61   18   110/64   95 


 


 03/11/19 07:00  03/11/19 11:19  03/11/19 07:00  03/11/19 09:33  03/11/19 07:00








Intake and Output: 


                                        











 03/11/19 03/11/19





 06:59 18:59


 


Intake Total 110 


 


Balance 110 














- Medications


Medications: 


                               Current Medications





Acetaminophen (Tylenol 325mg Tab)  650 mg PO Q6 PRN


   PRN Reason: pain


   Last Admin: 03/11/19 09:34 Dose:  650 mg


Aspirin (Ecotrin)  81 mg PO DAILY [facility]


   Last Admin: 03/11/19 09:34 Dose:  81 mg


Clonazepam (Klonopin)  1 mg PO BID [facility]


   Last Admin: 03/11/19 09:34 Dose:  1 mg


Dextrose (Dextrose 50% Inj)  0 ml IV STAT PRN; Protocol


   PRN Reason: Hypoglycemia Protocol


Dextrose (Glutose 15)  0 gm PO ONCE PRN; Protocol


   PRN Reason: Hypoglycemia Protocol


Diphenhydramine HCl (Benadryl)  25 mg PO ONCE PRN


   PRN Reason: Insomnia


Enoxaparin Sodium (Lovenox)  30 mg SC DAILY [facility]


   Last Admin: 03/11/19 09:32 Dose:  30 mg


Ergocalciferol (Drisdol 50,000 Intl Units Cap)  1 cap PO Q7D [facility]


   Last Admin: 03/08/19 10:57 Dose:  1 cap


Fluticasone Propionate (Flonase)  1 spr NS DAILY [facility]


   Last Admin: 03/11/19 09:32 Dose:  1 spr


Folic Acid (Folic Acid)  1 mg PO DAILY [facility]


   Last Admin: 03/11/19 09:33 Dose:  1 mg


Glucagon (Glucagen Diagnostic Kit)  0 mg IM STAT PRN; Protocol


   PRN Reason: Hypoglycemia Protocol


Meropenem 500 mg/ Sodium (Chloride)  100 mls @ 100 mls/hr IVPB Q8H [facility]; Protocol


   Last Admin: 03/11/19 08:13 Dose:  100 mls/hr


Dextrose (Dextrose 5% In Water 1000 Ml)  1,000 mls @ 0 mls/hr IV .Q0M PRN; 

Protocol


   PRN Reason: Hypoglycemia Protocol


Insulin Human Regular (Novolin R)  0 unit SC ACHS [facility]; Protocol


   Last Admin: 03/11/19 12:18 Dose:  2 units


Lidocaine (Lidoderm)  1 ea TD DAILY [facility]


   Last Admin: 03/11/19 09:32 Dose:  1 ea


Metoprolol Tartrate (Lopressor)  25 mg PO BID [facility]


   Last Admin: 03/11/19 09:33 Dose:  25 mg


Multivitamins (Hexavitamin)  1 tab PO DAILY [facility]


   Last Admin: 03/11/19 09:33 Dose:  1 tab


Nitroglycerin (Nitro-Bid 2% Oint)  1 ea TOP Q6 [facility]


   Last Admin: 03/11/19 06:16 Dose:  1 ea


Ondansetron HCl (Zofran Inj)  4 mg IVP Q8 PRN


   PRN Reason: Nausea/Vomiting


   Last Admin: 03/09/19 11:07 Dose:  4 mg


Pantoprazole Sodium (Protonix Ec Tab)  40 mg PO 0600 [facility]


   Last Admin: 03/11/19 06:16 Dose:  40 mg


Quetiapine Fumarate (Seroquel)  50 mg PO HS [facility]


   Last Admin: 03/10/19 21:16 Dose:  50 mg


Rosuvastatin Calcium (Crestor)  20 mg PO HS [facility]


   Last Admin: 03/10/19 21:16 Dose:  20 mg


Venlafaxine HCl (Effexor Xr)  150 mg PO DAILY [facility]


   Last Admin: 03/11/19 09:33 Dose:  150 mg


Venlafaxine HCl (Effexor Xr)  37.5 mg PO DAILY [facility]


   Last Admin: 03/11/19 09:33 Dose:  37.5 mg











- Labs


Labs: 


                                        





                                 03/11/19 08:17 





                                 03/11/19 08:17 





                                        











PT  14.5 SECONDS (9.7-12.2)  H  03/07/19  19:59    


 


INR  1.3   03/07/19  19:59    


 


APTT  32 SECONDS (21-34)   03/07/19  19:59    














- Constitutional


Appears: No Acute Distress





- Head Exam


Head Exam: NORMAL INSPECTION





- Eye Exam


Eye Exam: EOMI, PERRL





- ENT Exam


ENT Exam: Normal Oropharynx





- Neck Exam


Neck Exam: absent: Lymphadenopathy





- Cardiovascular Exam


Cardiovascular Exam: REGULAR RHYTHM, +S1, +S2





- GI/Abdominal Exam


GI & Abdominal Exam: Soft, Normal Bowel Sounds.  absent: Tenderness





- Extremities Exam


Extremities Exam: Normal Capillary Refill, Pedal Edema (1+).  absent: Calf 

Tenderness





- Neurological Exam


Neurological Exam: Alert, Awake, CN II-XII Intact, Oriented x3, Reflexes Normal





- Psychiatric Exam


Psychiatric exam: Normal Mood





- Skin


Skin Exam: Normal Color, Warm





Assessment and Plan


(1) UTI (urinary tract infection)


Status: Acute   





(2) Pancytopenia


Status: Acute   





(3) Chest pain


Status: Acute   





(4) Multiple sclerosis


Status: Acute   





(5) Diabetes mellitus


Status: Acute   





(6) Depression


Status: Chronic   





- Assessment and Plan (Free Text)


Plan: 





CONTINUE iv MERREM 500 MG EVERY 8 HOURLY 3/10/19.





FOLLOW-UP CBC WITH DIFFERENTIAL


CMP.


LIVER PROFILE IN A.M.


HEMATOLOGY EVALUATION OF PANCYTOPENIA.


RENAL US R/O KIDNEY STONES/HYDRONEPHROSIS.





AS PER CONSULTANTS. taking the patient to the OR for surgical excision.      The risks, benefits, and alternatives to the planned procedure were fully discussed with the patient and/or the patient's representative(s). The risks of bleeding, infection, death, missing pathology, the need for additional procedures intra-operatively, the possible need for intra-operative consults, the possible need for transfusion therapy, cardiopulmonary compromise, the possible need for additional surgery for a complication were discussed with the patient and/or the patient's representative(s). The patient's and/or patient's representative(s) questions were addressed and answered. Informed consent was obtained from the patient and/or the patient's representative(s). The patient and/or the patient's representative(s) consent to proceed.

## 2022-04-12 NOTE — CP.PCM.PN
Subjective





- Date & Time of Evaluation


Date of Evaluation: 03/15/19


Time of Evaluation: 12:00





- Subjective


Subjective: 





Back pain improved





Objective





- Vital Signs/Intake and Output


Vital Signs (last 24 hours): 


                                        











Temp Pulse Resp BP Pulse Ox


 


 98 F   63   20   116/73   94 L


 


 03/15/19 07:00  03/15/19 07:00  03/15/19 07:00  03/15/19 10:43  03/15/19 07:00








Intake and Output: 


                                        











 03/15/19 03/15/19





 06:59 18:59


 


Intake Total 580 


 


Balance 580 














- Medications


Medications: 


                               Current Medications





Acetaminophen (Tylenol 325mg Tab)  650 mg PO Q6 PRN


   PRN Reason: pain


   Last Admin: 03/14/19 14:12 Dose:  650 mg


Aspirin (Ecotrin)  81 mg PO DAILY [facility]


   Last Admin: 03/15/19 10:42 Dose:  81 mg


Clonazepam (Klonopin)  0.5 mg PO TID [facility]


   Last Admin: 03/15/19 10:43 Dose:  0.5 mg


Dextrose (Dextrose 50% Inj)  0 ml IV STAT PRN; Protocol


   PRN Reason: Hypoglycemia Protocol


Dextrose (Glutose 15)  0 gm PO ONCE PRN; Protocol


   PRN Reason: Hypoglycemia Protocol


Diphenhydramine HCl (Benadryl)  25 mg PO ONCE PRN


   PRN Reason: Insomnia


Ergocalciferol (Drisdol 50,000 Intl Units Cap)  1 cap PO Q7D [facility]


   Last Admin: 03/08/19 10:57 Dose:  1 cap


Ferric Sodium Gluconate Complex (Ferrlecit)  125 mg IVPB DAILY [facility]


   Stop: 03/20/19 12:46


   Last Admin: 03/15/19 10:41 Dose:  125 mg


Fluticasone Propionate (Flonase)  1 spr NS DAILY [facility]


   Last Admin: 03/15/19 10:42 Dose:  1 spr


Folic Acid (Folic Acid)  1 mg PO DAILY [facility]


   Last Admin: 03/15/19 10:43 Dose:  1 mg


Glucagon (Glucagen Diagnostic Kit)  0 mg IM STAT PRN; Protocol


   PRN Reason: Hypoglycemia Protocol


Meropenem 500 mg/ Sodium (Chloride)  100 mls @ 100 mls/hr IVPB Q8H [facility]; Protocol


   Last Admin: 03/15/19 07:55 Dose:  100 mls/hr


Insulin Human Regular (Novolin R)  0 unit SC ACHS [facility]; Protocol


   Last Admin: 03/15/19 07:37 Dose:  Not Given


Lidocaine (Lidoderm)  1 ea TD DAILY [facility]


   Last Admin: 03/15/19 10:41 Dose:  1 ea


Lorazepam (Ativan)  0.5 mg PO Q6 PRN


   PRN Reason: Agitation


   Last Admin: 03/12/19 16:01 Dose:  0.5 mg


Metoprolol Tartrate (Lopressor)  25 mg PO BID [facility]


   Last Admin: 03/15/19 10:43 Dose:  25 mg


Mirtazapine (Remeron)  15 mg PO HS [facility]


   Last Admin: 03/14/19 21:22 Dose:  15 mg


Multivitamins (Hexavitamin)  1 tab PO DAILY [facility]


   Last Admin: 03/15/19 10:42 Dose:  1 tab


Nitroglycerin (Nitro-Bid 2% Oint)  1 ea TOP Q6 [facility]


   Last Admin: 03/15/19 06:15 Dose:  1 ea


Ondansetron HCl (Zofran Inj)  4 mg IVP Q8 PRN


   PRN Reason: Nausea/Vomiting


   Last Admin: 03/14/19 16:57 Dose:  4 mg


Pantoprazole Sodium (Protonix Ec Tab)  40 mg PO 0600 [facility]


   Last Admin: 03/15/19 06:15 Dose:  40 mg


Quetiapine Fumarate (Seroquel)  50 mg PO HS [facility]


   Last Admin: 03/14/19 21:22 Dose:  50 mg


Rosuvastatin Calcium (Crestor)  20 mg PO HS [facility]


   Last Admin: 03/14/19 21:22 Dose:  20 mg


Tramadol HCl (Ultram)  50 mg PO Q6 PRN


   PRN Reason: Pain, moderate (4-7)


   Last Admin: 03/14/19 05:55 Dose:  50 mg


Venlafaxine HCl (Effexor Xr)  150 mg PO DAILY [facility]


   Last Admin: 03/15/19 10:41 Dose:  150 mg


Venlafaxine HCl (Effexor Xr)  37.5 mg PO DAILY [facility]


   Last Admin: 03/15/19 10:41 Dose:  37.5 mg











- Labs


Labs: 


                                        





                                 03/15/19 07:29 





                                 03/15/19 07:29 





                                        











PT  14.5 SECONDS (9.7-12.2)  H  03/07/19  19:59    


 


INR  1.3   03/07/19  19:59    


 


APTT  32 SECONDS (21-34)   03/07/19  19:59    














- Head Exam


Head Exam: ATRAUMATIC





- Eye Exam


Eye Exam: Normal appearance





- ENT Exam


ENT Exam: Mucous Membranes Dry





- Respiratory Exam


Respiratory Exam: NORMAL BREATHING PATTERN





- Cardiovascular Exam


Cardiovascular Exam: +S1, +S2





- GI/Abdominal Exam


GI & Abdominal Exam: Normal Bowel Sounds





Assessment and Plan


(1) Pancytopenia


Assessment & Plan: 


borderline iron and b12 deficiency; on supplementation


f/u zing and copper levels


counts stable


Status: Acute Low

## 2023-03-29 NOTE — CP.PCM.DIS
Provider





- Provider


Date of Admission: 


05/10/18 23:18





Attending physician: 


Natanael Saha MD





Primary care physician: 


NO PRIMARY CARE PROVIDER





Consults: 





Cardio - Dr. Lawton


Time Spent in preparation of Discharge (in minutes): 30





Hospital Course





- Lab Results


Lab Results: 


 Most Recent Lab Values











WBC  9.0 10^3/ul (4.5-11.0)  D 05/11/18  04:00    


 


RBC  2.96 10^6/uL (3.5-6.1)  L  05/11/18  04:00    


 


Hgb  9.0 g/dL (12.0-16.0)  L  05/11/18  04:00    


 


Hct  28.6 % (36.0-48.0)  L  05/11/18  04:00    


 


MCV  96.6 fl (80.0-105.0)   05/11/18  04:00    


 


MCH  30.4 pg (25.0-35.0)   05/11/18  04:00    


 


MCHC  31.5 g/dl (31.0-37.0)   05/11/18  04:00    


 


RDW  14.2 % (11.5-14.5)   05/11/18  04:00    


 


Plt Count  139 10^3/uL (120.0-450.0)   05/11/18  04:00    


 


MPV  11.0 fl (7.0-11.0)   05/11/18  04:00    


 


Gran %  73.2 % (50.0-68.0)  H  05/11/18  04:00    


 


Lymph % (Auto)  20.9 % (22.0-35.0)  L  05/11/18  04:00    


 


Mono % (Auto)  5.7 % (1.0-6.0)   05/11/18  04:00    


 


Eos % (Auto)  0.1 % (1.5-5.0)  L  05/11/18  04:00    


 


Baso % (Auto)  0.1 % (0.0-3.0)   05/11/18  04:00    


 


Gran #  6.60  (1.4-6.5)  H  05/11/18  04:00    


 


Lymph # (Auto)  1.9  (1.2-3.4)   05/11/18  04:00    


 


Mono # (Auto)  0.5  (0.1-0.6)   05/11/18  04:00    


 


Eos # (Auto)  0.0  (0.0-0.7)   05/11/18  04:00    


 


Baso # (Auto)  0.01 K/mm3 (0.0-2.0)   05/11/18  04:00    


 


PT  14.2 SECONDS (9.4-12.5)  H  05/11/18  04:00    


 


INR  1.24  (0.93-1.08)  H  05/11/18  04:00    


 


APTT  27.0 Seconds (25.1-36.5)   05/11/18  04:00    


 


Sodium  146 mmol/L (132-148)   05/10/18  21:51    


 


Potassium  3.9 mmol/L (3.6-5.0)   05/10/18  21:51    


 


Chloride  110 mmol/L ()  H  05/10/18  21:51    


 


Carbon Dioxide  27 mmol/L (21-33)   05/10/18  21:51    


 


Anion Gap  13  (10-20)   05/10/18  21:51    


 


BUN  17 mg/dL (7-21)   05/10/18  21:51    


 


Creatinine  0.6 mg/dl (0.7-1.2)  L  05/10/18  21:51    


 


Est GFR ( Amer)  > 60   05/10/18  21:51    


 


Est GFR (Non-Af Amer)  > 60   05/10/18  21:51    


 


POC Glucose (mg/dL)  70 mg/dL ()   05/11/18  12:17    


 


Random Glucose  97 mg/dL ()   05/10/18  21:51    


 


Calcium  8.0 mg/dL (8.4-10.5)  L  05/10/18  21:51    


 


Magnesium  1.5 mg/dL (1.7-2.2)  L  05/11/18  04:00    


 


Total Bilirubin  0.2 mg/dL (0.2-1.3)   05/10/18  21:51    


 


AST  43 U/L (14-36)  H D 05/10/18  21:51    


 


ALT  38 U/L (7-56)   05/10/18  21:51    


 


Alkaline Phosphatase  120 U/L ()   05/10/18  21:51    


 


Lactate Dehydrogenase  524 U/L (333-699)   05/10/18  21:51    


 


Total Creatine Kinase  34 U/L ()  L  05/10/18  21:51    


 


Troponin I  0.03 ng/mL D 05/11/18  10:06    


 


Total Protein  5.6 g/dL (5.8-8.3)  L  05/10/18  21:51    


 


Albumin  2.5 g/dL (3.0-4.8)  L  05/10/18  21:51    


 


Globulin  3.1 gm/dL  05/10/18  21:51    


 


Albumin/Globulin Ratio  0.8  (1.1-1.8)  L  05/10/18  21:51    


 


Free T4  0.83 ng/dL (0.78-2.19)   05/11/18  04:00    


 


TSH 3rd Generation  1.26 mIU/mL (0.46-4.68)   05/11/18  04:00    














Discharge Exam





- Head Exam


Head Exam: ATRAUMATIC, NORMAL INSPECTION, NORMOCEPHALIC





Discharge Plan





- Follow Up Plan


Condition: STABLE


Disposition: HOME/ ROUTINE


Referrals: 


PCP,NO [Primary Care Provider] - Scheduled patient an appointment for April 6,2023 at 3:00 pm. Attempted to reach patient to notify of appointment. Left voice mail.

## 2024-01-16 NOTE — ED PDOC
Arrival/HPI





- General


Historian: Patient





- History of Present Illness


Narrative History of Present Illness (Text): 





10/11/18 11:26


57yo female with pmhx of Diabetes, hypertension, Pacemaker, CHF, asthma, CVA, 

depression, anxiety present with complaint of depression. States she have not 

seen a psychiatrist for 4months and the last time she was given medication was 

last week for a week. States she ran out of her medication and has been having 

worsening depression. states the depression is causing her to have chest pain 

from last night. States she thinks is secondary to her depression and anxiety. 

She denies SOB, diaphoresis, focal weakness, SI/HI, hallucination, drug use, any

other complaint.








<Teto Caldwell A - Last Filed: 10/11/18 18:30>





<Mikel Lai - Last Filed: 10/18/18 05:25>





- General


Chief Complaint: Chest Pain


Time Seen by Provider: 10/11/18 10:07





Past Medical History





- Provider Review


Nursing Documentation Reviewed: Yes





- Past History


Past History: Non-Contributing





- Infectious Disease


Hx of Infectious Diseases: None





- Reproductive


Menopause: Yes





- Cardiac


Hx Congestive Heart Failure: Yes


Hx Hypertension: Yes


Hx Pacemaker: Yes





- Pulmonary


Hx Asthma: Yes





- Neurological


HX Cerebrovascular Accident: Yes (1994)


Other/Comment: MS





- HEENT


Hx HEENT Disorder: No





- Renal


Hx Renal Disorder: No


Hx Kidney Stones: Yes





- Endocrine/Metabolic


Hx Diabetes Mellitus Type 2: Yes





- Hematological/Oncological


Hx Anemia: Yes





- Integumentary


Hx Dermatological Disorder: No





- Musculoskeletal/Rheumatological


Hx Falls: Yes


Hx Unsteady Gait: Yes





- Gastrointestinal


Hx Gastrointestinal Disorders: Yes (COLITIS)





- Genitourinary/Gynecological


Hx Genitourinary Disorders: Yes (MULTIPLE UTIS)


Hx Reproductive Disorders: Yes (VAGINAL ITCH STILL)





- Psychiatric


Hx Anxiety: Yes


Hx Depression: Yes


Hx Substance Use: No





- Surgical History


Hx Cholecystectomy: Yes


Hx Gastric Bypass Surgery: Yes





- Anesthesia


Hx Anesthesia: Yes


Hx Anesthesia Reactions: No


Hx Malignant Hyperthermia: No





<PauletteHappiness A - Last Filed: 10/11/18 18:30>





Family/Social History





- Physician Review


Nursing Documentation Reviewed: Yes


Family/Social History: Unknown Family HX


Smoking Status: Former Smoker


Hx Alcohol Use: No


Hx Substance Use: No


Hx Substance Use Treatment: No





<PauletteHappiness A - Last Filed: 10/11/18 18:30>





Allergies/Home Meds





<Teto Caldwell A - Last Filed: 10/11/18 18:30>





<Mikel Lai - Last Filed: 10/18/18 05:25>


Allergies/Adverse Reactions: 


Allergies





levofloxacin [From Levaquin] Allergy (Verified 10/11/18 16:30)


   RASH








Home Medications: 


                                    Home Meds











 Medication  Instructions  Recorded  Confirmed


 


RX: Gabapentin [Neurontin] 300 mg PO BID 10/05/18 10/14/18


 


RX: Losartan [Cozaar] 100 mg PO DAILY 10/05/18 10/14/18














Review of Systems





- Physician Review


All systems were reviewed & negative as marked: Yes





- Review of Systems


Constitutional: Normal


Eyes: Normal


ENT: Normal


Respiratory: Normal


Cardiovascular: Chest Pain.  absent: Palpitations, Edema, Calf Pain


Gastrointestinal: Normal


Genitourinary Female: Normal


Musculoskeletal: Normal


Skin: Normal


Neurological: Normal


Endocrine: Normal


Hemo/Lymphatic: Normal


Psychiatric: Depression





<Teto Caldwell A - Last Filed: 10/11/18 18:30>





Physical Exam


Vital Signs Reviewed: Yes





Vital Signs











  Temp Pulse Resp BP Pulse Ox


 


 10/11/18 09:38  99.3 F  64  18  149/84  98











Temperature: Afebrile


Blood Pressure: Normal


Pulse: Regular


Respiratory Rate: Normal


Appearance: Positive for: Well-Appearing, Non-Toxic, Comfortable


Pain Distress: None


Mental Status: Positive for: Alert and Oriented X 3





- Systems Exam


Head: Present: Atraumatic, Normocephalic


Pupils: Present: PERRL


Extroacular Muscles: Present: EOMI


Conjunctiva: Present: Normal


Mouth: Present: Moist Mucous Membranes


Neck: Present: Normal Range of Motion


Respiratory/Chest: Present: Clear to Auscultation, Good Air Exchange.  No: 

Respiratory Distress, Accessory Muscle Use, Wheezes, Decreased Breath Sounds, 

Rales, Retracting, Rhonchi


Cardiovascular: Present: Regular Rate and Rhythm, Normal S1, S2.  No: Murmurs


Abdomen: No: Tenderness, Distention, Peritoneal Signs


Back: Present: Normal Inspection


Upper Extremity: Present: Normal Inspection.  No: Cyanosis, Edema


Lower Extremity: Present: Normal Inspection.  No: Edema


Neurological: Present: GCS=15, CN II-XII Intact, Speech Normal


Skin: Present: Warm, Dry, Normal Color.  No: Rashes


Psychiatric: Present: Alert, Oriented x 3, Normal Insight, Normal Concentration





<Diru,Happiness A - Last Filed: 10/11/18 18:30>





Vital Signs











  Temp Pulse Resp BP Pulse Ox


 


 10/11/18 15:09   98 H  18  142/78  97


 


 10/11/18 13:04   63  18  143/78  98


 


 10/11/18 09:38  99.3 F  64  18  149/84  98














<Mikel Lai - Last Filed: 10/18/18 05:25>





Medical Decision Making


ED Course and Treatment: 





10/11/18 18:30


PT was seen in ED for stated history. 





Her lab was reviewed and she was cleared medically for psych evaluation





EKG Sinus rhythm with sinus arrhythmia @ 72bpm. N-STEMI





CXR NAD





Pt was seen in ED by RICHARD Turcios. He DC with the psychiatrist and she 

was admitted to Dr. Cantrell's service for depression.





- RAD Interpretation


Radiology Orders: 











10/11/18 10:08


CHEST PORTABLE [RAD] Stat 














<Diru,Happiness A - Last Filed: 10/11/18 18:30>


ED Course and Treatment: 








10/18/18 05:25


The documented history was done by the physician extender. The documented 

physical exam was done by the physician extender. The documented procedures were

 done by the physician extender, I was available for consultation during the 

PA/NP evaluation. The chart was reviewed by me, and I agree with the management 

and plan.





- Lab Interpretations


Microbiology Results: 





Microbiology Results





10/11/18 12:50   Urine,Clean Catch   Urine Culture - Final


                            10-50,000 CFU/ML.


                            MULTIPLE SPECIES. PROBABLE CONTAMINATION.








Lab Results: 











                                 10/11/18 11:20 





                                 10/11/18 11:20 





                                   Lab Results





10/11/18 11:20: Alcohol, Quantitative < 10


10/11/18 11:20: Urine Opiates Screen Negative, Urine Methadone Screen Negative, 

Ur Barbiturates Screen Positive H, Ur Phencyclidine Scrn Negative, Ur 

Amphetamines Screen Negative, U Benzodiazepines Scrn Negative, U Oth Cocaine 

Metabols Negative, U Cannabinoids Screen Negative


10/11/18 11:20: Sodium 139, Potassium 3.9, Chloride 106, Carbon Dioxide 27, 

Anion Gap 10, BUN 12, Creatinine 0.6 L, Est GFR ( Amer) > 60, Est GFR 

(Non-Af Amer) > 60, Random Glucose 127 H, Calcium 8.4, Magnesium 1.7, Total 

Bilirubin 0.2, AST 25, ALT 34, Alkaline Phosphatase 127 H, Lactate Dehydrogenase

 384, Total Creatine Kinase 39, Troponin I < 0.01, Total Protein 6.2, Albumin 

3.3, Globulin 2.9, Albumin/Globulin Ratio 1.1


10/11/18 11:20: Urine Color Yellow, Urine Appearance Clear, Urine pH 6.0, Ur 

Specific Gravity 1.010, Urine Protein Negative, Urine Glucose (UA) Negative, 

Urine Ketones Negative, Urine Blood Negative, Urine Nitrate Negative, Urine 

Bilirubin Negative, Urine Urobilinogen 0.2, Ur Leukocyte Esterase Trace H, Urine

 RBC 0 - 2, Urine WBC 1 - 3, Ur Epithelial Cells 4 - 5, Urine Bacteria Few


10/11/18 11:20: PT 12.2, INR 1.06, APTT 29.2


10/11/18 11:20: WBC 5.1, RBC 3.39 L, Hgb 10.2 L, Hct 32.1 L, MCV 94.7, MCH 30.1,

 MCHC 31.8, RDW 12.9, Plt Count 206, MPV 10.4, Gran % 68.3 H, Lymph % (Auto) 

23.5, Mono % (Auto) 3.5, Eos % (Auto) 4.5, Baso % (Auto) 0.2, Gran # 3.49, Lymph

 # (Auto) 1.2, Mono # (Auto) 0.2, Eos # (Auto) 0.2, Baso # (Auto) 0.01











- RAD Interpretation


Radiology Orders: 











10/11/18 10:08


CHEST PORTABLE [RAD] Stat 














- Medication Orders


Current Medication Orders: 











Albuterol (Ventolin Hfa 90 Mcg/Actuation (8 G))  2 puff IH [id number] PRN


   PRN Reason: Shortness of Breath


   Last Admin: 10/16/18 22:43  Dose: 2 puff





Amlodipine Besylate (Norvasc)  5 mg PO DAILY [facility]


   Last Admin: 10/17/18 09:00  Dose: 5 mg





MAR Pulse and Blood Pressure


 Document     10/17/18 09:00  CV  (Rec: 10/17/18 09:34  CV  [id number])


     Pulse


      Pulse Rate (60-90)                         62


     Blood Pressure


      Blood Pressure (100//90)             132/75





Aspirin (Ecotrin)  81 mg PO DAILY [facility]


   Last Admin: 10/17/18 09:33  Dose: 81 mg





Atorvastatin Calcium (Lipitor)  40 mg PO DIN [facility]


   Last Admin: 10/17/18 17:51  Dose: 40 mg





Clonazepam (Klonopin)  1 mg PO BID [facility]; Protocol


   Last Admin: 10/17/18 16:25  Dose: 1 mg





Behavioural


 Document     10/17/18 16:25  CV  (Rec: 10/17/18 16:25  CV  [id number])


     Maintenance


      Maintenance Dose                           Yes


     Nonmedicinal


      Nonmedicinal Interventions                 Therapeutic Communication


Re-Assess: Reassess Psych Meds


 Document     10/17/18 17:25  CV  (Rec: 10/17/18 17:41  CV  [id number])


      Reassess Psych Med                         Effective





Fluticasone Propionate (Flonase)  1 actuation NS DAILY [facility]


   Last Admin: 10/17/18 09:37  Dose: 1 spr





Folic Acid (Folic Acid)  1 mg PO DAILY [facility]


   Last Admin: 10/17/18 09:35  Dose: 1 mg





Gabapentin (Neurontin)  300 mg PO TID [facility]; Protocol


   Last Admin: 10/17/18 17:48  Dose: 300 mg





Behavioural


 Document     10/17/18 17:48  CV  (Rec: 10/17/18 17:49  CV  [id number])


     Maintenance


      Maintenance Dose                           Yes


     Nonmedicinal


      Nonmedicinal Interventions                 Therapeutic Communication


Re-Assess: Reassess Psych Meds


 Document     10/17/18 18:48  CV  (Rec: 10/17/18 18:49  CV  [id number])


      Reassess Psych Med                         Effective





Hydroxyzine Pamoate (Vistaril)  50 mg PO Q8 PRN; Protocol


   PRN Reason: Anxiety


   Last Admin: 10/17/18 22:06  Dose: 50 mg





Behavioural


 Document     10/17/18 22:06  DC  (Rec: 10/17/18 22:06  DC  [id number])


     Maintenance


      Maintenance Dose                           No


     Nonmedicinal


      Nonmedicinal Interventions                 Redirect


     Behavior


      Behavior for Medication:                   Anxiety


Re-Assess: Reassess Psych Meds


 Document     10/17/18 23:06  DC  (Rec: 10/18/18 00:26  DC  [id number])


      Reassess Psych Med                         Effective





Ibuprofen (Motrin Tab)  800 mg PO Q6H PRN


   PRN Reason: Pain, moderate (4-7)


   Last Admin: 10/17/18 22:56  Dose: 800 mg





Insulin Human Regular (Humulin R Low)  0 units SC ACHS JEFF; Protocol


   Last Admin: 10/17/18 21:17 Dose:  Not Given


   Non-Admin Reason: Blood Sugar Parameter





MAR Blood Glucose


 Document     10/17/18 21:17  DC  (Rec: 10/17/18 21:17  DC  [id number])


     Blood Glucose


      Finger Stick Blood Glucose ()        88





Lidocaine (Lidoderm)  1 ea TD DAILY JEFF


   Last Admin: 10/17/18 09:46  Dose: 1 ea





MAR Transdermal Patch Site


 Document     10/17/18 09:46  CV  (Rec: 10/17/18 09:47  CV  [id number])


     Transdermal Patch Site


      Transdermal Patch Site                     Left Lower Back


Re-Assess: MAR Transdermal Patch Removal


 Document     10/17/18 21:46  DC  (Rec: 10/17/18 22:04  DC  [id number])


     Transdermal Patch Removal


      Removal of Transdermal Patch done?         Yes





Loperamide HCl (Imodium)  4 mg PO DAILY [facility]


   Last Admin: 10/17/18 08:34  Dose: 4 mg





Lorazepam (Ativan)  1 mg IM Q6H PRN; Protocol


   PRN Reason: Agitation


Lorazepam (Ativan)  1 mg PO Q6H PRN; Protocol


   PRN Reason: Anxiety


Losartan Potassium (Cozaar)  100 mg PO DAILY [facility]


   Last Admin: 10/17/18 09:33  Dose: 100 mg





Multivitamins (Thera Tab)  1 tab PO 0800 JEFF


   Last Admin: 10/17/18 09:35  Dose: 1 tab





Ondansetron HCl (Zofran Tab)  4 mg PO Q8H PRN


   PRN Reason: Nausea/Vomiting


   Last Admin: 10/15/18 18:02  Dose: 4 mg





Pantoprazole Sodium (Protonix Ec Tab)  40 mg PO 0600 JEFF


   Last Admin: 10/17/18 05:57  Dose: 40 mg





Quetiapine Fumarate (Seroquel)  25 mg PO HS [facility]; Protocol


   Last Admin: 10/17/18 21:21  Dose: 25 mg





Behavioural


 Document     10/17/18 21:21  DC  (Rec: 10/17/18 21:21  DC  [id number])


     Maintenance


      Maintenance Dose                           Yes


Re-Assess: Reassess Psych Meds


 Document     10/17/18 22:21  DC  (Rec: 10/18/18 00:26  DC  [id number])


      Reassess Psych Med                         Effective





Venlafaxine HCl (Effexor)  75 mg PO DAILY [facility]


   Last Admin: 10/17/18 09:35  Dose: 75 mg





Discontinued Medications





Clonazepam (Klonopin)  0.5 mg PO BID [facility]; Protocol


   Last Admin: 10/12/18 09:37  Dose: 0.5 mg





Behavioural


 Document     10/12/18 09:37  ABO  (Rec: 10/12/18 09:37  ABO  [id number])


     Maintenance


      Maintenance Dose                           Yes


Re-Assess: Reassess Psych Meds


 Document     10/12/18 10:37  ABO  (Rec: 10/12/18 11:16  ABO  [id number])


      Reassess Psych Med                         Effective





Gabapentin (Neurontin)  100 mg PO TID [facility]; Protocol


   Last Admin: 10/12/18 09:38  Dose: 100 mg





Behavioural


 Document     10/12/18 09:38  ABO  (Rec: 10/12/18 09:38  ABO  [id number])


     Maintenance


      Maintenance Dose                           Yes


Re-Assess: Reassess Psych Meds


 Document     10/12/18 10:38  ABO  (Rec: 10/12/18 11:16  ABO  [id number])


      Reassess Psych Med                         Effective





Ibuprofen (Motrin Tab)  600 mg PO Q6H PRN


   PRN Reason: Pain, moderate (4-7)


   Last Admin: 10/15/18 02:14  Dose: 600 mg





Re-Assess: MAR Pain/Vitals


 Document     10/15/18 03:09  NG  (Rec: 10/15/18 03:10  NG  [facility] [id number]


     Pain Reassessment


      Is This A Pain ReAssessment?               Yes


     Sleep


      Is patient sleeping during reassessment?   Yes





Ibuprofen (Motrin Tab)  800 mg PO Q6H PRN


   PRN Reason: Pain, moderate (4-7)


Lorazepam (Ativan)  1 mg PO Q6H PRN; Protocol


   PRN Reason: Anxiety


   Last Admin: 10/17/18 04:55  Dose: 1 mg





Behavioural


 Document     10/17/18 04:55  NG  (Rec: 10/17/18 04:55  NG  [id number])


     Maintenance


      Maintenance Dose                           No


     Nonmedicinal


      Nonmedicinal Interventions                 Therapeutic Communication


     Behavior


      Behavior for Medication:                   Anxiety


Re-Assess: Reassess Psych Meds


 Document     10/17/18 05:55  NG  (Rec: 10/17/18 05:59  NG  [id number])


      Reassess Psych Med                         Effective





Lorazepam (Ativan)  1 mg PO Q6H PRN; Protocol


   PRN Reason: Anxiety


Ondansetron HCl (Zofran Odt)  4 mg PO STAT STA


   Stop: 10/11/18 12:56


   Last Admin: 10/11/18 13:04  Dose: 4 mg





Venlafaxine HCl (Effexor)  37.5 mg PO DAILY JEFF


   Last Admin: 10/14/18 09:20 Dose:  Not Given


   Non-Admin Reason: discontinue





Zaleplon (Sonata)  5 mg PO HS PRN


   PRN Reason: Insomnia


   Last Admin: 10/11/18 22:23  Dose: 5 mg











<Mikel Lai - Last Filed: 10/18/18 05:25>





Disposition/Present on Arrival





- Present on Arrival


Any Indicators Present on Arrival: No


History of DVT/PE: No


History of Uncontrolled Diabetes: No


Urinary Catheter: No


History of Decub. Ulcer: No


History Surgical Site Infection Following: None





- Disposition


Have Diagnosis and Disposition been Completed?: Yes


Disposition Time: 13:25


Patient Plan: Admission





<Teto Caldwell - Last Filed: 10/11/18 18:30>





<Mikel Lai - Last Filed: 10/18/18 05:25>





- Disposition


Diagnosis: 


 Chest pain, MDD (major depressive disorder)





Disposition: HOSPITALIZED


Patient Problems: 


                             Current Active Problems











Problem Status Onset


 


Chest pain Acute 


 


Depression Chronic 


 


MDD (major depressive disorder) Chronic 











Condition: STABLE as directed by HF team/Take over the counter pain medication
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
## 2024-12-19 NOTE — CP.PCM.CON
<RoyerShirazdaysi Nielson - Last Filed: 10/19/18 22:48>





History of Present Illness





- History of Present Illness


History of Present Illness: 





Pt is 57 yo female with PMH of DM, HTN, permanent pacemaker, CHF, CVA, 

depression, anxiety who presents with complaint of depression.  Medicine has 

been consulted to manage her medical conditions.





Review of Systems





- Constitutional


Constitutional: absent: Chills, Fever, Headache





- EENT


Eyes: absent: Blurred Vision


Nose/Mouth/Throat: absent: Nasal Congestion, Sore Throat





- Cardiovascular


Cardiovascular: absent: Chest Pain, Dyspnea, Palpitations





- Respiratory


Respiratory: absent: Cough, Dyspnea, Hemoptysis





- Gastrointestinal


Gastrointestinal: absent: Abdominal Pain, Diarrhea, Nausea, Vomiting





- Genitourinary


Genitourinary: absent: Hematuria, Urinary Frequency, Urinary Urgency





- Musculoskeletal


Musculoskeletal: absent: Abnormal Gait, Muscle Weakness, Numbness





- Integumentary


Integumentary: absent: Rash, Swelling





- Neurological


Neurological: absent: Dizziness, Headaches, Loss of Vision





- Psychiatric


Psychiatric: Depression.  absent: Anxiety





- Endocrine


Endocrine: absent: Fatigue, Polydipsia, Polyphagia, Polyuria





- Hematologic/Lymphatic


Hematologic: absent: Easy Bleeding, Easy Bruising





Past Patient History





- Infectious Disease


Hx of Infectious Diseases: None





- Tetanus Immunizations


Tetanus Immunization: Unknown





- Past Social History


Home Situation {Lives}: Alone





- CARDIAC


Hx Cardiac Disorders: Yes (pacemaker)


Hx Congestive Heart Failure: Yes


Hx Hypertension: Yes





- PULMONARY


Hx Asthma: Yes





- NEUROLOGICAL


HX Cerebrovascular Accident: Yes





- HEENT


Hx HEENT Problems: No





- RENAL


Hx Chronic Kidney Disease: No


Hx Kidney Stones: Yes





- ENDOCRINE/METABOLIC


Hx Diabetes Mellitus Type 2: Yes





- HEMATOLOGICAL/ONCOLOGICAL


Hx Anemia: Yes





- INTEGUMENTARY


Hx Dermatological Problems: No





- MUSCULOSKELETAL/RHEUMATOLOGICAL


Hx Falls: Yes


Hx Unsteady Gait: Yes





- GASTROINTESTINAL


Hx Gastrointestinal Disorders: Yes (COLITIS)





- GENITOURINARY/GYNECOLOGICAL


Hx Genitourinary Disorders: Yes (MULTIPLE UTIS)


Hx Reproductive Disorders: Yes (VAGINAL ITCH STILL)





- PSYCHIATRIC


Hx Anxiety: Yes


Hx Depression: Yes


Hx Substance Use: Yes (see HPI)





- SURGICAL HISTORY


Hx Cholecystectomy: Yes


Hx Gastric Bypass Surgery: Yes





- ANESTHESIA


Hx Anesthesia: Yes


Hx Anesthesia Reactions: No


Hx Malignant Hyperthermia: No





Meds


Allergies/Adverse Reactions: 


                                    Allergies











Allergy/AdvReac Type Severity Reaction Status Date / Time


 


levofloxacin [From Levaquin] Allergy  RASH Verified 10/11/18 16:30














- Medications


Medications: 


                               Current Medications





Albuterol (Ventolin Hfa 90 Mcg/Actuation (8 G))  2 puff IH [id number] PRN


   PRN Reason: Shortness of Breath


   Last Admin: 10/19/18 09:16 Dose:  2 puff


Amlodipine Besylate (Norvasc)  5 mg PO DAILY [facility]


   Last Admin: 10/19/18 09:13 Dose:  5 mg


Aspirin (Ecotrin)  81 mg PO DAILY [facility]


   Last Admin: 10/19/18 09:13 Dose:  81 mg


Atorvastatin Calcium (Lipitor)  40 mg PO DIN [facility]


   Last Admin: 10/19/18 16:11 Dose:  40 mg


Clonazepam (Klonopin)  1 mg PO BID [facility]; Protocol


   Last Admin: 10/19/18 18:09 Dose:  1 mg


Fluticasone Propionate (Flonase)  1 actuation NS DAILY [facility]


   Last Admin: 10/19/18 09:16 Dose:  1 spr


Folic Acid (Folic Acid)  1 mg PO DAILY [facility]


   Last Admin: 10/19/18 09:14 Dose:  1 mg


Gabapentin (Neurontin)  300 mg PO TID [facility]; Protocol


   Last Admin: 10/19/18 18:09 Dose:  300 mg


Hydroxyzine Pamoate (Vistaril)  50 mg PO Q8 PRN; Protocol


   PRN Reason: Anxiety


   Last Admin: 10/19/18 06:55 Dose:  50 mg


Ibuprofen (Motrin Tab)  800 mg PO Q6H PRN


   PRN Reason: Pain, moderate (4-7)


   Last Admin: 10/19/18 21:18 Dose:  800 mg


Insulin Human Regular ([facility]ulin R Low)  [facility]; Protocol


   Last Admin: 10/19/18 22:21 Dose:  Not Given


Lidocaine (Lidoderm)  1 ea TD DAILY [facility]


   Last Admin: 10/19/18 09:13 Dose:  1 ea


Loperamide HCl (Imodium)  4 mg PO DAILY [facility]


   Last Admin: 10/19/18 09:14 Dose:  4 mg


Lorazepam (Ativan)  1 mg IM Q6H PRN; Protocol


   PRN Reason: Agitation


Lorazepam (Ativan)  1 mg PO Q6H PRN; Protocol


   PRN Reason: Anxiety


   Last Admin: 10/19/18 20:15 Dose:  1 mg


Losartan Potassium (Cozaar)  75 mg PO DAILY [facility]


Multivitamins (Thera Tab)  1 tab PO 0800 [facility]


   Last Admin: 10/19/18 09:13 Dose:  1 tab


Ondansetron HCl (Zofran Tab)  4 mg PO Q8H PRN


   PRN Reason: Nausea/Vomiting


   Last Admin: 10/19/18 16:11 Dose:  4 mg


Pantoprazole Sodium (Protonix Ec Tab)  40 mg PO 0600 [facility]


   Last Admin: 10/19/18 06:44 Dose:  40 mg


Quetiapine Fumarate (Seroquel)  50 mg PO HS [facility]; Protocol


   Last Admin: 10/19/18 21:18 Dose:  50 mg


Venlafaxine HCl (Effexor)  150 mg PO DAILY [facility]


   Last Admin: 10/19/18 09:14 Dose:  150 mg











Physical Exam





- Constitutional


Appears: No Acute Distress





- Head Exam


Head Exam: ATRAUMATIC, NORMAL INSPECTION, NORMOCEPHALIC





- Eye Exam


Eye Exam: EOMI.  absent: Scleral icterus





- ENT Exam


ENT Exam: Mucous Membranes Moist, Normal Exam





- Respiratory Exam


Respiratory Exam: Clear to Auscultation Bilateral, NORMAL BREATHING PATTERN.  

absent: Rales, Rhonchi, Wheezes





- Cardiovascular Exam


Cardiovascular Exam: RRR, +S1, +S2.  absent: Diastolic murmur, Systolic Murmur





- GI/Abdominal Exam


GI & Abdominal Exam: Normal Bowel Sounds, Soft.  absent: Tenderness





- Extremities Exam


Extremities exam: Positive for: full ROM, normal inspection.  Negative for: calf

tenderness, pedal edema





- Neurological Exam


Neurological exam: Alert, CN II-XII Intact, Oriented x3





- Psychiatric Exam


Psychiatric exam: Normal Affect, Normal Mood





- Skin


Skin Exam: Dry, Intact, Warm





Results





- Vital Signs


Recent Vital Signs: 


                                Last Vital Signs











Temp  98.5 F   10/19/18 07:22


 


Pulse  64   10/19/18 16:30


 


Resp  20   10/19/18 07:22


 


BP  99/63 L  10/19/18 16:30


 


Pulse Ox  97   10/11/18 15:09














- Labs


Result Diagrams: 


                                 10/13/18 08:00





                                 10/13/18 08:00


Labs: 


                         Laboratory Results - last 24 hr











  10/18/18 10/18/18 10/18/18





  13:13 17:14 21:47


 


POC Glucose (mg/dL)  147 H  73  98














  10/19/18 10/19/18 10/19/18





  07:25 11:04 11:59


 


POC Glucose (mg/dL)  156 H  67  79














  10/19/18





  16:25


 


POC Glucose (mg/dL)  94














Assessment & Plan





- Assessment and Plan (Free Text)


Assessment: 





Pt is 57 yo female with PMH of DM, HTN, permanent pacemaker, CHF, CVA, depre

ssion, anxiety who presents with complaint of depression.  Medicine has been 

consulted to manage her medical conditions.


Plan: 





DM


- ISS low





HTN


- losartan, reduce dose from 100mg to 75mg daily


- amlodipine 5mg daily





Depression


- continue to follow with psychiatry





Thank you for allowing us to participate in the care of this patient.  No 

further medical intervention indicated at this time.  Will sign off.


Pt seen and examined.  Assessment and plan discussed Dr. Pro.


Shiraz Linton PGY1





- Date & Time


Date: 10/19/18


Time: 12:00





<Marcus Pro - Last Filed: 10/20/18 13:27>





Meds





- Medications


Medications: 


                               Current Medications





Albuterol (Ventolin Hfa 90 Mcg/Actuation (8 G))  2 puff IH [id number] PRN


   PRN Reason: Shortness of Breath


   Last Admin: 10/19/18 09:16 Dose:  2 puff


Amlodipine Besylate (Norvasc)  5 mg PO DAILY [facility]


   Last Admin: 10/20/18 08:40 Dose:  5 mg


Aspirin (Ecotrin)  81 mg PO DAILY [facility]


   Last Admin: 10/20/18 08:40 Dose:  81 mg


Atorvastatin Calcium (Lipitor)  40 mg PO DIN [facility]


   Last Admin: 10/19/18 16:11 Dose:  40 mg


Clonazepam (Klonopin)  1 mg PO BID [facility]; Protocol


   Last Admin: 10/20/18 08:43 Dose:  1 mg


Fluticasone Propionate (Flonase)  1 actuation NS DAILY [facility]


   Last Admin: 10/20/18 08:45 Dose:  1 spr


Folic Acid (Folic Acid)  1 mg PO DAILY [facility]


   Last Admin: 10/20/18 08:42 Dose:  1 mg


Gabapentin (Neurontin)  300 mg PO TID [facility]; Protocol


   Last Admin: 10/20/18 12:41 Dose:  300 mg


Hydroxyzine Pamoate (Vistaril)  50 mg PO Q8 PRN; Protocol


   PRN Reason: Anxiety


   Last Admin: 10/19/18 06:55 Dose:  50 mg


Ibuprofen (Motrin Tab)  800 mg PO Q6H PRN


   PRN Reason: Pain, moderate (4-7)


   Last Admin: 10/19/18 21:18 Dose:  800 mg


Insulin Human Regular (Humulin R Low)  [facility]; Protocol


   Last Admin: 10/20/18 12:44 Dose:  Not Given


Lidocaine (Lidoderm)  1 ea TD DAILY [facility]


   Last Admin: 10/20/18 08:40 Dose:  1 ea


Loperamide HCl (Imodium)  4 mg PO DAILY [facility]


   Last Admin: 10/20/18 08:43 Dose:  4 mg


Lorazepam (Ativan)  1 mg IM Q6H PRN; Protocol


   PRN Reason: Agitation


Lorazepam (Ativan)  1 mg PO Q6H PRN; Protocol


   PRN Reason: Anxiety


   Last Admin: 10/20/18 12:40 Dose:  1 mg


Losartan Potassium (Cozaar)  75 mg PO DAILY [facility]


   Last Admin: 10/20/18 08:41 Dose:  75 mg


Multivitamins (Thera Tab)  1 tab PO 0800 [facility]


   Last Admin: 10/20/18 08:42 Dose:  1 tab


Ondansetron HCl (Zofran Tab)  4 mg PO Q8H PRN


   PRN Reason: Nausea/Vomiting


   Last Admin: 10/19/18 16:11 Dose:  4 mg


Pantoprazole Sodium (Protonix Ec Tab)  40 mg PO 0600 [facility]


   Last Admin: 10/20/18 07:23 Dose:  40 mg


Quetiapine Fumarate (Seroquel)  50 mg PO HS [facility]; Protocol


   Last Admin: 10/19/18 21:18 Dose:  50 mg


Venlafaxine HCl (Effexor)  150 mg PO DAILY [facility]


   Last Admin: 10/20/18 08:42 Dose:  150 mg











Results





- Vital Signs


Recent Vital Signs: 


                                Last Vital Signs











Temp  97.3 F L  10/20/18 07:00


 


Pulse  61   10/20/18 08:41


 


Resp  17   10/20/18 07:00


 


BP  117/83   10/20/18 08:41


 


Pulse Ox  97   10/11/18 15:09














- Labs


Result Diagrams: 


                                 10/13/18 08:00





                                 10/13/18 08:00


Labs: 


                         Laboratory Results - last 24 hr











  10/19/18 10/19/18 10/19/18





  11:59 16:25 22:10


 


POC Glucose (mg/dL)  79  94  102














  10/20/18





  07:58


 


POC Glucose (mg/dL)  76














Attending/Attestation





- Attestation


I have personally seen and examined this patient.: Yes


I have fully participated in the care of the patient.: Yes


I have reviewed all pertinent clinical information: Yes


Notes (Text): 





10/20/18 13:15


Patient was seen and examined with medical resident.





HTN.  Systolic Blood pressure is less than 100.Patient is asymptometic.Agreed 

with decreasing dose of Losartan to 75 mg po daily.





Hb1AC is 5.4, no need for any diabetic medication.





There is no active medical issue at this time.We will sign off.


Please call us back if any question.





Management plan was discussed in detail with patient. Education was provided








10/20/18 13:18
<Saeid Metcalf - Last Filed: 10/13/18 14:43>





History of Present Illness





- History of Present Illness


History of Present Illness: 





PGY-4 GI Fellow Consult Note





Pt is a 57 yo Hisp Female with "Colitis" (chronic per patient with last CSPY in 

early 2018 @ [facility] [place], unknown details), h/o gastric bypass and cholecystectomy, 

Major Depression, Anxiety, Panic disorder, DM, HTN, CHF, Asthma, who was a

dmitted to psych heath for management of depression symptoms.  GI consult for 

diarrhea.





Pt states that she had been dealing with diarrhea for years, ever since she had 

her gastric bypass+cholecystectomy.  She states that she would have 3-4 loose 

watery brown stools per day.  But, when she took two tabs of an anti-diarrheal 

(she thinks it's loperamide), she states that symptoms would resolve with just 1

formed brown bowel movement per day.  She denies any weight loss, melena, 

hematochezia, recent travel nor dietary changes/uncooked foods.  She does report

multiple course of antibiotics for UTIs, but states that her stool habits have 

not changed since that time.





12 point ROS negative other than stated above





MHx: See above


SurgHx: Gastric bypass, cholecystectomy, pacemaker


Meds: Reviewed in Mar


FamHx: Denied GI problems


SocHx: Denied x3


All: Levofloxacin





Past Patient History





- Infectious Disease


Hx of Infectious Diseases: None





- Tetanus Immunizations


Tetanus Immunization: Unknown





- Past Social History


Home Situation {Lives}: Alone





- CARDIAC


Hx Congestive Heart Failure: Yes


Hx Hypertension: Yes


Hx Pacemaker: Yes





- PULMONARY


Hx Asthma: Yes





- NEUROLOGICAL


HX Cerebrovascular Accident: Yes (1994)


Other/Comment: MS





- HEENT


Hx HEENT Problems: No





- RENAL


Hx Chronic Kidney Disease: No


Hx Kidney Stones: Yes





- ENDOCRINE/METABOLIC


Hx Diabetes Mellitus Type 2: Yes





- HEMATOLOGICAL/ONCOLOGICAL


Hx Anemia: Yes





- INTEGUMENTARY


Hx Dermatological Problems: No





- MUSCULOSKELETAL/RHEUMATOLOGICAL


Hx Falls: Yes


Hx Unsteady Gait: Yes





- GASTROINTESTINAL


Hx Gastrointestinal Disorders: Yes (COLITIS)





- GENITOURINARY/GYNECOLOGICAL


Hx Genitourinary Disorders: Yes (MULTIPLE UTIS)


Hx Reproductive Disorders: Yes (VAGINAL ITCH STILL)





- PSYCHIATRIC


Hx Anxiety: Yes


Hx Depression: Yes


Hx Substance Use: Yes (see HPI)





- SURGICAL HISTORY


Hx Cholecystectomy: Yes


Hx Gastric Bypass Surgery: Yes





- ANESTHESIA


Hx Anesthesia: Yes


Hx Anesthesia Reactions: No


Hx Malignant Hyperthermia: No





Meds


Allergies/Adverse Reactions: 


                                    Allergies











Allergy/AdvReac Type Severity Reaction Status Date / Time


 


levofloxacin [From Levaquin] Allergy  RASH Verified 10/11/18 16:30














- Medications


Medications: 


                               Current Medications





Amlodipine Besylate (Norvasc)  5 mg PO DAILY [facility]


   Last Admin: 10/13/18 09:13 Dose:  5 mg


Aspirin (Ecotrin)  81 mg PO DAILY [facility]


   Last Admin: 10/13/18 09:13 Dose:  81 mg


Atorvastatin Calcium (Lipitor)  40 mg PO DIN [facility]


   Last Admin: 10/12/18 17:55 Dose:  40 mg


Clonazepam (Klonopin)  1 mg PO BID [facility]; Protocol


   Last Admin: 10/13/18 09:14 Dose:  1 mg


Folic Acid (Folic Acid)  1 mg PO DAILY [facility]


   Last Admin: 10/13/18 09:13 Dose:  1 mg


Gabapentin (Neurontin)  300 mg PO TID [facility]; Protocol


   Last Admin: 10/13/18 09:12 Dose:  300 mg


Hydroxyzine Pamoate (Vistaril)  50 mg PO Q8 PRN; Protocol


   PRN Reason: Anxiety


   Last Admin: 10/13/18 07:37 Dose:  50 mg


Ibuprofen (Motrin Tab)  600 mg PO Q6H PRN


   PRN Reason: Pain, moderate (4-7)


   Last Admin: 10/12/18 22:27 Dose:  600 mg


Insulin Human Regular (Humulin R Low)  [facility]; Protocol


   Last Admin: 10/13/18 08:33 Dose:  Not Given


Lorazepam (Ativan)  1 mg PO Q6H PRN; Protocol


   PRN Reason: Anxiety


   Last Admin: 10/11/18 18:20 Dose:  1 mg


Lorazepam (Ativan)  1 mg IM Q6H PRN; Protocol


   PRN Reason: Agitation


Losartan Potassium (Cozaar)  100 mg PO DAILY [facility]


   Last Admin: 10/13/18 09:14 Dose:  100 mg


Multivitamins (Thera Tab)  1 tab PO 0800 [facility]


   Last Admin: 10/13/18 09:14 Dose:  1 tab


Pantoprazole Sodium (Protonix Ec Tab)  40 mg PO 0600 [facility]


   Last Admin: 10/13/18 07:11 Dose:  40 mg


Venlafaxine HCl (Effexor)  37.5 mg PO DAILY [facility]


   Last Admin: 10/13/18 09:13 Dose:  37.5 mg


Zaleplon (Sonata)  5 mg PO HS PRN


   PRN Reason: Insomnia


   Last Admin: 10/11/18 22:23 Dose:  5 mg











Physical Exam





- Constitutional


Appears: Well, No Acute Distress





- Head Exam


Head Exam: ATRAUMATIC, NORMAL INSPECTION





- Eye Exam


Eye Exam: EOMI.  absent: Conjunctival injection, Scleral icterus





- ENT Exam


ENT Exam: Mucous Membranes Moist, Normal External Ear Exam.  absent: Mucous 

Membranes Dry





- Respiratory Exam


Respiratory Exam: Clear to Auscultation Bilateral, NORMAL BREATHING PATTERN.  

absent: Accessory Muscle Use





- Cardiovascular Exam


Cardiovascular Exam: REGULAR RHYTHM, RRR





- GI/Abdominal Exam


GI & Abdominal Exam: Normal Bowel Sounds, Soft.  absent: Diminished Bowel 

Sounds, Distended, Firm, Guarding, Mass, Organomegaly, Pulsatile Mass, Rebound, 

Rigid, Tenderness





- Rectal Exam


Rectal Exam: Deferred





- Extremities Exam


Extremities exam: Positive for: normal inspection, pedal edema (trace bilateral)





- Neurological Exam


Neurological exam: Alert, CN II-XII Intact





- Skin


Skin Exam: Normal Color, Warm





Results





- Vital Signs


Recent Vital Signs: 


                                Last Vital Signs











Temp  98.3 F   10/13/18 07:24


 


Pulse  59 L  10/13/18 09:13


 


Resp  20   10/13/18 07:24


 


BP  122/80   10/13/18 09:13


 


Pulse Ox  97   10/11/18 15:09














- Labs


Result Diagrams: 


                                 10/13/18 08:00





                                 10/13/18 08:00


Labs: 


                         Laboratory Results - last 24 hr











  10/12/18 10/13/18 10/13/18





  07:55 08:00 08:00


 


WBC   4.9 


 


RBC   3.70 


 


Hgb   11.2 L 


 


Hct   35.1 L 


 


MCV   94.9 


 


MCH   30.3 


 


MCHC   31.9 


 


RDW   12.9 


 


Plt Count   235 


 


MPV   10.4 


 


Sodium    139


 


Potassium    4.2


 


Chloride    105


 


Carbon Dioxide    27


 


Anion Gap    12


 


BUN    16


 


Creatinine    0.6 L


 


Est GFR ( Amer)    > 60


 


Est GFR (Non-Af Amer)    > 60


 


Random Glucose    99


 


Calcium    8.8


 


Total Bilirubin    0.3


 


AST    21


 


ALT    24


 


Alkaline Phosphatase    130 H


 


Total Protein    6.6


 


Albumin    3.4


 


Globulin    3.1


 


Albumin/Globulin Ratio    1.1


 


RPR  Nonreactive  














Assessment & Plan





- Assessment and Plan (Free Text)


Assessment: 





57 yo Hisp Female with "colitis" and h/o gastric bypass, cholecystectomy 

admitted to psych for depression.  GI consulted for diarrhea.





# Chronic diarrhea: ~10 years per pt report since gastric bypass and 

cholecystectomy.  No red flag symptoms such as weight loss or signs of bleeding.

 Reassuringly, she reports complete relief with loperamide.  As far as cause, b

ile induced should have resolved about 6 months post-op.  "Colitis" history 

could suggest possible microscopic colitis which would present with chronic 

diarrhea.


Plan: 





- Start loperamide


- F/u with outpatient GI doctors at [facility] [place] for further treatment of "colitis"


- Supportive care


- Restarted home ondansetron per patient request





Thank you for the consult.  Will sign off.  Please page if questions.





Pt discused with Dr. Dai.  See attestation for further recs/changes.





<Abelardo Dai - Last Filed: 10/13/18 16:03>





Meds





- Medications


Medications: 


                               Current Medications





Amlodipine Besylate (Norvasc)  5 mg PO DAILY [facility]


   Last Admin: 10/13/18 09:13 Dose:  5 mg


Aspirin (Ecotrin)  81 mg PO DAILY [facility]


   Last Admin: 10/13/18 09:13 Dose:  81 mg


Atorvastatin Calcium (Lipitor)  40 mg PO DIN [facility]


   Last Admin: 10/12/18 17:55 Dose:  40 mg


Clonazepam (Klonopin)  1 mg PO BID [facility]; Protocol


   Last Admin: 10/13/18 09:14 Dose:  1 mg


Folic Acid (Folic Acid)  1 mg PO DAILY [facility]


   Last Admin: 10/13/18 09:13 Dose:  1 mg


Gabapentin (Neurontin)  300 mg PO TID [facility]; Protocol


   Last Admin: 10/13/18 13:16 Dose:  300 mg


Hydroxyzine Pamoate (Vistaril)  50 mg PO Q8 PRN; Protocol


   PRN Reason: Anxiety


   Last Admin: 10/13/18 07:37 Dose:  50 mg


Ibuprofen (Motrin Tab)  600 mg PO Q6H PRN


   PRN Reason: Pain, moderate (4-7)


   Last Admin: 10/12/18 22:27 Dose:  600 mg


Insulin Human Regular (Humulin R Low)  [facility]; Protocol


   Last Admin: 10/13/18 13:02 Dose:  Not Given


Loperamide HCl (Imodium)  4 mg PO DAILY [facility]


   Last Admin: 10/13/18 13:16 Dose:  4 mg


Lorazepam (Ativan)  1 mg PO Q6H PRN; Protocol


   PRN Reason: Anxiety


   Last Admin: 10/11/18 18:20 Dose:  1 mg


Lorazepam (Ativan)  1 mg IM Q6H PRN; Protocol


   PRN Reason: Agitation


Losartan Potassium (Cozaar)  100 mg PO DAILY [facility]


   Last Admin: 10/13/18 09:14 Dose:  100 mg


Multivitamins (Thera Tab)  1 tab PO 0800 [facility]


   Last Admin: 10/13/18 09:14 Dose:  1 tab


Ondansetron HCl (Zofran Tab)  4 mg PO Q8H PRN


   PRN Reason: Nausea/Vomiting


Pantoprazole Sodium (Protonix Ec Tab)  40 mg PO 0600 [facility]


   Last Admin: 10/13/18 07:11 Dose:  40 mg


Quetiapine Fumarate (Seroquel)  25 mg PO HS [facility]; Protocol


Venlafaxine HCl (Effexor)  37.5 mg PO DAILY [facility]


   Last Admin: 10/13/18 09:13 Dose:  37.5 mg











Results





- Vital Signs


Recent Vital Signs: 


                                Last Vital Signs











Temp  98.3 F   10/13/18 07:24


 


Pulse  59 L  10/13/18 09:13


 


Resp  20   10/13/18 07:24


 


BP  122/80   10/13/18 09:13


 


Pulse Ox  97   10/11/18 15:09














- Labs


Result Diagrams: 


                                 10/13/18 08:00





                                 10/13/18 08:00


Labs: 


                         Laboratory Results - last 24 hr











  10/12/18 10/12/18 10/12/18





  07:55 12:32 16:07


 


WBC   


 


RBC   


 


Hgb   


 


Hct   


 


MCV   


 


MCH   


 


MCHC   


 


RDW   


 


Plt Count   


 


MPV   


 


Sodium   


 


Potassium   


 


Chloride   


 


Carbon Dioxide   


 


Anion Gap   


 


BUN   


 


Creatinine   


 


Est GFR ( Amer)   


 


Est GFR (Non-Af Amer)   


 


POC Glucose (mg/dL)   85  108


 


Random Glucose   


 


Calcium   


 


Total Bilirubin   


 


AST   


 


ALT   


 


Alkaline Phosphatase   


 


Total Protein   


 


Albumin   


 


Globulin   


 


Albumin/Globulin Ratio   


 


RPR  Nonreactive  














  10/12/18 10/13/18 10/13/18





  21:04 07:16 08:00


 


WBC    4.9


 


RBC    3.70


 


Hgb    11.2 L


 


Hct    35.1 L


 


MCV    94.9


 


MCH    30.3


 


MCHC    31.9


 


RDW    12.9


 


Plt Count    235


 


MPV    10.4


 


Sodium   


 


Potassium   


 


Chloride   


 


Carbon Dioxide   


 


Anion Gap   


 


BUN   


 


Creatinine   


 


Est GFR ( Amer)   


 


Est GFR (Non-Af Amer)   


 


POC Glucose (mg/dL)  89  94 


 


Random Glucose   


 


Calcium   


 


Total Bilirubin   


 


AST   


 


ALT   


 


Alkaline Phosphatase   


 


Total Protein   


 


Albumin   


 


Globulin   


 


Albumin/Globulin Ratio   


 


RPR   














  10/13/18





  08:00


 


WBC 


 


RBC 


 


Hgb 


 


Hct 


 


MCV 


 


MCH 


 


MCHC 


 


RDW 


 


Plt Count 


 


MPV 


 


Sodium  139


 


Potassium  4.2


 


Chloride  105


 


Carbon Dioxide  27


 


Anion Gap  12


 


BUN  16


 


Creatinine  0.6 L


 


Est GFR ( Amer)  > 60


 


Est GFR (Non-Af Amer)  > 60


 


POC Glucose (mg/dL) 


 


Random Glucose  99


 


Calcium  8.8


 


Total Bilirubin  0.3


 


AST  21


 


ALT  24


 


Alkaline Phosphatase  130 H


 


Total Protein  6.6


 


Albumin  3.4


 


Globulin  3.1


 


Albumin/Globulin Ratio  1.1


 


RPR 














Attending/Attestation





- Attestation


I have personally seen and examined this patient.: Yes


I have fully participated in the care of the patient.: Yes


I have reviewed all pertinent clinical information: Yes


Notes (Text): 





10/13/18 16:00


Chart reviewed. Patient interviewed and examined. Assessment and 

recommendations, as above, discussed with Dr. Metcalf.
Resident/Fellow